# Patient Record
Sex: FEMALE | Race: ASIAN | NOT HISPANIC OR LATINO
[De-identification: names, ages, dates, MRNs, and addresses within clinical notes are randomized per-mention and may not be internally consistent; named-entity substitution may affect disease eponyms.]

---

## 2017-02-17 ENCOUNTER — APPOINTMENT (OUTPATIENT)
Dept: ORTHOPEDIC SURGERY | Facility: CLINIC | Age: 72
End: 2017-02-17

## 2017-02-17 VITALS — BODY MASS INDEX: 28.02 KG/M2 | WEIGHT: 139 LBS | HEIGHT: 59 IN

## 2017-02-17 VITALS — DIASTOLIC BLOOD PRESSURE: 76 MMHG | HEART RATE: 78 BPM | SYSTOLIC BLOOD PRESSURE: 136 MMHG

## 2017-02-17 DIAGNOSIS — Z78.9 OTHER SPECIFIED HEALTH STATUS: ICD-10-CM

## 2017-02-17 DIAGNOSIS — Z86.79 PERSONAL HISTORY OF OTHER DISEASES OF THE CIRCULATORY SYSTEM: ICD-10-CM

## 2017-02-17 DIAGNOSIS — Z96.652 PRESENCE OF LEFT ARTIFICIAL KNEE JOINT: ICD-10-CM

## 2017-02-17 DIAGNOSIS — Z82.61 FAMILY HISTORY OF ARTHRITIS: ICD-10-CM

## 2017-02-17 DIAGNOSIS — E78.00 PURE HYPERCHOLESTEROLEMIA, UNSPECIFIED: ICD-10-CM

## 2017-02-17 DIAGNOSIS — Z96.651 PRESENCE OF RIGHT ARTIFICIAL KNEE JOINT: ICD-10-CM

## 2017-02-17 DIAGNOSIS — Z86.39 PERSONAL HISTORY OF OTHER ENDOCRINE, NUTRITIONAL AND METABOLIC DISEASE: ICD-10-CM

## 2017-02-17 DIAGNOSIS — Z56.0 UNEMPLOYMENT, UNSPECIFIED: ICD-10-CM

## 2017-02-17 RX ORDER — CALCIUM ACETATE 667 MG/1
667 CAPSULE ORAL
Qty: 270 | Refills: 0 | Status: ACTIVE | COMMUNITY
Start: 2017-01-13

## 2017-02-17 RX ORDER — CLOBETASOL PROPIONATE 0.5 MG/G
0.05 CREAM TOPICAL
Qty: 60 | Refills: 0 | Status: ACTIVE | COMMUNITY
Start: 2016-10-04

## 2017-02-17 RX ORDER — ATORVASTATIN CALCIUM 80 MG/1
TABLET, FILM COATED ORAL
Refills: 0 | Status: ACTIVE | COMMUNITY

## 2017-02-17 RX ORDER — CLOPIDOGREL BISULFATE 300 MG/1
TABLET, FILM COATED ORAL
Refills: 0 | Status: ACTIVE | COMMUNITY

## 2017-02-17 RX ORDER — CALCITRIOL 0.5 UG/1
CAPSULE, LIQUID FILLED ORAL
Refills: 0 | Status: ACTIVE | COMMUNITY

## 2017-02-17 RX ORDER — EPOETIN ALFA 20000 [IU]/ML
SOLUTION INTRAVENOUS; SUBCUTANEOUS
Refills: 0 | Status: ACTIVE | COMMUNITY

## 2017-02-17 SDOH — ECONOMIC STABILITY - INCOME SECURITY: UNEMPLOYMENT, UNSPECIFIED: Z56.0

## 2018-08-01 ENCOUNTER — INPATIENT (INPATIENT)
Facility: HOSPITAL | Age: 73
LOS: 0 days | Discharge: HOME | End: 2018-08-02
Attending: INTERNAL MEDICINE | Admitting: INTERNAL MEDICINE
Payer: MEDICARE

## 2018-08-01 VITALS
DIASTOLIC BLOOD PRESSURE: 70 MMHG | RESPIRATION RATE: 20 BRPM | SYSTOLIC BLOOD PRESSURE: 155 MMHG | HEART RATE: 89 BPM | OXYGEN SATURATION: 99 % | TEMPERATURE: 99 F

## 2018-08-01 DIAGNOSIS — N18.9 CHRONIC KIDNEY DISEASE, UNSPECIFIED: ICD-10-CM

## 2018-08-01 DIAGNOSIS — N18.6 END STAGE RENAL DISEASE: ICD-10-CM

## 2018-08-01 DIAGNOSIS — Z95.1 PRESENCE OF AORTOCORONARY BYPASS GRAFT: Chronic | ICD-10-CM

## 2018-08-01 DIAGNOSIS — M25.50 PAIN IN UNSPECIFIED JOINT: ICD-10-CM

## 2018-08-01 DIAGNOSIS — D64.9 ANEMIA, UNSPECIFIED: ICD-10-CM

## 2018-08-01 DIAGNOSIS — I10 ESSENTIAL (PRIMARY) HYPERTENSION: ICD-10-CM

## 2018-08-01 LAB
ALBUMIN SERPL ELPH-MCNC: 2.7 G/DL — LOW (ref 3.3–5)
ALP SERPL-CCNC: 48 U/L — SIGNIFICANT CHANGE UP (ref 40–120)
ALT FLD-CCNC: 18 U/L — SIGNIFICANT CHANGE UP (ref 4–33)
ANISOCYTOSIS BLD QL: SLIGHT — SIGNIFICANT CHANGE UP
ANISOCYTOSIS BLD QL: SLIGHT — SIGNIFICANT CHANGE UP
APPEARANCE UR: SIGNIFICANT CHANGE UP
APTT BLD: 26.8 SEC — LOW (ref 27.5–37.4)
AST SERPL-CCNC: 17 U/L — SIGNIFICANT CHANGE UP (ref 4–32)
BASE EXCESS BLDV CALC-SCNC: 1.1 MMOL/L — SIGNIFICANT CHANGE UP
BASOPHILS # BLD AUTO: 0.02 K/UL — SIGNIFICANT CHANGE UP (ref 0–0.2)
BASOPHILS NFR BLD AUTO: 0.1 % — SIGNIFICANT CHANGE UP (ref 0–2)
BASOPHILS NFR SPEC: 0 % — SIGNIFICANT CHANGE UP (ref 0–2)
BASOPHILS NFR SPEC: 0 % — SIGNIFICANT CHANGE UP (ref 0–2)
BILIRUB SERPL-MCNC: < 0.2 MG/DL — LOW (ref 0.2–1.2)
BILIRUB UR-MCNC: NEGATIVE — SIGNIFICANT CHANGE UP
BLASTS # FLD: 0 % — SIGNIFICANT CHANGE UP (ref 0–0)
BLASTS # FLD: 0 % — SIGNIFICANT CHANGE UP (ref 0–0)
BLOOD GAS VENOUS - CREATININE: 7.11 MG/DL — HIGH (ref 0.5–1.3)
BLOOD UR QL VISUAL: HIGH
BUN SERPL-MCNC: 67 MG/DL — HIGH (ref 7–23)
CALCIUM SERPL-MCNC: 8.8 MG/DL — SIGNIFICANT CHANGE UP (ref 8.4–10.5)
CHLORIDE BLDV-SCNC: 100 MMOL/L — SIGNIFICANT CHANGE UP (ref 96–108)
CHLORIDE SERPL-SCNC: 95 MMOL/L — LOW (ref 98–107)
CO2 SERPL-SCNC: 24 MMOL/L — SIGNIFICANT CHANGE UP (ref 22–31)
COLOR SPEC: SIGNIFICANT CHANGE UP
CREAT SERPL-MCNC: 6.86 MG/DL — HIGH (ref 0.5–1.3)
CRP SERPL-MCNC: 77.2 MG/L — HIGH
EOSINOPHIL # BLD AUTO: 0.02 K/UL — SIGNIFICANT CHANGE UP (ref 0–0.5)
EOSINOPHIL NFR BLD AUTO: 0.1 % — SIGNIFICANT CHANGE UP (ref 0–6)
EOSINOPHIL NFR FLD: 0.9 % — SIGNIFICANT CHANGE UP (ref 0–6)
EOSINOPHIL NFR FLD: 0.9 % — SIGNIFICANT CHANGE UP (ref 0–6)
GAS PNL BLDV: 132 MMOL/L — LOW (ref 136–146)
GIANT PLATELETS BLD QL SMEAR: PRESENT — SIGNIFICANT CHANGE UP
GIANT PLATELETS BLD QL SMEAR: PRESENT — SIGNIFICANT CHANGE UP
GLUCOSE BLDC GLUCOMTR-MCNC: 230 MG/DL — HIGH (ref 70–99)
GLUCOSE BLDV-MCNC: 85 — SIGNIFICANT CHANGE UP (ref 70–99)
GLUCOSE SERPL-MCNC: 84 MG/DL — SIGNIFICANT CHANGE UP (ref 70–99)
GLUCOSE UR-MCNC: 50 — SIGNIFICANT CHANGE UP
HCO3 BLDV-SCNC: 25 MMOL/L — SIGNIFICANT CHANGE UP (ref 20–27)
HCT VFR BLD CALC: 33.4 % — LOW (ref 34.5–45)
HCT VFR BLD CALC: 33.4 % — LOW (ref 34.5–45)
HCT VFR BLDV CALC: 31.3 % — LOW (ref 34.5–45)
HGB BLD-MCNC: 10.3 G/DL — LOW (ref 11.5–15.5)
HGB BLD-MCNC: 10.3 G/DL — LOW (ref 11.5–15.5)
HGB BLDV-MCNC: 10.1 G/DL — LOW (ref 11.5–15.5)
IMM GRANULOCYTES # BLD AUTO: 0.12 # — SIGNIFICANT CHANGE UP
IMM GRANULOCYTES NFR BLD AUTO: 0.8 % — SIGNIFICANT CHANGE UP (ref 0–1.5)
INR BLD: 1.11 — SIGNIFICANT CHANGE UP (ref 0.88–1.17)
KETONES UR-MCNC: NEGATIVE — SIGNIFICANT CHANGE UP
LACTATE BLDV-MCNC: 1.4 MMOL/L — SIGNIFICANT CHANGE UP (ref 0.5–2)
LEUKOCYTE ESTERASE UR-ACNC: HIGH
LYMPHOCYTES # BLD AUTO: 1.4 K/UL — SIGNIFICANT CHANGE UP (ref 1–3.3)
LYMPHOCYTES # BLD AUTO: 9.6 % — LOW (ref 13–44)
LYMPHOCYTES NFR SPEC AUTO: 3.5 % — LOW (ref 13–44)
LYMPHOCYTES NFR SPEC AUTO: 3.5 % — LOW (ref 13–44)
MACROCYTES BLD QL: SIGNIFICANT CHANGE UP
MACROCYTES BLD QL: SIGNIFICANT CHANGE UP
MCHC RBC-ENTMCNC: 29.1 PG — SIGNIFICANT CHANGE UP (ref 27–34)
MCHC RBC-ENTMCNC: 29.1 PG — SIGNIFICANT CHANGE UP (ref 27–34)
MCHC RBC-ENTMCNC: 30.8 % — LOW (ref 32–36)
MCHC RBC-ENTMCNC: 30.8 % — LOW (ref 32–36)
MCV RBC AUTO: 94.4 FL — SIGNIFICANT CHANGE UP (ref 80–100)
MCV RBC AUTO: 94.4 FL — SIGNIFICANT CHANGE UP (ref 80–100)
METAMYELOCYTES # FLD: 0 % — SIGNIFICANT CHANGE UP (ref 0–1)
METAMYELOCYTES # FLD: 0 % — SIGNIFICANT CHANGE UP (ref 0–1)
MONOCYTES # BLD AUTO: 1.81 K/UL — HIGH (ref 0–0.9)
MONOCYTES NFR BLD AUTO: 12.5 % — SIGNIFICANT CHANGE UP (ref 2–14)
MONOCYTES NFR BLD: 7.8 % — SIGNIFICANT CHANGE UP (ref 2–9)
MONOCYTES NFR BLD: 7.8 % — SIGNIFICANT CHANGE UP (ref 2–9)
MUCOUS THREADS # UR AUTO: SIGNIFICANT CHANGE UP
MYELOCYTES NFR BLD: 0 % — SIGNIFICANT CHANGE UP (ref 0–0)
MYELOCYTES NFR BLD: 0 % — SIGNIFICANT CHANGE UP (ref 0–0)
NEUTROPHIL AB SER-ACNC: 86.9 % — HIGH (ref 43–77)
NEUTROPHIL AB SER-ACNC: 86.9 % — HIGH (ref 43–77)
NEUTROPHILS # BLD AUTO: 11.14 K/UL — HIGH (ref 1.8–7.4)
NEUTROPHILS NFR BLD AUTO: 76.9 % — SIGNIFICANT CHANGE UP (ref 43–77)
NEUTS BAND # BLD: 0.9 % — SIGNIFICANT CHANGE UP (ref 0–6)
NEUTS BAND # BLD: 0.9 % — SIGNIFICANT CHANGE UP (ref 0–6)
NITRITE UR-MCNC: NEGATIVE — SIGNIFICANT CHANGE UP
NON-SQ EPI CELLS # UR AUTO: 7 — SIGNIFICANT CHANGE UP
NRBC # FLD: 0.04 — SIGNIFICANT CHANGE UP
NRBC # FLD: 0.04 — SIGNIFICANT CHANGE UP
OTHER - HEMATOLOGY %: 0 — SIGNIFICANT CHANGE UP
OTHER - HEMATOLOGY %: 0 — SIGNIFICANT CHANGE UP
OVALOCYTES BLD QL SMEAR: SLIGHT — SIGNIFICANT CHANGE UP
OVALOCYTES BLD QL SMEAR: SLIGHT — SIGNIFICANT CHANGE UP
PCO2 BLDV: 40 MMHG — LOW (ref 41–51)
PH BLDV: 7.41 PH — SIGNIFICANT CHANGE UP (ref 7.32–7.43)
PH UR: 7 — SIGNIFICANT CHANGE UP (ref 4.6–8)
PLATELET # BLD AUTO: 282 K/UL — SIGNIFICANT CHANGE UP (ref 150–400)
PLATELET # BLD AUTO: 282 K/UL — SIGNIFICANT CHANGE UP (ref 150–400)
PLATELET COUNT - ESTIMATE: NORMAL — SIGNIFICANT CHANGE UP
PLATELET COUNT - ESTIMATE: NORMAL — SIGNIFICANT CHANGE UP
PMV BLD: 9.7 FL — SIGNIFICANT CHANGE UP (ref 7–13)
PMV BLD: 9.7 FL — SIGNIFICANT CHANGE UP (ref 7–13)
PO2 BLDV: 54 MMHG — HIGH (ref 35–40)
POIKILOCYTOSIS BLD QL AUTO: SLIGHT — SIGNIFICANT CHANGE UP
POIKILOCYTOSIS BLD QL AUTO: SLIGHT — SIGNIFICANT CHANGE UP
POLYCHROMASIA BLD QL SMEAR: SIGNIFICANT CHANGE UP
POLYCHROMASIA BLD QL SMEAR: SIGNIFICANT CHANGE UP
POTASSIUM BLDV-SCNC: 3.3 MMOL/L — LOW (ref 3.4–4.5)
POTASSIUM SERPL-MCNC: 3.8 MMOL/L — SIGNIFICANT CHANGE UP (ref 3.5–5.3)
POTASSIUM SERPL-SCNC: 3.8 MMOL/L — SIGNIFICANT CHANGE UP (ref 3.5–5.3)
PROMYELOCYTES # FLD: 0 % — SIGNIFICANT CHANGE UP (ref 0–0)
PROMYELOCYTES # FLD: 0 % — SIGNIFICANT CHANGE UP (ref 0–0)
PROT SERPL-MCNC: 5.5 G/DL — LOW (ref 6–8.3)
PROT UR-MCNC: 500 MG/DL — HIGH
PROTHROM AB SERPL-ACNC: 12.4 SEC — SIGNIFICANT CHANGE UP (ref 9.8–13.1)
RBC # BLD: 3.54 M/UL — LOW (ref 3.8–5.2)
RBC # BLD: 3.54 M/UL — LOW (ref 3.8–5.2)
RBC # FLD: 20.2 % — HIGH (ref 10.3–14.5)
RBC # FLD: 20.2 % — HIGH (ref 10.3–14.5)
RBC CASTS # UR COMP ASSIST: SIGNIFICANT CHANGE UP (ref 0–?)
SAO2 % BLDV: 83.7 % — SIGNIFICANT CHANGE UP (ref 60–85)
SODIUM SERPL-SCNC: 137 MMOL/L — SIGNIFICANT CHANGE UP (ref 135–145)
SP GR SPEC: 1.01 — SIGNIFICANT CHANGE UP (ref 1–1.04)
SQUAMOUS # UR AUTO: SIGNIFICANT CHANGE UP
UROBILINOGEN FLD QL: NORMAL MG/DL — SIGNIFICANT CHANGE UP
VARIANT LYMPHS # BLD: 0 % — SIGNIFICANT CHANGE UP
VARIANT LYMPHS # BLD: 0 % — SIGNIFICANT CHANGE UP
WBC # BLD: 14.45 K/UL — HIGH (ref 3.8–10.5)
WBC # BLD: 14.45 K/UL — HIGH (ref 3.8–10.5)
WBC # FLD AUTO: 14.45 K/UL — HIGH (ref 3.8–10.5)
WBC # FLD AUTO: 14.45 K/UL — HIGH (ref 3.8–10.5)
WBC UR QL: >50 — HIGH (ref 0–?)

## 2018-08-01 PROCEDURE — 73110 X-RAY EXAM OF WRIST: CPT | Mod: 26,LT

## 2018-08-01 PROCEDURE — 71045 X-RAY EXAM CHEST 1 VIEW: CPT | Mod: 26

## 2018-08-01 PROCEDURE — 99223 1ST HOSP IP/OBS HIGH 75: CPT | Mod: GC

## 2018-08-01 PROCEDURE — 73610 X-RAY EXAM OF ANKLE: CPT | Mod: 26,LT

## 2018-08-01 PROCEDURE — 99222 1ST HOSP IP/OBS MODERATE 55: CPT | Mod: GC

## 2018-08-01 RX ORDER — GENTAMICIN SULFATE 0.1 %
1 OINTMENT (GRAM) TOPICAL
Qty: 0 | Refills: 0 | Status: DISCONTINUED | OUTPATIENT
Start: 2018-08-01 | End: 2018-08-02

## 2018-08-01 RX ORDER — DEXTROSE 50 % IN WATER 50 %
12.5 SYRINGE (ML) INTRAVENOUS ONCE
Qty: 0 | Refills: 0 | Status: DISCONTINUED | OUTPATIENT
Start: 2018-08-01 | End: 2018-08-02

## 2018-08-01 RX ORDER — INSULIN LISPRO 100/ML
VIAL (ML) SUBCUTANEOUS
Qty: 0 | Refills: 0 | Status: DISCONTINUED | OUTPATIENT
Start: 2018-08-01 | End: 2018-08-02

## 2018-08-01 RX ORDER — GLUCAGON INJECTION, SOLUTION 0.5 MG/.1ML
1 INJECTION, SOLUTION SUBCUTANEOUS ONCE
Qty: 0 | Refills: 0 | Status: DISCONTINUED | OUTPATIENT
Start: 2018-08-01 | End: 2018-08-02

## 2018-08-01 RX ORDER — DEXTROSE 50 % IN WATER 50 %
25 SYRINGE (ML) INTRAVENOUS ONCE
Qty: 0 | Refills: 0 | Status: DISCONTINUED | OUTPATIENT
Start: 2018-08-01 | End: 2018-08-02

## 2018-08-01 RX ORDER — DEXTROSE 50 % IN WATER 50 %
15 SYRINGE (ML) INTRAVENOUS ONCE
Qty: 0 | Refills: 0 | Status: DISCONTINUED | OUTPATIENT
Start: 2018-08-01 | End: 2018-08-02

## 2018-08-01 RX ORDER — INSULIN LISPRO 100/ML
VIAL (ML) SUBCUTANEOUS AT BEDTIME
Qty: 0 | Refills: 0 | Status: DISCONTINUED | OUTPATIENT
Start: 2018-08-01 | End: 2018-08-02

## 2018-08-01 RX ORDER — OXYCODONE AND ACETAMINOPHEN 5; 325 MG/1; MG/1
1 TABLET ORAL ONCE
Qty: 0 | Refills: 0 | Status: DISCONTINUED | OUTPATIENT
Start: 2018-08-01 | End: 2018-08-01

## 2018-08-01 RX ORDER — SODIUM CHLORIDE 9 MG/ML
1000 INJECTION, SOLUTION INTRAVENOUS
Qty: 0 | Refills: 0 | Status: DISCONTINUED | OUTPATIENT
Start: 2018-08-01 | End: 2018-08-02

## 2018-08-01 RX ADMIN — OXYCODONE AND ACETAMINOPHEN 1 TABLET(S): 5; 325 TABLET ORAL at 10:36

## 2018-08-01 RX ADMIN — Medication 15 MILLIGRAM(S): at 22:51

## 2018-08-01 RX ADMIN — Medication 1 APPLICATION(S): at 18:29

## 2018-08-01 RX ADMIN — OXYCODONE AND ACETAMINOPHEN 1 TABLET(S): 5; 325 TABLET ORAL at 09:22

## 2018-08-01 NOTE — CONSULT NOTE ADULT - SUBJECTIVE AND OBJECTIVE BOX
[facility] [place] [facility]OLOGY [facility]ACT[facility]   MD ROSITA APODACA MD ANGELA WONG, PA    TEL:  OFFICE: [phone]  DR SCOTT CELL: [phone]  DR. JOHNSON CELL: [phone]  KESHA HOUSTON CELL: [phone]      HPI:  72 y/o female with PMH ESRD on peritoneal dialysis, HTN, HLD, DM, hypothyroid, CAD presenting with body pain. Pt states that she has been having body pain for the last two months, started in shoulder, went to back, now in her left hand and left ankle, with pain migrating to right ankle this AM, which is why she came in today. Cannot ambulate due to pain, can not do PD exchange sec to pain. Patient went to PCP for her joint pains and he started her on Prednisone for presumed rheumatoid arthritis, per patient. Endorses episode of N/V/D 2 weeks ago for which she was prescribed amoxicillin, which has since resolved. Endorses subjective fever and chills. Denies chest pain, SOB, current N/V/D, abdominal pain. No recent travel or sick contacts    Allergies:  Cipro (Unknown)  Diovan (Unknown)      PAST MEDICAL & SURGICAL HISTORY:  ESRD on peritoneal dialysis  Acid reflux  Hypertension  Dyslipidemia  Diabetes mellitus  CAD (coronary artery disease)  S/P CABG (coronary artery bypass graft): in   H/O: hysterectomy  History of total knee replacement b/l  After cataract, bilateral      Home Medications Reviewed    Hospital Medications:   MEDICATIONS  (STANDING):      SOCIAL HISTORY:  Denies ETOh, Smoking,     FAMILY HISTORY:      REVIEW OF SYSTEMS:  CONSTITUTIONAL: No weakness, fevers or chills  EYES/ENT: No visual changes;  No vertigo or throat pain   NECK: No pain or stiffness  RESPIRATORY: No cough, wheezing, hemoptysis; No shortness of breath  CARDIOVASCULAR: No chest pain or palpitations.  GASTROINTESTINAL: No abdominal or epigastric pain. No nausea, vomiting, or hematemesis; No diarrhea or constipation. No melena or hematochezia.  GENITOURINARY: No dysuria, frequency, foamy urine, urinary urgency, incontinence or hematuria  NEUROLOGICAL: No numbness or weakness  MUSCULOSKELETAL: SEE hpi  SKIN: No itching, burning, rashes, or lesions   VASCULAR: No bilateral lower extremity edema.   All other review of systems is negative unless indicated above.    VITALS:  T(F): 100 (18 @ 08:39), Max: 100 (18 @ 08:39)  HR: 78 (18 @ 08:39)  BP: 166/58 (18 @ 08:39)  RR: 16 (18 @ 08:39)  SpO2: 99% (18 @ 08:39)  Wt(kg): --        PHYSICAL EXAM:  Constitutional: NAD  HEENT: anicteric sclera, oropharynx clear, MMM  Neck: No JVD  Respiratory: CTAB, no wheezes, rales or rhonchi  Cardiovascular: S1, S2, RRR  Gastrointestinal: BS+, soft, NT/ND  Extremities: left ankle and wrist tenderness, left wrist in wrist guard. No peripheral edema  Neurological: A/O x 3, no focal deficits  Psychiatric: Normal mood, normal affect  : No CVA tenderness. No dyson.   Skin: No rashes  Vascular Access: +PD catheter in place    LABS:      137  |  95<L>  |  67<H>  ----------------------------<  84  3.8   |  24  |  6.86<H>    Ca    8.8      01 Aug 2018 09:07    TPro  5.5<L>  /  Alb  2.7<L>  /  TBili  < 0.2<L>  /  DBili      /  AST  17  /  ALT  18  /  AlkPhos  48      Creatinine Trend: 6.86 <--                        10.3   14.45 )-----------( 282      ( 01 Aug 2018 09:07 )             33.4     Urine Studies:  Urinalysis Basic - ( 01 Aug 2018 10:45 )    Color: PLYEL / Appearance: HAZY / S.011 / pH: 7.0  Gluc: 50 / Ketone: NEGATIVE  / Bili: NEGATIVE / Urobili: NORMAL mg/dL   Blood: TRACE / Protein: 500 mg/dL / Nitrite: NEGATIVE   Leuk Esterase: LARGE / RBC: 2-5 / WBC >50   Sq Epi: OCC / Non Sq Epi:  / Bacteria:           RADIOLOGY & ADDITIONAL STUDIES: [facility] [place] [facility]ACT[facility]   MD ROSITA APODACA MD ANGELA WONG, PA    TEL:  OFFICE: [phone]  DR SCOTT CELL: [phone]  DR. JOHNSON CELL: [phone]  KESHA HOUSTON CELL: [phone]      HPI:  74 y/o female with PMH ESRD on peritoneal dialysis, HTN, HLD, DM, hypothyroid, CAD presenting with body pain. Patient went to PCP for her joint pains and he started her on Prednisone for presumed rheumatoid arthritis, per patient. Endorses episode of N/V/D 2 weeks ago for which she was prescribed amoxicillin, which has since resolved. Endorses subjective fever and chills.    Nephrology consulted for ESRD management.  PT goes to [facility], has been on PD for 2 yrs. Pt home PD prescription is ( PD cycler for 9.5 hours, with 4 exchanges of alternating 1.5% and 2.5%). No Midday exchange. Denies discharge from PD catheter, or cloudy fluid.   Pt states she is also on epogen , q 2 weeks. last dose was last week.   Denies chest pain, SOB, current N/V/D, abdominal pain. No recent travel or sick contacts    Allergies:  Cipro (Unknown)  Diovan (Unknown)      PAST MEDICAL & SURGICAL HISTORY:  ESRD on peritoneal dialysis  Acid reflux  Hypertension  Dyslipidemia  Diabetes mellitus  CAD (coronary artery disease)  S/P CABG (coronary artery bypass graft): in   H/O: hysterectomy  History of total knee replacement b/l  After cataract, bilateral      Home Medications Reviewed    Hospital Medications:   MEDICATIONS  (STANDING):      SOCIAL HISTORY:  Denies ETOh, Smoking,     FAMILY HISTORY:      REVIEW OF SYSTEMS:  CONSTITUTIONAL: No weakness, fevers or chills  EYES/ENT: No visual changes;  No vertigo or throat pain   NECK: No pain or stiffness  RESPIRATORY: No cough, wheezing, hemoptysis; No shortness of breath  CARDIOVASCULAR: No chest pain or palpitations.  GASTROINTESTINAL: No abdominal or epigastric pain. No nausea, vomiting, or hematemesis; No diarrhea or constipation. No melena or hematochezia.  GENITOURINARY: No dysuria, frequency, foamy urine, urinary urgency, incontinence or hematuria  NEUROLOGICAL: No numbness or weakness  MUSCULOSKELETAL: SEE hpi  SKIN: No itching, burning, rashes, or lesions   VASCULAR: No bilateral lower extremity edema.   All other review of systems is negative unless indicated above.    VITALS:  T(F): 100 (18 @ 08:39), Max: 100 (18 @ 08:39)  HR: 78 (18 @ 08:39)  BP: 166/58 (18 @ 08:39)  RR: 16 (18 @ 08:39)  SpO2: 99% (18 @ 08:39)  Wt(kg): --        PHYSICAL EXAM:  Constitutional: NAD  HEENT: anicteric sclera, oropharynx clear, MMM  Neck: No JVD  Respiratory: CTAB, no wheezes, rales or rhonchi  Cardiovascular: S1, S2, RRR  Gastrointestinal: BS+, soft, NT/ND  Extremities: left ankle and wrist tenderness, left wrist in wrist guard. No peripheral edema  Neurological: A/O x 3, no focal deficits  Psychiatric: Normal mood, normal affect  : No CVA tenderness. No dyson.   Skin: No rashes  Vascular Access: +PD catheter in place, clean, no discharge     LABS:      137  |  95<L>  |  67<H>  ----------------------------<  84  3.8   |  24  |  6.86<H>    Ca    8.8      01 Aug 2018 09:07    TPro  5.5<L>  /  Alb  2.7<L>  /  TBili  < 0.2<L>  /  DBili      /  AST  17  /  ALT  18  /  AlkPhos  48      Creatinine Trend: 6.86 <--                        10.3   14.45 )-----------( 282      ( 01 Aug 2018 09:07 )             33.4     Urine Studies:  Urinalysis Basic - ( 01 Aug 2018 10:45 )    Color: PLYEL / Appearance: HAZY / S.011 / pH: 7.0  Gluc: 50 / Ketone: NEGATIVE  / Bili: NEGATIVE / Urobili: NORMAL mg/dL   Blood: TRACE / Protein: 500 mg/dL / Nitrite: NEGATIVE   Leuk Esterase: LARGE / RBC: 2-5 / WBC >50   Sq Epi: OCC / Non Sq Epi:  / Bacteria:           RADIOLOGY & ADDITIONAL STUDIES: [facility] [place] [facility]OLOGY PRACT[facility]   MD ROSITA APODACA MD ANGELA WONG, PA    TEL:  OFFICE: [phone]  DR SCOTT CELL: [phone]  DR. JOHNSON CELL: [phone]  KESHA HOUSTON CELL: [phone]      HPI:  74 y/o female with PMH ESRD on peritoneal dialysis, HTN, HLD, DM, hypothyroid, CAD presenting with body pain. Patient went to PCP for her joint pains and he started her on Prednisone for presumed rheumatoid arthritis, per patient. Endorses episode of N/V/D 2 weeks ago for which she was prescribed amoxicillin, which has since resolved. Endorses subjective fever and chills.    Nephrology consulted for ESRD management.  PT goes to [facility], has been on PD for 2 yrs. Pt home PD prescription is ( PD cycler for 9.5 hours, with 4 exchanges of alternating 1.5% and 2.5%). No Midday exchange. Denies discharge from PD catheter, or cloudy fluid.   Pt states she is also on epogen , q 2 weeks. last dose was last week.   Denies chest pain, SOB, current N/V/D, abdominal pain. No recent travel or sick contacts    Allergies:  Cipro (Unknown)  Diovan (Unknown)      PAST MEDICAL & SURGICAL HISTORY:  ESRD on peritoneal dialysis  Acid reflux  Hypertension  Dyslipidemia  Diabetes mellitus  CAD (coronary artery disease)  S/P CABG (coronary artery bypass graft): in   H/O: hysterectomy  History of total knee replacement b/l  After cataract, bilateral      Home Medications Reviewed    Hospital Medications:   MEDICATIONS  (STANDING):      SOCIAL HISTORY:  Denies ETOh, Smoking,     FAMILY HISTORY:      REVIEW OF SYSTEMS:  CONSTITUTIONAL: No weakness, fevers or chills  EYES/ENT: No visual changes;  No vertigo or throat pain   NECK: No pain or stiffness  RESPIRATORY: No cough, wheezing, hemoptysis; No shortness of breath  CARDIOVASCULAR: No chest pain or palpitations.  GASTROINTESTINAL: No abdominal or epigastric pain. No nausea, vomiting, or hematemesis; No diarrhea or constipation. No melena or hematochezia.  GENITOURINARY: No dysuria, frequency, foamy urine, urinary urgency, incontinence or hematuria  NEUROLOGICAL: No numbness or weakness  MUSCULOSKELETAL: SEE hpi  SKIN: No itching, burning, rashes, or lesions   VASCULAR: No bilateral lower extremity edema.   All other review of systems is negative unless indicated above.    VITALS:  T(F): 100 (18 @ 08:39), Max: 100 (18 @ 08:39)  HR: 78 (18 @ 08:39)  BP: 166/58 (18 @ 08:39)  RR: 16 (18 @ 08:39)  SpO2: 99% (18 @ 08:39)  Wt(kg): --        PHYSICAL EXAM:  Constitutional: NAD  HEENT: anicteric sclera, oropharynx clear, MMM  Neck: No JVD  Respiratory: CTAB, no wheezes, rales or rhonchi  Cardiovascular: S1, S2, RRR  Gastrointestinal: BS+, soft, NT/ND  Extremities: left ankle and wrist tenderness, left wrist in wrist guard. No peripheral edema  Neurological: A/O x 3, no focal deficits  Psychiatric: Normal mood, normal affect  : No CVA tenderness. No dyson.   Skin: No rashes  Vascular Access: +PD catheter in place, clean, no discharge     LABS:      137  |  95<L>  |  67<H>  ----------------------------<  84  3.8   |  24  |  6.86<H>    Ca    8.8      01 Aug 2018 09:07    TPro  5.5<L>  /  Alb  2.7<L>  /  TBili  < 0.2<L>  /  DBili      /  AST  17  /  ALT  18  /  AlkPhos  48      Creatinine Trend: 6.86 <--                        10.3   14.45 )-----------( 282      ( 01 Aug 2018 09:07 )             33.4     Urine Studies:  Urinalysis Basic - ( 01 Aug 2018 10:45 )    Color: PLYEL / Appearance: HAZY / S.011 / pH: 7.0  Gluc: 50 / Ketone: NEGATIVE  / Bili: NEGATIVE / Urobili: NORMAL mg/dL   Blood: TRACE / Protein: 500 mg/dL / Nitrite: NEGATIVE   Leuk Esterase: LARGE / RBC: 2-5 / WBC >50   Sq Epi: OCC / Non Sq Epi:  / Bacteria:           RADIOLOGY & ADDITIONAL STUDIES:

## 2018-08-01 NOTE — ED ADULT NURSE NOTE - CARDIO ASSESSMENT
Outgoing fax to patients employer (Disability Claims Office [phone]). [facility] paperwork sent; confirmation received.    ---

## 2018-08-01 NOTE — H&P ADULT - PSH
After cataract, bilateral    H/O: hysterectomy    History of total knee replacement b/l    S/P CABG (coronary artery bypass graft)  in 2012

## 2018-08-01 NOTE — H&P ADULT - HISTORY OF PRESENT ILLNESS
72 y/o female with PMH ESRD on peritoneal dialysis, HTN, HLD, DM, hypothyroid, CAD presenting with body pain. Patient went to PCP for her joint pains and he started her on Prednisone for presumed rheumatoid arthritis, per patient. Pt states that she has been having body pain for the last two months, started in shoulder, went to back, now in her left hand and left ankle, with pain migrating to right ankle this AM, which is why she came in today. Cannot ambulate due to painEndorses episode of N/V/D 2 weeks ago for which she was prescribed amoxicillin, which has since resolved. Endorses subjective fever and chills. pt denies recent tick bites

## 2018-08-01 NOTE — ED PROVIDER NOTE - OBJECTIVE STATEMENT
Pt is a 74 y/o female with PMH ESRD on peritoneal dialysis, HTN, HLD, DM, hypothyroid, CAD presenting with body pain. Pt states that she has been having body pain for the last two months, started in shoulder, went to back, now in her left hand and left leg, with pain migrating to right leg this AM, which is why she came in today. Patient went to PCP for pain and he started her on Prednisone for presumed rheumatoid arthritis, per patient. Of note, patient was in too much pain yesterday to perform her peritoneal dialysis overnight. Pt is a 72 y/o female with PMH ESRD on peritoneal dialysis, HTN, HLD, DM, hypothyroid, CAD presenting with body pain. Pt states that she has been having body pain for the last two months, started in shoulder, went to back, now in her left hand and left ankle, with pain migrating to right ankle this AM, which is why she came in today. Cannot ambulate due to pain. Patient went to PCP for her joint pains and he started her on Prednisone for presumed rheumatoid arthritis, per patient. Of note, patient was in too much pain yesterday to perform her peritoneal dialysis overnight. Endorses episode of N/V/D 2 weeks ago for which she was prescribed amoxicillin, which has since resolved. Endorses subjective fever and chills. Denies chest pain, SOB, current N/V/D, abdominal pain. Pt is a 72 y/o female with PMH ESRD on peritoneal dialysis, HTN, HLD, DM, hypothyroid, CAD presenting with body pain. Pt states that she has been having body pain for the last two months, started in shoulder, went to back, now in her left hand and left ankle, with pain migrating to right ankle this AM, which is why she came in today. Cannot ambulate due to pain. Patient went to PCP for her joint pains and he started her on Prednisone for presumed rheumatoid arthritis, per patient. Of note, patient was in too much pain yesterday to perform her peritoneal dialysis overnight. Endorses episode of N/V/D 2 weeks ago for which she was prescribed amoxicillin, which has since resolved. Endorses subjective fever and chills. Denies chest pain, SOB, current N/V/D, abdominal pain. No recent travel or sick contacts.

## 2018-08-01 NOTE — CONSULT NOTE ADULT - SUBJECTIVE AND OBJECTIVE BOX
Patient is a 73y old  Female who presents with a chief complaint of fever and joint pain     HPI: 74 y/o F with PMH as below presented for pain in both shoulder joints for 2 months, swelling in the left wrist and pain in hands since 2 weeks and swelling of left ankle joint since 1 week and fever and chills for 2 days.   Denied weight loss, appetite loss, cough, SOB, chest pain, dysuria.   Denied h/o autoimmune disorders in family.   Born in [facility] [place], migrated to [place] years ago, last visited [place] 2 years ago, no recent travel elsewhere.   [place] in [facility], [place]/ no tick bites.   Endorses recent abdominal infection 2 weeks ago for which she was prescribed antibiotics.   Has a pet bird.       PAST MEDICAL & SURGICAL HISTORY:  ESRD on peritoneal dialysis  Acid reflux  Hypertension  Dyslipidemia  Diabetes mellitus  CAD (coronary artery disease)  S/P CABG (coronary artery bypass graft): in   H/O: hysterectomy  History of total knee replacement b/l  After cataract, bilateral      Allergies  Cipro (Unknown)  Diovan (Unknown)        ANTIMICROBIALS:      OTHER MEDS: MEDICATIONS  (STANDING):      SOCIAL HISTORY:       FAMILY HISTORY:      REVIEW OF SYSTEMS  [  ] ROS unobtainable because:    [x  ] All other systems negative except as noted below:	    Constitutional:  [ ] fever [ ] chills  [ ] weight loss  [ ] weakness  Skin:  [ ] rash [ ] phlebitis	  Eyes: [ ] icterus [ ] pain  [ ] discharge	  ENMT: [ ] sore throat  [ ] thrush [ ] ulcers [ ] exudates  Respiratory: [ ] dyspnea [ ] hemoptysis [ ] cough [ ] sputum	  Cardiovascular:  [ ] chest pain [ ] palpitations [ ] edema	  Gastrointestinal:  [ ] nausea [ ] vomiting [ ] diarrhea [ ] constipation [ ] pain	  Genitourinary:  [ ] dysuria [ ] frequency [ ] hematuria [ ] discharge [ ] flank pain  [ ] incontinence  Musculoskeletal:  [ ] myalgias [x ] arthralgias [ ] arthritis  [ ] back pain  Neurological:  [ ] headache [ ] seizures  [ ] confusion/altered mental status  Psychiatric:  [ ] anxiety [ ] depression	  Hematology/Lymphatics:  [ ] lymphadenopathy  Endocrine:  [ ] adrenal [ ] thyroid  Allergic/Immunologic:	 [ ] transplant [ ] seasonal    Vital Signs Last 24 Hrs  T(F): 98.4 (18 @ 17:30), Max: 100 (18 @ 08:39)    Vital Signs Last 24 Hrs  HR: 75 (18 @ 17:30) (75 - 89)  BP: 164/75 (18 @ 17:30) (155/70 - 166/89)  RR: 16 (18 @ 17:30)  SpO2: 99% (18 @ 08:39) (99% - 99%)  Wt(kg): --    PHYSICAL EXAM:  General: non-toxic, awake and alert   HEAD/EYES: anicteric, PERRL  ENT:  supple  Cardiovascular:   S1, S2  Respiratory:  clear bilaterally  GI:  soft, non-tender, normal bowel sounds  :  no CVA tenderness   Musculoskeletal:  Left wrist and left ankle swelling  warmth, tenderness present   Neurologic:  grossly non-focal  Skin:  no rash  Lymph: no lymphadenopathy  Psychiatric:  appropriate affect  Vascular:  no phlebitis                                10.3   14.45 )-----------( 282      ( 01 Aug 2018 09:07 )             33.4       08-    137  |  95<L>  |  67<H>  ----------------------------<  84  3.8   |  24  |  6.86<H>    Ca    8.8      01 Aug 2018 09:07    TPro  5.5<L>  /  Alb  2.7<L>  /  TBili  < 0.2<L>  /  DBili  x   /  AST  17  /  ALT  18  /  AlkPhos  48  08-      Urinalysis Basic - ( 01 Aug 2018 10:45 )    Color: PLYEL / Appearance: HAZY / S.011 / pH: 7.0  Gluc: 50 / Ketone: NEGATIVE  / Bili: NEGATIVE / Urobili: NORMAL mg/dL   Blood: TRACE / Protein: 500 mg/dL / Nitrite: NEGATIVE   Leuk Esterase: LARGE / RBC: 2-5 / WBC >50   Sq Epi: OCC / Non Sq Epi: x / Bacteria: x        MICROBIOLOGY:          v            RADIOLOGY:  imaging below personally reviewed

## 2018-08-01 NOTE — CONSULT NOTE ADULT - SUBJECTIVE AND OBJECTIVE BOX
[id number]    HISTORY OF PRESENT ILLNESS:    Patient is a 73 year old with hx of DM, HTN, CAD, ESRD on PD who presents for 2 weeks of L wrist pain and 1 week of L ankle pain.    Patient stated that 2 weeks ago she developed L wrist pain and swelling. She went to her primary doctor who prescribed her 4mg prednisone which she took for 1 week. Her symptoms improved on the medications. After the medication was completed, her wrist pain worsened and she started to develop L ankle pain. Her primary gave her prednisone 10mg, which she took for 1 day and stopped because it gave her an upset stomach. She also tried icing her joints along with biofreeze, neither alleviated her symptoms. She denied any recent illness, sick contacts, fevers, chills, rashes, headaches, vision loss, kidney stones, denied a hx of gout.    Patient had b/l shoulder pain 2 months ago, was referred to Dr. Eduardo who performed b/l shoulder injections which did not help. She was also given gabapentin for her pain but only took 1 dose because of drowsiness. She stated that she did PT which did help her shoulder pains. Has a hx of b/l knee OA and had b/l knee replacements about 10 yrs ago.    PAST MEDICAL & SURGICAL HISTORY:  ESRD on peritoneal dialysis  Acid reflux  Hypertension  Dyslipidemia  Diabetes mellitus  CAD (coronary artery disease)  S/P CABG (coronary artery bypass graft): in   H/O: hysterectomy  History of total knee replacement b/l  After cataract, bilateral      Review of Systems:  Gen:  No fevers/chills, weight loss  HEENT: No blurry vision, no difficulty swallowing, no oral or nasal ulcers  CVS: No chest pain/palpitations  Resp: No SOB/wheezing  GI: No N/V/C/D/abdominal pain  MSK: b/l shoulder pain, L wrist pain, L ankle pain  Skin: No new rashes  Neuro: No headaches    MEDICATIONS  (STANDING):  dextrose 5%. 1000 milliLiter(s) (50 mL/Hr) IV Continuous <Continuous>  dextrose 50% Injectable 12.5 Gram(s) IV Push once  dextrose 50% Injectable 25 Gram(s) IV Push once  dextrose 50% Injectable 25 Gram(s) IV Push once  gentamicin 0.1% Cream 1 Application(s) Topical <User Schedule>  insulin lispro (HumaLOG) corrective regimen sliding scale   SubCutaneous three times a day before meals  insulin lispro (HumaLOG) corrective regimen sliding scale   SubCutaneous at bedtime  methylPREDNISolone sodium succinate Injectable 15 milliGRAM(s) IV Push once    MEDICATIONS  (PRN):  dextrose 40% Gel 15 Gram(s) Oral once PRN Blood Glucose LESS THAN 70 milliGRAM(s)/deciliter  glucagon  Injectable 1 milliGRAM(s) IntraMuscular once PRN Glucose LESS THAN 70 milligrams/deciliter      Allergies    Cipro (Unknown)  Diovan (Unknown)    Intolerances        PERTINENT MEDICATION HISTORY:    SOCIAL HISTORY:  OCCUPATION:  TRAVEL HISTORY:    FAMILY HISTORY:      Vital Signs Last 24 Hrs  T(C): 36.9 (01 Aug 2018 17:30), Max: 37.8 (01 Aug 2018 08:39)  T(F): 98.4 (01 Aug 2018 17:30), Max: 100 (01 Aug 2018 08:39)  HR: 75 (01 Aug 2018 17:30) (75 - 89)  BP: 164/75 (01 Aug 2018 17:30) (155/70 - 166/89)  BP(mean): --  RR: 16 (01 Aug 2018 17:30) (16 - 20)  SpO2: 99% (01 Aug 2018 08:39) (99% - 99%)    Physical Exam:  General: No apparent distress  HEENT: EOMI, MMM  CVS: +S1/S2, RRR, no murmurs/rubs/gallops  Resp: CTA b/l. No crackles/wheezing  GI: Soft, NT/ND +BS  MSK: L wrist swelling, L ankle medial malleolus tenderness, b/l surgical scars on knees  Neuro: AAOx3  Skin: no visible rashes    LABS:                        10.3   14.45 )-----------( 282      ( 01 Aug 2018 09:07 )             33.4     08-01    137  |  95<L>  |  67<H>  ----------------------------<  84  3.8   |  24  |  6.86<H>    Ca    8.8      01 Aug 2018 09:07    TPro  5.5<L>  /  Alb  2.7<L>  /  TBili  < 0.2<L>  /  DBili  x   /  AST  17  /  ALT  18  /  AlkPhos  48      PT/INR - ( 01 Aug 2018 09:07 )   PT: 12.4 SEC;   INR: 1.11          PTT - ( 01 Aug 2018 09:07 )  PTT:26.8 SEC  Urinalysis Basic - ( 01 Aug 2018 10:45 )    Color: PLYEL / Appearance: HAZY / S.011 / pH: 7.0  Gluc: 50 / Ketone: NEGATIVE  / Bili: NEGATIVE / Urobili: NORMAL mg/dL   Blood: TRACE / Protein: 500 mg/dL / Nitrite: NEGATIVE   Leuk Esterase: LARGE / RBC: 2-5 / WBC >50   Sq Epi: OCC / Non Sq Epi: x / Bacteria: x        RADIOLOGY & ADDITIONAL STUDIES:

## 2018-08-01 NOTE — CHART NOTE - NSCHARTNOTEFT_GEN_A_CORE
ADS NIGHT COVERAGE:    s/w pt's son [phone] in regards to home medications. I was told pt's daughter has list of medications. Attempted to reach daughter 2x at [phone]. Voicemail left, awaiting call back. Vitals stable. Will continue to monitor BP.     TRAVIS Lake ADS PA-C  [id number]

## 2018-08-01 NOTE — ED ADULT NURSE NOTE - NSIMPLEMENTINTERV_GEN_ALL_ED
Implemented All Fall with Harm Risk Interventions:  [place] to call system. Call bell, personal items and telephone within reach. Instruct patient to call for assistance. Room bathroom lighting operational. Non-slip footwear when patient is off stretcher. Physically safe environment: no spills, clutter or unnecessary equipment. Stretcher in lowest position, wheels locked, appropriate side rails in place. Provide visual cue, wrist band, yellow gown, etc. Monitor gait and stability. Monitor for mental status changes and reorient to person, place, and time. Review medications for side effects contributing to fall risk. Reinforce activity limits and safety measures with patient and family. Provide visual clues: red socks.

## 2018-08-01 NOTE — ED ADULT TRIAGE NOTE - CHIEF COMPLAINT QUOTE
pt c/o generalized body pain and joint pain for past two months worse for past few days. pt has seen her PMD for the same and is getting work up for the same. c/o severe pain to left arm and left foot.  pt says she called the ambulance as she can't take the pain any more. Pt does peritoneal dialysis at home, PMH of DM, HTN, GERD, hypothyroid,

## 2018-08-01 NOTE — H&P ADULT - ASSESSMENT
pt with above history p/w multiple joint pains pt with above history p/w multiple joint pains    - evaluated by rheum - trail of steroids    Dm2- iss    ESRD - PD AS SCHEDULED

## 2018-08-01 NOTE — ED PROVIDER NOTE - PROGRESS NOTE DETAILS
GUIDO note kelle: 72yo female with multiple joint pain and swelling, left shoulder, left wrist, left ankle, currently undergoing workup for possible RA on low dose prednisone. Over last week worsening symptoms and now unable to walk 2/2 pain. Pt well appearing, small effusion and warmth left ankle and wrist, range of motion limited 2/2 pain. pt also missed PD last night. plan for xrays, pain control, possible admission if unable to walk. Alicia Gauthier MD PGY-1: PCP Arin contacted. Reached answering machine, left callback number. Alicia Gauthier MD PGY-1: Will admit to Dr. Hilton given patient's inability to walk and care for herself/perform peritoneal dialysis due to pain. Alicia Gauthier MD PGY-1L: Patient states pain improved, appears comfortable

## 2018-08-01 NOTE — CONSULT NOTE ADULT - ATTENDING COMMENTS
73 year old female with ESRD presents with a two month history of joint pain (polyarticular) now with fever and leukocytosis.    She was noted to have pyuria but is ESRD and lacks urinary symptoms. I do not suspect a UTI.  Any growth on culture would likely be colonization.    She denies abdominal pain to suggest peritonitis related to PD.     Her primary complaint is joint pain.    Polyarticular joint pain due to an infectious process is less likely.     Check blood cultures.   No risk for GC.   No travel to lyme endemic area.  No international travel to risk areas for zika, chikungunya, dengue.    Consider rheumatology work up. Has a sister with "bad arthritis".  Check ARIES.     Observe off anabiotics. 73 year old female with ESRD presents with a two month history of joint pain (polyarticular) now with fever and leukocytosis.    She was noted to have pyuria but is ESRD and lacks urinary symptoms. I do not suspect a UTI.  Any growth on culture would likely be colonization.    She denies abdominal pain to suggest peritonitis related to PD.     Her primary complaint is joint pain.    Polyarticular joint pain due to an infectious process is less likely.   Recent GI illness about two weeks ago (treated with abx) but a reactive arthritis still seems less likely given that her joints pains preceeded the GI illness.    Check blood cultures.   No risk for GC.   No travel to lyme endemic area.  No international travel to risk areas for zika, chikungunya, dengue.    Consider rheumatology work up. Has a sister with "bad arthritis".  Check ARIES.     Observe off anabiotics.

## 2018-08-01 NOTE — ED PROVIDER NOTE - CARDIAC, MLM
Normal rate, regular rhythm.  Heart sounds S1, S2.  Holosystolic murmur noted at the right upper sternal border, no rubs or gallops.

## 2018-08-01 NOTE — CONSULT NOTE ADULT - PROBLEM SELECTOR RECOMMENDATION 9
on PD x 2 years  PD as ordered today  consent in chart  monitor electrolyte on PD x 2 years  Plan to start Manual PD , no cycler in [facility].   PD  as ordered today  consent in chart  monitor electrolyte

## 2018-08-01 NOTE — ED ADULT NURSE NOTE - OBJECTIVE STATEMENT
Received pt from shift change in room 22 AOX4. Pt c/o L arm and leg pain with no radiation to any other areas 8/10. Pt states gradually getting worse can no longer bear weight on leg, pt appears uncomfortably in bed. Pt does peritoneal dialysis at home nightly, area look clean dry and intact no signs of infect. Pt denies and CP, difficulty breathing, N/V or any other complaints at this time. PMH includes HTN, HLD, CAD and two bypass surgeries, pt did not take any medication today. Pt febrile rectally, NSR with run of Vtach on monitor, will continue to monitor

## 2018-08-01 NOTE — ED PROVIDER NOTE - ATTENDING CONTRIBUTION TO CARE
73F p/w gen body pain, L arm pain rad to back and now to L ankle x 2 mos  Went to PMD who rx’ed prednisone for poss RA.  LG temp at home, here as well.  Episode of N/V/D recently rx’ed amox a few weeks ago.  Takes PD at home, too much pain to hold the tube yesterday and thus did not get the PD.  On plavix.  H/o a-sten, mild abd distension.  PD site no erythema.  LUQ ttp, epig ttp.  No edema.  L wrist – tender, decr ROM, No warmth to wrist, no palpable effusion.  L ankle warm, red swollen.  Diff ?septic arthritis of ankle - less likely given afebrile and multiple joints involved - .  Check labs eval for hyperkalemia.  Hand xray prev was wnl.  Rx percocet for pain as tylenol not helping.  D/w Dr Sanchez covering for Dr Hinkle -   VS:  unremarkable except htn    GEN - mild distress L wrist L ankle pain, A+O x3   HEAD - NC/AT     ENT - PEERL, EOMI, mucous membranes dry , no discharge      NECK: Neck supple, non-tender without lymphadenopathy, no masses, no JVD  PULM - CTA b/l,  symmetric breath sounds  COR -  normal heart sounds    ABD - , mild mod distension, NT, soft, RLQ PD catheter site c/d/i.   BACK - no CVA tenderness, nontender spine     EXTREMS - no edema, no deformity, warm and well perfused.  L wrist and L ankle effusions, mild warmth, some pain with ROM, mild diffuse ttp, not hot, skin not red, both distal N/V/T intact.    SKIN - no rash or bruising      NEUROLOGIC - alert, CN 2-12 intact, sensation nl, motor no focal deficit.

## 2018-08-01 NOTE — ED PROVIDER NOTE - MUSCULOSKELETAL MINIMAL EXAM
Left wrist tender to palpation. Radial pulse present. ROM limited 2/2 pain. Left ankle erythematous, edematous and tender to palpation. ROM limited 2/2 pain. Dorsalis pedis pulse present, good capilllary refill Left wrist dorsal, medial and lateral tenderness to palpation, with small joint effusion, without erythema. Radial pulse present. ROM limited 2/2 pain. Left ankle erythematous, edematous and tender to palpation. ROM limited 2/2 pain. Dorsalis pedis pulse present, good capillary refill Left wrist dorsal, medial and lateral tenderness to palpation, with small joint effusion, without erythema. Atrophy of musculature evident on dorsal aspect of hand. Radial pulse present. ROM limited 2/2 pain. Left ankle erythematous, edematous and tender to palpation. ROM limited 2/2 pain. Dorsalis pedis pulse present, good capillary refill

## 2018-08-01 NOTE — ED PROVIDER NOTE - MEDICAL DECISION MAKING DETAILS
Pt is a 72 y/o female with PMH ESRD on peritoneal dialysis, HTN, HLD, DM, hypothyroid, CAD presenting with body pain. Will get labs, EKG, Chest XRay, left wrist and ankle XRay, will communicate with PCP/nephrologist. Pt is a 72 y/o female with PMH ESRD on peritoneal dialysis, HTN, HLD, DM, hypothyroid, CAD presenting with body pain. Will get labs, EKG, Chest XRay, left wrist and ankle XRay, pain control, will communicate with PCP/nephrologist.

## 2018-08-01 NOTE — ED PROVIDER NOTE - PSH
After cataract, bilateral    H/O: hysterectomy    History of total knee replacement b/l After cataract, bilateral    H/O: hysterectomy    History of total knee replacement b/l    S/P CABG (coronary artery bypass graft)  in 2012

## 2018-08-01 NOTE — CONSULT NOTE ADULT - ASSESSMENT
72 y/o female with PMH ESRD on peritoneal dialysis, HTN, HLD, DM, hypothyroid, CAD presenting with body pain x2 month first started on right shoulder now, left ankle and wrist pain.
Patient is a 73 year old with hx of DM, HTN, CAD, ESRD on PD who presents for 2 weeks of L wrist pain and 1 week of L ankle pain.    Patient presenting with acute onset polyarticular joint pains. She also had b/l shoulder pains recently as well. 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 X-rays of the wrist and ankle without any significant abnormalities.    - Would give solumedrol 10mg IV x1 now, another dose in the AM  - Will send for ESR, RF, CCP  - Will reassess in AM    Peter Kulkarni  Rheumatology Fellow PGY IV
4 y/o F with PMH as below presented for pain in both shoulder joints for 2 months, swelling in the left wrist and pain in hands since 2 weeks and swelling of left ankle joint since 1 week and fever and chills for 2 days. Born in [facility], migrated to [place] years ago, last visited [facility] 2 years ago, no recent travel elsewhere. Recent abdominal infection 2 weeks ago. Left wrist and ankle swelling on exam and with leukocytosis.      Assessment  Infectious etiology of joint pain and swelling less likely.     Plan-   Check blood cultures.   No risk for GC.   Unlikely reactive arthritis as joint pains ( 2 months) started before abdominal infection ( 2 weeks)   No travel to lyme endemic area.  No international travel to risk areas for zika, chikungunya, dengue.  Consider rheumatology work up.  Check ARIES.   Observe off antibiotics.

## 2018-08-01 NOTE — CONSULT NOTE ADULT - PROBLEM SELECTOR RECOMMENDATION 3
suboptimal  resume home medication  monitor suboptimal  resume home medication  monitor BP  Low salt diet

## 2018-08-01 NOTE — ED PROVIDER NOTE - PMH
Acid reflux    CAD (coronary artery disease)    Diabetes mellitus    Dyslipidemia    ESRD on peritoneal dialysis    Hypertension

## 2018-08-02 ENCOUNTER — TRANSCRIPTION ENCOUNTER (OUTPATIENT)
Age: 73
End: 2018-08-02

## 2018-08-02 VITALS
HEART RATE: 73 BPM | DIASTOLIC BLOOD PRESSURE: 76 MMHG | SYSTOLIC BLOOD PRESSURE: 158 MMHG | TEMPERATURE: 98 F | RESPIRATION RATE: 17 BRPM

## 2018-08-02 LAB
APTT BLD: 29.5 SEC — SIGNIFICANT CHANGE UP (ref 27.5–37.4)
CRP SERPL-MCNC: 87.4 MG/L — HIGH
ERYTHROCYTE [SEDIMENTATION RATE] IN BLOOD: 96 MM/HR — HIGH (ref 4–25)
GLUCOSE BLDC GLUCOMTR-MCNC: 294 MG/DL — HIGH (ref 70–99)
GLUCOSE BLDC GLUCOMTR-MCNC: 301 MG/DL — HIGH (ref 70–99)
GLUCOSE BLDC GLUCOMTR-MCNC: 324 MG/DL — HIGH (ref 70–99)
INR BLD: 1.07 — SIGNIFICANT CHANGE UP (ref 0.88–1.17)
PROTHROM AB SERPL-ACNC: 11.9 SEC — SIGNIFICANT CHANGE UP (ref 9.8–13.1)
THROMBIN TIME: 22.7 SEC — SIGNIFICANT CHANGE UP (ref 17–26)

## 2018-08-02 PROCEDURE — 99232 SBSQ HOSP IP/OBS MODERATE 35: CPT | Mod: GC

## 2018-08-02 RX ORDER — CLOPIDOGREL BISULFATE 75 MG/1
75 TABLET, FILM COATED ORAL DAILY
Qty: 0 | Refills: 0 | Status: DISCONTINUED | OUTPATIENT
Start: 2018-08-02 | End: 2018-08-02

## 2018-08-02 RX ORDER — LABETALOL HCL 100 MG
1 TABLET ORAL
Qty: 0 | Refills: 0 | COMMUNITY

## 2018-08-02 RX ORDER — AMLODIPINE BESYLATE 2.5 MG/1
10 TABLET ORAL DAILY
Qty: 0 | Refills: 0 | Status: DISCONTINUED | OUTPATIENT
Start: 2018-08-02 | End: 2018-08-02

## 2018-08-02 RX ORDER — ASPIRIN/CALCIUM CARB/MAGNESIUM 324 MG
81 TABLET ORAL DAILY
Qty: 0 | Refills: 0 | Status: DISCONTINUED | OUTPATIENT
Start: 2018-08-02 | End: 2018-08-02

## 2018-08-02 RX ORDER — CALCIUM ACETATE 667 MG
667 TABLET ORAL
Qty: 0 | Refills: 0 | Status: DISCONTINUED | OUTPATIENT
Start: 2018-08-02 | End: 2018-08-02

## 2018-08-02 RX ORDER — LEVOTHYROXINE SODIUM 125 MCG
50 TABLET ORAL DAILY
Qty: 0 | Refills: 0 | Status: DISCONTINUED | OUTPATIENT
Start: 2018-08-02 | End: 2018-08-02

## 2018-08-02 RX ORDER — SEVELAMER CARBONATE 2400 MG/1
2400 POWDER, FOR SUSPENSION ORAL THREE TIMES A DAY
Qty: 0 | Refills: 0 | Status: DISCONTINUED | OUTPATIENT
Start: 2018-08-02 | End: 2018-08-02

## 2018-08-02 RX ADMIN — CLOPIDOGREL BISULFATE 75 MILLIGRAM(S): 75 TABLET, FILM COATED ORAL at 18:25

## 2018-08-02 RX ADMIN — Medication 50 MICROGRAM(S): at 18:25

## 2018-08-02 RX ADMIN — Medication 15 MILLIGRAM(S): at 14:13

## 2018-08-02 RX ADMIN — Medication 4: at 18:25

## 2018-08-02 RX ADMIN — Medication 3: at 14:13

## 2018-08-02 RX ADMIN — Medication 81 MILLIGRAM(S): at 18:25

## 2018-08-02 RX ADMIN — Medication 667 MILLIGRAM(S): at 18:25

## 2018-08-02 RX ADMIN — AMLODIPINE BESYLATE 10 MILLIGRAM(S): 2.5 TABLET ORAL at 18:25

## 2018-08-02 NOTE — PROGRESS NOTE ADULT - ASSESSMENT
pt with above history p/w multiple joint pains    - evaluated by rheum - trail of steroids, poss dc after rheum clearance     Dm2- iss    ESRD - PD AS SCHEDULED

## 2018-08-02 NOTE — PROGRESS NOTE ADULT - ASSESSMENT
72 y/o female with PMH ESRD on peritoneal dialysis, HTN, HLD, DM, hypothyroid, CAD presenting with body pain x2 month first started on right shoulder now, left ankle and wrist pain.

## 2018-08-02 NOTE — PROGRESS NOTE ADULT - SUBJECTIVE AND OBJECTIVE BOX
chief complaint : joint pains       SUBJECTIVE / OVERNIGHT EVENTS: pt denies chest pain, shortness of breath, nausea, vomiting c/o joint pains       MEDICATIONS  (STANDING):  amLODIPine   Tablet 10 milliGRAM(s) Oral daily  aspirin  chewable 81 milliGRAM(s) Oral daily  calcium acetate 667 milliGRAM(s) Oral three times a day with meals  clopidogrel Tablet 75 milliGRAM(s) Oral daily  dextrose 5%. 1000 milliLiter(s) (50 mL/Hr) IV Continuous <Continuous>  dextrose 50% Injectable 12.5 Gram(s) IV Push once  dextrose 50% Injectable 25 Gram(s) IV Push once  dextrose 50% Injectable 25 Gram(s) IV Push once  gentamicin 0.1% Cream 1 Application(s) Topical <User Schedule>  insulin lispro (HumaLOG) corrective regimen sliding scale   SubCutaneous three times a day before meals  insulin lispro (HumaLOG) corrective regimen sliding scale   SubCutaneous at bedtime  levothyroxine 50 MICROGram(s) Oral daily  sevelamer hydrochloride 2400 milliGRAM(s) Oral three times a day    MEDICATIONS  (PRN):  dextrose 40% Gel 15 Gram(s) Oral once PRN Blood Glucose LESS THAN 70 milliGRAM(s)/deciliter  glucagon  Injectable 1 milliGRAM(s) IntraMuscular once PRN Glucose LESS THAN 70 milligrams/deciliter      Vital Signs Last 24 Hrs  T(C): 36.6 (02 Aug 2018 16:30), Max: 37.4 (01 Aug 2018 22:43)  T(F): 97.8 (02 Aug 2018 16:30), Max: 99.4 (01 Aug 2018 22:43)  HR: 73 (02 Aug 2018 16:30) (73 - 98)  BP: 158/76 (02 Aug 2018 16:30) (135/54 - 171/76)  BP(mean): --  RR: 17 (02 Aug 2018 16:30) (17 - 18)  SpO2: 99% (02 Aug 2018 15:16) (98% - 100%)    CAPILLARY BLOOD GLUCOSE      POCT Blood Glucose.: 324 mg/dL (02 Aug 2018 17:31)  POCT Blood Glucose.: 294 mg/dL (02 Aug 2018 14:10)  POCT Blood Glucose.: 301 mg/dL (02 Aug 2018 07:29)  POCT Blood Glucose.: 230 mg/dL (01 Aug 2018 22:30)    I&O's Summary    01 Aug 2018 07:  -  02 Aug 2018 07:00  --------------------------------------------------------  IN: 6000 mL / OUT: 4340 mL / NET: 1660 mL    02 Aug 2018 07:01  -  02 Aug 2018 19:16  --------------------------------------------------------  IN: 4000 mL / OUT: 4600 mL / NET: -600 mL        Constitutional: No fever, fatigue  Skin: No rash.  Eyes: No recent vision problems or eye pain.  ENT: No congestion, ear pain, or sore throat.  Cardiovascular: No chest pain or palpation.  Respiratory: No cough, shortness of breath, congestion, or wheezing.  Gastrointestinal: No abdominal pain, nausea, vomiting, or diarrhea.  Genitourinary: No dysuria.  Musculoskeletal: No joint swelling.  Neurologic: No headache.    PHYSICAL EXAM:  GENERAL: NAD  EYES: EOMI, PERRLA  NECK: Supple, No JVD  CHEST/LUNG: dec breath sounds at bases   HEART:  S1 , S2 +  ABDOMEN: soft , bs+  EXTREMITIES:  dec rom of left / rt ankle   NEUROLOGY: alert awake oriented       LABS:                        10.3   14.45 )-----------( 282      ( 01 Aug 2018 09:07 )             33.4     08-    137  |  95<L>  |  67<H>  ----------------------------<  84  3.8   |  24  |  6.86<H>    Ca    8.8      01 Aug 2018 09:07    TPro  5.5<L>  /  Alb  2.7<L>  /  TBili  < 0.2<L>  /  DBili  x   /  AST  17  /  ALT  18  /  AlkPhos  48  08-    PT/INR - ( 02 Aug 2018 15:37 )   PT: 11.9 SEC;   INR: 1.07          PTT - ( 02 Aug 2018 15:37 )  PTT:29.5 SEC      Urinalysis Basic - ( 01 Aug 2018 10:45 )    Color: PLYEL / Appearance: HAZY / S.011 / pH: 7.0  Gluc: 50 / Ketone: NEGATIVE  / Bili: NEGATIVE / Urobili: NORMAL mg/dL   Blood: TRACE / Protein: 500 mg/dL / Nitrite: NEGATIVE   Leuk Esterase: LARGE / RBC: 2-5 / WBC >50   Sq Epi: OCC / Non Sq Epi: x / Bacteria: x        RADIOLOGY & ADDITIONAL TESTS:    Imaging Personally Reviewed:    Consultant(s) Notes Reviewed:      Care Discussed with Consultants/Other Providers:

## 2018-08-02 NOTE — DISCHARGE NOTE ADULT - CARE PROVIDER_API CALL
Joel Hinkle), Internal Medicine; Nephrology  [address] [place] [address]  Phone: [phone]  Fax: [phone]

## 2018-08-02 NOTE — DISCHARGE NOTE ADULT - PATIENT PORTAL LINK FT
You can access the [facility] Patient Portal, offered by [facility], by registering with the following website: [email or web address]

## 2018-08-02 NOTE — DISCHARGE NOTE ADULT - CARE PLAN
Principal Discharge DX:	Joint pain  Goal:	Improved pain  Assessment and plan of treatment:	You were seen by Rheumatology in-hospital. You received IV Solumedrol with improvement. Continue steroid taper as recommended. Stable. Follow up outpatient PCP in 1-2 weeks for further management.  Secondary Diagnosis:	CAD (coronary artery disease)  Assessment and plan of treatment:	Continue ASA, Plavix. Stable. Monitor for chest pain, shortness of breath, palpitations. Follow up outpatient PCP in 1-2 weeks for further management.  Secondary Diagnosis:	ESRD on peritoneal dialysis  Assessment and plan of treatment:	Continue dialysis sessions as recommended by your nephrologist. Stable. Follow up outpatient nephrologist for further management.  Secondary Diagnosis:	Hypertension  Assessment and plan of treatment:	Continue blood pressure medication regimen as directed. Monitor for any visual changes, headaches or dizziness.  Monitor blood pressure regularly.  Follow up with your PCP for further management for high blood pressure.  Secondary Diagnosis:	Diabetes mellitus  Assessment and plan of treatment:	Continue consistent carbohydrate diet.  Monitor blood glucose levels throughout the day before meals and at bedtime.  Record blood sugars and bring to outpatient providers appointment in order to be reviewed by your doctor for management modifications.  Be aware of diabetes management symptoms including feeling cool and clammy may be related to low glucose levels.  Feeling hot and dry may indicate high glucose levels. If you feel these symptoms, check your blood sugar.  Make regular podiatry appointments in order to have feet checked for wounds and toe nails cut by a doctor to prevent infections, as well as, appointments with an ophthalmologist to monitor your vision. Principal Discharge DX:	Joint pain  Goal:	Improved pain  Assessment and plan of treatment:	You were seen by Rheumatology in-hospital. You received IV Solumedrol with improvement. Continue steroid taper as recommended. Stable. Follow up outpatient PCP in 1-2 weeks for further management.  You had some labs drawn in-hospital. Follow up outpatient Rheumatology clinic for results and further management at [phone].  Secondary Diagnosis:	CAD (coronary artery disease)  Assessment and plan of treatment:	Continue ASA, Plavix. Stable. Monitor for chest pain, shortness of breath, palpitations. Follow up outpatient PCP in 1-2 weeks for further management.  Secondary Diagnosis:	ESRD on peritoneal dialysis  Assessment and plan of treatment:	Continue dialysis sessions as recommended by your nephrologist. Stable. Follow up outpatient nephrologist for further management.  Secondary Diagnosis:	Hypertension  Assessment and plan of treatment:	Continue blood pressure medication regimen as directed. Monitor for any visual changes, headaches or dizziness.  Monitor blood pressure regularly.  Follow up with your PCP for further management for high blood pressure.  Secondary Diagnosis:	Diabetes mellitus  Assessment and plan of treatment:	Continue consistent carbohydrate diet.  Monitor blood glucose levels throughout the day before meals and at bedtime.  Record blood sugars and bring to outpatient providers appointment in order to be reviewed by your doctor for management modifications.  Be aware of diabetes management symptoms including feeling cool and clammy may be related to low glucose levels.  Feeling hot and dry may indicate high glucose levels. If you feel these symptoms, check your blood sugar.  Make regular podiatry appointments in order to have feet checked for wounds and toe nails cut by a doctor to prevent infections, as well as, appointments with an ophthalmologist to monitor your vision.

## 2018-08-02 NOTE — PROGRESS NOTE ADULT - SUBJECTIVE AND OBJECTIVE BOX
[facility]   MD ROSITA APODACA MD ANGELA WONG, PA    TEL:  OFFICE: [phone]  DR SCOTT CELL: [phone]  DR. JOHNSON CELL: [phone]  KESHA HOUSTON CELL: [phone]        Patient is a 73y old  Female who presents with a chief complaint of Joint pains (02 Aug 2018 14:10)      Patient seen and examined at bedside. No chest pain/sob    VITALS:  T(F): 98.3 (08-02-18 @ 15:16), Max: 99.4 (08-01-18 @ 22:43)  HR: 87 (08-02-18 @ 15:16)  BP: 138/74 (08-02-18 @ 15:16)  RR: 17 (08-02-18 @ 15:16)  SpO2: 99% (08-02-18 @ 15:16)  Wt(kg): --    08-01 @ 07:01  -  08-02 @ 07:00  --------------------------------------------------------  IN: 6000 mL / OUT: 4340 mL / NET: 1660 mL    08-02 @ 07:01  -  08-02 @ 15:38  --------------------------------------------------------  IN: 4000 mL / OUT: 4600 mL / NET: -600 mL      Height (cm): 149.86 (08-02 @ 11:07)  Weight (kg): 66.9 (08-02 @ 11:07)  BMI (kg/m2): 29.8 (08-02 @ 11:07)  BSA (m2): 1.62 (08-02 @ 11:07)    PHYSICAL EXAM:  Constitutional: NAD  Neck: No JVD  Respiratory: CTAB, no wheezes, rales or rhonchi  Cardiovascular: S1, S2, RRR  Gastrointestinal: BS+, soft, NT/ND  Extremities: No peripheral edema    Hospital Medications:   MEDICATIONS  (STANDING):  amLODIPine   Tablet 10 milliGRAM(s) Oral daily  aspirin  chewable 81 milliGRAM(s) Oral daily  calcium acetate 667 milliGRAM(s) Oral three times a day with meals  clopidogrel Tablet 75 milliGRAM(s) Oral daily  dextrose 5%. 1000 milliLiter(s) (50 mL/Hr) IV Continuous <Continuous>  dextrose 50% Injectable 12.5 Gram(s) IV Push once  dextrose 50% Injectable 25 Gram(s) IV Push once  dextrose 50% Injectable 25 Gram(s) IV Push once  gentamicin 0.1% Cream 1 Application(s) Topical <User Schedule>  insulin lispro (HumaLOG) corrective regimen sliding scale   SubCutaneous three times a day before meals  insulin lispro (HumaLOG) corrective regimen sliding scale   SubCutaneous at bedtime  levothyroxine 50 MICROGram(s) Oral daily  sevelamer hydrochloride 2400 milliGRAM(s) Oral three times a day      LABS:  08-01    137  |  95<L>  |  67<H>  ----------------------------<  84  3.8   |  24  |  6.86<H>    Ca    8.8      01 Aug 2018 09:07    TPro  5.5<L>  /  Alb  2.7<L>  /  TBili  < 0.2<L>  /  DBili      /  AST  17  /  ALT  18  /  AlkPhos  48  08-01    Creatinine Trend: 6.86 <--                                10.3   14.45 )-----------( 282      ( 01 Aug 2018 09:07 )             33.4     Urine Studies:  Urinalysis - [08-01-18 @ 10:45]      Color PLYEL / Appearance HAZY / SG 1.011 / pH 7.0      Gluc 50 / Ketone NEGATIVE  / Bili NEGATIVE / Urobili NORMAL       Blood TRACE / Protein 500 / Leuk Est LARGE / Nitrite NEGATIVE      RBC 2-5 / WBC >50 / Hyaline  / Gran  / Sq Epi OCC / Non Sq Epi  / Bacteria             RADIOLOGY & ADDITIONAL STUDIES: [facility] [facility]   MD ROSITA APODACA MD ANGELA WONG, PA    TEL:  OFFICE: [phone]  DR SCOTT CELL: [phone]  DR. JOHNSON CELL: [phone]  KESHA HOUSTON CELL: [phone]      HPI  Patient is a 73y old  Female who presents with a chief complaint of Joint pains   PT given solumedrol overnight with improvement in joint pain.   Pt tolerating PD, no cloudy fluid, no abdominal pain     Patient seen and examined at bedside. No chest pain/sob  Pt tolerating PD, no cloudy fluid, no abdominal pain     VITALS:  T(F): 98.3 (08-02-18 @ 15:16), Max: 99.4 (08-01-18 @ 22:43)  HR: 87 (08-02-18 @ 15:16)  BP: 138/74 (08-02-18 @ 15:16)  RR: 17 (08-02-18 @ 15:16)  SpO2: 99% (08-02-18 @ 15:16)  Wt(kg): --    08-01 @ 07:01  -  08-02 @ 07:00  --------------------------------------------------------  IN: 6000 mL / OUT: 4340 mL / NET: 1660 mL    08-02 @ 07:01  -  08-02 @ 15:38  --------------------------------------------------------  IN: 4000 mL / OUT: 4600 mL / NET: -600 mL      Height (cm): 149.86 (08-02 @ 11:07)  Weight (kg): 66.9 (08-02 @ 11:07)  BMI (kg/m2): 29.8 (08-02 @ 11:07)  BSA (m2): 1.62 (08-02 @ 11:07)    PHYSICAL EXAM:  Constitutional: NAD  Neck: No JVD  Respiratory: CTAB, no wheezes, rales or rhonchi  Cardiovascular: S1, S2, RRR  Gastrointestinal: BS+, soft, NT/ND,   Extremities: No peripheral edema  VAscular: PD catheter in place, no discharge no   Skin Warm and dry     Hospital Medications:   MEDICATIONS  (STANDING):  amLODIPine   Tablet 10 milliGRAM(s) Oral daily  aspirin  chewable 81 milliGRAM(s) Oral daily  calcium acetate 667 milliGRAM(s) Oral three times a day with meals  clopidogrel Tablet 75 milliGRAM(s) Oral daily  dextrose 5%. 1000 milliLiter(s) (50 mL/Hr) IV Continuous <Continuous>  dextrose 50% Injectable 12.5 Gram(s) IV Push once  dextrose 50% Injectable 25 Gram(s) IV Push once  dextrose 50% Injectable 25 Gram(s) IV Push once  gentamicin 0.1% Cream 1 Application(s) Topical <User Schedule>  insulin lispro (HumaLOG) corrective regimen sliding scale   SubCutaneous three times a day before meals  insulin lispro (HumaLOG) corrective regimen sliding scale   SubCutaneous at bedtime  levothyroxine 50 MICROGram(s) Oral daily  sevelamer hydrochloride 2400 milliGRAM(s) Oral three times a day      LABS:  08-01    137  |  95<L>  |  67<H>  ----------------------------<  84  3.8   |  24  |  6.86<H>    Ca    8.8      01 Aug 2018 09:07    TPro  5.5<L>  /  Alb  2.7<L>  /  TBili  < 0.2<L>  /  DBili      /  AST  17  /  ALT  18  /  AlkPhos  48  08-01    Creatinine Trend: 6.86 <--                                10.3   14.45 )-----------( 282      ( 01 Aug 2018 09:07 )             33.4     Urine Studies:  Urinalysis - [08-01-18 @ 10:45]      Color PLYEL / Appearance HAZY / SG 1.011 / pH 7.0      Gluc 50 / Ketone NEGATIVE  / Bili NEGATIVE / Urobili NORMAL       Blood TRACE / Protein 500 / Leuk Est LARGE / Nitrite NEGATIVE      RBC 2-5 / WBC >50 / Hyaline  / Gran  / Sq Epi OCC / Non Sq Epi  / Bacteria             RADIOLOGY & ADDITIONAL STUDIES:

## 2018-08-02 NOTE — CHART NOTE - NSCHARTNOTEFT_GEN_A_CORE
Spoke with ID, doesn't need BCx.     As per attending, pt medically stable for discharge.     ADS  [id number]

## 2018-08-02 NOTE — PROGRESS NOTE ADULT - SUBJECTIVE AND OBJECTIVE BOX
[facility]  [id number]    INTERVAL HPI/OVERNIGHT EVENTS:    Received solumedrol overnight, assessment done prior to second dose of solumedrol given. Patient reports improvement in wrist pain and swelling, able to flex and extend wrist. Ankle tenderness improved as patient able to ambulate.    MEDICATIONS  (STANDING):  dextrose 5%. 1000 milliLiter(s) (50 mL/Hr) IV Continuous <Continuous>  dextrose 50% Injectable 12.5 Gram(s) IV Push once  dextrose 50% Injectable 25 Gram(s) IV Push once  dextrose 50% Injectable 25 Gram(s) IV Push once  gentamicin 0.1% Cream 1 Application(s) Topical <User Schedule>  insulin lispro (HumaLOG) corrective regimen sliding scale   SubCutaneous three times a day before meals  insulin lispro (HumaLOG) corrective regimen sliding scale   SubCutaneous at bedtime  methylPREDNISolone sodium succinate Injectable 15 milliGRAM(s) IV Push once    MEDICATIONS  (PRN):  dextrose 40% Gel 15 Gram(s) Oral once PRN Blood Glucose LESS THAN 70 milliGRAM(s)/deciliter  glucagon  Injectable 1 milliGRAM(s) IntraMuscular once PRN Glucose LESS THAN 70 milligrams/deciliter      Allergies    Cipro (Unknown)  Diovan (Unknown)    Intolerances        Review of Systems:  Gen:  No fevers/chills, weight loss  HEENT: No blurry vision, no difficulty swallowing, no oral or nasal ulcers  CVS: No chest pain/palpitations  Resp: No SOB/wheezing  GI: No N/V/C/D/abdominal pain  MSK: b/l shoulder pain, L wrist pain, L ankle pain  Skin: No new rashes  Neuro: No headaches      Vital Signs Last 24 Hrs  T(C): 36.7 (02 Aug 2018 12:25), Max: 37.4 (01 Aug 2018 22:43)  T(F): 98 (02 Aug 2018 12:25), Max: 99.4 (01 Aug 2018 22:43)  HR: 98 (02 Aug 2018 12:25) (75 - 98)  BP: 152/80 (02 Aug 2018 12:25) (135/54 - 171/76)  BP(mean): --  RR: 18 (02 Aug 2018 12:25) (16 - 18)  SpO2: 98% (02 Aug 2018 11:07) (98% - 100%)    Physical Exam:  General: No apparent distress  HEENT: EOMI, MMM  CVS: +S1/S2, RRR, no murmurs/rubs/gallops  Resp: CTA b/l. No crackles/wheezing  GI: Soft, NT/ND +BS  MSK: L wrist swelling improved with flexion and extension of 20 degrees, L ankle medial malleolus tenderness markedly improved, able to ambulate, b/l surgical scars on knees  Neuro: AAOx3  Skin: no visible rashes    LABS:                        10.3   14.45 )-----------( 282      ( 01 Aug 2018 09:07 )             33.4     08-    137  |  95<L>  |  67<H>  ----------------------------<  84  3.8   |  24  |  6.86<H>    Ca    8.8      01 Aug 2018 09:07    TPro  5.5<L>  /  Alb  2.7<L>  /  TBili  < 0.2<L>  /  DBili  x   /  AST  17  /  ALT  18  /  AlkPhos  48  08-    PT/INR - ( 01 Aug 2018 09:07 )   PT: 12.4 SEC;   INR: 1.11          PTT - ( 01 Aug 2018 09:07 )  PTT:26.8 SEC  Urinalysis Basic - ( 01 Aug 2018 10:45 )    Color: PLYEL / Appearance: HAZY / S.011 / pH: 7.0  Gluc: 50 / Ketone: NEGATIVE  / Bili: NEGATIVE / Urobili: NORMAL mg/dL   Blood: TRACE / Protein: 500 mg/dL / Nitrite: NEGATIVE   Leuk Esterase: LARGE / RBC: 2-5 / WBC >50   Sq Epi: OCC / Non Sq Epi: x / Bacteria: x          RADIOLOGY & ADDITIONAL TESTS:

## 2018-08-02 NOTE — PROGRESS NOTE ADULT - ASSESSMENT
Patient is a 73 year old with hx of DM, HTN, CAD, ESRD on PD who presents for 2 weeks of L wrist pain and 1 week of L ankle pain.    Patient presenting with acute onset polyarticular joint pains. She also had b/l shoulder pains recently as well. 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 X-rays of the wrist and ankle without any significant abnormalities.    - Given solumedrol with improvement, will give short prednisone taper with PPI  - Follow up ESR, RF, CCP    **Recs pending attending approval**    Peter Kulkarni  Rheumatology Fellow PGY IV Patient is a 73 year old with hx of DM, HTN, CAD, ESRD on PD who presents for 2 weeks of L wrist pain and 1 week of L ankle pain.    Patient presenting with acute onset polyarticular joint pains. She also had b/l shoulder pains recently as well. 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 X-rays of the wrist and ankle without any significant abnormalities.    - Given solumedrol with improvement, will give short prednisone taper with PPI  - Follow up ESR, RF, CCP and uric acid.     Patient to follow up in 2 weeks after discharge with Dr. Eduardo ([facility])    Peter Kulkarni  Rheumatology Fellow PGY IV Patient is a 73 year old with hx of DM, HTN, CAD, ESRD on PD who presents for 2 weeks of L wrist pain and 1 week of L ankle pain.    Patient presenting with acute onset polyarticular joint pains. She also had b/l shoulder pains recently as well. 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 X-rays of the wrist and ankle without any significant abnormalities.    - Given solumedrol with improvement, will give short prednisone taper with PPI  - Follow up ESR, RF, CCP and uric acid.   given history of dypepsia with steroids in the past will give ppi with steorids.   steroid taper:  prednisone 15mg daily for 2 days and then 10mg a day for 3 day and then d/c    Patient to follow up in 2 weeks after discharge with Dr. Eduardo ([facility])    Peter Kulkarni  Rheumatology Fellow PGY IV

## 2018-08-02 NOTE — DISCHARGE NOTE ADULT - PLAN OF CARE
Improved pain You were seen by Rheumatology in-hospital. You received IV Solumedrol with improvement. Continue steroid taper as recommended. Stable. Follow up outpatient PCP in 1-2 weeks for further management. Continue ASA, Plavix. Stable. Monitor for chest pain, shortness of breath, palpitations. Follow up outpatient PCP in 1-2 weeks for further management. Continue dialysis sessions as recommended by your nephrologist. Stable. Follow up outpatient nephrologist for further management. Continue blood pressure medication regimen as directed. Monitor for any visual changes, headaches or dizziness.  Monitor blood pressure regularly.  Follow up with your PCP for further management for high blood pressure. Continue consistent carbohydrate diet.  Monitor blood glucose levels throughout the day before meals and at bedtime.  Record blood sugars and bring to outpatient providers appointment in order to be reviewed by your doctor for management modifications.  Be aware of diabetes management symptoms including feeling cool and clammy may be related to low glucose levels.  Feeling hot and dry may indicate high glucose levels. If you feel these symptoms, check your blood sugar.  Make regular podiatry appointments in order to have feet checked for wounds and toe nails cut by a doctor to prevent infections, as well as, appointments with an ophthalmologist to monitor your vision. You were seen by Rheumatology in-hospital. You received IV Solumedrol with improvement. Continue steroid taper as recommended. Stable. Follow up outpatient PCP in 1-2 weeks for further management.  You had some labs drawn in-hospital. Follow up outpatient Rheumatology clinic for results and further management at [phone].

## 2018-08-02 NOTE — DISCHARGE NOTE ADULT - HOSPITAL COURSE
73 F PMH ESRD on peritoneal dialysis, HTN, HLD, DM, hypothyroid, CAD presenting with body pain. Pt went to PCP for her joint pains and he started her on Prednisone for presumed RA, per patient. Pt states that she has been having body pain for the last two months, started in shoulder, went to back, now in her L hand and L ankle, with pain migrating to R ankle this AM, which is why she came in today. Cannot ambulate due to pain. Endorses episode of N/V/D 2 weeks ago for which she was prescribed Amoxicillin, which has since resolved. Endorses subjective fever and chills. pt denies recent tick bites. Seen by Rheumatology. S/P Solumedrol. Improved. Stable, follow up outpatient PCP.    As per attending, pt medically stable for discharge. 73 F PMH ESRD on peritoneal dialysis, HTN, HLD, DM, hypothyroid, CAD presenting with body pain. Pt went to PCP for her joint pains and he started her on Prednisone for presumed RA, per patient. Pt states that she has been having body pain for the last two months, started in shoulder, went to back, now in her L hand and L ankle, with pain migrating to R ankle this AM, which is why she came in today. Cannot ambulate due to pain. Endorses episode of N/V/D 2 weeks ago for which she was prescribed Amoxicillin, which has since resolved. Endorses subjective fever and chills. pt denies recent tick bites. Seen by Rheumatology, ID and Nephrology. S/P Solumedrol. Improved. Stable, follow up outpatient PCP.    As per attending, pt medically stable for discharge.

## 2018-08-02 NOTE — DISCHARGE NOTE ADULT - MEDICATION SUMMARY - MEDICATIONS TO TAKE
I will START or STAY ON the medications listed below when I get home from the hospital:    predniSONE 5 mg oral tablet  -- Take 15 mg QD x 2 days  Take 10 mg QD x 3 days then STOP   -- It is very important that you take or use this exactly as directed.  Do not skip doses or discontinue unless directed by your doctor.  Obtain medical advice before taking any non-prescription drugs as some may affect the action of this medication.  Take with food or milk.    -- Indication: For Joint pain    aspirin 81 mg oral tablet  -- 1 tab(s) by mouth once a day  -- Indication: For CAD (coronary artery disease)    gabapentin 100 mg oral capsule  -- 2 cap(s) by mouth once a day (at bedtime)  -- Indication: For Neuropathy    glimepiride 1 mg oral tablet  -- 2 tab(s) by mouth once a day in the morning  -- Indication: For Diabetes mellitus    atorvastatin 40 mg oral tablet  -- 1 tab(s) by mouth once a day  -- Indication: For Hyperlipidemia    clopidogrel 75 mg oral tablet  -- 1 tab(s) by mouth once a day  -- Indication: For CAD (coronary artery disease)    amLODIPine 10 mg oral tablet  -- 1 tab(s) by mouth once a day  -- Indication: For Hypertension    Renagel 800 mg oral tablet  -- 3 tab(s) by mouth 3 times a day  -- Indication: For ESRD on peritoneal dialysis    calcium acetate 667 mg oral capsule  -- 3 cap(s) by mouth 3 times a day (with meals)  -- Indication: For ESRD on peritoneal dialysis    levothyroxine 50 mcg (0.05 mg) oral tablet  -- 1 tab(s) by mouth once a day  -- Indication: For Hypothyroidism    Daily Martha oral tablet  -- 1 tab(s) by mouth once a day  -- Indication: For Supplement

## 2018-08-03 LAB
ANA PAT FLD IF-IMP: SIGNIFICANT CHANGE UP
ANA TITR SER: SIGNIFICANT CHANGE UP
DRVVT SCREEN TO CONFIRM RATIO: 1.09 — SIGNIFICANT CHANGE UP (ref 0–1.2)
NORMALIZED SCT PPP-RTO: 0.99 — SIGNIFICANT CHANGE UP (ref 0.88–1.27)
RHEUMATOID FACT SERPL-ACNC: 14 IU/ML — HIGH (ref 0–13)

## 2018-08-06 LAB
B19V DNA FLD QL NAA+PROBE: SIGNIFICANT CHANGE UP
CCP AB SER-ACNC: <8 — SIGNIFICANT CHANGE UP

## 2018-08-10 ENCOUNTER — INPATIENT (INPATIENT)
Facility: HOSPITAL | Age: 73
LOS: 2 days | Discharge: HOME HEALTH | End: 2018-08-13
Attending: INTERNAL MEDICINE | Admitting: INTERNAL MEDICINE
Payer: MEDICARE

## 2018-08-10 VITALS
DIASTOLIC BLOOD PRESSURE: 46 MMHG | RESPIRATION RATE: 16 BRPM | SYSTOLIC BLOOD PRESSURE: 112 MMHG | HEART RATE: 84 BPM | TEMPERATURE: 98 F | OXYGEN SATURATION: 99 %

## 2018-08-10 DIAGNOSIS — N18.9 CHRONIC KIDNEY DISEASE, UNSPECIFIED: ICD-10-CM

## 2018-08-10 DIAGNOSIS — E11.9 TYPE 2 DIABETES MELLITUS WITHOUT COMPLICATIONS: ICD-10-CM

## 2018-08-10 DIAGNOSIS — M25.50 PAIN IN UNSPECIFIED JOINT: ICD-10-CM

## 2018-08-10 DIAGNOSIS — D64.9 ANEMIA, UNSPECIFIED: ICD-10-CM

## 2018-08-10 DIAGNOSIS — Z29.9 ENCOUNTER FOR PROPHYLACTIC MEASURES, UNSPECIFIED: ICD-10-CM

## 2018-08-10 DIAGNOSIS — E78.5 HYPERLIPIDEMIA, UNSPECIFIED: ICD-10-CM

## 2018-08-10 DIAGNOSIS — R50.9 FEVER, UNSPECIFIED: ICD-10-CM

## 2018-08-10 DIAGNOSIS — Z95.1 PRESENCE OF AORTOCORONARY BYPASS GRAFT: Chronic | ICD-10-CM

## 2018-08-10 DIAGNOSIS — I25.10 ATHEROSCLEROTIC HEART DISEASE OF NATIVE CORONARY ARTERY WITHOUT ANGINA PECTORIS: ICD-10-CM

## 2018-08-10 DIAGNOSIS — I10 ESSENTIAL (PRIMARY) HYPERTENSION: ICD-10-CM

## 2018-08-10 DIAGNOSIS — N18.6 END STAGE RENAL DISEASE: ICD-10-CM

## 2018-08-10 LAB
ALBUMIN SERPL ELPH-MCNC: 3 G/DL — LOW (ref 3.3–5)
ALP SERPL-CCNC: 55 U/L — SIGNIFICANT CHANGE UP (ref 40–120)
ALT FLD-CCNC: 16 U/L — SIGNIFICANT CHANGE UP (ref 4–33)
APPEARANCE UR: CLEAR — SIGNIFICANT CHANGE UP
APTT BLD: 26.3 SEC — LOW (ref 27.5–37.4)
AST SERPL-CCNC: 20 U/L — SIGNIFICANT CHANGE UP (ref 4–32)
BASE EXCESS BLDV CALC-SCNC: 1.8 MMOL/L — SIGNIFICANT CHANGE UP
BASOPHILS # BLD AUTO: 0.04 K/UL — SIGNIFICANT CHANGE UP (ref 0–0.2)
BASOPHILS NFR BLD AUTO: 0.2 % — SIGNIFICANT CHANGE UP (ref 0–2)
BILIRUB SERPL-MCNC: 0.3 MG/DL — SIGNIFICANT CHANGE UP (ref 0.2–1.2)
BILIRUB UR-MCNC: NEGATIVE — SIGNIFICANT CHANGE UP
BLOOD UR QL VISUAL: NEGATIVE — SIGNIFICANT CHANGE UP
BUN SERPL-MCNC: 63 MG/DL — HIGH (ref 7–23)
CALCIUM SERPL-MCNC: 9.3 MG/DL — SIGNIFICANT CHANGE UP (ref 8.4–10.5)
CHLORIDE SERPL-SCNC: 98 MMOL/L — SIGNIFICANT CHANGE UP (ref 98–107)
CO2 SERPL-SCNC: 24 MMOL/L — SIGNIFICANT CHANGE UP (ref 22–31)
COLOR SPEC: YELLOW — SIGNIFICANT CHANGE UP
CREAT SERPL-MCNC: 6.65 MG/DL — HIGH (ref 0.5–1.3)
EOSINOPHIL # BLD AUTO: 0.05 K/UL — SIGNIFICANT CHANGE UP (ref 0–0.5)
EOSINOPHIL NFR BLD AUTO: 0.3 % — SIGNIFICANT CHANGE UP (ref 0–6)
GAS PNL BLDV: 133 MMOL/L — LOW (ref 136–146)
GLUCOSE BLDV-MCNC: 75 — SIGNIFICANT CHANGE UP (ref 70–99)
GLUCOSE SERPL-MCNC: 76 MG/DL — SIGNIFICANT CHANGE UP (ref 70–99)
GLUCOSE UR-MCNC: 300 — SIGNIFICANT CHANGE UP
HCO3 BLDV-SCNC: 26 MMOL/L — SIGNIFICANT CHANGE UP (ref 20–27)
HCT VFR BLD CALC: 35.7 % — SIGNIFICANT CHANGE UP (ref 34.5–45)
HCT VFR BLDV CALC: 34.6 % — SIGNIFICANT CHANGE UP (ref 34.5–45)
HGB BLD-MCNC: 11 G/DL — LOW (ref 11.5–15.5)
HGB BLDV-MCNC: 11.2 G/DL — LOW (ref 11.5–15.5)
IMM GRANULOCYTES # BLD AUTO: 0.26 # — SIGNIFICANT CHANGE UP
IMM GRANULOCYTES NFR BLD AUTO: 1.3 % — SIGNIFICANT CHANGE UP (ref 0–1.5)
INR BLD: 1.09 — SIGNIFICANT CHANGE UP (ref 0.88–1.17)
KETONES UR-MCNC: NEGATIVE — SIGNIFICANT CHANGE UP
LEUKOCYTE ESTERASE UR-ACNC: HIGH
LYMPHOCYTES # BLD AUTO: 1.4 K/UL — SIGNIFICANT CHANGE UP (ref 1–3.3)
LYMPHOCYTES # BLD AUTO: 7.2 % — LOW (ref 13–44)
MCHC RBC-ENTMCNC: 28.6 PG — SIGNIFICANT CHANGE UP (ref 27–34)
MCHC RBC-ENTMCNC: 30.8 % — LOW (ref 32–36)
MCV RBC AUTO: 92.7 FL — SIGNIFICANT CHANGE UP (ref 80–100)
MONOCYTES # BLD AUTO: 1.58 K/UL — HIGH (ref 0–0.9)
MONOCYTES NFR BLD AUTO: 8.1 % — SIGNIFICANT CHANGE UP (ref 2–14)
NEUTROPHILS # BLD AUTO: 16.19 K/UL — HIGH (ref 1.8–7.4)
NEUTROPHILS NFR BLD AUTO: 82.9 % — HIGH (ref 43–77)
NITRITE UR-MCNC: NEGATIVE — SIGNIFICANT CHANGE UP
NON-SQ EPI CELLS # UR AUTO: 2 — SIGNIFICANT CHANGE UP
NRBC # FLD: 0.08 — SIGNIFICANT CHANGE UP
PCO2 BLDV: 37 MMHG — LOW (ref 41–51)
PH BLDV: 7.45 PH — HIGH (ref 7.32–7.43)
PH UR: 7.5 — SIGNIFICANT CHANGE UP (ref 4.6–8)
PLATELET # BLD AUTO: 277 K/UL — SIGNIFICANT CHANGE UP (ref 150–400)
PMV BLD: 9.6 FL — SIGNIFICANT CHANGE UP (ref 7–13)
PO2 BLDV: 45 MMHG — HIGH (ref 35–40)
POTASSIUM BLDV-SCNC: 3.3 MMOL/L — LOW (ref 3.4–4.5)
POTASSIUM SERPL-MCNC: 3.5 MMOL/L — SIGNIFICANT CHANGE UP (ref 3.5–5.3)
POTASSIUM SERPL-SCNC: 3.5 MMOL/L — SIGNIFICANT CHANGE UP (ref 3.5–5.3)
PROT SERPL-MCNC: 6 G/DL — SIGNIFICANT CHANGE UP (ref 6–8.3)
PROT UR-MCNC: >600 MG/DL — SIGNIFICANT CHANGE UP
PROTHROM AB SERPL-ACNC: 12.1 SEC — SIGNIFICANT CHANGE UP (ref 9.8–13.1)
RBC # BLD: 3.85 M/UL — SIGNIFICANT CHANGE UP (ref 3.8–5.2)
RBC # FLD: 18.6 % — HIGH (ref 10.3–14.5)
RBC CASTS # UR COMP ASSIST: SIGNIFICANT CHANGE UP (ref 0–?)
SAO2 % BLDV: 77.2 % — SIGNIFICANT CHANGE UP (ref 60–85)
SODIUM SERPL-SCNC: 138 MMOL/L — SIGNIFICANT CHANGE UP (ref 135–145)
SP GR SPEC: 1.02 — SIGNIFICANT CHANGE UP (ref 1–1.04)
SQUAMOUS # UR AUTO: SIGNIFICANT CHANGE UP
UROBILINOGEN FLD QL: NORMAL MG/DL — SIGNIFICANT CHANGE UP
WBC # BLD: 19.52 K/UL — HIGH (ref 3.8–10.5)
WBC # FLD AUTO: 19.52 K/UL — HIGH (ref 3.8–10.5)
WBC UR QL: SIGNIFICANT CHANGE UP (ref 0–?)

## 2018-08-10 PROCEDURE — 71046 X-RAY EXAM CHEST 2 VIEWS: CPT | Mod: 26

## 2018-08-10 PROCEDURE — 99233 SBSQ HOSP IP/OBS HIGH 50: CPT

## 2018-08-10 PROCEDURE — 74176 CT ABD & PELVIS W/O CONTRAST: CPT | Mod: 26

## 2018-08-10 RX ORDER — DEXTROSE 50 % IN WATER 50 %
25 SYRINGE (ML) INTRAVENOUS ONCE
Qty: 0 | Refills: 0 | Status: DISCONTINUED | OUTPATIENT
Start: 2018-08-10 | End: 2018-08-13

## 2018-08-10 RX ORDER — ACETAMINOPHEN 500 MG
650 TABLET ORAL ONCE
Qty: 0 | Refills: 0 | Status: COMPLETED | OUTPATIENT
Start: 2018-08-10 | End: 2018-08-10

## 2018-08-10 RX ORDER — DEXTROSE 50 % IN WATER 50 %
15 SYRINGE (ML) INTRAVENOUS ONCE
Qty: 0 | Refills: 0 | Status: DISCONTINUED | OUTPATIENT
Start: 2018-08-10 | End: 2018-08-13

## 2018-08-10 RX ORDER — GLUCAGON INJECTION, SOLUTION 0.5 MG/.1ML
1 INJECTION, SOLUTION SUBCUTANEOUS ONCE
Qty: 0 | Refills: 0 | Status: DISCONTINUED | OUTPATIENT
Start: 2018-08-10 | End: 2018-08-13

## 2018-08-10 RX ORDER — ERGOCALCIFEROL 1.25 MG/1
50000 CAPSULE ORAL
Qty: 0 | Refills: 0 | Status: DISCONTINUED | OUTPATIENT
Start: 2018-08-10 | End: 2018-08-13

## 2018-08-10 RX ORDER — SEVELAMER CARBONATE 2400 MG/1
800 POWDER, FOR SUSPENSION ORAL THREE TIMES A DAY
Qty: 0 | Refills: 0 | Status: DISCONTINUED | OUTPATIENT
Start: 2018-08-10 | End: 2018-08-10

## 2018-08-10 RX ORDER — CLOPIDOGREL BISULFATE 75 MG/1
75 TABLET, FILM COATED ORAL DAILY
Qty: 0 | Refills: 0 | Status: DISCONTINUED | OUTPATIENT
Start: 2018-08-10 | End: 2018-08-13

## 2018-08-10 RX ORDER — AMLODIPINE BESYLATE 2.5 MG/1
10 TABLET ORAL DAILY
Qty: 0 | Refills: 0 | Status: DISCONTINUED | OUTPATIENT
Start: 2018-08-10 | End: 2018-08-13

## 2018-08-10 RX ORDER — GABAPENTIN 400 MG/1
200 CAPSULE ORAL AT BEDTIME
Qty: 0 | Refills: 0 | Status: DISCONTINUED | OUTPATIENT
Start: 2018-08-10 | End: 2018-08-13

## 2018-08-10 RX ORDER — ASPIRIN/CALCIUM CARB/MAGNESIUM 324 MG
81 TABLET ORAL DAILY
Qty: 0 | Refills: 0 | Status: DISCONTINUED | OUTPATIENT
Start: 2018-08-10 | End: 2018-08-13

## 2018-08-10 RX ORDER — PIPERACILLIN AND TAZOBACTAM 4; .5 G/20ML; G/20ML
3.38 INJECTION, POWDER, LYOPHILIZED, FOR SOLUTION INTRAVENOUS ONCE
Qty: 0 | Refills: 0 | Status: COMPLETED | OUTPATIENT
Start: 2018-08-10 | End: 2018-08-10

## 2018-08-10 RX ORDER — LABETALOL HCL 100 MG
200 TABLET ORAL
Qty: 0 | Refills: 0 | Status: DISCONTINUED | OUTPATIENT
Start: 2018-08-10 | End: 2018-08-13

## 2018-08-10 RX ORDER — SODIUM CHLORIDE 9 MG/ML
1000 INJECTION, SOLUTION INTRAVENOUS
Qty: 0 | Refills: 0 | Status: DISCONTINUED | OUTPATIENT
Start: 2018-08-10 | End: 2018-08-13

## 2018-08-10 RX ORDER — DEXTROSE 50 % IN WATER 50 %
12.5 SYRINGE (ML) INTRAVENOUS ONCE
Qty: 0 | Refills: 0 | Status: DISCONTINUED | OUTPATIENT
Start: 2018-08-10 | End: 2018-08-13

## 2018-08-10 RX ORDER — GENTAMICIN SULFATE 0.1 %
1 OINTMENT (GRAM) TOPICAL
Qty: 0 | Refills: 0 | Status: COMPLETED | OUTPATIENT
Start: 2018-08-10 | End: 2018-08-11

## 2018-08-10 RX ORDER — INSULIN LISPRO 100/ML
VIAL (ML) SUBCUTANEOUS
Qty: 0 | Refills: 0 | Status: DISCONTINUED | OUTPATIENT
Start: 2018-08-10 | End: 2018-08-13

## 2018-08-10 RX ORDER — ATORVASTATIN CALCIUM 80 MG/1
40 TABLET, FILM COATED ORAL AT BEDTIME
Qty: 0 | Refills: 0 | Status: DISCONTINUED | OUTPATIENT
Start: 2018-08-10 | End: 2018-08-13

## 2018-08-10 RX ORDER — SODIUM CHLORIDE 9 MG/ML
1000 INJECTION INTRAMUSCULAR; INTRAVENOUS; SUBCUTANEOUS ONCE
Qty: 0 | Refills: 0 | Status: COMPLETED | OUTPATIENT
Start: 2018-08-10 | End: 2018-08-10

## 2018-08-10 RX ORDER — SEVELAMER CARBONATE 2400 MG/1
2400 POWDER, FOR SUSPENSION ORAL THREE TIMES A DAY
Qty: 0 | Refills: 0 | Status: DISCONTINUED | OUTPATIENT
Start: 2018-08-10 | End: 2018-08-13

## 2018-08-10 RX ORDER — LEVOTHYROXINE SODIUM 125 MCG
50 TABLET ORAL DAILY
Qty: 0 | Refills: 0 | Status: DISCONTINUED | OUTPATIENT
Start: 2018-08-10 | End: 2018-08-13

## 2018-08-10 RX ORDER — CALCIUM ACETATE 667 MG
1334 TABLET ORAL
Qty: 0 | Refills: 0 | Status: DISCONTINUED | OUTPATIENT
Start: 2018-08-10 | End: 2018-08-13

## 2018-08-10 RX ADMIN — PIPERACILLIN AND TAZOBACTAM 200 GRAM(S): 4; .5 INJECTION, POWDER, LYOPHILIZED, FOR SOLUTION INTRAVENOUS at 15:38

## 2018-08-10 RX ADMIN — ATORVASTATIN CALCIUM 40 MILLIGRAM(S): 80 TABLET, FILM COATED ORAL at 22:55

## 2018-08-10 RX ADMIN — PIPERACILLIN AND TAZOBACTAM 3.38 GRAM(S): 4; .5 INJECTION, POWDER, LYOPHILIZED, FOR SOLUTION INTRAVENOUS at 19:59

## 2018-08-10 RX ADMIN — Medication 650 MILLIGRAM(S): at 17:31

## 2018-08-10 RX ADMIN — SEVELAMER CARBONATE 2400 MILLIGRAM(S): 2400 POWDER, FOR SUSPENSION ORAL at 23:05

## 2018-08-10 RX ADMIN — GABAPENTIN 200 MILLIGRAM(S): 400 CAPSULE ORAL at 22:55

## 2018-08-10 RX ADMIN — SODIUM CHLORIDE 1000 MILLILITER(S): 9 INJECTION INTRAMUSCULAR; INTRAVENOUS; SUBCUTANEOUS at 19:59

## 2018-08-10 RX ADMIN — SODIUM CHLORIDE 1000 MILLILITER(S): 9 INJECTION INTRAMUSCULAR; INTRAVENOUS; SUBCUTANEOUS at 15:39

## 2018-08-10 NOTE — H&P ADULT - FAMILY HISTORY
Mother  Still living? No  Family history of diabetes mellitus, Age at diagnosis: Age Unknown  Family history of early CAD, Age at diagnosis: Age Unknown

## 2018-08-10 NOTE — H&P ADULT - PROBLEM SELECTOR PLAN 8
IMPROVE VTE Individual Risk Assessment    RISK                                                          Points  [] Previous VTE                                           3  [] Thrombophilia                                        2  [] Lower limb paralysis                              2   [] Current Cancer                                       2   [x] Immobilization > 24 hrs                        1  [] ICU/CCU stay > 24 hours                       1  [x] Age > 60                                                   1    IMPROVE VTE Score: 2 will place on hsq for dvt ppx

## 2018-08-10 NOTE — H&P ADULT - PROBLEM SELECTOR PLAN 6
hold oral hypoglycemics, cont SS insulin for now hold oral hypoglycemics, cont SS insulin for now, check a1c

## 2018-08-10 NOTE — ED ADULT TRIAGE NOTE - CHIEF COMPLAINT QUOTE
Pt c/o weakness x2 weeks.  Family also endorses fever at home (Tmax 101).  Pt is on peritoneal dialysis.

## 2018-08-10 NOTE — CONSULT NOTE ADULT - SUBJECTIVE AND OBJECTIVE BOX
[facility] [place] [facility]   MD ROSITA APODACA MD RUORU WONG, PA    TEL:  OFFICE: [phone]  DR SCOTT CELL: [phone]  [place] CELL: [phone]  DR. JOHNSON CELL: [phone]    -- INITIAL RENAL CONSULT NOTE  --------------------------------------------------------------------------------  HPI:  74 y/o female with PMH ESRD on peritoneal dialysis, HTN, HLD, DM, hypothyroid, CAD presents with fever and pain in joints. Pt was recently discharged from [facility] , after treatment for RA flare s/p solumedrol.   Nephrology consulted for ESRD management.  Pt goes to [facility], has been on PD for 2 yrs. Pt home PD prescription is ( PD cycler for 9.5 hours, with 4 exchanges of alternating 1.5% and 2.5%). No Midday exchange. Denies discharge from PD catheter, or cloudy fluid.     Denies chest pain, SOB, current N/V/D, abdominal pain. No recent travel or sick contacts  States she had LLQ pain in AM which has resolved now.         PAST HISTORY  --------------------------------------------------------------------------------  PAST MEDICAL & SURGICAL HISTORY:  ESRD on peritoneal dialysis  Acid reflux  Hypertension  Dyslipidemia  Diabetes mellitus  CAD (coronary artery disease)  S/P CABG (coronary artery bypass graft): in 2012  H/O: hysterectomy  History of total knee replacement b/l  After cataract, bilateral    FAMILY HISTORY:    PAST SOCIAL HISTORY:    ALLERGIES & MEDICATIONS  --------------------------------------------------------------------------------  Allergies    Cipro (Unknown)  Diovan (Unknown)    Intolerances      Standing Inpatient Medications    PRN Inpatient Medications      REVIEW OF SYSTEMS  --------------------------------------------------------------------------------  Gen: No fevers/chills  Skin: No rashes  Head/Eyes/Ears: Normal hearing,  Normal vision   Respiratory: No dyspnea, cough  CV: No chest pain  GI: No abdominal pain, diarrhea, constipation, nausea, vomiting  MSK: No  edema, + pain in joints  Heme: No easy bruising or bleeding  Psych: No significant depression    All other systems were reviewed and are negative, except as noted.    VITALS/PHYSICAL EXAM  --------------------------------------------------------------------------------  T(C): 37.1 (08-10-18 @ 14:47), Max: 37.1 (08-10-18 @ 14:47)  HR: 74 (08-10-18 @ 16:05) (74 - 84)  BP: 128/50 (08-10-18 @ 16:05) (112/46 - 134/55)  RR: 16 (08-10-18 @ 16:05) (16 - 16)  SpO2: 99% (08-10-18 @ 16:05) (99% - 100%)  Wt(kg): --        Physical Exam:  	Gen: NAD  	HEENT: MMM  	Pulm: CTA B/L  	CV: S1S2  	Abd: Soft, +BS   	Ext: No LE edema B/L  	Neuro: Awake, alert non focal   	Skin: Warm and dry  	HD access: PD catheter site clean, no discharge or erythema     LABS/STUDIES  --------------------------------------------------------------------------------              11.0   19.52 >-----------<  277      [08-10-18 @ 14:15]              35.7     138  |  98  |  63  ----------------------------<  76      [08-10-18 @ 14:15]  3.5   |  24  |  6.65        Ca     9.3     [08-10-18 @ 14:15]    TPro  6.0  /  Alb  3.0  /  TBili  0.3  /  DBili  x   /  AST  20  /  ALT  16  /  AlkPhos  55  [08-10-18 @ 14:15]    PT/INR: PT 12.1 , INR 1.09       [08-10-18 @ 13:45]  PTT: 26.3       [08-10-18 @ 13:45]      Creatinine Trend:  SCr 6.65 [08-10 @ 14:15]  SCr 6.86 [08-01 @ 09:07]    Urinalysis - [08-10-18 @ 15:35]      Color YELLOW / Appearance CLEAR / SG 1.016 / pH 7.5      Gluc 300 / Ketone NEGATIVE  / Bili NEGATIVE / Urobili NORMAL       Blood NEGATIVE / Protein >600 / Leuk Est SMALL / Nitrite NEGATIVE      RBC 2-5 / WBC 25-50 / Hyaline  / Gran  / Sq Epi OCC / Non Sq Epi  / Bacteria           ARIES: titer 1:160, pattern Homogeneous      [08-02-18 @ 15:37]  Rheumatoid Factor 14.0      [08-02-18 @ 15:37]

## 2018-08-10 NOTE — CONSULT NOTE ADULT - PROBLEM SELECTOR RECOMMENDATION 9
ESRD on PD  Plan to intiate PD tonight, no cycler in hospital, will do manual exchanges  Monitor BMP and BP  Check peritoneal fluid cell count and culture ESRD on PD  Plan to intiate PD tonight, no cycler in hospital, will do manual exchanges  Monitor BMP and BP  Check peritoneal fluid cell count and culture  Follow up Ct A/P

## 2018-08-10 NOTE — H&P ADULT - HISTORY OF PRESENT ILLNESS
73yof with h/o htn, hld, dm, hypothyroid, ESRD on PD, cad, s/p cabg, recent adm ( Aug 1st to 2nd for joint pains treated with steroids and sent home) came in c/o having generalized weakness, fever and joint pain. Pt reports that she completed the tapering dose of prednisone yesterday, today developed a fever to 101, and the joint pains became worse, went to see her pmd who referred her to ER. Pt reports that her joint pain was better when she was taking prednisone and now has become worse since yesterday.  Reports that her joint pain involves her left wrist, left ankle, b/l elbows, and at the back of her knees. Denies any cough, dysuria ( pt still produces urine), sob, chest pain, redness at the PD site, nausea, vomiting, abdominal pain, chest pain or any other complaints.    In the ED she had cxr which was neg for infiltrate, admitted to medicine for further evaluation and management, Pt also was seen by Nephrology and rec to send peritoneal fluid cultures. 73yof with h/o htn, hld, dm, hypothyroid, ESRD on PD, cad, s/p cabg, recent adm ( Aug 1st to 2nd for joint pains treated with steroids and sent home) came in c/o having generalized weakness, fever and joint pain. Pt reports that she completed the tapering dose of prednisone yesterday, today developed a fever to 101, and the joint pains became worse, went to see her pmd who referred her to ER. Pt reports that her joint pain was better when she was taking prednisone and now has become worse since yesterday.  Reports that her joint pain involves her left wrist, left ankle, b/l elbows, and at the back of her knees. Denies any cough, dysuria ( pt still produces urine), sob, chest pain, redness at the PD site, nausea, vomiting, abdominal pain, chest pain or any other complaints.    In the ED she had cxr which was neg for infiltrate, received 1 dose of zosyn and admitted to medicine for further evaluation and management, Pt also was seen by Nephrology and rec to send peritoneal fluid cell count and cultures.

## 2018-08-10 NOTE — CONSULT NOTE ADULT - ASSESSMENT
72 y/o female with PMH ESRD on peritoneal dialysis, HTN, HLD, DM, hypothyroid, CAD presents with fever and pain in joints

## 2018-08-10 NOTE — H&P ADULT - EXTREMITIES COMMENTS
Left wrist with swelling, warmth and ttp, left ankle with swelling, warmth and tenderness, b/l elbow with minimal tenderness, b/l knees with no edema, or tenderness

## 2018-08-10 NOTE — ED PROVIDER NOTE - OBJECTIVE STATEMENT
72 y/o female with PMH ESRD on peritoneal dialysis, HTN, HLD, DM, hypothyroid, CAD s/p bypass presenting with fever and joint pain for 1 week, worsening last night. Pain is mostly in left wrist and HUSSEIN ankles right now. Fever of 101 this morning. Was seen by PMD/Nephrologist this morning who noted temperature and LLQ pain and sent to ED. LLQ tenderness has since resolved. Denies n/v/c/d, cp, sob, headache.

## 2018-08-10 NOTE — H&P ADULT - ATTENDING COMMENTS
Case discussed with pmd ( Dr. Hilton who will assume further care) Case discussed with pmd ( Dr. Hilton) who will assume further care.

## 2018-08-10 NOTE — H&P ADULT - PROBLEM SELECTOR PLAN 3
f/u cultures, will get ID consult f/u cultures, will get ID consult in am, hold off on abx for now f/u cultures, will get ID consult in am, hold off on abx for now, Ct abd/pelvis pending to f/u

## 2018-08-10 NOTE — ED PROVIDER NOTE - PHYSICAL EXAMINATION
Gen: No acute distress, alert, cooperative  Head: Normocephalic, Atraumatic  HEENT: PERRL, oral mucosa moist, normal conjunctiva  Lung: CTAB, no respiratory distress, no crackles or wheezes  CV: rrr, no murmur  Abd: soft, NTND, no rebound or guarding  MSK: No LE edema, walks, but needs assist, tires after a few steps. Brace over left wrist, tender diffusely to left wrist. ankles HUSSEIN non-tender. Shoulder, knee, extremities all non-tender.   Neuro: No focal neurologic deficits, normal strength and sensation throughout, aox3  Skin: Warm and dry, no evidence of rash   Psych: normal affect, follows commands

## 2018-08-10 NOTE — ED ADULT NURSE NOTE - NSIMPLEMENTINTERV_GEN_ALL_ED
Implemented All Fall with Harm Risk Interventions:  [place] to call system. Call bell, personal items and telephone within reach. Instruct patient to call for assistance. Room bathroom lighting operational. Non-slip footwear when patient is off stretcher. Physically safe environment: no spills, clutter or unnecessary equipment. Stretcher in lowest position, wheels locked, appropriate side rails in place. Provide visual cue, wrist band, yellow gown, etc. Monitor gait and stability. Monitor for mental status changes and reorient to person, place, and time. Review medications for side effects contributing to fall risk. Reinforce activity limits and safety measures with patient and family. Provide visual clues: red socks.

## 2018-08-10 NOTE — ED PROVIDER NOTE - ATTENDING CONTRIBUTION TO CARE
DR. BLOCH, ATTENDING MD-  I performed a face to face bedside interview with patient regarding history of present illness, review of symptoms and past medical history. I completed an independent physical exam.  I have discussed patient's plan of care with the resident.   Patient c/o weakness, well appearing NAD HEENT nml lungs clear, abd soft, nontender, extrem no edema. site nml. skin no redness.

## 2018-08-10 NOTE — ED PROVIDER NOTE - MEDICAL DECISION MAKING DETAILS
74 y/o female with PMH ESRD on peritoneal dialysis, HTN, HLD, DM, hypothyroid, CAD s/p bypass presenting with fever and joint pain for 1 week, worsening last night. No obvious source of infection, currently afebrile. PMD noted LLQ pain, but non-tender, recently received tylenol. Will get labs, urine, culture, cxr, vbg, fluids. Waiting on Callback from Dr. Hinkle for additional information.

## 2018-08-10 NOTE — H&P ADULT - ASSESSMENT
73yof with h/o htn, hld, dm, hypothyroid, ESRD on PD, cad, s/p cabg, recent adm ( Aug 1st to 2nd for joint pains treated with steroids and sent home) a/w fever and joint pain.

## 2018-08-10 NOTE — CONSULT NOTE ADULT - PROBLEM SELECTOR RECOMMENDATION 5
follow up pan cultures  monitor temp and wbc  consider ID consult follow up pan cultures  follow up Ct A/P  monitor temp and wbc  consider ID consult

## 2018-08-10 NOTE — H&P ADULT - NSHPLABSRESULTS_GEN_ALL_CORE
labs reviewed significant for leukocytosis, anemia, elevated bun/creatinine, proteinuria, pyuria  cxr imaging reviewed, labs reviewed significant for leukocytosis, anemia, elevated bun/creatinine, proteinuria, pyuria  cxr imaging reviewed.

## 2018-08-10 NOTE — ED ADULT NURSE NOTE - OBJECTIVE STATEMENT
Pt received a&ox3, c/o fever x 1 week, denies any source including headache/cp/sob/nvd/urianry symptoms, pt receives peritoneal dialysis, no signs of infection noted to dialysis port site in abdomen, denies abd pain, sent in today for fever, given 975mg of Tylenol 1 hour ago by center, NAD, MD eval performed, 20 gauge IV placed in right ac, labs drawn and sent.

## 2018-08-10 NOTE — H&P ADULT - PROBLEM SELECTOR PLAN 1
Will get Rheum consult in am, ID consult, hold off on abx for now as with unclear source, will send peritoneal fluid cell count and culture Will get Rheum consult in am, ID consult, hold off on abx for now as with unclear source, will send peritoneal fluid cell count and culture, f/u cultures. Discussed with ID to be seen in am.

## 2018-08-10 NOTE — ED PROVIDER NOTE - PROGRESS NOTE DETAILS
AK: Talked with PMD, Dr. Hinkle. He saw patient this morning, notes patient is stoic, but looked poorly this morning. History of + quat gold and diverticulitis with tenderness LLQ this morning. If she gets admitted can re-admit to Dr. Hilton or the hospitalist. AK: Discussed with Dr. Hilton for admission.

## 2018-08-11 LAB
B PERT DNA SPEC QL NAA+PROBE: SIGNIFICANT CHANGE UP
BODY FLUID TYPE: SIGNIFICANT CHANGE UP
C PNEUM DNA SPEC QL NAA+PROBE: NOT DETECTED — SIGNIFICANT CHANGE UP
CLARITY SPEC: CLEAR — SIGNIFICANT CHANGE UP
COLOR FLD: SIGNIFICANT CHANGE UP
FLUAV H1 2009 PAND RNA SPEC QL NAA+PROBE: NOT DETECTED — SIGNIFICANT CHANGE UP
FLUAV H1 RNA SPEC QL NAA+PROBE: NOT DETECTED — SIGNIFICANT CHANGE UP
FLUAV H3 RNA SPEC QL NAA+PROBE: NOT DETECTED — SIGNIFICANT CHANGE UP
FLUAV SUBTYP SPEC NAA+PROBE: SIGNIFICANT CHANGE UP
FLUBV RNA SPEC QL NAA+PROBE: NOT DETECTED — SIGNIFICANT CHANGE UP
GLUCOSE BLDC GLUCOMTR-MCNC: 191 MG/DL — HIGH (ref 70–99)
GLUCOSE BLDC GLUCOMTR-MCNC: 256 MG/DL — HIGH (ref 70–99)
GLUCOSE BLDC GLUCOMTR-MCNC: 276 MG/DL — HIGH (ref 70–99)
GLUCOSE BLDC GLUCOMTR-MCNC: 351 MG/DL — HIGH (ref 70–99)
GLUCOSE BLDC GLUCOMTR-MCNC: 400 MG/DL — HIGH (ref 70–99)
GRAM STN FLD: SIGNIFICANT CHANGE UP
HADV DNA SPEC QL NAA+PROBE: NOT DETECTED — SIGNIFICANT CHANGE UP
HBA1C BLD-MCNC: 6.7 % — HIGH (ref 4–5.6)
HCOV 229E RNA SPEC QL NAA+PROBE: NOT DETECTED — SIGNIFICANT CHANGE UP
HCOV HKU1 RNA SPEC QL NAA+PROBE: NOT DETECTED — SIGNIFICANT CHANGE UP
HCOV NL63 RNA SPEC QL NAA+PROBE: NOT DETECTED — SIGNIFICANT CHANGE UP
HCOV OC43 RNA SPEC QL NAA+PROBE: NOT DETECTED — SIGNIFICANT CHANGE UP
HMPV RNA SPEC QL NAA+PROBE: NOT DETECTED — SIGNIFICANT CHANGE UP
HPIV1 RNA SPEC QL NAA+PROBE: NOT DETECTED — SIGNIFICANT CHANGE UP
HPIV2 RNA SPEC QL NAA+PROBE: NOT DETECTED — SIGNIFICANT CHANGE UP
HPIV3 RNA SPEC QL NAA+PROBE: NOT DETECTED — SIGNIFICANT CHANGE UP
HPIV4 RNA SPEC QL NAA+PROBE: NOT DETECTED — SIGNIFICANT CHANGE UP
LYMPHOCYTES NFR FLD: 84 % — SIGNIFICANT CHANGE UP
M PNEUMO DNA SPEC QL NAA+PROBE: NOT DETECTED — SIGNIFICANT CHANGE UP
MONOCYTES # FLD: 8 % — SIGNIFICANT CHANGE UP
NEUTS SEG NFR FLD MANUAL: 8 % — SIGNIFICANT CHANGE UP
RCV VOL RI: 9 CELL/UL — HIGH (ref 0–5)
RSV RNA SPEC QL NAA+PROBE: NOT DETECTED — SIGNIFICANT CHANGE UP
RV+EV RNA SPEC QL NAA+PROBE: NOT DETECTED — SIGNIFICANT CHANGE UP
SPECIMEN SOURCE: SIGNIFICANT CHANGE UP
TOTAL CELLS COUNTED, BODY FLUID: 25 CELLS — SIGNIFICANT CHANGE UP
TOTAL NUCLEATED CELL COUNT, BODY FLUID: 11 CELL/UL — HIGH (ref 0–5)

## 2018-08-11 PROCEDURE — 99222 1ST HOSP IP/OBS MODERATE 55: CPT | Mod: GC

## 2018-08-11 RX ORDER — INSULIN LISPRO 100/ML
2 VIAL (ML) SUBCUTANEOUS ONCE
Qty: 0 | Refills: 0 | Status: COMPLETED | OUTPATIENT
Start: 2018-08-11 | End: 2018-08-11

## 2018-08-11 RX ORDER — INSULIN LISPRO 100/ML
4 VIAL (ML) SUBCUTANEOUS ONCE
Qty: 0 | Refills: 0 | Status: DISCONTINUED | OUTPATIENT
Start: 2018-08-11 | End: 2018-08-11

## 2018-08-11 RX ADMIN — Medication 81 MILLIGRAM(S): at 11:23

## 2018-08-11 RX ADMIN — Medication 50 MICROGRAM(S): at 05:48

## 2018-08-11 RX ADMIN — Medication 1334 MILLIGRAM(S): at 13:13

## 2018-08-11 RX ADMIN — Medication 1 APPLICATION(S): at 00:25

## 2018-08-11 RX ADMIN — Medication 1334 MILLIGRAM(S): at 18:32

## 2018-08-11 RX ADMIN — GABAPENTIN 200 MILLIGRAM(S): 400 CAPSULE ORAL at 21:55

## 2018-08-11 RX ADMIN — Medication 20 MILLIGRAM(S): at 16:05

## 2018-08-11 RX ADMIN — CLOPIDOGREL BISULFATE 75 MILLIGRAM(S): 75 TABLET, FILM COATED ORAL at 11:23

## 2018-08-11 RX ADMIN — ATORVASTATIN CALCIUM 40 MILLIGRAM(S): 80 TABLET, FILM COATED ORAL at 21:55

## 2018-08-11 RX ADMIN — Medication 1: at 09:30

## 2018-08-11 RX ADMIN — ERGOCALCIFEROL 50000 UNIT(S): 1.25 CAPSULE ORAL at 11:23

## 2018-08-11 RX ADMIN — Medication 1334 MILLIGRAM(S): at 09:34

## 2018-08-11 RX ADMIN — Medication 3: at 13:09

## 2018-08-11 RX ADMIN — Medication 200 MILLIGRAM(S): at 18:32

## 2018-08-11 RX ADMIN — Medication 1 TABLET(S): at 11:23

## 2018-08-11 RX ADMIN — SEVELAMER CARBONATE 2400 MILLIGRAM(S): 2400 POWDER, FOR SUSPENSION ORAL at 21:55

## 2018-08-11 RX ADMIN — Medication 2 UNIT(S): at 21:56

## 2018-08-11 RX ADMIN — Medication 200 MILLIGRAM(S): at 05:48

## 2018-08-11 RX ADMIN — AMLODIPINE BESYLATE 10 MILLIGRAM(S): 2.5 TABLET ORAL at 05:48

## 2018-08-11 RX ADMIN — Medication 3: at 18:33

## 2018-08-11 RX ADMIN — SEVELAMER CARBONATE 2400 MILLIGRAM(S): 2400 POWDER, FOR SUSPENSION ORAL at 13:13

## 2018-08-11 RX ADMIN — SEVELAMER CARBONATE 2400 MILLIGRAM(S): 2400 POWDER, FOR SUSPENSION ORAL at 09:50

## 2018-08-11 NOTE — CONSULT NOTE ADULT - ASSESSMENT
74 yo F with polyarticular arthritis most likely secondary to gout flare resistant to quick taper  thus we will recommend a slower taper  Although infectious etiology may be less likely, however, given generalized arthralgia myalgia and non productive cough  would r/o any underlying infectious etiology     Plan:  initiate prednisone 20mg daily x 2 days, 15mgx 2 days, 10mgx 2 days and then 5mg x 2 days  will follow along  F/u with ID recs  Should the pt be d/c - pt should follow up with Dr. Eduardo within one week    Dr. Huynh  Rheumatology Fellow  [phone]

## 2018-08-11 NOTE — PROGRESS NOTE ADULT - SUBJECTIVE AND OBJECTIVE BOX
KAPIL CAMPOVERDE  73y  Female      Patient is a 73y old  Female who presents with a chief complaint of Fever, joint pain and generalized weakness (10 Aug 2018 19:19)  Patient seen and examined.Covering .Patient c/o multiple joint pains,mainly lt.wrist,ankles.no sob,no cp,no fever,no cough    REVIEW OF SYSTEMS:          INTERVAL HPI/OVERNIGHT EVENTS:  T(C): 36.6 (18 @ 18:30), Max: 37.1 (18 @ 17:23)  HR: 80 (18 @ 18:30) (62 - 80)  BP: 181/86 (18 @ 18:30) (144/57 - 181/)  RR: 18 (18 @ 18:30) (8 - )  SpO2: 96% (18 @ 18:30) (96% - 99%)  Wt(kg): --  I&O's Summary    10 Aug 2018 07:  -  11 Aug 2018 07:00  --------------------------------------------------------  IN: 4000 mL / OUT: 2400 mL / NET: 1600 mL    11 Aug 2018 07:  -  11 Aug 2018 19:07  --------------------------------------------------------  IN: 6000 mL / OUT: 7000 mL / NET: -1000 mL      T(C): 36.6 (18 @ 18:30), Max: 37.1 (18 @ 17:23)  HR: 80 (18 @ 18:30) (62 - 80)  BP: 181/86 (18 @ 18:30) (144/57 - 181/86)  RR: 18 (18 @ 18:30) (8 - )  SpO2: 96% (18 @ 18:30) (96% - 99%)  Wt(kg): --Vital Signs Last 24 Hrs  T(C): 36.6 (11 Aug 2018 18:30), Max: 37.1 (11 Aug 2018 17:23)  T(F): 97.9 (11 Aug 2018 18:30), Max: 98.7 (11 Aug 2018 17:23)  HR: 80 (11 Aug 2018 18:30) (62 - 80)  BP: 181/86 (11 Aug 2018 18:30) (144/57 - 181/86)  BP(mean): --  RR: 18 (11 Aug 2018 18:30) (8 - 18)  SpO2: 96% (11 Aug 2018 18:30) (96% - 99%)    LABS:                        11.0   19.52 )-----------( 277      ( 10 Aug 2018 14:15 )             35.7     0810    138  |  98  |  63<H>  ----------------------------<  76  3.5   |  24  |  6.65<H>    Ca    9.3      10 Aug 2018 14:15    TPro  6.0  /  Alb  3.0<L>  /  TBili  0.3  /  DBili  x   /  AST  20  /  ALT  16  /  AlkPhos  55  08-10    PT/INR - ( 10 Aug 2018 13:45 )   PT: 12.1 SEC;   INR: 1.09          PTT - ( 10 Aug 2018 13:45 )  PTT:26.3 SEC  Urinalysis Basic - ( 10 Aug 2018 15:35 )    Color: YELLOW / Appearance: CLEAR / S.016 / pH: 7.5  Gluc: 300 / Ketone: NEGATIVE  / Bili: NEGATIVE / Urobili: NORMAL mg/dL   Blood: NEGATIVE / Protein: >600 mg/dL / Nitrite: NEGATIVE   Leuk Esterase: SMALL / RBC: 2-5 / WBC 25-50   Sq Epi: OCC / Non Sq Epi: x / Bacteria: x      CAPILLARY BLOOD GLUCOSE      POCT Blood Glucose.: 256 mg/dL (11 Aug 2018 18:30)  POCT Blood Glucose.: 276 mg/dL (11 Aug 2018 12:10)  POCT Blood Glucose.: 191 mg/dL (11 Aug 2018 08:57)        Urinalysis Basic - ( 10 Aug 2018 15:35 )    Color: YELLOW / Appearance: CLEAR / S.016 / pH: 7.5  Gluc: 300 / Ketone: NEGATIVE  / Bili: NEGATIVE / Urobili: NORMAL mg/dL   Blood: NEGATIVE / Protein: >600 mg/dL / Nitrite: NEGATIVE   Leuk Esterase: SMALL / RBC: 2-5 / WBC 25-50   Sq Epi: OCC / Non Sq Epi: x / Bacteria: x        PAST MEDICAL & SURGICAL HISTORY:  ESRD on peritoneal dialysis  Acid reflux  Hypertension  Dyslipidemia  Diabetes mellitus  CAD (coronary artery disease)  S/P CABG (coronary artery bypass graft): in   H/O: hysterectomy  History of total knee replacement b/l  After cataract, bilateral      MEDICATIONS  (STANDING):  amLODIPine   Tablet 10 milliGRAM(s) Oral daily  aspirin  chewable 81 milliGRAM(s) Oral daily  atorvastatin 40 milliGRAM(s) Oral at bedtime  calcium acetate 1334 milliGRAM(s) Oral three times a day with meals  clopidogrel Tablet 75 milliGRAM(s) Oral daily  dextrose 5%. 1000 milliLiter(s) (50 mL/Hr) IV Continuous <Continuous>  dextrose 50% Injectable 12.5 Gram(s) IV Push once  dextrose 50% Injectable 25 Gram(s) IV Push once  dextrose 50% Injectable 25 Gram(s) IV Push once  ergocalciferol 41668 Unit(s) Oral every week  gabapentin 200 milliGRAM(s) Oral at bedtime  insulin lispro (HumaLOG) corrective regimen sliding scale   SubCutaneous three times a day before meals  labetalol 200 milliGRAM(s) Oral two times a day  levothyroxine 50 MICROGram(s) Oral daily  multivitamin 1 Tablet(s) Oral daily  predniSONE   Tablet   Oral   predniSONE   Tablet 20 milliGRAM(s) Oral daily  sevelamer hydrochloride 2400 milliGRAM(s) Oral three times a day    MEDICATIONS  (PRN):  dextrose 40% Gel 15 Gram(s) Oral once PRN Blood Glucose LESS THAN 70 milliGRAM(s)/deciliter  glucagon  Injectable 1 milliGRAM(s) IntraMuscular once PRN Glucose LESS THAN 70 milligrams/deciliter        RADIOLOGY & ADDITIONAL TESTS:    Imaging Personally Reviewed:  [ ] YES  [ ] NO    Consultant(s) Notes Reviewed:  [ ] YES  [ ] NO    PHYSICAL EXAM:  GENERAL: NAD, well-groomed, well-developed  HEAD:  Atraumatic, Normocephalic  EYES: EOMI, PERRLA, conjunctiva and sclera clear  ENMT: No tonsillar erythema, exudates, or enlargement; Moist mucous membranes, Good dentition, No lesions  NECK: Supple, No JVD, Normal thyroid  NERVOUS SYSTEM:  Alert & Oriented X3, Good concentration; Motor Strength 5/5 B/L upper and lower extremities; DTRs 2+ intact and symmetric  CHEST/LUNG: Clear to percussion bilaterally; No rales, rhonchi, wheezing, or rubs  HEART: Regular rate and rhythm; No murmurs, rubs, or gallops  ABDOMEN: Soft, Nontender, Nondistended; Bowel sounds present  EXTREMITIES:  2+ Peripheral Pulses, No clubbing, cyanosis,  edema of lt.wrist area  LYMPH: No lymphadenopathy noted  SKIN: No rashes or lesions    Care Discussed with Consultants/Other Providers [x ] YES  [ ] NO      Code Status: [] Full Code [] DNR [] DNI [] Goals of Care:   Disposition: [] ICU [] Stroke Unit [] RCU []PCU []Floor [] Discharge Home         HENRY SheaFACP

## 2018-08-11 NOTE — CONSULT NOTE ADULT - ASSESSMENT
73yof with h/o htn, hld, dm, hypothyroid, ESRD on PD, cad, s/p cabg, recent adm ( Aug 1st to 2nd for joint pains treated with steroids and sent home) came in c/o having generalized weakness, fever and joint pain. Denies recent diarrheal illness, travel  She is afebrile since admission.  She does have a leukocytosis but this may be due to steroids. CXR Ua RVP CT a/p no evidence of infection. 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  It would be unusual to have multiple joints infected, though staph sepsis can present with polyarthralgias.  Leukocytosis is concerning but can be a result of the steroids she was given.  At this time, there is no definite infection.    Suggest:  - hold on the antibiotics  - f/u BC  - rheum to see    Please call the ID service [phone] with questions or concerns over the weekend

## 2018-08-11 NOTE — CONSULT NOTE ADULT - SUBJECTIVE AND OBJECTIVE BOX
HPI:  73yof with h/o htn, hld, dm, hypothyroid, ESRD on PD, cad, s/p cabg, recent adm ( Aug 1st to  for joint pains treated with steroids and sent home) came in c/o having generalized weakness, fever and joint pain. Pt reports that she completed the tapering dose of prednisone yesterday, today developed a fever to 101, and the joint pains became worse, went to see her pmd who referred her to ER. Pt reports that her joint pain was better when she was taking prednisone and now has become worse since yesterday.  Reports that her joint pain involves her left wrist, left ankle, b/l elbows, and at the back of her knees. Denies any cough, dysuria ( pt still produces urine), sob, chest pain, redness at the PD site, nausea, vomiting, abdominal pain, chest pain or any other complaints.    In the ED she had cxr which was neg for infiltrate, received 1 dose of zosyn and admitted to medicine for further evaluation and management, Pt also was seen by Nephrology and rec to send peritoneal fluid cell count and cultures. (10 Aug 2018 19:19)      PAST MEDICAL & SURGICAL HISTORY:  ESRD on peritoneal dialysis  Acid reflux  Hypertension  Dyslipidemia  Diabetes mellitus  CAD (coronary artery disease)  S/P CABG (coronary artery bypass graft): in   H/O: hysterectomy  History of total knee replacement b/l  After cataract, bilateral      Allergies  Cipro (Unknown)  Diovan (Unknown)        ANTIMICROBIALS:      OTHER MEDS: MEDICATIONS  (STANDING):  amLODIPine   Tablet 10 daily  aspirin  chewable 81 daily  atorvastatin 40 at bedtime  clopidogrel Tablet 75 daily  dextrose 40% Gel 15 once PRN  dextrose 50% Injectable 12.5 once  dextrose 50% Injectable 25 once  dextrose 50% Injectable 25 once  gabapentin 200 at bedtime  glucagon  Injectable 1 once PRN  insulin lispro (HumaLOG) corrective regimen sliding scale  three times a day before meals  labetalol 200 two times a day  levothyroxine 50 daily      SOCIAL HISTORY:  [ ] etoh [ ] tobacco [ ] former smoker [ ] IVDU    FAMILY HISTORY:  Family history of early CAD (Mother): mother had cad  Family history of diabetes mellitus (Mother): mother had dm      REVIEW OF SYSTEMS  [  ] ROS unobtainable because:    [  ] All other systems negative except as noted below:	    Constitutional:  [ ] fever [ ] chills  [ ] weight loss  [ ] weakness  Skin:  [ ] rash [ ] phlebitis	  Eyes: [ ] icterus [ ] pain  [ ] discharge	  ENMT: [ ] sore throat  [ ] thrush [ ] ulcers [ ] exudates  Respiratory: [ ] dyspnea [ ] hemoptysis [ ] cough [ ] sputum	  Cardiovascular:  [ ] chest pain [ ] palpitations [ ] edema	  Gastrointestinal:  [ ] nausea [ ] vomiting [ ] diarrhea [ ] constipation [ ] pain	  Genitourinary:  [ ] dysuria [ ] frequency [ ] hematuria [ ] discharge [ ] flank pain  [ ] incontinence  Musculoskeletal:  [ ] myalgias [ ] arthralgias [ ] arthritis  [ ] back pain  Neurological:  [ ] headache [ ] seizures  [ ] confusion/altered mental status  Psychiatric:  [ ] anxiety [ ] depression	  Hematology/Lymphatics:  [ ] lymphadenopathy  Endocrine:  [ ] adrenal [ ] thyroid  Allergic/Immunologic:	 [ ] transplant [ ] seasonal    Vital Signs Last 24 Hrs  T(F): 98.1 (18 @ 07:45), Max: 98.7 (08-10-18 @ 14:47)    Vital Signs Last 24 Hrs  HR: 68 (18 @ 07:45) (62 - 84)  BP: 154/75 (18 @ 07:45) (112/46 - 159/68)  RR: 18 (18 @ 07:45)  SpO2: 99% (18 @ 05:37) (96% - 100%)  Wt(kg): --    PHYSICAL EXAM:  General: non-toxic  HEAD/EYES: anicteric, PERRL  ENT:  supple  Cardiovascular:   S1, S2  Respiratory:  clear bilaterally  GI:  soft, non-tender, normal bowel sounds  :  no CVA tenderness   Musculoskeletal:  no synovitis  Neurologic:  grossly non-focal  Skin:  no rash  Lymph: no lymphadenopathy  Psychiatric:  appropriate affect  Vascular:  no phlebitis                                11.0   19.52 )-----------( 277      ( 10 Aug 2018 14:15 )             35.7       08-10    138  |  98  |  63<H>  ----------------------------<  76  3.5   |  24  |  6.65<H>    Ca    9.3      10 Aug 2018 14:15    TPro  6.0  /  Alb  3.0<L>  /  TBili  0.3  /  DBili  x   /  AST  20  /  ALT  16  /  AlkPhos  55  08-10      Urinalysis Basic - ( 10 Aug 2018 15:35 )    Color: YELLOW / Appearance: CLEAR / S.016 / pH: 7.5  Gluc: 300 / Ketone: NEGATIVE  / Bili: NEGATIVE / Urobili: NORMAL mg/dL   Blood: NEGATIVE / Protein: >600 mg/dL / Nitrite: NEGATIVE   Leuk Esterase: SMALL / RBC: 2-5 / WBC 25-50   Sq Epi: OCC / Non Sq Epi: x / Bacteria: x        MICROBIOLOGY:          v            RADIOLOGY: HPI:  73yof with h/o htn, hld, dm, hypothyroid, ESRD on PD, cad, s/p cabg, recent adm ( Aug 1st to  for joint pains treated with steroids and sent home) came in c/o having generalized weakness, fever and joint pain. Pt reports that she completed the tapering dose of prednisone yesterday, today developed a fever to 101, and the joint pains became worse, went to see her pmd who referred her to ER. Pt reports that her joint pain was better when she was taking prednisone and now has become worse since yesterday.  Reports that her joint pain involves her left wrist, left ankle, b/l elbows, and at the back of her knees. Denies any cough, dysuria ( pt still produces urine), sob, chest pain, redness at the PD site, nausea, vomiting, abdominal pain, chest pain or any other complaints.    In the ED she had cxr which was neg for infiltrate, received 1 dose of zosyn and admitted to medicine for further evaluation and management, Pt also was seen by Nephrology and rec to send peritoneal fluid cell count and cultures. (10 Aug 2018 19:19)    Id note-above reviewed. 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 She went come completed the course 1 day prior to arrival. Her pain improved and she had no fever. the day after completing prednisone she felt chills, went to HD center for bloodwork and was found there to have fever 101 F and sent to Ed. Still has some left wrist pain but other joints improved. Since in Ed noticed dry cough. Denies diarrhea urinary symptoms, sore throat, travel, sick contacts    PAST MEDICAL & SURGICAL HISTORY:  ESRD on peritoneal dialysis  Acid reflux  Hypertension  Dyslipidemia  Diabetes mellitus  CAD (coronary artery disease)  S/P CABG (coronary artery bypass graft): in   H/O: hysterectomy  History of total knee replacement b/l  After cataract, bilateral      Allergies  Cipro (Unknown)  Diovan (Unknown)        ANTIMICROBIALS:      OTHER MEDS: MEDICATIONS  (STANDING):  amLODIPine   Tablet 10 daily  aspirin  chewable 81 daily  atorvastatin 40 at bedtime  clopidogrel Tablet 75 daily  dextrose 40% Gel 15 once PRN  dextrose 50% Injectable 12.5 once  dextrose 50% Injectable 25 once  dextrose 50% Injectable 25 once  gabapentin 200 at bedtime  glucagon  Injectable 1 once PRN  insulin lispro (HumaLOG) corrective regimen sliding scale  three times a day before meals  labetalol 200 two times a day  levothyroxine 50 daily      SOCIAL HISTORY:  originally from [facility] [place], lives with  and adult son, has a pet bird, no sick contacts no travel    FAMILY HISTORY:  Family history of early CAD (Mother): mother had cad  Family history of diabetes mellitus (Mother): mother had dm      REVIEW OF SYSTEMS  [  ] ROS unobtainable because:    [ x ] All other systems negative except as noted below:	    Constitutional:  [x ] fever [ ] chills  [ ] weight loss  [ ] weakness  Skin:  [ ] rash [ ] phlebitis	  Eyes: [ ] icterus [ ] pain  [ ] discharge	  ENMT: [ ] sore throat  [ ] thrush [ ] ulcers [ ] exudates  Respiratory: [ ] dyspnea [ ] hemoptysis [ ] cough [ ] sputum	  Cardiovascular:  [ ] chest pain [ ] palpitations [ ] edema	  Gastrointestinal:  [ ] nausea [ ] vomiting [ ] diarrhea [ ] constipation [ ] pain	  Genitourinary:  [ ] dysuria [ ] frequency [ ] hematuria [ ] discharge [ ] flank pain  [ ] incontinence  Musculoskeletal:  [ ] myalgias [ ] arthralgias [ ] arthritis  [ ] back pain  Neurological:  [ ] headache [ ] seizures  [ ] confusion/altered mental status  Psychiatric:  [ ] anxiety [ ] depression	  Hematology/Lymphatics:  [ ] lymphadenopathy  Endocrine:  [ ] adrenal [ ] thyroid  Allergic/Immunologic:	 [ ] transplant [ ] seasonal    Vital Signs Last 24 Hrs  T(F): 98.1 (18 @ 07:45), Max: 98.7 (08-10-18 @ 14:47)    Vital Signs Last 24 Hrs  HR: 68 (18 @ 07:45) (62 - 84)  BP: 154/75 (18 @ 07:45) (112/46 - 159/68)  RR: 18 (18 @ 07:45)  SpO2: 99% (18 @ 05:37) (96% - 100%)  Wt(kg): --    PHYSICAL EXAM:  General: non-toxic  HEAD/EYES: anicteric, PERRL  ENT:  supple  Cardiovascular:   CABG scar well healedS1, S2  Respiratory:  clear bilaterally  GI:  soft, non-tender, RLQ PD catheter site clean normal bowel sounds  :  no CVA tenderness   Musculoskeletal:  left wrist warmth swelling limited ROM, b/l knee TKR scars  Neurologic:  grossly non-focal  Skin:  no rash  Lymph: no lymphadenopathy  Psychiatric:  appropriate affect  Vascular:  no phlebitis                                11.0   19.52 )-----------( 277      ( 10 Aug 2018 14:15 )             35.7       08-10    138  |  98  |  63<H>  ----------------------------<  76  3.5   |  24  |  6.65<H>    Ca    9.3      10 Aug 2018 14:15    TPro  6.0  /  Alb  3.0<L>  /  TBili  0.3  /  DBili  x   /  AST  20  /  ALT  16  /  AlkPhos  55  08-10      Urinalysis Basic - ( 10 Aug 2018 15:35 )    Color: YELLOW / Appearance: CLEAR / S.016 / pH: 7.5  Gluc: 300 / Ketone: NEGATIVE  / Bili: NEGATIVE / Urobili: NORMAL mg/dL   Blood: NEGATIVE / Protein: >600 mg/dL / Nitrite: NEGATIVE   Leuk Esterase: SMALL / RBC: 2-5 / WBC 25-50   Sq Epi: OCC / Non Sq Epi: x / Bacteria: x        MICROBIOLOGY:          v            RADIOLOGY: HPI:  73F with h/o htn, hld, dm, hypothyroid, ESRD on PD for 2 years, CAD s/p CABG.  In [facility] until early August when she was admitted with arthralgias.  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  Empiric steroids.  NO BC checked at that time.  Steroids were being tapered and she now has generalized weakness, fever and joint pain. Fever to 101 with worsening joint pains.  here she has leukocytosis.  No fever.  The joint pain involves her left wrist more so than the left ankle, b/l elbows, and at the back of her knees. Denies any cough, dysuria (pt still produces urine), sob, chest pain, redness at the PD site, nausea, vomiting, abdominal pain, diarrhea, SOB/CP/cough or any other complaints.  No sick contacts or travel.  No sorethroat.  complains of dry mouth - no dry eyes    In the ED she had cxr which was neg for infiltrate, received 1 dose of zosyn and admitted to medicine for further evaluation and management, Pt also was seen by Nephrology and rec to send peritoneal fluid cell count and cultures. (10 Aug 2018 19:19)    PAST MEDICAL & SURGICAL HISTORY:  ESRD on peritoneal dialysis  Acid reflux  Hypertension  Dyslipidemia  Diabetes mellitus  CAD (coronary artery disease)  S/P CABG (coronary artery bypass graft): in   H/O: hysterectomy  History of total knee replacement b/l  After cataract, bilateral    Allergies  Cipro (Unknown)  Diovan (Unknown)    ANTIMICROBIALS:  zosyn    OTHER MEDS: MEDICATIONS  (STANDING):  amLODIPine   Tablet 10 daily  aspirin  chewable 81 daily  atorvastatin 40 at bedtime  clopidogrel Tablet 75 daily  gabapentin 200 at bedtime  glucagon  Injectable 1 once PRN  insulin lispro (HumaLOG) corrective regimen sliding scale  three times a day before meals  labetalol 200 two times a day  levothyroxine 50 daily    SOCIAL HISTORY:  originally from [place] [facility], lives with  and adult son, has a pet bird, no sick contacts no travel    FAMILY HISTORY:  Family history of early CAD (Mother): mother had cad  Family history of diabetes mellitus (Mother): mother had dm  sister has similar joint symptoms    REVIEW OF SYSTEMS  [  ] ROS unobtainable because:    [ x ] All other systems negative except as noted below:	    Constitutional:  [x ] fever [ ] chills  [ ] weight loss  [ ] weakness  Skin:  [ ] rash [ ] phlebitis	  Eyes: [ ] icterus [ ] pain  [ ] discharge	  ENMT: [ ] sore throat  [ ] thrush [ ] ulcers [ ] exudates  Respiratory: [ ] dyspnea [ ] hemoptysis [ ] cough [ ] sputum	  Cardiovascular:  [ ] chest pain [ ] palpitations [ ] edema	  Gastrointestinal:  [ ] nausea [ ] vomiting [ ] diarrhea [ ] constipation [ ] pain	  Genitourinary:  [ ] dysuria [ ] frequency [ ] hematuria [ ] discharge [ ] flank pain  [ ] incontinence  Musculoskeletal:  [ ] myalgias [ ] arthralgias [ ] arthritis  [ ] back pain  Neurological:  [ ] headache [ ] seizures  [ ] confusion/altered mental status  Psychiatric:  [ ] anxiety [ ] depression	  Hematology/Lymphatics:  [ ] lymphadenopathy  Endocrine:  [ ] adrenal [ ] thyroid  Allergic/Immunologic:	 [ ] transplant [ ] seasonal    Vital Signs Last 24 Hrs  T(F): 98.1 (18 @ 07:45), Max: 98.7 (08-10-18 @ 14:47)    Vital Signs Last 24 Hrs  HR: 68 (18 @ 07:45) (62 - 84)  BP: 154/75 (18 @ 07:45) (112/46 - 159/68)  RR: 18 (18 @ 07:45)  SpO2: 99% (18 @ 05:37) (96% - 100%)  Wt(kg): --    PHYSICAL EXAM:  General: non-toxic  HEAD/EYES: anicteric  ENT:  supple  Cardiovascular:   CABG scar well healedS1, S2  Respiratory:  clear bilaterally  GI:  soft, non-tender, RLQ PD catheter site clean normal bowel sounds  :  no CVA tenderness   Musculoskeletal:  left wrist warmth swelling limited ROM, b/l knee TKR scars  Neurologic:  grossly non-focal  Skin:  no rash  Psychiatric:  appropriate affect  Vascular:  no phlebitis    Cell Count, Body Fluid (08.10.18 @ 23:33)    Body Fluid Type: PERITONEAL    Color - Body Fluid: STRAW    Clarity Body Fluid: CLEAR    Total Nucleated Cell Count, Body Fluid: 11 cell/uL    Total RBC Count: 9: Red Cell count correlates with the number and proportion of cells on cytospin preparation. cell/uL     Sedimentation Rate, Erythrocyte: 96 (18)  C-Reactive Protein, Serum: 87.4 (18)  C-Reactive Protein, Serum: 77.2 (18)    Anti-Nuclear Antibody in AM (18 @ 15:37)    ARIES Pattern: Homogeneous  Anti Nuclear Factor Titer: 1:160 (18 @ 15:37)                      11.0   19.52 )-----------( 277      ( 10 Aug 2018 14:15 )             35.7     138  |  98  |  63<H>  ----------------------------<  76  3.5   |  24  |  6.65<H>    Ca    9.3      10 Aug 2018 14:15    TPro  6.0  /  Alb  3.0<L>  /  TBili  0.3  /  DBili  x   /  AST  20  /  ALT  16  /  AlkPhos  55  08-10    Urinalysis Basic - ( 10 Aug 2018 15:35 )  Color: YELLOW / Appearance: CLEAR / S.016 / pH: 7.5  Gluc: 300 / Ketone: NEGATIVE  / Bili: NEGATIVE / Urobili: NORMAL mg/dL   Blood: NEGATIVE / Protein: >600 mg/dL / Nitrite: NEGATIVE   Leuk Esterase: SMALL / RBC: 2-5 / WBC 25-50   Sq Epi: OCC / Non Sq Epi: x / Bacteria: x    MICROBIOLOGY:  8/10 - BC,     RADIOLOGY:  CT Abdomen and Pelvis No Cont (08.10.18 @ 21:17) >  IMPRESSION:  No acute bowel pathology.  Peritoneal dialysis catheter with tip in the inferior peritoneal cavity.  Moderate ascites.    Xray Chest 2 Views PA/Lat (08.10.18 @ 15:17) >  IMPRESSION:  Clear lungs. No pleural effusions or pneumothorax.  Stable sternal wires, mediastinal surgical clips, cardiomegaly, and  aortic calcifications.  Trachea midline.  Generalized osteopenia and spinal degenerative changes again noted.

## 2018-08-11 NOTE — CONSULT NOTE ADULT - SUBJECTIVE AND OBJECTIVE BOX
[facility]  [id number]    HISTORY OF PRESENT ILLNESS:    Patient is a 73 year old with hx of DM, HTN, CAD, ESRD on PD who presents with diffuse joint pain with the worst at right shoulder, L wrist, and L ankle.  Pt was recently hospitalized for similar pain and was treated for acute gout flare with tapering of steroids which finished last Tuesday.  Pt started having symptoms again on  Wednesday and continued to worsen thus she came back to the hospital.  Pt states she has both proximal muscle pain as well as joint pain at her shoulders  R>>L and endorses on prev hospitalization, with the steroids  she felt much better in all joints including b/l shoulders.    Patient had b/l shoulder pain starting about 4 months ago - saw Dr. Eduardo about two months ago- who performed b/l shoulder injections   and pt had some relief. Denies any hx of hip pain.  Pt denies any constitutional symptoms, HA, vision changes, rash, trauma, chest pain, SOB, abd pain, GI or  complaints.    PAST MEDICAL & SURGICAL HISTORY:  ESRD on peritoneal dialysis  Acid reflux  Hypertension  Dyslipidemia  Diabetes mellitus  CAD (coronary artery disease)  S/P CABG (coronary artery bypass graft): in   H/O: hysterectomy  History of total knee replacement b/l  After cataract, bilateral      Review of Systems:  Gen:  No fevers/chills, weight loss  HEENT: No blurry vision, no difficulty swallowing  CVS: No chest pain/palpitations  Resp: No SOB/wheezing  GI: No N/V/C/D/abdominal pain  MSK: as above; R shoulder, L wrist and L ankle pain with dec ROM  Skin: No new rashes  Neuro: No headaches    MEDICATIONS  (STANDING):  amLODIPine   Tablet 10 milliGRAM(s) Oral daily  aspirin  chewable 81 milliGRAM(s) Oral daily  atorvastatin 40 milliGRAM(s) Oral at bedtime  calcium acetate 1334 milliGRAM(s) Oral three times a day with meals  clopidogrel Tablet 75 milliGRAM(s) Oral daily  dextrose 5%. 1000 milliLiter(s) (50 mL/Hr) IV Continuous <Continuous>  dextrose 50% Injectable 12.5 Gram(s) IV Push once  dextrose 50% Injectable 25 Gram(s) IV Push once  dextrose 50% Injectable 25 Gram(s) IV Push once  ergocalciferol 06794 Unit(s) Oral every week  gabapentin 200 milliGRAM(s) Oral at bedtime  insulin lispro (HumaLOG) corrective regimen sliding scale   SubCutaneous three times a day before meals  labetalol 200 milliGRAM(s) Oral two times a day  levothyroxine 50 MICROGram(s) Oral daily  multivitamin 1 Tablet(s) Oral daily  sevelamer hydrochloride 2400 milliGRAM(s) Oral three times a day    MEDICATIONS  (PRN):  dextrose 40% Gel 15 Gram(s) Oral once PRN Blood Glucose LESS THAN 70 milliGRAM(s)/deciliter  glucagon  Injectable 1 milliGRAM(s) IntraMuscular once PRN Glucose LESS THAN 70 milligrams/deciliter      Allergies    Cipro (Unknown)  Diovan (Unknown)    Intolerances      FAMILY HISTORY:  Family history of early CAD (Mother): mother had cad  Family history of diabetes mellitus (Mother): mother had dm      Vital Signs Last 24 Hrs  T(C): 36.7 (11 Aug 2018 13:45), Max: 36.8 (10 Aug 2018 19:27)  T(F): 98 (11 Aug 2018 13:45), Max: 98.3 (10 Aug 2018 19:27)  HR: 77 (11 Aug 2018 13:45) (62 - 78)  BP: 167/62 (11 Aug 2018 13:45) (128/50 - 167/62)  BP(mean): --  RR: 18 (11 Aug 2018 13:45) (16 - 68)  SpO2: 96% (11 Aug 2018 10:19) (96% - 99%)    Daily Height in cm: 149.86 (11 Aug 2018 01:07)    Daily Weight in k.3 (11 Aug 2018 07:45)    Physical Exam:  General: No apparent distress  HEENT: EOMI, MMM  GI: +PD dialysis occuring  MSK: + synovitis of L wrist, L ankle with dec ROM  right shoulder TTP with dec ROM 2/2 to pain  Neuro: AAOx3  Skin: no visible rashes    LABS:                        11.0   19.52 )-----------( 277      ( 10 Aug 2018 14:15 )             35.7     08-10    138  |  98  |  63<H>  ----------------------------<  76  3.5   |  24  |  6.65<H>    Ca    9.3      10 Aug 2018 14:15    TPro  6.0  /  Alb  3.0<L>  /  TBili  0.3  /  DBili  x   /  AST  20  /  ALT  16  /  AlkPhos  55  08-10    PT/INR - ( 10 Aug 2018 13:45 )   PT: 12.1 SEC;   INR: 1.09          PTT - ( 10 Aug 2018 13:45 )  PTT:26.3 SEC  Urinalysis Basic - ( 10 Aug 2018 15:35 )    Color: YELLOW / Appearance: CLEAR / S.016 / pH: 7.5  Gluc: 300 / Ketone: NEGATIVE  / Bili: NEGATIVE / Urobili: NORMAL mg/dL   Blood: NEGATIVE / Protein: >600 mg/dL / Nitrite: NEGATIVE   Leuk Esterase: SMALL / RBC: 2-5 / WBC 25-50   Sq Epi: OCC / Non Sq Epi: x / Bacteria: x        RADIOLOGY & ADDITIONAL STUDIES:

## 2018-08-11 NOTE — PROGRESS NOTE ADULT - SUBJECTIVE AND OBJECTIVE BOX
[facility]   MD ROSITA APODACA MD RUORU WONG, PA    TEL:  OFFICE: [phone]  DR SCOTT CELL: [phone]  TINY [place] CELL: [phone]  DR. JOHNSON CELL: [phone]    RENAL FOLLOW UP NOTE  --------------------------------------------------------------------------------  HPI:  72 y/o female with PMH ESRD on peritoneal dialysis, HTN, HLD, DM, hypothyroid, CAD presents with fever and pain in joints. Pt was recently discharged from [facility] , after treatment for RA flare s/p solumedrol.   Pt tolerating PD , denies sob, chest pain     PAST HISTORY  --------------------------------------------------------------------------------  No significant changes to PMH, PSH, FHx, SHx, unless otherwise noted    ALLERGIES & MEDICATIONS  --------------------------------------------------------------------------------  Allergies    Cipro (Unknown)  Diovan (Unknown)    Intolerances      Standing Inpatient Medications  amLODIPine   Tablet 10 milliGRAM(s) Oral daily  aspirin  chewable 81 milliGRAM(s) Oral daily  atorvastatin 40 milliGRAM(s) Oral at bedtime  calcium acetate 1334 milliGRAM(s) Oral three times a day with meals  clopidogrel Tablet 75 milliGRAM(s) Oral daily  dextrose 5%. 1000 milliLiter(s) IV Continuous <Continuous>  dextrose 50% Injectable 12.5 Gram(s) IV Push once  dextrose 50% Injectable 25 Gram(s) IV Push once  dextrose 50% Injectable 25 Gram(s) IV Push once  ergocalciferol 06556 Unit(s) Oral every week  gabapentin 200 milliGRAM(s) Oral at bedtime  insulin lispro (HumaLOG) corrective regimen sliding scale   SubCutaneous three times a day before meals  labetalol 200 milliGRAM(s) Oral two times a day  levothyroxine 50 MICROGram(s) Oral daily  multivitamin 1 Tablet(s) Oral daily  sevelamer hydrochloride 2400 milliGRAM(s) Oral three times a day    PRN Inpatient Medications  dextrose 40% Gel 15 Gram(s) Oral once PRN  glucagon  Injectable 1 milliGRAM(s) IntraMuscular once PRN      REVIEW OF SYSTEMS  --------------------------------------------------------------------------------  General: no fever  CVS: no chest pain  RESP: no sob, no cough  ABD: no abdominal pain  MSK: no edema     VITALS/PHYSICAL EXAM  --------------------------------------------------------------------------------  T(C): 36.6 (08-11-18 @ 10:19), Max: 37.1 (08-10-18 @ 14:47)  HR: 78 (08-11-18 @ 10:19) (62 - 84)  BP: 164/78 (08-11-18 @ 10:19) (112/46 - 164/78)  RR: 18 (08-11-18 @ 10:19) (16 - 68)  SpO2: 96% (08-11-18 @ 10:19) (96% - 100%)  Wt(kg): --  Height (cm): 149.86 (08-11-18 @ 01:07)  Weight (kg): 67.3 (08-11-18 @ 01:07)  BMI (kg/m2): 30 (08-11-18 @ 01:07)  BSA (m2): 1.62 (08-11-18 @ 01:07)      08-10-18 @ 07:01  -  08-11-18 @ 07:00  --------------------------------------------------------  IN: 4000 mL / OUT: 2400 mL / NET: 1600 mL    08-11-18 @ 07:01  -  08-11-18 @ 10:24  --------------------------------------------------------  IN: 2000 mL / OUT: 2400 mL / NET: -400 mL      Physical Exam:  	Gen: NAD  	HEENT: MMM  	Pulm: CTA B/L  	CV: S1S2  	Abd: Soft, +BS  	Ext: No LE edema B/L                      Neuro: Awake   	Skin: Warm and Dry   	Vascular access: PD catheter site clean, no discharge or erythema     LABS/STUDIES  --------------------------------------------------------------------------------              11.0   19.52 >-----------<  277      [08-10-18 @ 14:15]              35.7     138  |  98  |  63  ----------------------------<  76      [08-10-18 @ 14:15]  3.5   |  24  |  6.65        Ca     9.3     [08-10-18 @ 14:15]    TPro  6.0  /  Alb  3.0  /  TBili  0.3  /  DBili  x   /  AST  20  /  ALT  16  /  AlkPhos  55  [08-10-18 @ 14:15]    PT/INR: PT 12.1 , INR 1.09       [08-10-18 @ 13:45]  PTT: 26.3       [08-10-18 @ 13:45]      Creatinine Trend:  SCr 6.65 [08-10 @ 14:15]  SCr 6.86 [08-01 @ 09:07]    Urinalysis - [08-10-18 @ 15:35]      Color YELLOW / Appearance CLEAR / SG 1.016 / pH 7.5      Gluc 300 / Ketone NEGATIVE  / Bili NEGATIVE / Urobili NORMAL       Blood NEGATIVE / Protein >600 / Leuk Est SMALL / Nitrite NEGATIVE      RBC 2-5 / WBC 25-50 / Hyaline  / Gran  / Sq Epi OCC / Non Sq Epi  / Bacteria       HbA1c 6.7      [08-11-18 @ 05:30]      ARIES: titer 1:160, pattern Homogeneous      [08-02-18 @ 15:37]  Rheumatoid Factor 14.0      [08-02-18 @ 15:37]

## 2018-08-11 NOTE — CHART NOTE - NSCHARTNOTEFT_GEN_A_CORE
73yof with h/o htn, hld, dm, hypothyroid, ESRD on PD, cad, s/p CABG, recent adm ( Aug 1st to 2nd for joint pains treated with steroids and sent home) a/w fever and joint pain, ID following, Off Abx. Rheum consult called for further evaluation and management of joints pain     ADSNP [id number]

## 2018-08-12 LAB
BACTERIA UR CULT: SIGNIFICANT CHANGE UP
BASOPHILS # BLD AUTO: 0.02 K/UL — SIGNIFICANT CHANGE UP (ref 0–0.2)
BASOPHILS NFR BLD AUTO: 0.2 % — SIGNIFICANT CHANGE UP (ref 0–2)
BUN SERPL-MCNC: 58 MG/DL — HIGH (ref 7–23)
CALCIUM SERPL-MCNC: 9.3 MG/DL — SIGNIFICANT CHANGE UP (ref 8.4–10.5)
CHLORIDE SERPL-SCNC: 94 MMOL/L — LOW (ref 98–107)
CO2 SERPL-SCNC: 20 MMOL/L — LOW (ref 22–31)
CREAT SERPL-MCNC: 6.21 MG/DL — HIGH (ref 0.5–1.3)
EOSINOPHIL # BLD AUTO: 0 K/UL — SIGNIFICANT CHANGE UP (ref 0–0.5)
EOSINOPHIL NFR BLD AUTO: 0 % — SIGNIFICANT CHANGE UP (ref 0–6)
GLUCOSE BLDC GLUCOMTR-MCNC: 256 MG/DL — HIGH (ref 70–99)
GLUCOSE BLDC GLUCOMTR-MCNC: 285 MG/DL — HIGH (ref 70–99)
GLUCOSE BLDC GLUCOMTR-MCNC: 351 MG/DL — HIGH (ref 70–99)
GLUCOSE SERPL-MCNC: 284 MG/DL — HIGH (ref 70–99)
HCT VFR BLD CALC: 33.4 % — LOW (ref 34.5–45)
HGB BLD-MCNC: 10.4 G/DL — LOW (ref 11.5–15.5)
IMM GRANULOCYTES # BLD AUTO: 0.21 # — SIGNIFICANT CHANGE UP
IMM GRANULOCYTES NFR BLD AUTO: 1.8 % — HIGH (ref 0–1.5)
LYMPHOCYTES # BLD AUTO: 0.77 K/UL — LOW (ref 1–3.3)
LYMPHOCYTES # BLD AUTO: 6.7 % — LOW (ref 13–44)
MAGNESIUM SERPL-MCNC: 1.5 MG/DL — LOW (ref 1.6–2.6)
MCHC RBC-ENTMCNC: 28.4 PG — SIGNIFICANT CHANGE UP (ref 27–34)
MCHC RBC-ENTMCNC: 31.1 % — LOW (ref 32–36)
MCV RBC AUTO: 91.3 FL — SIGNIFICANT CHANGE UP (ref 80–100)
MONOCYTES # BLD AUTO: 0.18 K/UL — SIGNIFICANT CHANGE UP (ref 0–0.9)
MONOCYTES NFR BLD AUTO: 1.6 % — LOW (ref 2–14)
NEUTROPHILS # BLD AUTO: 10.35 K/UL — HIGH (ref 1.8–7.4)
NEUTROPHILS NFR BLD AUTO: 89.7 % — HIGH (ref 43–77)
NRBC # FLD: 0.07 — SIGNIFICANT CHANGE UP
PHOSPHATE SERPL-MCNC: 3.1 MG/DL — SIGNIFICANT CHANGE UP (ref 2.5–4.5)
PLATELET # BLD AUTO: 272 K/UL — SIGNIFICANT CHANGE UP (ref 150–400)
PMV BLD: 10.2 FL — SIGNIFICANT CHANGE UP (ref 7–13)
POTASSIUM SERPL-MCNC: 3.6 MMOL/L — SIGNIFICANT CHANGE UP (ref 3.5–5.3)
POTASSIUM SERPL-SCNC: 3.6 MMOL/L — SIGNIFICANT CHANGE UP (ref 3.5–5.3)
PTH-INTACT SERPL-MCNC: 206.8 PG/ML — HIGH (ref 15–65)
RBC # BLD: 3.66 M/UL — LOW (ref 3.8–5.2)
RBC # FLD: 18.3 % — HIGH (ref 10.3–14.5)
SODIUM SERPL-SCNC: 132 MMOL/L — LOW (ref 135–145)
SPECIMEN SOURCE: SIGNIFICANT CHANGE UP
WBC # BLD: 11.53 K/UL — HIGH (ref 3.8–10.5)
WBC # FLD AUTO: 11.53 K/UL — HIGH (ref 3.8–10.5)

## 2018-08-12 PROCEDURE — 99232 SBSQ HOSP IP/OBS MODERATE 35: CPT

## 2018-08-12 RX ADMIN — Medication 20 MILLIGRAM(S): at 15:11

## 2018-08-12 RX ADMIN — AMLODIPINE BESYLATE 10 MILLIGRAM(S): 2.5 TABLET ORAL at 05:29

## 2018-08-12 RX ADMIN — Medication 1334 MILLIGRAM(S): at 09:33

## 2018-08-12 RX ADMIN — Medication 3: at 09:32

## 2018-08-12 RX ADMIN — SEVELAMER CARBONATE 2400 MILLIGRAM(S): 2400 POWDER, FOR SUSPENSION ORAL at 13:21

## 2018-08-12 RX ADMIN — GABAPENTIN 200 MILLIGRAM(S): 400 CAPSULE ORAL at 21:26

## 2018-08-12 RX ADMIN — Medication 3: at 18:28

## 2018-08-12 RX ADMIN — Medication 1334 MILLIGRAM(S): at 12:01

## 2018-08-12 RX ADMIN — SEVELAMER CARBONATE 2400 MILLIGRAM(S): 2400 POWDER, FOR SUSPENSION ORAL at 09:33

## 2018-08-12 RX ADMIN — Medication 200 MILLIGRAM(S): at 18:28

## 2018-08-12 RX ADMIN — Medication 50 MICROGRAM(S): at 05:29

## 2018-08-12 RX ADMIN — Medication 1 TABLET(S): at 12:02

## 2018-08-12 RX ADMIN — CLOPIDOGREL BISULFATE 75 MILLIGRAM(S): 75 TABLET, FILM COATED ORAL at 12:02

## 2018-08-12 RX ADMIN — SEVELAMER CARBONATE 2400 MILLIGRAM(S): 2400 POWDER, FOR SUSPENSION ORAL at 21:26

## 2018-08-12 RX ADMIN — ATORVASTATIN CALCIUM 40 MILLIGRAM(S): 80 TABLET, FILM COATED ORAL at 21:26

## 2018-08-12 RX ADMIN — Medication 200 MILLIGRAM(S): at 05:29

## 2018-08-12 RX ADMIN — Medication 1334 MILLIGRAM(S): at 18:29

## 2018-08-12 RX ADMIN — Medication 5: at 13:21

## 2018-08-12 RX ADMIN — Medication 81 MILLIGRAM(S): at 12:01

## 2018-08-12 NOTE — PHYSICAL THERAPY INITIAL EVALUATION ADULT - CRITERIA FOR SKILLED THERAPEUTIC INTERVENTIONS
therapy frequency/anticipated equipment needs at discharge/impairments found/rehab potential/predicted duration of therapy intervention

## 2018-08-12 NOTE — PROGRESS NOTE ADULT - ASSESSMENT
74 y/o female with PMH ESRD on peritoneal dialysis, HTN, HLD, DM, hypothyroid, CAD presenting with body pain x2 month first started on right shoulder now, left ankle and wrist pain.

## 2018-08-12 NOTE — PROGRESS NOTE ADULT - SUBJECTIVE AND OBJECTIVE BOX
[facility]   MD ROSITA APODACA MD RUORU WONG, PA    TEL:  OFFICE: [phone]  DR SCOTT CELL: [phone]  T[place]Y [place] CELL: [phone]  DR. JOHNSON CELL: [phone]    RENAL FOLLOW UP NOTE  --------------------------------------------------------------------------------  HPI:  72 y/o female with PMH ESRD on peritoneal dialysis, HTN, HLD, DM, hypothyroid, CAD presents with fever and pain in joints. Pt was recently discharged from [facility] , after treatment for RA flare s/p solumedrol.   Pt tolerating PD , denies sob, chest pain         PAST HISTORY  --------------------------------------------------------------------------------  No significant changes to PMH, PSH, FHx, SHx, unless otherwise noted    ALLERGIES & MEDICATIONS  --------------------------------------------------------------------------------  Allergies    Cipro (Unknown)  Diovan (Unknown)    Intolerances      Standing Inpatient Medications  amLODIPine   Tablet 10 milliGRAM(s) Oral daily  aspirin  chewable 81 milliGRAM(s) Oral daily  atorvastatin 40 milliGRAM(s) Oral at bedtime  calcium acetate 1334 milliGRAM(s) Oral three times a day with meals  clopidogrel Tablet 75 milliGRAM(s) Oral daily  dextrose 5%. 1000 milliLiter(s) IV Continuous <Continuous>  dextrose 50% Injectable 12.5 Gram(s) IV Push once  dextrose 50% Injectable 25 Gram(s) IV Push once  dextrose 50% Injectable 25 Gram(s) IV Push once  ergocalciferol 36696 Unit(s) Oral every week  gabapentin 200 milliGRAM(s) Oral at bedtime  insulin lispro (HumaLOG) corrective regimen sliding scale   SubCutaneous three times a day before meals  labetalol 200 milliGRAM(s) Oral two times a day  levothyroxine 50 MICROGram(s) Oral daily  multivitamin 1 Tablet(s) Oral daily  predniSONE   Tablet   Oral   predniSONE   Tablet 20 milliGRAM(s) Oral daily  sevelamer hydrochloride 2400 milliGRAM(s) Oral three times a day    PRN Inpatient Medications  dextrose 40% Gel 15 Gram(s) Oral once PRN  glucagon  Injectable 1 milliGRAM(s) IntraMuscular once PRN      REVIEW OF SYSTEMS  --------------------------------------------------------------------------------  General: no fever  CVS: no chest pain  RESP: no sob, no cough  ABD: no abdominal pain  MSK: no edema , + wrist pain    VITALS/PHYSICAL EXAM  --------------------------------------------------------------------------------  T(C): 36.6 (08-12-18 @ 10:14), Max: 37.2 (08-11-18 @ 21:01)  HR: 70 (08-12-18 @ 10:14) (70 - 86)  BP: 167/69 (08-12-18 @ 10:14) (144/91 - 181/86)  RR: 18 (08-12-18 @ 10:14) (8 - 18)  SpO2: 100% (08-12-18 @ 10:14) (96% - 100%)  Wt(kg): --  Height (cm): 149.86 (08-11-18 @ 01:07)  Weight (kg): 67.3 (08-11-18 @ 01:07)  BMI (kg/m2): 30 (08-11-18 @ 01:07)  BSA (m2): 1.62 (08-11-18 @ 01:07)      08-11-18 @ 07:01  -  08-12-18 @ 07:00  --------------------------------------------------------  IN: 8000 mL / OUT: 28447 mL / NET: -3400 mL      Physical Exam:  	Gen: NAD  	HEENT: MMM  	Pulm: CTA B/L  	CV: S1S2  	Abd: Soft, +BS  	Ext: No LE edema B/L                      Neuro: Awake   	Skin: Warm and Dry   	Vascular access: PD catheter site clean, no discharge or erythema   LABS/STUDIES  --------------------------------------------------------------------------------              10.4   11.53 >-----------<  272      [08-12-18 @ 06:00]              33.4     132  |  94  |  58  ----------------------------<  284      [08-12-18 @ 06:00]  3.6   |  20  |  6.21        Ca     9.3     [08-12-18 @ 06:00]      Mg     1.5     [08-12-18 @ 06:00]      Phos  3.1     [08-12-18 @ 06:00]    TPro  6.0  /  Alb  3.0  /  TBili  0.3  /  DBili  x   /  AST  20  /  ALT  16  /  AlkPhos  55  [08-10-18 @ 14:15]    PT/INR: PT 12.1 , INR 1.09       [08-10-18 @ 13:45]  PTT: 26.3       [08-10-18 @ 13:45]      Creatinine Trend:  SCr 6.21 [08-12 @ 06:00]  SCr 6.65 [08-10 @ 14:15]  SCr 6.86 [08-01 @ 09:07]    Urinalysis - [08-10-18 @ 15:35]      Color YELLOW / Appearance CLEAR / SG 1.016 / pH 7.5      Gluc 300 / Ketone NEGATIVE  / Bili NEGATIVE / Urobili NORMAL       Blood NEGATIVE / Protein >600 / Leuk Est SMALL / Nitrite NEGATIVE      RBC 2-5 / WBC 25-50 / Hyaline  / Gran  / Sq Epi OCC / Non Sq Epi  / Bacteria       .8 (Ca --)      [08-12-18 @ 06:00]   --  HbA1c 6.7      [08-11-18 @ 05:30]      ARIES: titer 1:160, pattern Homogeneous      [08-02-18 @ 15:37]  Rheumatoid Factor 14.0      [08-02-18 @ 15:37]

## 2018-08-12 NOTE — PHYSICAL THERAPY INITIAL EVALUATION ADULT - LEVEL OF INDEPENDENCE: STAIR NEGOTIATION, REHAB EVAL
Will progress; not assessed at this time secondary to pt. c/o dizziness with ambulation which resolves upon sitting in bed

## 2018-08-12 NOTE — PROGRESS NOTE ADULT - SUBJECTIVE AND OBJECTIVE BOX
Follow Up:  fever, polyarthralgia, leukocytosis    Interval History/ROS: afebrile overnight. Restarted on prednisone as per Rheum. Still with some left wrist pain but feels it is better. Says cough is not as bad as before    Allergies  Cipro (Unknown)  Diovan (Unknown)        ANTIMICROBIALS:      OTHER MEDS:  MEDICATIONS  (STANDING):  amLODIPine   Tablet 10 daily  aspirin  chewable 81 daily  atorvastatin 40 at bedtime  clopidogrel Tablet 75 daily  dextrose 40% Gel 15 once PRN  dextrose 50% Injectable 12.5 once  dextrose 50% Injectable 25 once  dextrose 50% Injectable 25 once  gabapentin 200 at bedtime  glucagon  Injectable 1 once PRN  insulin lispro (HumaLOG) corrective regimen sliding scale  three times a day before meals  labetalol 200 two times a day  levothyroxine 50 daily  predniSONE   Tablet    predniSONE   Tablet 20 daily      Vital Signs Last 24 Hrs  T(C): 36.6 (12 Aug 2018 05:25), Max: 37.2 (11 Aug 2018 21:01)  T(F): 97.9 (12 Aug 2018 05:25), Max: 99 (11 Aug 2018 21:01)  HR: 78 (12 Aug 2018 05:25) (72 - 86)  BP: 152/88 (12 Aug 2018 05:25) (144/91 - 181/86)  BP(mean): --  RR: 18 (12 Aug 2018 05:25) (8 - 18)  SpO2: 97% (12 Aug 2018 05:25) (96% - 99%)    PHYSICAL EXAM:  General: non-toxic  HEAD/EYES: anicteric, PERRL  ENT:  supple  Cardiovascular:   S1, S2 midline sternal scar  Respiratory:  clear bilaterally  GI:  soft, non-tender, RLQ PD catheter site clean normal bowel sounds  :  no CVA tenderness   Musculoskeletal:  left wrist warmth and tender  Neurologic:  grossly non-focal  Skin:  no rash  Lymph: no lymphadenopathy  Psychiatric:  appropriate affect  Vascular:  no phlebitis                                10.4   11.53 )-----------( 272      ( 12 Aug 2018 06:00 )             33.4       08-12    132<L>  |  94<L>  |  58<H>  ----------------------------<  284<H>  3.6   |  20<L>  |  6.21<H>    Ca    9.3      12 Aug 2018 06:00  Phos  3.1     08-12  Mg     1.5     08-12    TPro  6.0  /  Alb  3.0<L>  /  TBili  0.3  /  DBili  x   /  AST  20  /  ALT  16  /  AlkPhos  55  08-10      Urinalysis Basic - ( 10 Aug 2018 15:35 )    Color: YELLOW / Appearance: CLEAR / S.016 / pH: 7.5  Gluc: 300 / Ketone: NEGATIVE  / Bili: NEGATIVE / Urobili: NORMAL mg/dL   Blood: NEGATIVE / Protein: >600 mg/dL / Nitrite: NEGATIVE   Leuk Esterase: SMALL / RBC: 2-5 / WBC 25-50   Sq Epi: OCC / Non Sq Epi: x / Bacteria: x        MICROBIOLOGY:  v  PERITONEAL FLUID  18 --  --    NOS^No Organisms Seen  WBC^White Blood Cells  QNTY CELLS IN GRAM STAIN: FEW (2+)      BLOOD PERIPHERAL  08-10-18 --  --  --                RADIOLOGY: Follow Up:  fever, polyarthralgia, leukocytosis    Interval History/ROS: afebrile overnight. Restarted on prednisone as per Rheum. Still with some left wrist pain but feels it is better. Says cough is not as bad as before.  Rest of ROS otherwise negative.    Allergies  Cipro (Unknown)  Diovan (Unknown)    ANTIMICROBIALS:    zosyn x1 8/10    OTHER MEDS:  MEDICATIONS  (STANDING):  amLODIPine   Tablet 10 daily  aspirin  chewable 81 daily  atorvastatin 40 at bedtime  clopidogrel Tablet 75 daily  gabapentin 200 at bedtime  insulin lispro (HumaLOG) corrective regimen sliding scale  three times a day before meals  labetalol 200 two times a day  levothyroxine 50 daily    predniSONE   Tablet 20 daily    Vital Signs Last 24 Hrs  T(C): 36.6 (12 Aug 2018 05:25), Max: 37.2 (11 Aug 2018 21:01)  T(F): 97.9 (12 Aug 2018 05:25), Max: 99 (11 Aug 2018 21:01)  HR: 78 (12 Aug 2018 05:25) (72 - 86)  BP: 152/88 (12 Aug 2018 05:25) (144/91 - 181/86)  BP(mean): --  RR: 18 (12 Aug 2018 05:25) (8 - 18)  SpO2: 97% (12 Aug 2018 05:25) (96% - 99%)    PHYSICAL EXAM:  General: non-toxic  HEAD/EYES: anicteric  ENT:  supple  Cardiovascular:   S1, S2 midline sternal scar  Respiratory:  clear bilaterally  GI:  soft, non-tender, RLQ PD catheter site clean normal bowel sounds  :  no CVA tenderness   Musculoskeletal:  left wrist warmth and tender - better ROM and less tender  Neurologic:  grossly non-focal  Skin:  no rash  Psychiatric:  appropriate affect  Vascular:  no phlebitis    WBC Count: 11.53 (18 @ 06:00)  WBC Count: 19.52 (08-10-18 @ 14:15)                       10.4   11.53 )-----------( 272      ( 12 Aug 2018 06:00 )             33.4     132<L>  |  94<L>  |  58<H>  ----------------------------<  284<H>  3.6   |  20<L>  |  6.21<H>    Ca    9.3      12 Aug 2018 06:00  Phos  3.1     08-12  Mg     1.5     08-12    TPro  6.0  /  Alb  3.0<L>  /  TBili  0.3  /  DBili  x   /  AST  20  /  ALT  16  /  AlkPhos  55  08-10    Urinalysis Basic - ( 10 Aug 2018 15:35 )  Color: YELLOW / Appearance: CLEAR / S.016 / pH: 7.5  Gluc: 300 / Ketone: NEGATIVE  / Bili: NEGATIVE / Urobili: NORMAL mg/dL   Blood: NEGATIVE / Protein: >600 mg/dL / Nitrite: NEGATIVE   Leuk Esterase: SMALL / RBC: 2-5 / WBC 25-50   Sq Epi: OCC / Non Sq Epi: x / Bacteria: x    MICROBIOLOGY:  PERITONEAL FLUID  18 --  --    NOS^No Organisms Seen  WBC^White Blood Cells  QNTY CELLS IN GRAM STAIN: FEW (2+)    Culture - Urine (08.10.18 @ 16:04)    Culture - Urine:   NO GROWTH AT 24 HOURS    Specimen Source: URINE MIDSTREAM    Culture - Blood (08.10.18 @ 15:25)    Culture - Blood:   NO ORGANISMS ISOLATED  NO ORGANISMS ISOLATED AT 24 HOURS    Specimen Source: BLOOD VENOUS    Culture - Blood (08.10.18 @ 15:25)    Culture - Blood:   NO ORGANISMS ISOLATED  NO ORGANISMS ISOLATED AT 24 HOURS    Specimen Source: BLOOD PERIPHERAL    RADIOLOGY:  CT Abdomen and Pelvis No Cont (08.10.18 @ 21:17) >  IMPRESSION:  No acute bowel pathology.  Peritoneal dialysis catheter with tip in the inferior peritoneal cavity.  Moderate ascites.    Xray Chest 2 Views PA/Lat (08.10.18 @ 15:17) >  IMPRESSION:  Clear lungs. No pleural effusions or pneumothorax.  Stable sternal wires, mediastinal surgical clips, cardiomegaly, and  aortic calcifications.  Trachea midline.  Generalized osteopenia and spinal degenerative changes again noted.

## 2018-08-12 NOTE — PHYSICAL THERAPY INITIAL EVALUATION ADULT - ADDITIONAL COMMENTS
Pt has straight cane if needed.     Patient left  sitting at edge of bed, NAD, call bell in reach, all lines/devices intact, RN aware.

## 2018-08-12 NOTE — PROGRESS NOTE ADULT - SUBJECTIVE AND OBJECTIVE BOX
KAPIL CAMPOVERDE  73y  Female      Patient is a 73y old  Female who presents with a chief complaint of Fever, joint pain and generalized weakness (10 Aug 2018 19:19)  Patient seen and examined.Covering .Patient c/o multiple joint pains,mainly lt.wrist,ankles.no sob,no cp,no fever,no cough    REVIEW OF SYSTEMS: as above    MEDICATIONS  (STANDING):  amLODIPine   Tablet 10 milliGRAM(s) Oral daily  aspirin  chewable 81 milliGRAM(s) Oral daily  atorvastatin 40 milliGRAM(s) Oral at bedtime  calcium acetate 1334 milliGRAM(s) Oral three times a day with meals  clopidogrel Tablet 75 milliGRAM(s) Oral daily  dextrose 5%. 1000 milliLiter(s) (50 mL/Hr) IV Continuous <Continuous>  dextrose 50% Injectable 12.5 Gram(s) IV Push once  dextrose 50% Injectable 25 Gram(s) IV Push once  dextrose 50% Injectable 25 Gram(s) IV Push once  ergocalciferol 56252 Unit(s) Oral every week  gabapentin 200 milliGRAM(s) Oral at bedtime  insulin lispro (HumaLOG) corrective regimen sliding scale   SubCutaneous three times a day before meals  labetalol 200 milliGRAM(s) Oral two times a day  levothyroxine 50 MICROGram(s) Oral daily  multivitamin 1 Tablet(s) Oral daily  predniSONE   Tablet   Oral   predniSONE   Tablet 20 milliGRAM(s) Oral daily  sevelamer hydrochloride 2400 milliGRAM(s) Oral three times a day    MEDICATIONS  (PRN):  dextrose 40% Gel 15 Gram(s) Oral once PRN Blood Glucose LESS THAN 70 milliGRAM(s)/deciliter  glucagon  Injectable 1 milliGRAM(s) IntraMuscular once PRN Glucose LESS THAN 70 milligrams/deciliter    Vital Signs Last 24 Hrs  T(C): 36.7 (12 Aug 2018 21:21), Max: 36.7 (12 Aug 2018 21:21)  T(F): 98.1 (12 Aug 2018 21:21), Max: 98.1 (12 Aug 2018 21:21)  HR: 70 (12 Aug 2018 21:21) (68 - 86)  BP: 141/58 (12 Aug 2018 21:21) (141/58 - 167/69)  BP(mean): --  RR: 16 (12 Aug 2018 21:21) (16 - 18)  SpO2: 97% (12 Aug 2018 21:21) (97% - 100%)    PAST MEDICAL & SURGICAL HISTORY:  ESRD on peritoneal dialysis  Acid reflux  Hypertension  Dyslipidemia  Diabetes mellitus  CAD (coronary artery disease)  S/P CABG (coronary artery bypass graft): in 2012  H/O: hysterectomy  History of total knee replacement b/l  After cataract, bilateral    MEDICATIONS  (STANDING):  amLODIPine   Tablet 10 milliGRAM(s) Oral daily  aspirin  chewable 81 milliGRAM(s) Oral daily  atorvastatin 40 milliGRAM(s) Oral at bedtime  calcium acetate 1334 milliGRAM(s) Oral three times a day with meals  clopidogrel Tablet 75 milliGRAM(s) Oral daily  dextrose 5%. 1000 milliLiter(s) (50 mL/Hr) IV Continuous <Continuous>  dextrose 50% Injectable 12.5 Gram(s) IV Push once  dextrose 50% Injectable 25 Gram(s) IV Push once  dextrose 50% Injectable 25 Gram(s) IV Push once  ergocalciferol 80537 Unit(s) Oral every week  gabapentin 200 milliGRAM(s) Oral at bedtime  insulin lispro (HumaLOG) corrective regimen sliding scale   SubCutaneous three times a day before meals  labetalol 200 milliGRAM(s) Oral two times a day  levothyroxine 50 MICROGram(s) Oral daily  multivitamin 1 Tablet(s) Oral daily  predniSONE   Tablet   Oral   predniSONE   Tablet 20 milliGRAM(s) Oral daily  sevelamer hydrochloride 2400 milliGRAM(s) Oral three times a day    MEDICATIONS  (PRN):  dextrose 40% Gel 15 Gram(s) Oral once PRN Blood Glucose LESS THAN 70 milliGRAM(s)/deciliter  glucagon  Injectable 1 milliGRAM(s) IntraMuscular once PRN Glucose LESS THAN 70 milligrams/deciliter    PHYSICAL EXAM:  GENERAL: NAD, well-groomed, well-developed  HEAD:  Atraumatic, Normocephalic  EYES: EOMI, PERRLA, conjunctiva and sclera clear  ENMT: No tonsillar erythema, exudates, or enlargement; Moist mucous membranes, Good dentition, No lesions  NECK: Supple, No JVD, Normal thyroid  NERVOUS SYSTEM:  Alert & Oriented X3, Good concentration; Motor Strength 5/5 B/L upper and lower extremities; DTRs 2+ intact and symmetric  CHEST/LUNG: Clear to percussion bilaterally; No rales, rhonchi, wheezing, or rubs  HEART: Regular rate and rhythm; No murmurs, rubs, or gallops  ABDOMEN: Soft, Nontender, Nondistended; Bowel sounds present  EXTREMITIES:  2+ Peripheral Pulses, No clubbing, cyanosis,  edema of lt.wrist area  LYMPH: No lymphadenopathy noted  SKIN: No rashes or lesions

## 2018-08-12 NOTE — PHYSICAL THERAPY INITIAL EVALUATION ADULT - GENERAL OBSERVATIONS, REHAB EVAL
Consult received, chart reviewed. Patient received standing bedside, NAD. Patient agreed to EVALUATION from Physical Therapist.

## 2018-08-12 NOTE — PROGRESS NOTE ADULT - ASSESSMENT
73F with h/o htn, hld, dm, hypothyroid, ESRD on PD, CAD s/p CABG - recent admission for polyarthralgias started on prednisone now with worsening arthralgias and fever, leukocytosis.  It would be unusual to have multiple joints infected, though staph sepsis can present with polyarthralgias.   She has been afebrile since admission  Her lekocytosis has improved off Abx, but she is now back on steroids    Suggest:  - hold on the antibiotics  - f/u BC 73F with h/o htn, hld, dm, hypothyroid, ESRD on PD, CAD s/p CABG - recent admission for polyarthralgias started on prednisone now with worsening arthralgias and fever, leukocytosis.  Appreciate rheumatology evaluation who feels the polyarthralgias are likely related to gout.  BC and UC as well as PD cultures have all been negative, therefore, doubt infectious etiology.  In addition, it would be unusual to have multiple joints infected, though staph sepsis can present with polyarthralgias, however, again - blood cultures are negative.  She has been afebrile since admission.  Leukocytosis is resolved.    Suggest:  - continue to observe off antibiotics    Please call Infectious Diseases if there is a change in status.  Thank you.  [phone].

## 2018-08-12 NOTE — PROGRESS NOTE ADULT - PROBLEM SELECTOR PLAN 3
BP elevated (sec to steroid?)  continue UF with PD   If BP remains elevated, consider titrating labetalol to 300mg TID  monitor BP  Low salt diet.

## 2018-08-13 ENCOUNTER — TRANSCRIPTION ENCOUNTER (OUTPATIENT)
Age: 73
End: 2018-08-13

## 2018-08-13 VITALS
SYSTOLIC BLOOD PRESSURE: 167 MMHG | OXYGEN SATURATION: 97 % | TEMPERATURE: 97 F | DIASTOLIC BLOOD PRESSURE: 66 MMHG | RESPIRATION RATE: 18 BRPM | HEART RATE: 73 BPM

## 2018-08-13 DIAGNOSIS — I10 ESSENTIAL (PRIMARY) HYPERTENSION: ICD-10-CM

## 2018-08-13 DIAGNOSIS — E87.1 HYPO-OSMOLALITY AND HYPONATREMIA: ICD-10-CM

## 2018-08-13 LAB
CARDIOLIPIN IGM SER-MCNC: 10.05 GPL — SIGNIFICANT CHANGE UP (ref 0–23)
CARDIOLIPIN IGM SER-MCNC: 2.06 MPL — SIGNIFICANT CHANGE UP (ref 0–11)
GLUCOSE BLDC GLUCOMTR-MCNC: 307 MG/DL — HIGH (ref 70–99)
GLUCOSE BLDC GLUCOMTR-MCNC: 397 MG/DL — HIGH (ref 70–99)
GLUCOSE BLDC GLUCOMTR-MCNC: 446 MG/DL — HIGH (ref 70–99)
SPECIMEN SOURCE: SIGNIFICANT CHANGE UP
SPECIMEN SOURCE: SIGNIFICANT CHANGE UP

## 2018-08-13 RX ORDER — LABETALOL HCL 100 MG
300 TABLET ORAL
Qty: 0 | Refills: 0 | Status: DISCONTINUED | OUTPATIENT
Start: 2018-08-13 | End: 2018-08-13

## 2018-08-13 RX ORDER — GLIMEPIRIDE 1 MG
2 TABLET ORAL
Qty: 0 | Refills: 0 | COMMUNITY

## 2018-08-13 RX ORDER — LABETALOL HCL 100 MG
1 TABLET ORAL
Qty: 60 | Refills: 0 | OUTPATIENT
Start: 2018-08-13 | End: 2018-09-11

## 2018-08-13 RX ORDER — LABETALOL HCL 100 MG
1 TABLET ORAL
Qty: 0 | Refills: 0 | COMMUNITY

## 2018-08-13 RX ORDER — PANTOPRAZOLE SODIUM 20 MG/1
1 TABLET, DELAYED RELEASE ORAL
Qty: 10 | Refills: 0 | OUTPATIENT
Start: 2018-08-13 | End: 2018-08-22

## 2018-08-13 RX ORDER — INSULIN LISPRO 100/ML
4 VIAL (ML) SUBCUTANEOUS
Qty: 0 | Refills: 0 | Status: DISCONTINUED | OUTPATIENT
Start: 2018-08-13 | End: 2018-08-13

## 2018-08-13 RX ADMIN — Medication 4 UNIT(S): at 13:18

## 2018-08-13 RX ADMIN — Medication 4: at 09:03

## 2018-08-13 RX ADMIN — Medication 5: at 18:36

## 2018-08-13 RX ADMIN — SEVELAMER CARBONATE 2400 MILLIGRAM(S): 2400 POWDER, FOR SUSPENSION ORAL at 09:03

## 2018-08-13 RX ADMIN — Medication 6: at 13:18

## 2018-08-13 RX ADMIN — Medication 200 MILLIGRAM(S): at 05:58

## 2018-08-13 RX ADMIN — Medication 4 UNIT(S): at 18:36

## 2018-08-13 RX ADMIN — SEVELAMER CARBONATE 2400 MILLIGRAM(S): 2400 POWDER, FOR SUSPENSION ORAL at 18:35

## 2018-08-13 RX ADMIN — Medication 1334 MILLIGRAM(S): at 09:03

## 2018-08-13 RX ADMIN — Medication 81 MILLIGRAM(S): at 13:20

## 2018-08-13 RX ADMIN — Medication 50 MICROGRAM(S): at 05:57

## 2018-08-13 RX ADMIN — AMLODIPINE BESYLATE 10 MILLIGRAM(S): 2.5 TABLET ORAL at 05:58

## 2018-08-13 RX ADMIN — SEVELAMER CARBONATE 2400 MILLIGRAM(S): 2400 POWDER, FOR SUSPENSION ORAL at 13:19

## 2018-08-13 RX ADMIN — Medication 300 MILLIGRAM(S): at 18:35

## 2018-08-13 RX ADMIN — Medication 1 TABLET(S): at 13:19

## 2018-08-13 RX ADMIN — CLOPIDOGREL BISULFATE 75 MILLIGRAM(S): 75 TABLET, FILM COATED ORAL at 13:19

## 2018-08-13 RX ADMIN — Medication 20 MILLIGRAM(S): at 05:58

## 2018-08-13 NOTE — PROGRESS NOTE ADULT - PROBLEM SELECTOR PLAN 4
likely secondary to hyperglycemia  optimize glucose control   monitor BMP
 on 8/12- at goal for ESRD   Monitor serum calcium and PO4.  Continue binders
Check PTH and PO4   Monitor serum calcium and PO4.  Continue binders
cont home meds
cont home meds

## 2018-08-13 NOTE — DISCHARGE NOTE ADULT - CARE PLAN
Principal Discharge DX:	Joint pain  Goal:	symptoms control  Assessment and plan of treatment:	Complete Prednisone as ordered, Taper off. 8/13-Prednisone 15mgx 2 days, 10mgx 2 days and then 5mg x 2 days-> follow up with Dr. Eduardo within one week  Secondary Diagnosis:	ESRD on peritoneal dialysis  Goal:	symptoms control  Secondary Diagnosis:	CAD (coronary artery disease)  Goal:	symptoms control  Secondary Diagnosis:	Diabetes mellitus  Goal:	Glucose control  Assessment and plan of treatment:	Hg A1c 6.7%  Secondary Diagnosis:	Dyslipidemia  Goal:	lipids control  Assessment and plan of treatment:	Continue Simvastatin  Secondary Diagnosis:	HTN (hypertension)  Goal:	BP control  Assessment and plan of treatment:	labetalol increased to 300mg 2 x day Principal Discharge DX:	Joint pain  Goal:	symptoms control  Assessment and plan of treatment:	Complete Prednisone as ordered, Taper off. 8/14 take Prednisone 15mgx 2 days, 10mgx 2 days and then 5mg x 2 days-> follow up with Dr. Eduardo within one week  Secondary Diagnosis:	ESRD on peritoneal dialysis  Goal:	symptoms control  Assessment and plan of treatment:	Continue PD as per routine schedule. Continue PHolso   Phosphorus level was 3.1 during hospitalization->follow up with your nephrologist for further management  Secondary Diagnosis:	CAD (coronary artery disease)  Goal:	symptoms control  Assessment and plan of treatment:	Continue Aspirin and Plavix, monitor for sign of bleeding  Secondary Diagnosis:	Diabetes mellitus  Goal:	Glucose control  Assessment and plan of treatment:	Hg A1c 6.7%. Your sugars may be elevated due to Prednisone, please follow consistent carbohydrate diet and Use Amaryl in AM before breakfast. Check FS in AM and before dinner, keep FS log and follow up with your PCP/ endocrinologist  Secondary Diagnosis:	Dyslipidemia  Goal:	lipids control  Assessment and plan of treatment:	Continue Simvastatin  Secondary Diagnosis:	HTN (hypertension)  Goal:	BP control  Assessment and plan of treatment:	*******labetalol increased to 300mg 2 x day****. Continue Norvasc daily

## 2018-08-13 NOTE — PROGRESS NOTE ADULT - PROBLEM SELECTOR PLAN 6
suboptimal, on norvasc 10 and labetalol 200 bid  increase labetalol 300mg bid  monitor suboptimal, on norvasc 10 and labetalol 200 bid  increase labetalol 300mg bid  monitor BP  low salt diet

## 2018-08-13 NOTE — DISCHARGE NOTE ADULT - SECONDARY DIAGNOSIS.
ESRD on peritoneal dialysis CAD (coronary artery disease) Diabetes mellitus Dyslipidemia HTN (hypertension)

## 2018-08-13 NOTE — PROGRESS NOTE ADULT - PROBLEM SELECTOR PROBLEM 1
ESRD on peritoneal dialysis
Joint pain
Joint pain

## 2018-08-13 NOTE — PROGRESS NOTE ADULT - PROBLEM SELECTOR PLAN 3
on 8/12- at goal for ESRD   Monitor serum calcium and PO4.  Continue binders  on 8/12- at goal for ESRD   Monitor serum calcium and PO4.  Continue renagel

## 2018-08-13 NOTE — DISCHARGE NOTE ADULT - MEDICATION SUMMARY - MEDICATIONS TO TAKE
I will START or STAY ON the medications listed below when I get home from the hospital:    predniSONE 5 mg oral tablet  -- 3 tabs  x 2 days, then 2 tabs x 2 days then 1 tab x 2 days then stop   -- It is very important that you take or use this exactly as directed.  Do not skip doses or discontinue unless directed by your doctor.  Obtain medical advice before taking any non-prescription drugs as some may affect the action of this medication.  Take with food or milk.    -- Indication: For Joint pain    aspirin 81 mg oral tablet  -- 1 tab(s) by mouth once a day  -- Indication: For CAD (coronary artery disease)    gabapentin 100 mg oral capsule  -- 2 cap(s) by mouth once a day (at bedtime)  -- Indication: For Neuropathy     glimepiride 1 mg oral tablet  -- 2 tab(s) by mouth once a day in the morning  -- Indication: For Diabetes mellitus    atorvastatin 40 mg oral tablet  -- 1 tab(s) by mouth once a day  -- Indication: For Dyslipidemia    clopidogrel 75 mg oral tablet  -- 1 tab(s) by mouth once a day  -- Indication: For CAD (coronary artery disease)    labetalol 300 mg oral tablet  -- 1 tab(s) by mouth 2 times a day  -- Indication: For HTN (hypertension)    amLODIPine 10 mg oral tablet  -- 1 tab(s) by mouth once a day  -- Indication: For HTN (hypertension)    Renagel 800 mg oral tablet  -- 3 tab(s) by mouth 3 times a day  -- Indication: For ESRD on peritoneal dialysis    calcium acetate 667 mg oral capsule  -- 3 cap(s) by mouth 3 times a day (with meals)  -- Indication: For ESRD on peritoneal dialysis    Protonix 40 mg oral delayed release tablet  -- 1 tab(s) by mouth once a day   -- It is very important that you take or use this exactly as directed.  Do not skip doses or discontinue unless directed by your doctor.  Obtain medical advice before taking any non-prescription drugs as some may affect the action of this medication.  Swallow whole.  Do not crush.    -- Indication: For Need for prophylactic measure    levothyroxine 50 mcg (0.05 mg) oral tablet  -- 1 tab(s) by mouth once a day  -- Indication: For Hypothyroidism     Daily Martha oral tablet  -- 1 tab(s) by mouth once a day  -- Indication: For Need for prophylactic measure    ergocalciferol 50,000 intl units (1.25 mg) oral capsule  -- 1 cap(s) by mouth once a week  -- Indication: For ESRD on peritoneal dialysis

## 2018-08-13 NOTE — DISCHARGE NOTE ADULT - CARE PROVIDER_API CALL
Osvaldo Sanchez), Internal Medicine; Nephrology  [address]  [address] [place] [address]  Phone: [phone]  Fax: [phone]    Dragan Eduardo), Internal Medicine; Rheumatology  [address] [facility] [address]  [place] [address]  Phone: [phone]  Fax: [phone]

## 2018-08-13 NOTE — PROGRESS NOTE ADULT - SUBJECTIVE AND OBJECTIVE BOX
[facility]   MD ROSITA APODACA MD ANGELA WONG, PA    TEL:  OFFICE: [phone]  DR SCOTT CELL: [phone]  DR. JOHNSON CELL: [phone]  KESHA HOUSTON CELL: [phone]        Patient is a 73y old  Female who presents with a chief complaint of Fever, joint pain and generalized weakness (10 Aug 2018 19:19)      Patient seen and examined at bedside. No chest pain/sob    VITALS:  T(F): 97.5 (08-13-18 @ 10:07), Max: 98.1 (08-12-18 @ 21:21)  HR: 69 (08-13-18 @ 10:07)  BP: 158/83 (08-13-18 @ 10:07)  RR: 18 (08-13-18 @ 10:07)  SpO2: 96% (08-13-18 @ 10:07)  Wt(kg): --    08-12 @ 07:01  -  08-13 @ 07:00  --------------------------------------------------------  IN: 2000 mL / OUT: 0 mL / NET: 2000 mL          PHYSICAL EXAM:  Constitutional: NAD  Neck: No JVD  Respiratory: CTAB, no wheezes, rales or rhonchi  Cardiovascular: S1, S2, RRR  Gastrointestinal: BS+, soft, NT/ND  Extremities: No peripheral edema    Hospital Medications:   MEDICATIONS  (STANDING):  amLODIPine   Tablet 10 milliGRAM(s) Oral daily  aspirin  chewable 81 milliGRAM(s) Oral daily  atorvastatin 40 milliGRAM(s) Oral at bedtime  calcium acetate 1334 milliGRAM(s) Oral three times a day with meals  clopidogrel Tablet 75 milliGRAM(s) Oral daily  dextrose 5%. 1000 milliLiter(s) (50 mL/Hr) IV Continuous <Continuous>  dextrose 50% Injectable 12.5 Gram(s) IV Push once  dextrose 50% Injectable 25 Gram(s) IV Push once  dextrose 50% Injectable 25 Gram(s) IV Push once  ergocalciferol 64064 Unit(s) Oral every week  gabapentin 200 milliGRAM(s) Oral at bedtime  insulin lispro (HumaLOG) corrective regimen sliding scale   SubCutaneous three times a day before meals  insulin lispro Injectable (HumaLOG) 4 Unit(s) SubCutaneous three times a day before meals  labetalol 200 milliGRAM(s) Oral two times a day  levothyroxine 50 MICROGram(s) Oral daily  multivitamin 1 Tablet(s) Oral daily  predniSONE   Tablet   Oral   sevelamer hydrochloride 2400 milliGRAM(s) Oral three times a day      LABS:  08-12    132<L>  |  94<L>  |  58<H>  ----------------------------<  284<H>  3.6   |  20<L>  |  6.21<H>    Ca    9.3      12 Aug 2018 06:00  Phos  3.1     08-12  Mg     1.5     08-12      Creatinine Trend: 6.21 <--, 6.65 <--                                10.4   11.53 )-----------( 272      ( 12 Aug 2018 06:00 )             33.4     Urine Studies:  Urinalysis - [08-10-18 @ 15:35]      Color YELLOW / Appearance CLEAR / SG 1.016 / pH 7.5      Gluc 300 / Ketone NEGATIVE  / Bili NEGATIVE / Urobili NORMAL       Blood NEGATIVE / Protein >600 / Leuk Est SMALL / Nitrite NEGATIVE      RBC 2-5 / WBC 25-50 / Hyaline  / Gran  / Sq Epi OCC / Non Sq Epi  / Bacteria       .8 (Ca --)      [08-12-18 @ 06:00]   --  HbA1c 6.7      [08-11-18 @ 05:30]      ARIES: titer 1:160, pattern Homogeneous      [08-02-18 @ 15:37]  Rheumatoid Factor 14.0      [08-02-18 @ 15:37]    RADIOLOGY & ADDITIONAL STUDIES: [facility]   MD ROSITA APODACA MD ANGELA WONG, PA    TEL:  OFFICE: [phone]  DR SCOTT CELL: [phone]  DR. JOHNSON CELL: [phone]  KESHA HOUSTON CELL: [phone]        Patient is a 73y old  Female who presents with a chief complaint of Fever, joint pain and generalized weakness       Patient seen and examined at bedside. No chest pain/sob    VITALS:  T(F): 97.5 (08-13-18 @ 10:07), Max: 98.1 (08-12-18 @ 21:21)  HR: 69 (08-13-18 @ 10:07)  BP: 158/83 (08-13-18 @ 10:07)  RR: 18 (08-13-18 @ 10:07)  SpO2: 96% (08-13-18 @ 10:07)  Wt(kg): --    08-12 @ 07:01  -  08-13 @ 07:00  --------------------------------------------------------  IN: 2000 mL / OUT: 0 mL / NET: 2000 mL          PHYSICAL EXAM:  Constitutional: NAD  Neck: No JVD  Respiratory: CTAB, no wheezes, rales or rhonchi  Cardiovascular: S1, S2, RRR  Gastrointestinal: BS+, soft, NT/ND, + pD catheter with no discharge no drainage   Extremities: No peripheral edema    Hospital Medications:   MEDICATIONS  (STANDING):  amLODIPine   Tablet 10 milliGRAM(s) Oral daily  aspirin  chewable 81 milliGRAM(s) Oral daily  atorvastatin 40 milliGRAM(s) Oral at bedtime  calcium acetate 1334 milliGRAM(s) Oral three times a day with meals  clopidogrel Tablet 75 milliGRAM(s) Oral daily  dextrose 5%. 1000 milliLiter(s) (50 mL/Hr) IV Continuous <Continuous>  dextrose 50% Injectable 12.5 Gram(s) IV Push once  dextrose 50% Injectable 25 Gram(s) IV Push once  dextrose 50% Injectable 25 Gram(s) IV Push once  ergocalciferol 73086 Unit(s) Oral every week  gabapentin 200 milliGRAM(s) Oral at bedtime  insulin lispro (HumaLOG) corrective regimen sliding scale   SubCutaneous three times a day before meals  insulin lispro Injectable (HumaLOG) 4 Unit(s) SubCutaneous three times a day before meals  labetalol 200 milliGRAM(s) Oral two times a day  levothyroxine 50 MICROGram(s) Oral daily  multivitamin 1 Tablet(s) Oral daily  predniSONE   Tablet   Oral   sevelamer hydrochloride 2400 milliGRAM(s) Oral three times a day      LABS:  08-12    132<L>  |  94<L>  |  58<H>  ----------------------------<  284<H>  3.6   |  20<L>  |  6.21<H>    Ca    9.3      12 Aug 2018 06:00  Phos  3.1     08-12  Mg     1.5     08-12      Creatinine Trend: 6.21 <--, 6.65 <--                                10.4   11.53 )-----------( 272      ( 12 Aug 2018 06:00 )             33.4     Urine Studies:  Urinalysis - [08-10-18 @ 15:35]      Color YELLOW / Appearance CLEAR / SG 1.016 / pH 7.5      Gluc 300 / Ketone NEGATIVE  / Bili NEGATIVE / Urobili NORMAL       Blood NEGATIVE / Protein >600 / Leuk Est SMALL / Nitrite NEGATIVE      RBC 2-5 / WBC 25-50 / Hyaline  / Gran  / Sq Epi OCC / Non Sq Epi  / Bacteria       .8 (Ca --)      [08-12-18 @ 06:00]   --  HbA1c 6.7      [08-11-18 @ 05:30]      ARIES: titer 1:160, pattern Homogeneous      [08-02-18 @ 15:37]  Rheumatoid Factor 14.0      [08-02-18 @ 15:37]    RADIOLOGY & ADDITIONAL STUDIES:

## 2018-08-13 NOTE — DISCHARGE NOTE ADULT - HOSPITAL COURSE
72 y/o female with PMH ESRD on peritoneal dialysis, HTN, HLD, DM, hypothyroid, CAD s/p bypass presenting with fever and joint pain for 1 week, worsening last night. Pain is mostly in left wrist and HUSSEIN ankles right now. fever 101. Pt has been seen by ID, cultures negative, monitored off Abx . Pt has been afebrile   Rheum  consulted, stated on Steroid taper with significant improvement in symptoms. 8/11-Start prednisone 20mg daily x 2 days, 15mgx 2 days, 10mgx 2 days and then 5mg x 2 days-> follow up with Dr. Eduardo within one week. PT eval no home PT needs  ESRD on peritoneal dialysis-CT A/P No acute bowel pathology.  PD catheter with tip in the inferior peritoneal cavity. Moderate ascites.  HTN Labetalol increased d/t elevated BP /Norvasc  Dyslipidemia-statin.   Diabetes mellitus: Hg A1c 6.7%- pt to resume home DM regimen   CAD asymptomatic -ASA/Plavix    PT eval no home PT needs, pt is optimized for discharge

## 2018-08-13 NOTE — DISCHARGE NOTE ADULT - PATIENT PORTAL LINK FT
You can access the [facility] Portal, offered by [facility], by registering with the following website: [email or web address]

## 2018-08-13 NOTE — PROGRESS NOTE ADULT - PROBLEM SELECTOR PLAN 1
ESRD on PD  Pt tolerating PD  Continue PD as ordered   Monitor BMP and BP  Peritoneal fluid cell count unremarkable, follow up culture
ESRD on PD  Pt tolerating PD  Resume PD from tomorrow  Monitor BMP and BP  Peritoneal fluid cell count unremarkable, follow up culture
ESRD on PD  Pt tolerating PD , continue current prescription   Monitor BMP and BP  Peritoneal fluid cell count unremarkable, follow up culture
Polyarticular arthritis,likely gout,started on prednisone taper  -Rheumatology,ID consult appreciated.
Polyarticular arthritis,likely gout,started on prednisone taper  -Rheumatology,ID consult appreciated.

## 2018-08-13 NOTE — PROGRESS NOTE ADULT - PROBLEM SELECTOR PROBLEM 4
Hyponatremia
Chronic kidney disease-mineral and bone disorder
Chronic kidney disease-mineral and bone disorder
HTN (hypertension)
HTN (hypertension)

## 2018-08-13 NOTE — DISCHARGE NOTE ADULT - PLAN OF CARE
symptoms control Complete Prednisone as ordered, Taper off. 8/13-Prednisone 15mgx 2 days, 10mgx 2 days and then 5mg x 2 days-> follow up with Dr. Eduardo within one week Glucose control Hg A1c 6.7% lipids control Continue Simvastatin BP control labetalol increased to 300mg 2 x day Complete Prednisone as ordered, Taper off. 8/14 take Prednisone 15mgx 2 days, 10mgx 2 days and then 5mg x 2 days-> follow up with Dr. Eduardo within one week Continue PD as per routine schedule. Continue PHolso   Phosphorus level was 3.1 during hospitalization->follow up with your nephrologist for further management Continue Aspirin and Plavix, monitor for sign of bleeding Hg A1c 6.7%. Your sugars may be elevated due to Prednisone, please follow consistent carbohydrate diet and Use Amaryl in AM before breakfast. Check FS in AM and before dinner, keep FS log and follow up with your PCP/ endocrinologist *******labetalol increased to 300mg 2 x day****. Continue Norvasc daily

## 2018-08-13 NOTE — PROGRESS NOTE ADULT - PROBLEM SELECTOR PLAN 2
in setting of RF  Monitor H/H  at goal no KANWAL for now
cont PD per schedule, cont home meds
cont PD per schedule, cont home meds
in setting of RF  Monitor H/H  at goal no KANWAL for now
in setting of RF  Monitor H/H  at goal no KANWAL for now

## 2018-08-15 LAB
BACTERIA BLD CULT: SIGNIFICANT CHANGE UP
BACTERIA BLD CULT: SIGNIFICANT CHANGE UP

## 2018-08-17 LAB
BACTERIA BLD CULT: SIGNIFICANT CHANGE UP
BACTERIA BLD CULT: SIGNIFICANT CHANGE UP
BACTERIA FLD CULT: SIGNIFICANT CHANGE UP

## 2018-12-13 ENCOUNTER — INPATIENT (INPATIENT)
Facility: HOSPITAL | Age: 73
LOS: 2 days | Discharge: HOME HEALTH | End: 2018-12-16
Attending: INTERNAL MEDICINE | Admitting: INTERNAL MEDICINE
Payer: MEDICARE

## 2018-12-13 VITALS
DIASTOLIC BLOOD PRESSURE: 72 MMHG | RESPIRATION RATE: 16 BRPM | HEART RATE: 50 BPM | SYSTOLIC BLOOD PRESSURE: 194 MMHG | OXYGEN SATURATION: 97 % | TEMPERATURE: 98 F

## 2018-12-13 DIAGNOSIS — I44.2 ATRIOVENTRICULAR BLOCK, COMPLETE: ICD-10-CM

## 2018-12-13 DIAGNOSIS — Z95.1 PRESENCE OF AORTOCORONARY BYPASS GRAFT: Chronic | ICD-10-CM

## 2018-12-13 LAB
ALBUMIN SERPL ELPH-MCNC: 3.5 G/DL — SIGNIFICANT CHANGE UP (ref 3.3–5)
ALP SERPL-CCNC: 105 U/L — SIGNIFICANT CHANGE UP (ref 40–120)
ALT FLD-CCNC: 21 U/L — SIGNIFICANT CHANGE UP (ref 4–33)
ANISOCYTOSIS BLD QL: SLIGHT — SIGNIFICANT CHANGE UP
APTT BLD: 16.6 SEC — LOW (ref 27.5–36.3)
AST SERPL-CCNC: 31 U/L — SIGNIFICANT CHANGE UP (ref 4–32)
BASE EXCESS BLDV CALC-SCNC: -0.3 MMOL/L — SIGNIFICANT CHANGE UP
BASOPHILS # BLD AUTO: 0.03 K/UL — SIGNIFICANT CHANGE UP (ref 0–0.2)
BASOPHILS NFR BLD AUTO: 0.3 % — SIGNIFICANT CHANGE UP (ref 0–2)
BASOPHILS NFR SPEC: 0 % — SIGNIFICANT CHANGE UP (ref 0–2)
BILIRUB SERPL-MCNC: 0.2 MG/DL — SIGNIFICANT CHANGE UP (ref 0.2–1.2)
BLASTS # FLD: 0 % — SIGNIFICANT CHANGE UP (ref 0–0)
BLOOD GAS VENOUS - CREATININE: 10.4 MG/DL — HIGH (ref 0.5–1.3)
BUN SERPL-MCNC: 75 MG/DL — HIGH (ref 7–23)
CALCIUM SERPL-MCNC: 10.2 MG/DL — SIGNIFICANT CHANGE UP (ref 8.4–10.5)
CHLORIDE BLDV-SCNC: 97 MMOL/L — SIGNIFICANT CHANGE UP (ref 96–108)
CHLORIDE SERPL-SCNC: 91 MMOL/L — LOW (ref 98–107)
CK MB BLD-MCNC: 4.6 — HIGH (ref 0–2.5)
CK MB BLD-MCNC: 6.97 NG/ML — HIGH (ref 1–4.7)
CK SERPL-CCNC: 150 U/L — SIGNIFICANT CHANGE UP (ref 25–170)
CO2 SERPL-SCNC: 21 MMOL/L — LOW (ref 22–31)
CREAT SERPL-MCNC: 9.76 MG/DL — HIGH (ref 0.5–1.3)
ELLIPTOCYTES BLD QL SMEAR: SLIGHT — SIGNIFICANT CHANGE UP
EOSINOPHIL # BLD AUTO: 0.14 K/UL — SIGNIFICANT CHANGE UP (ref 0–0.5)
EOSINOPHIL NFR BLD AUTO: 1.4 % — SIGNIFICANT CHANGE UP (ref 0–6)
EOSINOPHIL NFR FLD: 0.9 % — SIGNIFICANT CHANGE UP (ref 0–6)
GAS PNL BLDV: 135 MMOL/L — LOW (ref 136–146)
GLUCOSE BLDV-MCNC: 293 — HIGH (ref 70–99)
GLUCOSE SERPL-MCNC: 305 MG/DL — HIGH (ref 70–99)
HCO3 BLDV-SCNC: 23 MMOL/L — SIGNIFICANT CHANGE UP (ref 20–27)
HCT VFR BLD CALC: 29.3 % — LOW (ref 34.5–45)
HCT VFR BLDV CALC: 29.6 % — LOW (ref 34.5–45)
HGB BLD-MCNC: 9.1 G/DL — LOW (ref 11.5–15.5)
HGB BLDV-MCNC: 9.6 G/DL — LOW (ref 11.5–15.5)
HYPOCHROMIA BLD QL: SLIGHT — SIGNIFICANT CHANGE UP
IMM GRANULOCYTES # BLD AUTO: 0.05 # — SIGNIFICANT CHANGE UP
IMM GRANULOCYTES NFR BLD AUTO: 0.5 % — SIGNIFICANT CHANGE UP (ref 0–1.5)
INR BLD: 1.02 — SIGNIFICANT CHANGE UP (ref 0.88–1.17)
LACTATE BLDV-MCNC: 2.8 MMOL/L — HIGH (ref 0.5–2)
LIDOCAIN IGE QN: 157.5 U/L — HIGH (ref 7–60)
LYMPHOCYTES # BLD AUTO: 1.24 K/UL — SIGNIFICANT CHANGE UP (ref 1–3.3)
LYMPHOCYTES # BLD AUTO: 12.3 % — LOW (ref 13–44)
LYMPHOCYTES NFR SPEC AUTO: 7 % — LOW (ref 13–44)
MCHC RBC-ENTMCNC: 27.2 PG — SIGNIFICANT CHANGE UP (ref 27–34)
MCHC RBC-ENTMCNC: 31.1 % — LOW (ref 32–36)
MCV RBC AUTO: 87.7 FL — SIGNIFICANT CHANGE UP (ref 80–100)
METAMYELOCYTES # FLD: 0 % — SIGNIFICANT CHANGE UP (ref 0–1)
MICROCYTES BLD QL: SLIGHT — SIGNIFICANT CHANGE UP
MONOCYTES # BLD AUTO: 0.77 K/UL — SIGNIFICANT CHANGE UP (ref 0–0.9)
MONOCYTES NFR BLD AUTO: 7.6 % — SIGNIFICANT CHANGE UP (ref 2–14)
MONOCYTES NFR BLD: 8.7 % — SIGNIFICANT CHANGE UP (ref 2–9)
MYELOCYTES NFR BLD: 0 % — SIGNIFICANT CHANGE UP (ref 0–0)
NEUTROPHIL AB SER-ACNC: 81.7 % — HIGH (ref 43–77)
NEUTROPHILS # BLD AUTO: 7.89 K/UL — HIGH (ref 1.8–7.4)
NEUTROPHILS NFR BLD AUTO: 77.9 % — HIGH (ref 43–77)
NEUTS BAND # BLD: 0 % — SIGNIFICANT CHANGE UP (ref 0–6)
NRBC # FLD: 0.03 — SIGNIFICANT CHANGE UP
NT-PROBNP SERPL-SCNC: SIGNIFICANT CHANGE UP PG/ML
OTHER - HEMATOLOGY %: 0 — SIGNIFICANT CHANGE UP
OVALOCYTES BLD QL SMEAR: SLIGHT — SIGNIFICANT CHANGE UP
PCO2 BLDV: 43 MMHG — SIGNIFICANT CHANGE UP (ref 41–51)
PH BLDV: 7.37 PH — SIGNIFICANT CHANGE UP (ref 7.32–7.43)
PLATELET # BLD AUTO: 214 K/UL — SIGNIFICANT CHANGE UP (ref 150–400)
PLATELET COUNT - ESTIMATE: NORMAL — SIGNIFICANT CHANGE UP
PMV BLD: 9.4 FL — SIGNIFICANT CHANGE UP (ref 7–13)
PO2 BLDV: 32 MMHG — LOW (ref 35–40)
POIKILOCYTOSIS BLD QL AUTO: SLIGHT — SIGNIFICANT CHANGE UP
POLYCHROMASIA BLD QL SMEAR: SLIGHT — SIGNIFICANT CHANGE UP
POTASSIUM BLDV-SCNC: 4 MMOL/L — SIGNIFICANT CHANGE UP (ref 3.4–4.5)
POTASSIUM SERPL-MCNC: 4.2 MMOL/L — SIGNIFICANT CHANGE UP (ref 3.5–5.3)
POTASSIUM SERPL-SCNC: 4.2 MMOL/L — SIGNIFICANT CHANGE UP (ref 3.5–5.3)
PROMYELOCYTES # FLD: 0 % — SIGNIFICANT CHANGE UP (ref 0–0)
PROT SERPL-MCNC: 7.4 G/DL — SIGNIFICANT CHANGE UP (ref 6–8.3)
PROTHROM AB SERPL-ACNC: 11.6 SEC — SIGNIFICANT CHANGE UP (ref 9.8–13.1)
RBC # BLD: 3.34 M/UL — LOW (ref 3.8–5.2)
RBC # FLD: 18.9 % — HIGH (ref 10.3–14.5)
SAO2 % BLDV: 44.8 % — LOW (ref 60–85)
SCHISTOCYTES BLD QL AUTO: SLIGHT — SIGNIFICANT CHANGE UP
SODIUM SERPL-SCNC: 134 MMOL/L — LOW (ref 135–145)
TROPONIN T, HIGH SENSITIVITY: 2520 NG/L — CRITICAL HIGH (ref ?–14)
VARIANT LYMPHS # BLD: 1.7 % — SIGNIFICANT CHANGE UP
WBC # BLD: 10.12 K/UL — SIGNIFICANT CHANGE UP (ref 3.8–10.5)
WBC # FLD AUTO: 10.12 K/UL — SIGNIFICANT CHANGE UP (ref 3.8–10.5)

## 2018-12-13 PROCEDURE — 71045 X-RAY EXAM CHEST 1 VIEW: CPT | Mod: 26

## 2018-12-13 RX ORDER — ASPIRIN/CALCIUM CARB/MAGNESIUM 324 MG
325 TABLET ORAL ONCE
Qty: 0 | Refills: 0 | Status: COMPLETED | OUTPATIENT
Start: 2018-12-13 | End: 2018-12-13

## 2018-12-13 RX ORDER — HYDRALAZINE HCL 50 MG
10 TABLET ORAL ONCE
Qty: 0 | Refills: 0 | Status: COMPLETED | OUTPATIENT
Start: 2018-12-13 | End: 2018-12-13

## 2018-12-13 RX ORDER — NITROGLYCERIN 6.5 MG
50 CAPSULE, EXTENDED RELEASE ORAL
Qty: 50 | Refills: 0 | Status: DISCONTINUED | OUTPATIENT
Start: 2018-12-13 | End: 2018-12-14

## 2018-12-13 RX ORDER — ONDANSETRON 8 MG/1
4 TABLET, FILM COATED ORAL ONCE
Qty: 0 | Refills: 0 | Status: COMPLETED | OUTPATIENT
Start: 2018-12-13 | End: 2018-12-13

## 2018-12-13 RX ORDER — BUMETANIDE 0.25 MG/ML
2 INJECTION INTRAMUSCULAR; INTRAVENOUS ONCE
Qty: 0 | Refills: 0 | Status: COMPLETED | OUTPATIENT
Start: 2018-12-13 | End: 2018-12-13

## 2018-12-13 RX ADMIN — Medication 10 MILLIGRAM(S): at 23:22

## 2018-12-13 RX ADMIN — BUMETANIDE 2 MILLIGRAM(S): 0.25 INJECTION INTRAMUSCULAR; INTRAVENOUS at 23:20

## 2018-12-13 RX ADMIN — ONDANSETRON 4 MILLIGRAM(S): 8 TABLET, FILM COATED ORAL at 23:40

## 2018-12-13 RX ADMIN — BUMETANIDE 2 MILLIGRAM(S): 0.25 INJECTION INTRAMUSCULAR; INTRAVENOUS at 23:58

## 2018-12-13 RX ADMIN — Medication 325 MILLIGRAM(S): at 23:38

## 2018-12-13 NOTE — H&P ADULT - ASSESSMENT
74yo F with h/o HTN, HLD, DMII, hypothyroid, ESRD on PD, CAD s/p CABG, recent adm for ADHF 2 months ago @ [facility] now presents shortness of breath x3 days likely 2/2 ADHF. ProBNP >34347, CXR showing pulmonary edema.  Patient with elevated hST 2520, CKMB 6.97, CKMB relative index 4.5, normal .  but denies any active chest pain, STD in V6.  Patient had recent LHC <2 months ago.  Patient also found to be in high degree AV block. Patient's current creatinine 9.76. Lipase  Admit to CCU for monitoring. 72yo F with h/o HTN, HLD, DMII, hypothyroid, ESRD on PD, CAD s/p CABG, recent adm for ADHF 2 months ago @ [facility] now presents shortness of breath x3 days likely 2/2 ADHF. ProBNP >42559, CXR showing pulmonary edema.  Patient with elevated hST 2520, CKMB 6.97, CKMB relative index 4.5, normal .  but denies any active chest pain, STD in V6.  Patient had recent LHC <2 months ago.  Patient also found to be in high degree AV block. Patient's current creatinine 9.76.  Admit to CCU for monitoring.

## 2018-12-13 NOTE — H&P ADULT - NSHPLABSRESULTS_GEN_ALL_CORE
LABORATORY VALUES	 	                          9.1    10.12 )-----------( 214      ( 13 Dec 2018 21:05 )             29.3       12-13    134<L>  |  91<L>  |  75<H>  ----------------------------<  305<H>  4.2   |  21<L>  |  9.76<H>    Ca    10.2      13 Dec 2018 21:05    TPro  7.4  /  Alb  3.5  /  TBili  0.2  /  DBili  x   /  AST  31  /  ALT  21  /  AlkPhos  105  12-13    LIVER FUNCTIONS - ( 13 Dec 2018 21:05 )  Alb: 3.5 g/dL / Pro: 7.4 g/dL / ALK PHOS: 105 u/L / ALT: 21 u/L / AST: 31 u/L / GGT: x           Prothrombin Time, Plasma: 11.6 SEC (12-13 @ 21:05)      CARDIAC MARKERS:    Troponin T, High Sensitivity: 2520 ng/L (12-13-18 @ 21:05)    Serum Pro-Brain Natriuretic Peptide: > 42757 pg/mL (12-13 @ 21:05)    Blood Gas Venous - Lactate: 2.8 mmol/L (12-13 @ 21:50)    CXR: pulmonary edema

## 2018-12-13 NOTE — H&P ADULT - NEGATIVE GENERAL SYMPTOMS
no chills/no sweating/no anorexia/no weight loss/no polyphagia/no polyuria/no fever/no polydipsia/no weight gain

## 2018-12-13 NOTE — ED PROVIDER NOTE - ATTENDING CONTRIBUTION TO CARE
agree with resident note  "73 F ESRD on daily PD, CAD s/p cabg in 2012 and stenting 6 weeks ago by Dr Jennings, HTN, HLD, DM, presenting due to 2 days of shortness of breath and nonproductive cough and epigastric pain. "  States worse when lying down at night.      clinical course: EKG shows third degree heart block with RBBB and LPFB; pt is hypertensive, mentating and in no distress; placed on pad (Zoll); CCU consult called; trop and pro BNP extremely elevated; pt initially showing no signs of acute pulmonary edema; admitted to CCU; during report between floor and ER became dyspneic; started on nitro gtt and BIPAP; currently stabilized and awaiting transfer to CCU; discussed with NP with pt being hypertensive at this time no need for emergent pacer as patient on pads and hypertensive;

## 2018-12-13 NOTE — H&P ADULT - PROBLEM SELECTOR PLAN 5
Has h/o CAD  Check lipid panel  low fat, DASH diet  Risk factor modification Has h/o CAD  Check lipid panel  low fat, DASH diet  Risk factor modification  continue asa, plavix and statin Has h/o CAD  Check lipid panel  low fat, DASH diet  Risk factor modification  continue asa, plavix and atorvastatin

## 2018-12-13 NOTE — ED ADULT NURSE NOTE - NSIMPLEMENTINTERV_GEN_ALL_ED
Implemented All Fall Risk Interventions:  [place] to call system. Call bell, personal items and telephone within reach. Instruct patient to call for assistance. Room bathroom lighting operational. Non-slip footwear when patient is off stretcher. Physically safe environment: no spills, clutter or unnecessary equipment. Stretcher in lowest position, wheels locked, appropriate side rails in place. Provide visual cue, wrist band, yellow gown, etc. Monitor gait and stability. Monitor for mental status changes and reorient to person, place, and time. Review medications for side effects contributing to fall risk. Reinforce activity limits and safety measures with patient and family.

## 2018-12-13 NOTE — ED PROVIDER NOTE - OBJECTIVE STATEMENT
73 F ESRD on daily PD, CAD s/p cabg in 2012 and stenting 6 weeks ago by Dr Jennings, HTN, HLD, DM, presenting due to 2 days of shortness of breath and nonproductive cough and epigastric pain. SHe notes that she is unable to lie down which is unusual for her nad having light headedness over the last 2 days. Denies f/c, dysuria, hematuria, hematochezia, hx DVT, weight changes, sick contacts, recent travel.

## 2018-12-13 NOTE — H&P ADULT - PROBLEM SELECTOR PLAN 1
Patient p/w SOB, ROQUE, orthopnea.   CXR showed pulmonary edema  Admit to CCU  Continuous cardiac monitoring to r/o arrhythmias.   Bumex 2mg IVP BID  Trend BMP 2/2 diuresis and replete as needed  Daily weight monitoring, Strict I/O, Low sodium DASH diet  Check ECHO to evaluate LV/RV function and valvular abnormalities Patient p/w SOB, ROQUE, orthopnea.   CXR showed pulmonary edema  Admit to CCU  Continuous cardiac monitoring to r/o arrhythmias.   Bumex 2mg IVP BID  Trend BMP 2/2 diuresis and replete as needed  Daily weight monitoring, Strict I/O, Low sodium DASH diet  Check ECHO to evaluate LV/RV function and valvular abnormalities  currently on nitro gtt, Patient p/w SOB, ROQUE, orthopnea.   CXR showed pulmonary edema  Admit to CCU  Continuous cardiac monitoring to r/o arrhythmias.   Bumex 2mg IVP BID  Trend BMP 2/2 diuresis and replete as needed  Daily weight monitoring, Strict I/O, Low sodium DASH diet  Check ECHO to evaluate LV/RV function and valvular abnormalities  currently on nitro gtt and bipap

## 2018-12-13 NOTE — H&P ADULT - PROBLEM SELECTOR PLAN 6
Patient has h/o hyperlipidemia and is on   Lipid panel in am  Continue statin therapy   Low fat diet Patient has h/o hyperlipidemia and is on   Lipid panel in am  Continue statin therapy w/ atorvastatin 40mg at bedtime  Low fat diet

## 2018-12-13 NOTE — H&P ADULT - PROBLEM SELECTOR PLAN 4
Check HbA1c  Hold oral hypoglycemic agents  Monitor blood sugars ac and hs  Lispro insulin sliding scale  Low carbohydrate diet  Diabetes education

## 2018-12-13 NOTE — H&P ADULT - PROBLEM SELECTOR PLAN 7
patient currently on nitro gtt with goal -180s  hold labetalol since patient is CHB patient currently on nitro gtt with goal -180s  hold labetalol  and norvacs since patient is CHB

## 2018-12-13 NOTE — H&P ADULT - PROBLEM SELECTOR PLAN 3
nephro consulted for emergent HD  monitor strict I/O nephro consulted for? emergent HD; reccs appreciated  monitor strict I/O

## 2018-12-13 NOTE — ED ADULT TRIAGE NOTE - CHIEF COMPLAINT QUOTE
Arrives from home by ems, c/o epigastric abdominal pain x 3 days and sob started 3 days ago , getting worse.   dialysis patient , peritoneal done nightly. FS = [id number]

## 2018-12-13 NOTE — ED PROVIDER NOTE - CHIEF COMPLAINT
The patient is a 73y Female complaining of The patient is a 73y Female complaining of shortness of breath

## 2018-12-13 NOTE — ED ADULT NURSE NOTE - OBJECTIVE STATEMENT
Pt arrives to ED c/o midline upper abdominal x 3 days and feeling SOB, worse when laying flat. Pt was seen at [facility] recently, "my legs were very swollen, double the size", resolved with dialysis. Pt does peritoneal once nightly. Pt observed to be in third degree heart block, states this is a new finding. Place on Zoll. Attending aware, pending cardiology eval. Labs were drawn from EMS IV, sent to lab. Safety and comfort maintained. Pt arrives to ED c/o midline upper abdominal x 3 days and feeling SOB, worse when laying flat. Pt was seen at [facility] recently, "my legs were very swollen, double the size", resolved with dialysis. Pt does peritoneal dialysis once nightly, last treatment last night. Pt observed to be in third degree heart block, states this is a new finding. Place on Zoll. Attending aware, pending cardiology eval. Labs were drawn from EMS IV, sent to lab. Safety and comfort maintained.

## 2018-12-13 NOTE — ED PROVIDER NOTE - MEDICAL DECISION MAKING DETAILS
73 F with multiple cardiac risk factors and prior CAD p/w sob and orthopnea. On exam has b/l crackles suggestive of volume overload vs cardiac decompensation.  Likely due to ischemic cardiomyopathy given recent stenting and hx of CAD. Plan: XR, CCU consult, pacer pads, EKG, labs, ASA.

## 2018-12-13 NOTE — H&P ADULT - MUSCULOSKELETAL
details… detailed exam normal strength/normal/ROM intact/no joint swelling/no joint erythema/no joint warmth/no calf tenderness

## 2018-12-13 NOTE — H&P ADULT - NEGATIVE NEUROLOGICAL SYMPTOMS
no tremors/no headache/no paresthesias/no syncope/no difficulty walking/no loss of consciousness/no transient paralysis/no weakness/no vertigo/no loss of sensation/no generalized seizures/no focal seizures

## 2018-12-13 NOTE — H&P ADULT - NEGATIVE MUSCULOSKELETAL SYMPTOMS
no arthralgia/no stiffness/no myalgia/no joint swelling/no muscle weakness/no muscle cramps/no neck pain/no arthritis

## 2018-12-13 NOTE — H&P ADULT - HISTORY OF PRESENT ILLNESS
74yo F with h/o HTN, HLD, DMII, hypothyroid, ESRD on PD, CAD s/p CABG, recent adm for ADHF 2 months ago @ [facility] now presents with epigastric pain x4 days and shortness of breath x3 days.  In ED, found to be in high degree AV block, placed on zoll pads and atropine at bedside.  She denies fever, chills, recent or active chest pain, palpitation, orthopnea, PND, dizziness, lightheadedness, weakness, nausea, vomiting, diarrhea, constipation, abdominal pain, bladder and bowel problems, leg swelling, sick contact, recent travel. Patient endorsed compliance with medication, no overdose on betablocker.  Patient was admitted to [place] with same presentation as this admission; shortness of breath, pulmonary edema on CXR, elevated troponin, was treated for ADHF.  Patient had minimal response to diuretics, and required peritoneal dialysis to remove fluid.  Patient also had LHC about 2months ago in [place], with intervention to Cx and left main.    Patient with elevated proBNP >76331, pulmonary edema on CXR. Troponin 2520. Lipase 157.5. Lactate 2.8.  Vitals: 98.1, /70, HR 52, RR22, 02 sat 99%on NC2L. Patient given asa 325mg po and bumex 2mg IVP in ED.    Admit to CCU for monitoring. 74yo F with h/o HTN, HLD, DMII, hypothyroid, ESRD on PD, CAD s/p CABG, recent adm for ADHF 2 months ago @ [facility] now presents with epigastric pain x4 days and shortness of breath x3 days.  In ED, found to be in high degree AV block, placed on zoll pads and atropine at bedside.  She denies fever, chills, recent or active chest pain, palpitation, orthopnea, PND, dizziness, lightheadedness, weakness, nausea, vomiting, diarrhea, constipation, abdominal pain, bladder and bowel problems, leg swelling, sick contact, recent travel. Patient endorsed compliance with medication, no overdose on betablocker.  Patient was admitted to [place] with same presentation as this admission; shortness of breath, pulmonary edema on CXR, elevated troponin, was treated for ADHF.  Patient had minimal response to diuretics, and required peritoneal dialysis to remove fluid.  Patient also had LHC about 2months ago in [place], with intervention to Cx and left main.    Patient with elevated proBNP >17981, pulmonary edema on CXR. Troponin 2520. Lipase 157.5. Lactate 2.8.  Vitals: 98.1, /70, HR 52, RR22, 02 sat 99%on NC2L. Patient given asa 325mg po and bumex 2mg IVP x2. started on nitro gtt, and bipap in ED.    Admit to CCU for monitoring. 72yo F with h/o HTN, HLD, DMII, hypothyroid, ESRD on PD ( last session 12/12 pm), CAD s/p CABG, recent adm for ADHF 2 months ago @ [facility] now presents with epigastric pain x4 days and shortness of breath x3 days.  In ED, found to be in high degree AV block, placed on zoll pads and atropine at bedside.  She denies fever, chills, recent or active chest pain, palpitation, orthopnea, PND, dizziness, lightheadedness, weakness, nausea, vomiting, diarrhea, constipation, abdominal pain, bladder and bowel problems, leg swelling, sick contact, recent travel. Patient endorsed compliance with medication, no overdose on betablocker.  Patient was admitted to [place] with same presentation as this admission; shortness of breath, pulmonary edema on CXR, elevated troponin, was treated for ADHF.  Patient had minimal response to diuretics, and required peritoneal dialysis to remove fluid.  Patient also had LHC about 2months ago in [place], with intervention to Cx and left main.    Patient with elevated proBNP >85136, pulmonary edema on CXR. Troponin 2520. Lipase 157.5. Lactate 2.8.  Vitals: 98.1, /70, HR 52, RR22, 02 sat 99%on NC2L. Patient given asa 325mg po and bumex 2mg IVP x2. started on nitro gtt, and bipap in ED.     Admit to CCU for monitoring. 74yo F with h/o HTN, HLD, DMII, hypothyroid, ESRD on PD ( last session 12/12 pm), CAD s/p CABG, recent adm for ADHF 2 months ago @ [facility] now presents with epigastric pain x4 days and shortness of breath x3 days.  In ED, found to be in high degree AV block, placed on zoll pads and atropine at bedside.  She denies fever, chills, recent or active chest pain, palpitation, orthopnea, PND, dizziness, lightheadedness, weakness, nausea, vomiting, diarrhea, constipation, abdominal pain, bladder and bowel problems, leg swelling, sick contact, recent travel. Patient endorsed compliance with medication, no overdose on betablocker.  Patient was admitted to [place] with same presentation as this admission; shortness of breath, pulmonary edema on CXR, elevated troponin, was treated for ADHF.  Patient had minimal response to diuretics, and required peritoneal dialysis to remove fluid.  Patient also had LHC about 2months ago in [place], with intervention to Cx and left main.    Patient with elevated proBNP >49337, pulmonary edema on CXR. Troponin 2520. Lipase 157.5. Lactate 2.8.  Vitals: 98.1, /70, HR 52, RR22, 02 sat 99%on NC2L. Patient given asa 325mg po and bumex 2mg IVP x2. started on nitro gtt, and bipap in ED.     Admit to CCU for monitoring.    Patient recently at [facility] with CHF responded to increased dialysis via peritoneal improved workup for ischemia-lateral ishcemia on nuclear stress mild to moderate LV dysfunction, cath patent LIMA to LAD small RCA with mdoerate disease SVG to cx occluded severe distal left kelsea and proximal native CX disease- stented.  Patient discharged stable in 48 hours followed by Dr. Singletary nepharology    today awake alert on hemodyalysis CHB underlying RBBB

## 2018-12-13 NOTE — ED PROVIDER NOTE - PHYSICAL EXAMINATION
General: Alert and Oriented. No apparent distress.  Head: Normocephalic and atraumatic.  Eyes: PERRLA with EOMI.  Neck: Supple. Trachea midline.   Cardiac: Bradycardia. No murmurs appreciated. no LE edema.  Pulmonary: Crackles b/l  Abdominal: Soft, non-tender.  Neurologic: No focal sensory or motor deficits. Mentating normally  Musculoskeletal: Strength appropriate in all 4 extremities for age with no limited ROM.  Skin: Color appropriate for race. Intact, warm, and well-perfused.  Psychiatric: Appropriate mood and affect. No apparent risk to self or others.

## 2018-12-13 NOTE — H&P ADULT - PROBLEM SELECTOR PLAN 2
Patients HR currently 50s SBP 200s.  DDX: heart failure vs Iatrogenic vs conduction defect vs hypothyroidism vs hypothermia  Atropine and Zoll pads at bedside  Consider TVP if HD unstable  Hold AV mario blockers  EP consult and f/u recommendations.   Check TSH and if elevated, reflex free T4 and T3, check lyme titre  NPO after midnight. Possible PPM placement  continue to trend lactate Patients HR currently 50s SBP 200s.  DDX: heart failure vs Iatrogenic vs conduction defect vs hypothyroidism vs hypothermia  Atropine and Zoll pads at bedside  Consider TVP if HD unstable  Hold AV mario blockers  EP consult and f/u recommendations.   Check TSH and if elevated, reflex free T4 and T3, check lyme titre  NPO after midnight. Possible PPM placement  continue to trend lactate  trend cardiac enzymes

## 2018-12-13 NOTE — H&P ADULT - NEGATIVE ENMT SYMPTOMS
no nasal congestion/no nose bleeds/no recurrent cold sores/no tinnitus/no sinus symptoms/no nasal discharge/no hearing difficulty/no vertigo/no post-nasal discharge/no ear pain

## 2018-12-13 NOTE — H&P ADULT - ATTENDING COMMENTS
I reviewed and amended the housestaff/PA note  Presently patient on hemodialysis , less sob off IV NTG awake alert on bipap elevated troponin and BNP probably compatible with CHF (similar to [place] admission). CHB is new  Of concern is abdominal pain and elevated lipase n patient on hemodialysis    Recommend  dialysis as per renal   GI consult   EP consult re: pacemaker  2 D echo

## 2018-12-14 DIAGNOSIS — E78.5 HYPERLIPIDEMIA, UNSPECIFIED: ICD-10-CM

## 2018-12-14 DIAGNOSIS — N18.9 CHRONIC KIDNEY DISEASE, UNSPECIFIED: ICD-10-CM

## 2018-12-14 DIAGNOSIS — I10 ESSENTIAL (PRIMARY) HYPERTENSION: ICD-10-CM

## 2018-12-14 DIAGNOSIS — N18.6 END STAGE RENAL DISEASE: ICD-10-CM

## 2018-12-14 DIAGNOSIS — D64.9 ANEMIA, UNSPECIFIED: ICD-10-CM

## 2018-12-14 DIAGNOSIS — I25.10 ATHEROSCLEROTIC HEART DISEASE OF NATIVE CORONARY ARTERY WITHOUT ANGINA PECTORIS: ICD-10-CM

## 2018-12-14 DIAGNOSIS — I50.9 HEART FAILURE, UNSPECIFIED: ICD-10-CM

## 2018-12-14 DIAGNOSIS — R06.02 SHORTNESS OF BREATH: ICD-10-CM

## 2018-12-14 DIAGNOSIS — E03.9 HYPOTHYROIDISM, UNSPECIFIED: ICD-10-CM

## 2018-12-14 DIAGNOSIS — E11.9 TYPE 2 DIABETES MELLITUS WITHOUT COMPLICATIONS: ICD-10-CM

## 2018-12-14 DIAGNOSIS — K85.90 ACUTE PANCREATITIS WITHOUT NECROSIS OR INFECTION, UNSPECIFIED: ICD-10-CM

## 2018-12-14 DIAGNOSIS — I44.2 ATRIOVENTRICULAR BLOCK, COMPLETE: ICD-10-CM

## 2018-12-14 LAB
ALBUMIN SERPL ELPH-MCNC: 3 G/DL — LOW (ref 3.3–5)
ALBUMIN SERPL ELPH-MCNC: 3 G/DL — LOW (ref 3.3–5)
ALP SERPL-CCNC: 84 U/L — SIGNIFICANT CHANGE UP (ref 40–120)
ALP SERPL-CCNC: 93 U/L — SIGNIFICANT CHANGE UP (ref 40–120)
ALT FLD-CCNC: 18 U/L — SIGNIFICANT CHANGE UP (ref 4–33)
ALT FLD-CCNC: 21 U/L — SIGNIFICANT CHANGE UP (ref 4–33)
APTT BLD: 27.8 SEC — SIGNIFICANT CHANGE UP (ref 27.5–36.3)
AST SERPL-CCNC: 23 U/L — SIGNIFICANT CHANGE UP (ref 4–32)
AST SERPL-CCNC: 27 U/L — SIGNIFICANT CHANGE UP (ref 4–32)
BILIRUB SERPL-MCNC: 0.2 MG/DL — SIGNIFICANT CHANGE UP (ref 0.2–1.2)
BILIRUB SERPL-MCNC: 0.2 MG/DL — SIGNIFICANT CHANGE UP (ref 0.2–1.2)
BUN SERPL-MCNC: 59 MG/DL — HIGH (ref 7–23)
BUN SERPL-MCNC: 73 MG/DL — HIGH (ref 7–23)
CALCIUM SERPL-MCNC: 9.5 MG/DL — SIGNIFICANT CHANGE UP (ref 8.4–10.5)
CALCIUM SERPL-MCNC: 9.5 MG/DL — SIGNIFICANT CHANGE UP (ref 8.4–10.5)
CHLORIDE SERPL-SCNC: 92 MMOL/L — LOW (ref 98–107)
CHLORIDE SERPL-SCNC: 93 MMOL/L — LOW (ref 98–107)
CHOLEST SERPL-MCNC: 176 MG/DL — SIGNIFICANT CHANGE UP (ref 120–199)
CK MB BLD-MCNC: 3.67 NG/ML — SIGNIFICANT CHANGE UP (ref 1–4.7)
CK MB BLD-MCNC: 5.4 NG/ML — HIGH (ref 1–4.7)
CK SERPL-CCNC: 117 U/L — SIGNIFICANT CHANGE UP (ref 25–170)
CK SERPL-CCNC: 97 U/L — SIGNIFICANT CHANGE UP (ref 25–170)
CO2 SERPL-SCNC: 17 MMOL/L — LOW (ref 22–31)
CO2 SERPL-SCNC: 20 MMOL/L — LOW (ref 22–31)
CREAT SERPL-MCNC: 8.51 MG/DL — HIGH (ref 0.5–1.3)
CREAT SERPL-MCNC: 9.48 MG/DL — HIGH (ref 0.5–1.3)
GLUCOSE BLDC GLUCOMTR-MCNC: 115 MG/DL — HIGH (ref 70–99)
GLUCOSE BLDC GLUCOMTR-MCNC: 147 MG/DL — HIGH (ref 70–99)
GLUCOSE BLDC GLUCOMTR-MCNC: 154 MG/DL — HIGH (ref 70–99)
GLUCOSE BLDC GLUCOMTR-MCNC: 193 MG/DL — HIGH (ref 70–99)
GLUCOSE SERPL-MCNC: 193 MG/DL — HIGH (ref 70–99)
GLUCOSE SERPL-MCNC: 323 MG/DL — HIGH (ref 70–99)
HBA1C BLD-MCNC: 7.7 % — HIGH (ref 4–5.6)
HBV CORE AB SER-ACNC: NONREACTIVE — SIGNIFICANT CHANGE UP
HBV SURFACE AB SER-ACNC: 3.7 MLU/ML — LOW
HBV SURFACE AG SER-ACNC: NEGATIVE — SIGNIFICANT CHANGE UP
HCT VFR BLD CALC: 28 % — LOW (ref 34.5–45)
HCT VFR BLD CALC: 28.5 % — LOW (ref 34.5–45)
HCV AB S/CO SERPL IA: 0.03 S/CO — SIGNIFICANT CHANGE UP
HCV AB SERPL-IMP: SIGNIFICANT CHANGE UP
HDLC SERPL-MCNC: 32 MG/DL — LOW (ref 45–65)
HGB BLD-MCNC: 8.5 G/DL — LOW (ref 11.5–15.5)
HGB BLD-MCNC: 8.5 G/DL — LOW (ref 11.5–15.5)
INR BLD: 1.17 — SIGNIFICANT CHANGE UP (ref 0.88–1.17)
LACTATE SERPL-SCNC: 2.9 MMOL/L — HIGH (ref 0.5–2)
LIDOCAIN IGE QN: 136.7 U/L — HIGH (ref 7–60)
LIPID PNL WITH DIRECT LDL SERPL: 112 MG/DL — SIGNIFICANT CHANGE UP
MAGNESIUM SERPL-MCNC: 1.5 MG/DL — LOW (ref 1.6–2.6)
MAGNESIUM SERPL-MCNC: 1.7 MG/DL — SIGNIFICANT CHANGE UP (ref 1.6–2.6)
MCHC RBC-ENTMCNC: 26.9 PG — LOW (ref 27–34)
MCHC RBC-ENTMCNC: 27 PG — SIGNIFICANT CHANGE UP (ref 27–34)
MCHC RBC-ENTMCNC: 29.8 % — LOW (ref 32–36)
MCHC RBC-ENTMCNC: 30.4 % — LOW (ref 32–36)
MCV RBC AUTO: 88.9 FL — SIGNIFICANT CHANGE UP (ref 80–100)
MCV RBC AUTO: 90.2 FL — SIGNIFICANT CHANGE UP (ref 80–100)
NRBC # FLD: 0.04 — SIGNIFICANT CHANGE UP
NRBC # FLD: 0.04 — SIGNIFICANT CHANGE UP
NT-PROBNP SERPL-SCNC: SIGNIFICANT CHANGE UP PG/ML
PHOSPHATE SERPL-MCNC: 4.8 MG/DL — HIGH (ref 2.5–4.5)
PHOSPHATE SERPL-MCNC: 5.2 MG/DL — HIGH (ref 2.5–4.5)
PLATELET # BLD AUTO: 190 K/UL — SIGNIFICANT CHANGE UP (ref 150–400)
PLATELET # BLD AUTO: 214 K/UL — SIGNIFICANT CHANGE UP (ref 150–400)
PMV BLD: 9.1 FL — SIGNIFICANT CHANGE UP (ref 7–13)
PMV BLD: 9.4 FL — SIGNIFICANT CHANGE UP (ref 7–13)
POTASSIUM SERPL-MCNC: 4.6 MMOL/L — SIGNIFICANT CHANGE UP (ref 3.5–5.3)
POTASSIUM SERPL-MCNC: 4.7 MMOL/L — SIGNIFICANT CHANGE UP (ref 3.5–5.3)
POTASSIUM SERPL-SCNC: 4.6 MMOL/L — SIGNIFICANT CHANGE UP (ref 3.5–5.3)
POTASSIUM SERPL-SCNC: 4.7 MMOL/L — SIGNIFICANT CHANGE UP (ref 3.5–5.3)
PROT SERPL-MCNC: 6.4 G/DL — SIGNIFICANT CHANGE UP (ref 6–8.3)
PROT SERPL-MCNC: 6.5 G/DL — SIGNIFICANT CHANGE UP (ref 6–8.3)
PROTHROM AB SERPL-ACNC: 13 SEC — SIGNIFICANT CHANGE UP (ref 9.8–13.1)
RBC # BLD: 3.15 M/UL — LOW (ref 3.8–5.2)
RBC # BLD: 3.16 M/UL — LOW (ref 3.8–5.2)
RBC # FLD: 18.8 % — HIGH (ref 10.3–14.5)
RBC # FLD: 19.5 % — HIGH (ref 10.3–14.5)
SODIUM SERPL-SCNC: 134 MMOL/L — LOW (ref 135–145)
SODIUM SERPL-SCNC: 135 MMOL/L — SIGNIFICANT CHANGE UP (ref 135–145)
TRIGL SERPL-MCNC: 237 MG/DL — HIGH (ref 10–149)
TROPONIN T, HIGH SENSITIVITY: 2451 NG/L — CRITICAL HIGH (ref ?–14)
TROPONIN T, HIGH SENSITIVITY: 2676 NG/L — CRITICAL HIGH (ref ?–14)
TSH SERPL-MCNC: 6.09 UIU/ML — HIGH (ref 0.27–4.2)
WBC # BLD: 13.95 K/UL — HIGH (ref 3.8–10.5)
WBC # BLD: 9.66 K/UL — SIGNIFICANT CHANGE UP (ref 3.8–10.5)
WBC # FLD AUTO: 13.95 K/UL — HIGH (ref 3.8–10.5)
WBC # FLD AUTO: 9.66 K/UL — SIGNIFICANT CHANGE UP (ref 3.8–10.5)

## 2018-12-14 PROCEDURE — 93306 TTE W/DOPPLER COMPLETE: CPT | Mod: 26

## 2018-12-14 PROCEDURE — 99233 SBSQ HOSP IP/OBS HIGH 50: CPT | Mod: 57

## 2018-12-14 PROCEDURE — 76705 ECHO EXAM OF ABDOMEN: CPT | Mod: 26

## 2018-12-14 PROCEDURE — 33225 L VENTRIC PACING LEAD ADD-ON: CPT

## 2018-12-14 PROCEDURE — 71045 X-RAY EXAM CHEST 1 VIEW: CPT | Mod: 26

## 2018-12-14 PROCEDURE — 33208 INSRT HEART PM ATRIAL & VENT: CPT

## 2018-12-14 PROCEDURE — 36556 INSERT NON-TUNNEL CV CATH: CPT | Mod: RT

## 2018-12-14 RX ORDER — CHLORHEXIDINE GLUCONATE 213 G/1000ML
1 SOLUTION TOPICAL
Qty: 0 | Refills: 0 | Status: DISCONTINUED | OUTPATIENT
Start: 2018-12-14 | End: 2018-12-16

## 2018-12-14 RX ORDER — INSULIN LISPRO 100/ML
VIAL (ML) SUBCUTANEOUS EVERY 6 HOURS
Qty: 0 | Refills: 0 | Status: DISCONTINUED | OUTPATIENT
Start: 2018-12-14 | End: 2018-12-14

## 2018-12-14 RX ORDER — ASPIRIN/CALCIUM CARB/MAGNESIUM 324 MG
81 TABLET ORAL DAILY
Qty: 0 | Refills: 0 | Status: DISCONTINUED | OUTPATIENT
Start: 2018-12-14 | End: 2018-12-16

## 2018-12-14 RX ORDER — VANCOMYCIN HCL 1 G
1000 VIAL (EA) INTRAVENOUS ONCE
Qty: 0 | Refills: 0 | Status: DISCONTINUED | OUTPATIENT
Start: 2018-12-15 | End: 2018-12-15

## 2018-12-14 RX ORDER — DEXTROSE 50 % IN WATER 50 %
12.5 SYRINGE (ML) INTRAVENOUS ONCE
Qty: 0 | Refills: 0 | Status: DISCONTINUED | OUTPATIENT
Start: 2018-12-14 | End: 2018-12-15

## 2018-12-14 RX ORDER — GLUCAGON INJECTION, SOLUTION 0.5 MG/.1ML
1 INJECTION, SOLUTION SUBCUTANEOUS ONCE
Qty: 0 | Refills: 0 | Status: DISCONTINUED | OUTPATIENT
Start: 2018-12-14 | End: 2018-12-15

## 2018-12-14 RX ORDER — NITROGLYCERIN 6.5 MG
50 CAPSULE, EXTENDED RELEASE ORAL
Qty: 50 | Refills: 0 | Status: DISCONTINUED | OUTPATIENT
Start: 2018-12-14 | End: 2018-12-14

## 2018-12-14 RX ORDER — ATORVASTATIN CALCIUM 80 MG/1
40 TABLET, FILM COATED ORAL AT BEDTIME
Qty: 0 | Refills: 0 | Status: DISCONTINUED | OUTPATIENT
Start: 2018-12-14 | End: 2018-12-16

## 2018-12-14 RX ORDER — DEXTROSE 50 % IN WATER 50 %
25 SYRINGE (ML) INTRAVENOUS ONCE
Qty: 0 | Refills: 0 | Status: DISCONTINUED | OUTPATIENT
Start: 2018-12-14 | End: 2018-12-15

## 2018-12-14 RX ORDER — CLOPIDOGREL BISULFATE 75 MG/1
75 TABLET, FILM COATED ORAL DAILY
Qty: 0 | Refills: 0 | Status: DISCONTINUED | OUTPATIENT
Start: 2018-12-14 | End: 2018-12-16

## 2018-12-14 RX ORDER — LEVOTHYROXINE SODIUM 125 MCG
50 TABLET ORAL DAILY
Qty: 0 | Refills: 0 | Status: DISCONTINUED | OUTPATIENT
Start: 2018-12-14 | End: 2018-12-16

## 2018-12-14 RX ORDER — DEXTROSE 50 % IN WATER 50 %
15 SYRINGE (ML) INTRAVENOUS ONCE
Qty: 0 | Refills: 0 | Status: DISCONTINUED | OUTPATIENT
Start: 2018-12-14 | End: 2018-12-15

## 2018-12-14 RX ORDER — NOREPINEPHRINE BITARTRATE/D5W 8 MG/250ML
0.05 PLASTIC BAG, INJECTION (ML) INTRAVENOUS
Qty: 8 | Refills: 0 | Status: DISCONTINUED | OUTPATIENT
Start: 2018-12-14 | End: 2018-12-14

## 2018-12-14 RX ORDER — INSULIN LISPRO 100/ML
VIAL (ML) SUBCUTANEOUS AT BEDTIME
Qty: 0 | Refills: 0 | Status: DISCONTINUED | OUTPATIENT
Start: 2018-12-14 | End: 2018-12-16

## 2018-12-14 RX ORDER — INSULIN LISPRO 100/ML
VIAL (ML) SUBCUTANEOUS
Qty: 0 | Refills: 0 | Status: DISCONTINUED | OUTPATIENT
Start: 2018-12-14 | End: 2018-12-16

## 2018-12-14 RX ORDER — SODIUM CHLORIDE 9 MG/ML
1000 INJECTION, SOLUTION INTRAVENOUS
Qty: 0 | Refills: 0 | Status: DISCONTINUED | OUTPATIENT
Start: 2018-12-14 | End: 2018-12-15

## 2018-12-14 RX ORDER — GABAPENTIN 400 MG/1
200 CAPSULE ORAL AT BEDTIME
Qty: 0 | Refills: 0 | Status: DISCONTINUED | OUTPATIENT
Start: 2018-12-14 | End: 2018-12-16

## 2018-12-14 RX ADMIN — Medication 15 MICROGRAM(S)/MIN: at 00:05

## 2018-12-14 RX ADMIN — Medication 1: at 06:45

## 2018-12-14 RX ADMIN — Medication 50 MICROGRAM(S): at 06:41

## 2018-12-14 RX ADMIN — ATORVASTATIN CALCIUM 40 MILLIGRAM(S): 80 TABLET, FILM COATED ORAL at 22:06

## 2018-12-14 RX ADMIN — Medication 15 MICROGRAM(S)/MIN: at 00:48

## 2018-12-14 RX ADMIN — CLOPIDOGREL BISULFATE 75 MILLIGRAM(S): 75 TABLET, FILM COATED ORAL at 11:31

## 2018-12-14 RX ADMIN — Medication 81 MILLIGRAM(S): at 11:31

## 2018-12-14 RX ADMIN — CHLORHEXIDINE GLUCONATE 1 APPLICATION(S): 213 SOLUTION TOPICAL at 11:14

## 2018-12-14 RX ADMIN — GABAPENTIN 200 MILLIGRAM(S): 400 CAPSULE ORAL at 22:06

## 2018-12-14 NOTE — CONSULT NOTE ADULT - ASSESSMENT
72 y/o female with PMH ESRD on peritoneal dialysis, HTN, HLD, DM, hypothyroid, CAD presenting with SOB

## 2018-12-14 NOTE — PROCEDURE NOTE - PROCEDURE
<<-----Click on this checkbox to enter Procedure Insertion of temporary dialysis catheter  12/14/2018  right femoral vein  Active  MMARIELLA

## 2018-12-14 NOTE — PROCEDURE NOTE - NSPROCDETAILS_GEN_ALL_CORE
ultrasound guidance/sterile technique, catheter placed/lumen(s) aspirated and flushed/sterile dressing applied/guidewire recovered

## 2018-12-14 NOTE — CHART NOTE - NSCHARTNOTEFT_GEN_A_CORE
Patient is NPO for PPM implantation. Device teaching given to patient and she demonstrates understanding of the instructions. F/U appointment with device clinic in two weeks Patient is NPO for PPM implantation. Device teaching given to patient and her son ( over the phone). They demonstrate understanding of the instructions. F/U appointment with device clinic in two weeks

## 2018-12-14 NOTE — CONSULT NOTE ADULT - ASSESSMENT
72yo F with h/o HTN, HLD, DMII, hypothyroid, ESRD on PD ( last session 12/12 pm), CAD s/p CABG, recent adm for ADHF 2 months ago @ [facility] presented  with complete heart block and acute decompensated heart failure. S/P hemodialysis this morning. Feels better. Telemetry and EKG shows complete heart block. Hemodynamically stable at this time  - Echocardiogram to evaluate LV function and valve function  - Zoll pads on  - Continue to hold AVN blockers  - Possible PPM implantation today 74yo F with h/o HTN, HLD, DMII, hypothyroid, ESRD on PD ( last session 12/12 pm), CAD s/p CABG, recent adm for ADHF 2 months ago @ [facility] presented  with complete heart block and acute decompensated heart failure. S/P hemodialysis this morning. Feels better. Telemetry and EKG shows complete heart block. Hemodynamically stable at this time  - Echocardiogram to evaluate LV function and valve function  - Zoll pads on  - Continue to hold AVN blockers  - Possible PPM implantation today  - Please remove HD catheter

## 2018-12-14 NOTE — PROCEDURE NOTE - SUPERVISORY STATEMENT
R femoral temporary HD cathter was placed w/o complications and can be used as needed no cyanosis/no pedal edema/no clubbing

## 2018-12-14 NOTE — CONSULT NOTE ADULT - SUBJECTIVE AND OBJECTIVE BOX
[facility]   MD ROSITA APODACA MD RUORU WONG, PA    TEL:  OFFICE: [phone]  DR SCOTT CELL: [phone]  [place] CELL: [phone]  DR. JOHNSON CELL: [phone]    -- INITIAL RENAL CONSULT NOTE  --------------------------------------------------------------------------------  HPI:    72 y/o female with PMH ESRD on peritoneal dialysis, HTN, HLD, DM, hypothyroid, CAD presenting with SOB.     Nephrology consulted for ESRD management.  PT goes to [place]  [facility], has been on PD for 2 yrs. Pt home PD prescription is ( PD cycler for 9.5 hours, with 4 exchanges of alternating 1.5% and 2.5%). No Midday exchange. Denies discharge from PD catheter, or cloudy fluid.   PT reports SOB      PAST HISTORY  --------------------------------------------------------------------------------  PAST MEDICAL & SURGICAL HISTORY:  ESRD on peritoneal dialysis  Acid reflux  Hypertension  Dyslipidemia  Diabetes mellitus  CAD (coronary artery disease)  S/P CABG (coronary artery bypass graft): in 2012  H/O: hysterectomy  History of total knee replacement b/l  After cataract, bilateral    FAMILY HISTORY:  Family history of early CAD: mother had cad  Family history of diabetes mellitus: mother had dm    PAST SOCIAL HISTORY:    ALLERGIES & MEDICATIONS  --------------------------------------------------------------------------------  Allergies    Cipro (Unknown)  Diovan (Unknown)    Intolerances      Standing Inpatient Medications  nitroglycerin  Infusion 50 MICROgram(s)/Min IV Continuous <Continuous>    PRN Inpatient Medications      REVIEW OF SYSTEMS  --------------------------------------------------------------------------------  Gen: No fevers/chills  Skin: No rashes  Head/Eyes/Ears: Normal hearing,  Normal vision   Respiratory: No dyspnea, cough  CV: No chest pain  GI: No abdominal pain, diarrhea, constipation, nausea, vomiting  : No dysuria, hematuria  MSK: No  edema  Heme: No easy bruising or bleeding  Psych: No significant depression    All other systems were reviewed and are negative, except as noted.    VITALS/PHYSICAL EXAM  --------------------------------------------------------------------------------  T(C): 36.7 (12-13-18 @ 21:30), Max: 36.8 (12-13-18 @ 18:52)  HR: 54 (12-14-18 @ 00:06) (50 - 64)  BP: 229/77 (12-14-18 @ 00:06) (194/72 - 247/82)  RR: 24 (12-14-18 @ 00:06) (16 - 26)  SpO2: 96% (12-14-18 @ 00:06) (96% - 100%)  Wt(kg): --  Height (cm): 149.86 (12-13-18 @ 21:31)  Weight (kg): 62.1 (12-13-18 @ 21:31)  BMI (kg/m2): 27.7 (12-13-18 @ 21:31)  BSA (m2): 1.57 (12-13-18 @ 21:31)      Physical Exam:  	Gen: NAD  	HEENT: MMM  	Pulm: CTA B/L  	CV: S1S2  	Abd: Soft, +BS   	Ext: No LE edema B/L  	Neuro: Awake  	Skin: Warm and dry  	Vascular access:    LABS/STUDIES  --------------------------------------------------------------------------------              9.1    10.12 >-----------<  214      [12-13-18 @ 21:05]              29.3     134  |  91  |  75  ----------------------------<  305      [12-13-18 @ 21:05]  4.2   |  21  |  9.76        Ca     10.2     [12-13-18 @ 21:05]    TPro  7.4  /  Alb  3.5  /  TBili  0.2  /  DBili  x   /  AST  31  /  ALT  21  /  AlkPhos  105  [12-13-18 @ 21:05]    PT/INR: PT 11.6 , INR 1.02       [12-13-18 @ 21:05]  PTT: 16.6       [12-13-18 @ 21:05]          [12-13-18 @ 21:05]    Creatinine Trend:  SCr 9.76 [12-13 @ 21:05]    Urinalysis - [08-10-18 @ 15:35]      Color YELLOW / Appearance CLEAR / SG 1.016 / pH 7.5      Gluc 300 / Ketone NEGATIVE  / Bili NEGATIVE / Urobili NORMAL       Blood NEGATIVE / Protein >600 / Leuk Est SMALL / Nitrite NEGATIVE      RBC 2-5 / WBC 25-50 / Hyaline  / Gran  / Sq Epi OCC / Non Sq Epi  / Bacteria       .8 (Ca --)      [08-12-18 @ 06:00]   --  HbA1c 6.7      [08-11-18 @ 05:30]      ARIES: titer 1:160, pattern Homogeneous      [08-02-18 @ 15:37]  Rheumatoid Factor 14.0      [08-02-18 @ 15:37] [facility] [place] [place] [facility]   MD ROSITA APODACA MD RUORU WONG, PA    TEL:  OFFICE: [phone]  DR SCOTT CELL: [phone]  [place] CELL: [phone]  DR. JOHNSON CELL: [phone]    -- INITIAL RENAL CONSULT NOTE  --------------------------------------------------------------------------------  HPI:    74 y/o female with PMH ESRD on peritoneal dialysis, HTN, HLD, DM, hypothyroid, CAD presenting with SOB.     Nephrology consulted for ESRD management.  PT goes to [place]  [employer], has been on PD for 2 yrs. Pt home PD prescription is ( PD cycler for 9.5 hours, with 4 exchanges of alternating 1.5% and 2.5%). No Midday exchange. Denies discharge from PD catheter, or cloudy fluid.   PT reports SOB on bipap  denies missing any dialysis sessions       PAST HISTORY  --------------------------------------------------------------------------------  PAST MEDICAL & SURGICAL HISTORY:  ESRD on peritoneal dialysis  Acid reflux  Hypertension  Dyslipidemia  Diabetes mellitus  CAD (coronary artery disease)  S/P CABG (coronary artery bypass graft): in 2012  H/O: hysterectomy  History of total knee replacement b/l  After cataract, bilateral    FAMILY HISTORY:  Family history of early CAD: mother had cad  Family history of diabetes mellitus: mother had dm    PAST SOCIAL HISTORY:    ALLERGIES & MEDICATIONS  --------------------------------------------------------------------------------  Allergies    Cipro (Unknown)  Diovan (Unknown)    Intolerances      Standing Inpatient Medications  nitroglycerin  Infusion 50 MICROgram(s)/Min IV Continuous <Continuous>    PRN Inpatient Medications      REVIEW OF SYSTEMS  --------------------------------------------------------------------------------  Gen: No fevers/chills  Skin: No rashes  Head/Eyes/Ears: Normal hearing,  Normal vision   Respiratory: + dyspnea, no cough  CV: No chest pain  GI: No abdominal pain, diarrhea, constipation, nausea, vomiting  MSK: No  edema  Heme: No easy bruising or bleeding  Psych: No significant depression    All other systems were reviewed and are negative, except as noted.    VITALS/PHYSICAL EXAM  --------------------------------------------------------------------------------  T(C): 36.7 (12-13-18 @ 21:30), Max: 36.8 (12-13-18 @ 18:52)  HR: 54 (12-14-18 @ 00:06) (50 - 64)  BP: 229/77 (12-14-18 @ 00:06) (194/72 - 247/82)  RR: 24 (12-14-18 @ 00:06) (16 - 26)  SpO2: 96% (12-14-18 @ 00:06) (96% - 100%)  Wt(kg): --  Height (cm): 149.86 (12-13-18 @ 21:31)  Weight (kg): 62.1 (12-13-18 @ 21:31)  BMI (kg/m2): 27.7 (12-13-18 @ 21:31)  BSA (m2): 1.57 (12-13-18 @ 21:31)      Physical Exam:  	Gen: distress on bipap  	HEENT: MMM  	Pulm: Crackles B/L  	CV: S1S2  	Abd: Soft, +BS   	Ext: No LE edema B/L  	Neuro: Awake  	Skin: Warm and dry  	Vascular access: none    LABS/STUDIES  --------------------------------------------------------------------------------              9.1    10.12 >-----------<  214      [12-13-18 @ 21:05]              29.3     134  |  91  |  75  ----------------------------<  305      [12-13-18 @ 21:05]  4.2   |  21  |  9.76        Ca     10.2     [12-13-18 @ 21:05]    TPro  7.4  /  Alb  3.5  /  TBili  0.2  /  DBili  x   /  AST  31  /  ALT  21  /  AlkPhos  105  [12-13-18 @ 21:05]    PT/INR: PT 11.6 , INR 1.02       [12-13-18 @ 21:05]  PTT: 16.6       [12-13-18 @ 21:05]          [12-13-18 @ 21:05]    Creatinine Trend:  SCr 9.76 [12-13 @ 21:05]    Urinalysis - [08-10-18 @ 15:35]      Color YELLOW / Appearance CLEAR / SG 1.016 / pH 7.5      Gluc 300 / Ketone NEGATIVE  / Bili NEGATIVE / Urobili NORMAL       Blood NEGATIVE / Protein >600 / Leuk Est SMALL / Nitrite NEGATIVE      RBC 2-5 / WBC 25-50 / Hyaline  / Gran  / Sq Epi OCC / Non Sq Epi  / Bacteria       .8 (Ca --)      [08-12-18 @ 06:00]   --  HbA1c 6.7      [08-11-18 @ 05:30]      ARIES: titer 1:160, pattern Homogeneous      [08-02-18 @ 15:37]  Rheumatoid Factor 14.0      [08-02-18 @ 15:37]

## 2018-12-14 NOTE — CHART NOTE - NSCHARTNOTEFT_GEN_A_CORE
Type of Procedure: BiV PPM  Licensed independent practitioner: Gabino Lay MD  Assistant: None  Description of procedure:   Written informed consent was obtained from the patient after a full explanation of the risks and benefits of the procedure.  The risks of bleeding / infection / cardiac perforation / pneumothorax / vascular injury / stroke and death were among  those discussed. Alternatives were reviewed, including the option of no therapy. The patient was brought to the lab in the  fasting state. Continuous electrocardiographic and hemodynamic monitoring was initiated. The implantation site was  meticulously prepared with surgical scrub and allowed to dry with no pooling. Sterile draping was applied to cover the  operation site. The image intensifier was draped with a sterile bag and positioned over the patient's chest. Local anesthesia  was infiltrated subcutaneously at the access site. A conscious sedation protocol was used during the case.  After infiltration with Lidocaine, an incision was made slightly medial to the left deltopectoral groove. The incision was  carried down to the level of the pectoral fascia using blunt dissection and electrocautery to obtain hemostasis. The  cephalic branch of the left axillary vein was isolated, as it coursed in the deltopectoral groove, and circumferential ties  were placed around it to control back bleeding. Access was obtained with a permacath and a Terumo wire was advanced  to the level of the IVC under fluoroscopic guidance. A peel-away introducer sheath was inserted over the wire. A soft Jwire  was next advanced through the sheath and double wired. Next, the subclavian vein was entered with a micropuncture  needle utilizing fluoroscopic guidance. A Terumo glidewire was advanced through the needle into the subclavian vein to  the level of the IVC. The needle was removed and a peel-away introducer was inserted over the wire. Using the  subclavian vein access and fluoroscopy, the RV lead was inserted through the introducer and advanced to the RV apex.  The lead was tested for sensing and pacing parameters and then fixated. Next, a new peel-away sheath was placed in the  vein. The RA lead was advanced under fluoroscopy to the right atrial appendage. The lead was tested for sensing and  pacing parameters and then fixated. Next attention was paid to cephalic access. A long CS sheath was advanced over the  wire to the region of the tricuspid annulus. The glidewire was advanced to the CS using fluoroscopy and the long sheath  now advanced into the CS. A balloon tipped catheter was placed in the mid to distal CS body. A CS angiogram was taken  in multiple views to identify suitable target vessels for placement of the LV lead via the CS. The balloon tipped catheter  was removed and an LV lead was advanced over a whisper wire and positioned in the target branch of the CS using the  previously obtained angiogram and fluoroscopic guidance.The lead was tested for thresholds and sensing. Using a slitter,  the outer sheath was removed while leaving the left ventricular lead in place. The leads were anchored to the underlying  pectoralis muscle with Ethibond suture. A subcutaneous pocket was formed using blunt dissection medial and inferior to  the incision. The pocket was irrigated with sterile antibiotic solution. Hemostasis was confirmed. The leads were  connected to the generator and the whole device was inserted into the pocket. Appropriate function of the device was  assured by electrocardiographic observation and pacemaker interrogation. The pocket was closed in layers using  absorbable Vicryl sutures followed by a subcuticular running suture with 4-0 Vicryl. Steri-strips and a sterile dressing  were then applied.   Findings of procedure: As above  Estimated blood loss: <10cc  Specimen removed: N/A  Preoperative Dx: Heart block  Postoperative Dx: Heart block  Complications: None  Anesthesia type: Conscious

## 2018-12-14 NOTE — PROVIDER CONTACT NOTE (EICU) - RECOMMENDATIONS
Plan for urgent HD, awaiting HD cath placement     if need TVP will need RIJ available   NIV for comfort  external pacer as back up as per cardio  asa/plavix, statin,   no bb due to hr no ace/arb due to esrd

## 2018-12-14 NOTE — CONSULT NOTE ADULT - SUBJECTIVE AND OBJECTIVE BOX
CHIEF COMPLAINT: Called to evaluate patient with complete heart block    HISTORY OF PRESENT ILLNESS:  74yo F with h/o HTN, HLD, DMII, hypothyroid, ESRD on PD ( last session 12/12 pm), CAD s/p CABG, recent adm for ADHF 2 months ago @ [facility] now presents with epigastric pain x4 days and shortness of breath x3 days.  In ED, found to be in high degree AV block, placed on zoll pads and atropine at bedside.      PAST MEDICAL & SURGICAL HISTORY:  ESRD on peritoneal dialysis  Acid reflux  Hypertension  Dyslipidemia  Diabetes mellitus  CAD (coronary artery disease)  S/P CABG (coronary artery bypass graft): in 2012  H/O: hysterectomy  History of total knee replacement b/l  After cataract, bilateral          PREVIOUS DIAGNOSTIC TESTING:    Echocardiogram:   Catheterization:  Stress Test:  	    MEDICATIONS:  aspirin enteric coated 81 milliGRAM(s) Oral daily  clopidogrel Tablet 75 milliGRAM(s) Oral daily        gabapentin 200 milliGRAM(s) Oral at bedtime      atorvastatin 40 milliGRAM(s) Oral at bedtime  dextrose 40% Gel 15 Gram(s) Oral once PRN  dextrose 50% Injectable 12.5 Gram(s) IV Push once  dextrose 50% Injectable 25 Gram(s) IV Push once  dextrose 50% Injectable 25 Gram(s) IV Push once  glucagon  Injectable 1 milliGRAM(s) IntraMuscular once PRN  insulin lispro (HumaLOG) corrective regimen sliding scale   SubCutaneous every 6 hours  levothyroxine 50 MICROGram(s) Oral daily    chlorhexidine 4% Liquid 1 Application(s) Topical <User Schedule>  dextrose 5%. 1000 milliLiter(s) IV Continuous <Continuous>      FAMILY HISTORY:  Family history of early CAD: mother had cad  Family history of diabetes mellitus: mother had dm      SOCIAL HISTORY:      [ ] Smoker  [ ] Alcohol  [ ] Drugs    Allergies    Cipro (Unknown)  Diovan (Unknown)    Intolerances    	    REVIEW OF SYSTEMS:  CONSTITUTIONAL: No fever, weight loss, or fatigue  EYES: No eye pain, visual disturbances, or discharge  ENMT:  No difficulty hearing, tinnitus, vertigo; No sinus or throat pain  NECK: No pain or stiffness  RESPIRATORY: No cough, wheezing, chills or hemoptysis; No Shortness of Breath  CARDIOVASCULAR: No chest pain, palpitations, passing out, dizziness, or leg swelling  GASTROINTESTINAL: No abdominal or epigastric pain. No nausea, vomiting, or hematemesis; No diarrhea or constipation. No melena or hematochezia.  GENITOURINARY: No dysuria, frequency, hematuria, or incontinence  NEUROLOGICAL: No headaches, memory loss, loss of strength, numbness, or tremors  SKIN: No itching, burning, rashes, or lesions   LYMPH Nodes: No enlarged glands  ENDOCRINE: No heat or cold intolerance; No hair loss  MUSCULOSKELETAL: No joint pain or swelling; No muscle, back, or extremity pain  PSYCHIATRIC: No depression, anxiety, mood swings, or difficulty sleeping  HEME/LYMPH: No easy bruising, or bleeding gums  ALLERY AND IMMUNOLOGIC: No hives or eczema	    [ ] All others negative	  [ ] Unable to obtain    PHYSICAL EXAM:  T(C): 37.6 (12-14-18 @ 08:05), Max: 37.8 (12-14-18 @ 02:00)  HR: 67 (12-14-18 @ 11:09) (50 - 71)  BP: 143/46 (12-14-18 @ 11:00) (132/60 - 247/82)  RR: 23 (12-14-18 @ 11:00) (16 - 28)  SpO2: 100% (12-14-18 @ 11:09) (96% - 100%)  Wt(kg): --  I&O's Summary    13 Dec 2018 07:01  -  14 Dec 2018 07:00  --------------------------------------------------------  IN: 103 mL / OUT: 0 mL / NET: 103 mL    14 Dec 2018 07:01  -  14 Dec 2018 11:52  --------------------------------------------------------  IN: 800 mL / OUT: 3000 mL / NET: -2200 mL        Appearance: Normal	  HEENT:   Normal oral mucosa, PERRL, EOMI	  Lymphatic: No lymphadenopathy  Cardiovascular: Normal S1 S2, No JVD, No murmurs, No edema  Respiratory: Lungs clear to auscultation	  Psychiatry: A & O x 3, Mood & affect appropriate  Gastrointestinal:  Soft, Non-tender, + BS	  Skin: No rashes, No ecchymoses, No cyanosis	  Neurologic: Non-focal  Extremities: Normal range of motion, No clubbing, cyanosis or edema  Vascular: Peripheral pulses palpable 2+ bilaterally    TELEMETRY: 	    ECG:  	  RADIOLOGY:  OTHER: 	  	  LABS:	 	    CARDIAC MARKERS:      CKMB: 5.40 ng/mL (12-14 @ 01:08)  CKMB: 6.97 ng/mL (12-13 @ 21:05)    CKMB Relative Index: 4.6 (12-13 @ 21:05)                            8.5    13.95 )-----------( 214      ( 14 Dec 2018 01:08 )             28.0     12-14    134<L>  |  92<L>  |  73<H>  ----------------------------<  323<H>  4.6   |  17<L>  |  9.48<H>    Ca    9.5      14 Dec 2018 01:08  Phos  4.8     12-14  Mg     1.5     12-14    TPro  6.4  /  Alb  3.0<L>  /  TBili  0.2  /  DBili  x   /  AST  27  /  ALT  21  /  AlkPhos  93  12-14    proBNP: Serum Pro-Brain Natriuretic Peptide: > 49113 pg/mL (12-14 @ 01:08)  Serum Pro-Brain Natriuretic Peptide: > 06049 pg/mL (12-13 @ 21:05)    Lipid Profile:   HgA1c: Hemoglobin A1C, Whole Blood: 7.7 % (12-14 @ 05:10)    TSH: Thyroid Stimulating Hormone, Serum: 6.09 uIU/mL (12-14 @ 01:08) CHIEF COMPLAINT: Called to evaluate patient with complete heart block    HISTORY OF PRESENT ILLNESS:  72yo F with h/o HTN, HLD, DMII, hypothyroid, ESRD on PD ( last session 12/12 pm), CAD s/p CABG, recent adm for ADHF 2 months ago @ [facility] presented  with epigastric pain x4 days and shortness of breath x3 days.  Found to be in high degree AV block, placed on zoll pads and atropine at bedside.  EKG revealed complete heart block with wide QRS and HR 50 bpm. Telemetry shows complete heart block with VR 50s-60s. Patient was also with elevated proBNP >78614, pulmonary edema on CXR. Troponin 2520. Lipase 157.5. Lactate 2.8. She was given asa 325mg po and bumex 2mg IVP x2,  started on nitro gtt, and bipap in ED.   Patient is on peritoneal dialysis at home and had hemodialysis this morning. Currently patient feels better and denies any chest pain, palpitations or dizziness. On BIPAP and SOB is better.     PAST MEDICAL & SURGICAL HISTORY:  ESRD on peritoneal dialysis  Acid reflux  Hypertension  Dyslipidemia  Diabetes mellitus  CAD (coronary artery disease)  S/P CABG (coronary artery bypass graft): in 2012  H/O: hysterectomy  History of total knee replacement b/l  After cataract, bilateral    PREVIOUS DIAGNOSTIC TESTING:    Echocardiogram: Pending   Catheterization: Recently at [place] ~ 2 mos ago; patent LIMA to LAD small RCA with mdoerate disease SVG to cx occluded severe distal left main  and proximal native CX disease- stented.    Stress Test:  at [place]: ischemia-lateral ishcemia on nuclear stress mild to moderate LV dysfunction,	    MEDICATIONS:  aspirin enteric coated 81 milliGRAM(s) Oral daily  clopidogrel Tablet 75 milliGRAM(s) Oral daily  gabapentin 200 milliGRAM(s) Oral at bedtime  atorvastatin 40 milliGRAM(s) Oral at bedtime  dextrose 40% Gel 15 Gram(s) Oral once PRN  dextrose 50% Injectable 12.5 Gram(s) IV Push once  dextrose 50% Injectable 25 Gram(s) IV Push once  dextrose 50% Injectable 25 Gram(s) IV Push once  glucagon  Injectable 1 milliGRAM(s) IntraMuscular once PRN  insulin lispro (HumaLOG) corrective regimen sliding scale   SubCutaneous every 6 hours  levothyroxine 50 MICROGram(s) Oral daily  chlorhexidine 4% Liquid 1 Application(s) Topical <User Schedule>  dextrose 5%. 1000 milliLiter(s) IV Continuous <Continuous>      FAMILY HISTORY:  Family history of early CAD: mother had cad  Family history of diabetes mellitus: mother had dm      SOCIAL HISTORY:    Lives with     [-] Smoker  [-] Alcohol  [-] Drugs    Allergies    Cipro (Unknown)  Diovan (Unknown)    Intolerances    	    REVIEW OF SYSTEMS:  CONSTITUTIONAL: No fever, weight loss, or fatigue  EYES: No eye pain, visual disturbances, or discharge  ENMT:  No difficulty hearing, tinnitus, vertigo; No sinus or throat pain  NECK: No pain or stiffness  RESPIRATORY: No cough, wheezing, chills or hemoptysis; + Shortness of Breath  CARDIOVASCULAR: No chest pain, palpitations, passing out, dizziness, or leg swelling  GASTROINTESTINAL:  No nausea, vomiting, or hematemesis; No diarrhea or constipation. No melena or hematochezia. + epigastric pain,   GENITOURINARY:  + peritoneal dialysis  NEUROLOGICAL: No headaches, memory loss, loss of strength, numbness, or tremors  SKIN: No itching, burning, rashes, or lesions   LYMPH Nodes: No enlarged glands  ENDOCRINE: No heat or cold intolerance; No hair loss  MUSCULOSKELETAL: No joint pain or swelling; No muscle, back, or extremity pain  PSYCHIATRIC: No depression, anxiety, mood swings, or difficulty sleeping  HEME/LYMPH: No easy bruising, or bleeding gums  ALLERY AND IMMUNOLOGIC: No hives or eczema	    [X] All others negative	  [ ] Unable to obtain    PHYSICAL EXAM:  T(C): 37.6 (12-14-18 @ 08:05), Max: 37.8 (12-14-18 @ 02:00)  HR: 67 (12-14-18 @ 11:09) (50 - 71)  BP: 143/46 (12-14-18 @ 11:00) (132/60 - 247/82)  RR: 23 (12-14-18 @ 11:00) (16 - 28)  SpO2: 100% (12-14-18 @ 11:09) (96% - 100%)  Wt(kg): --  I&O's Summary    13 Dec 2018 07:01  -  14 Dec 2018 07:00  --------------------------------------------------------  IN: 103 mL / OUT: 0 mL / NET: 103 mL    14 Dec 2018 07:01  -  14 Dec 2018 11:52  --------------------------------------------------------  IN: 800 mL / OUT: 3000 mL / NET: -2200 mL        Appearance: Normal	  HEENT:   Normal oral mucosa, PERRL, EOMI	  Lymphatic: No lymphadenopathy  Cardiovascular: Irregular rate and rhythm  Respiratory: Lungs clear to auscultation	  Psychiatry: A & O x 3, Mood & affect appropriate  Gastrointestinal:  Soft, Non-tender, + BS	  Skin: No rashes, No ecchymoses, No cyanosis	  Neurologic: Non-focal  Extremities: Normal range of motion, No clubbing, cyanosis or edema  Vascular: Peripheral pulses palpable 2+ bilaterally    TELEMETRY: Complete heart block with HR 50s-60s	    ECG:  Complete heart block with RBBB, lAFB, HR 50 bpm  RADIOLOGY:  OTHER: 	  	  LABS:	 	    CARDIAC MARKERS:      CKMB: 5.40 ng/mL (12-14 @ 01:08)  CKMB: 6.97 ng/mL (12-13 @ 21:05)    CKMB Relative Index: 4.6 (12-13 @ 21:05)                            8.5    13.95 )-----------( 214      ( 14 Dec 2018 01:08 )             28.0     12-14    134<L>  |  92<L>  |  73<H>  ----------------------------<  323<H>  4.6   |  17<L>  |  9.48<H>    Ca    9.5      14 Dec 2018 01:08  Phos  4.8     12-14  Mg     1.5     12-14    TPro  6.4  /  Alb  3.0<L>  /  TBili  0.2  /  DBili  x   /  AST  27  /  ALT  21  /  AlkPhos  93  12-14    proBNP: Serum Pro-Brain Natriuretic Peptide: > 94825 pg/mL (12-14 @ 01:08)  Serum Pro-Brain Natriuretic Peptide: > 07789 pg/mL (12-13 @ 21:05)    Lipid Profile:   HgA1c: Hemoglobin A1C, Whole Blood: 7.7 % (12-14 @ 05:10)    TSH: Thyroid Stimulating Hormone, Serum: 6.09 uIU/mL (12-14 @ 01:08)

## 2018-12-14 NOTE — PROCEDURE NOTE - ADDITIONAL PROCEDURE DETAILS
-Guidewire confirmed in position in VEIN via bedside ultrasound prior to dilatation  -Catheter confirmed in position in VEIN via bedside ultrasound after insertion  (hard copy of images placed in paper chart)

## 2018-12-14 NOTE — CONSULT NOTE ADULT - PROBLEM SELECTOR RECOMMENDATION 2
BP eelvated  resume outpt meds  low salt diet  UF with Dialysis BP elevated  on nitro   resume outpt meds  low salt diet  UF with Dialysis

## 2018-12-14 NOTE — PROGRESS NOTE ADULT - PROBLEM SELECTOR PLAN 5
-HbA1c 7.7%  -Hold oral hypoglycemic agents  -Monitor blood sugars ac and hs  -Lispro insulin sliding scale  -Low carbohydrate diet  -Diabetes education

## 2018-12-14 NOTE — CONSULT NOTE ADULT - PROBLEM SELECTOR RECOMMENDATION 5
in setting of fluid overload  Hemodialysis tonight with UF as tolerated in setting of fluid overload  Hemodialysis tonight with UF as tolerated  Monitor daily weights

## 2018-12-14 NOTE — CONSULT NOTE ADULT - PROBLEM SELECTOR RECOMMENDATION 9
ESRD on PD  Pt in acute sob, fluid overload, plan for HD catheter placement and HD tonight  Monitor BMP and BP ESRD on PD  Pt in acute sob, fluid overload, plan for HD catheter placement and HD tonight  Dialylsis nurse on call paged   Monitor BMP and BP

## 2018-12-14 NOTE — PROVIDER CONTACT NOTE (EICU) - BACKGROUND
73 year old female CAD s/p CABG, HTN, High chol, DM2, Esrd on PD, who p/w decompensated heart failure & 3rd heart block  elevated trop ? acs

## 2018-12-15 LAB
BUN SERPL-MCNC: 75 MG/DL — HIGH (ref 7–23)
CALCIUM SERPL-MCNC: 8.6 MG/DL — SIGNIFICANT CHANGE UP (ref 8.4–10.5)
CHLORIDE SERPL-SCNC: 94 MMOL/L — LOW (ref 98–107)
CO2 SERPL-SCNC: 21 MMOL/L — LOW (ref 22–31)
CREAT SERPL-MCNC: 9.28 MG/DL — HIGH (ref 0.5–1.3)
GLUCOSE BLDC GLUCOMTR-MCNC: 140 MG/DL — HIGH (ref 70–99)
GLUCOSE BLDC GLUCOMTR-MCNC: 286 MG/DL — HIGH (ref 70–99)
GLUCOSE BLDC GLUCOMTR-MCNC: 301 MG/DL — HIGH (ref 70–99)
GLUCOSE BLDC GLUCOMTR-MCNC: 314 MG/DL — HIGH (ref 70–99)
GLUCOSE BLDC GLUCOMTR-MCNC: 317 MG/DL — HIGH (ref 70–99)
GLUCOSE BLDC GLUCOMTR-MCNC: 327 MG/DL — HIGH (ref 70–99)
GLUCOSE SERPL-MCNC: 115 MG/DL — HIGH (ref 70–99)
HCT VFR BLD CALC: 24.6 % — LOW (ref 34.5–45)
HGB BLD-MCNC: 7.5 G/DL — LOW (ref 11.5–15.5)
LACTATE SERPL-SCNC: 1.1 MMOL/L — SIGNIFICANT CHANGE UP (ref 0.5–2)
LIDOCAIN IGE QN: 137.4 U/L — HIGH (ref 7–60)
MAGNESIUM SERPL-MCNC: 1.5 MG/DL — LOW (ref 1.6–2.6)
MCHC RBC-ENTMCNC: 27.1 PG — SIGNIFICANT CHANGE UP (ref 27–34)
MCHC RBC-ENTMCNC: 30.5 % — LOW (ref 32–36)
MCV RBC AUTO: 88.8 FL — SIGNIFICANT CHANGE UP (ref 80–100)
NRBC # FLD: 0 — SIGNIFICANT CHANGE UP
NT-PROBNP SERPL-SCNC: SIGNIFICANT CHANGE UP PG/ML
PHOSPHATE SERPL-MCNC: 6.1 MG/DL — HIGH (ref 2.5–4.5)
PLATELET # BLD AUTO: 162 K/UL — SIGNIFICANT CHANGE UP (ref 150–400)
PMV BLD: 9.6 FL — SIGNIFICANT CHANGE UP (ref 7–13)
POTASSIUM SERPL-MCNC: 4.5 MMOL/L — SIGNIFICANT CHANGE UP (ref 3.5–5.3)
POTASSIUM SERPL-SCNC: 4.5 MMOL/L — SIGNIFICANT CHANGE UP (ref 3.5–5.3)
RBC # BLD: 2.77 M/UL — LOW (ref 3.8–5.2)
RBC # FLD: 19.4 % — HIGH (ref 10.3–14.5)
SODIUM SERPL-SCNC: 137 MMOL/L — SIGNIFICANT CHANGE UP (ref 135–145)
T3FREE SERPL-MCNC: 1.49 PG/ML — LOW (ref 1.8–4.6)
T4 FREE SERPL-MCNC: 1 NG/DL — SIGNIFICANT CHANGE UP (ref 0.9–1.8)
TROPONIN T, HIGH SENSITIVITY: 3214 NG/L — CRITICAL HIGH (ref ?–14)
WBC # BLD: 9.26 K/UL — SIGNIFICANT CHANGE UP (ref 3.8–10.5)
WBC # FLD AUTO: 9.26 K/UL — SIGNIFICANT CHANGE UP (ref 3.8–10.5)

## 2018-12-15 PROCEDURE — 71045 X-RAY EXAM CHEST 1 VIEW: CPT | Mod: 26

## 2018-12-15 PROCEDURE — 93306 TTE W/DOPPLER COMPLETE: CPT | Mod: 26

## 2018-12-15 RX ORDER — LABETALOL HCL 100 MG
300 TABLET ORAL
Qty: 0 | Refills: 0 | Status: DISCONTINUED | OUTPATIENT
Start: 2018-12-15 | End: 2018-12-16

## 2018-12-15 RX ORDER — GENTAMICIN SULFATE 0.1 %
1 OINTMENT (GRAM) TOPICAL EVERY 8 HOURS
Qty: 0 | Refills: 0 | Status: DISCONTINUED | OUTPATIENT
Start: 2018-12-15 | End: 2018-12-16

## 2018-12-15 RX ORDER — MAGNESIUM SULFATE 500 MG/ML
1 VIAL (ML) INJECTION ONCE
Qty: 0 | Refills: 0 | Status: COMPLETED | OUTPATIENT
Start: 2018-12-15 | End: 2018-12-15

## 2018-12-15 RX ORDER — VANCOMYCIN HCL 1 G
1000 VIAL (EA) INTRAVENOUS ONCE
Qty: 0 | Refills: 0 | Status: COMPLETED | OUTPATIENT
Start: 2018-12-15 | End: 2018-12-15

## 2018-12-15 RX ORDER — ACETAMINOPHEN 500 MG
650 TABLET ORAL ONCE
Qty: 0 | Refills: 0 | Status: COMPLETED | OUTPATIENT
Start: 2018-12-15 | End: 2018-12-15

## 2018-12-15 RX ORDER — AMLODIPINE BESYLATE 2.5 MG/1
10 TABLET ORAL DAILY
Qty: 0 | Refills: 0 | Status: DISCONTINUED | OUTPATIENT
Start: 2018-12-15 | End: 2018-12-16

## 2018-12-15 RX ORDER — CALCIUM ACETATE 667 MG
667 TABLET ORAL
Qty: 0 | Refills: 0 | Status: DISCONTINUED | OUTPATIENT
Start: 2018-12-15 | End: 2018-12-16

## 2018-12-15 RX ORDER — ERYTHROPOIETIN 10000 [IU]/ML
10000 INJECTION, SOLUTION INTRAVENOUS; SUBCUTANEOUS
Qty: 0 | Refills: 0 | Status: DISCONTINUED | OUTPATIENT
Start: 2018-12-15 | End: 2018-12-16

## 2018-12-15 RX ADMIN — Medication 100 GRAM(S): at 10:07

## 2018-12-15 RX ADMIN — Medication 667 MILLIGRAM(S): at 17:48

## 2018-12-15 RX ADMIN — Medication 1 APPLICATION(S): at 13:19

## 2018-12-15 RX ADMIN — Medication 650 MILLIGRAM(S): at 06:43

## 2018-12-15 RX ADMIN — Medication 50 MICROGRAM(S): at 06:43

## 2018-12-15 RX ADMIN — Medication 250 MILLIGRAM(S): at 12:17

## 2018-12-15 RX ADMIN — ATORVASTATIN CALCIUM 40 MILLIGRAM(S): 80 TABLET, FILM COATED ORAL at 22:25

## 2018-12-15 RX ADMIN — Medication 650 MILLIGRAM(S): at 22:55

## 2018-12-15 RX ADMIN — Medication 650 MILLIGRAM(S): at 07:13

## 2018-12-15 RX ADMIN — Medication 650 MILLIGRAM(S): at 14:42

## 2018-12-15 RX ADMIN — Medication 81 MILLIGRAM(S): at 12:17

## 2018-12-15 RX ADMIN — Medication 650 MILLIGRAM(S): at 15:15

## 2018-12-15 RX ADMIN — CLOPIDOGREL BISULFATE 75 MILLIGRAM(S): 75 TABLET, FILM COATED ORAL at 12:17

## 2018-12-15 RX ADMIN — Medication 667 MILLIGRAM(S): at 22:25

## 2018-12-15 RX ADMIN — Medication 4: at 17:48

## 2018-12-15 RX ADMIN — GABAPENTIN 200 MILLIGRAM(S): 400 CAPSULE ORAL at 22:25

## 2018-12-15 RX ADMIN — Medication 650 MILLIGRAM(S): at 22:25

## 2018-12-15 RX ADMIN — Medication 2: at 22:27

## 2018-12-15 RX ADMIN — CHLORHEXIDINE GLUCONATE 1 APPLICATION(S): 213 SOLUTION TOPICAL at 12:18

## 2018-12-15 RX ADMIN — Medication 3: at 12:17

## 2018-12-15 RX ADMIN — Medication 300 MILLIGRAM(S): at 23:19

## 2018-12-15 NOTE — PROGRESS NOTE ADULT - ATTENDING COMMENTS
Patient seen at 12 PM  S/P biventicular pacer  echo mild to moderate LV dsyfunction   denies abdominal pain or sob feels well   no fever or chills  stil desaturation off O2  dialysis catherter removed on peritoneal dialysis    Recommend  GI consult re; elevated LIpase and abdomianl pain on admission  continue present medications  if stable in AM discharge home followup with outside renal, Dr. Hinkle and me

## 2018-12-16 ENCOUNTER — TRANSCRIPTION ENCOUNTER (OUTPATIENT)
Age: 73
End: 2018-12-16

## 2018-12-16 VITALS
TEMPERATURE: 98 F | DIASTOLIC BLOOD PRESSURE: 50 MMHG | SYSTOLIC BLOOD PRESSURE: 108 MMHG | RESPIRATION RATE: 21 BRPM | OXYGEN SATURATION: 96 % | HEART RATE: 79 BPM

## 2018-12-16 LAB
ALBUMIN SERPL ELPH-MCNC: 2.6 G/DL — LOW (ref 3.3–5)
ALP SERPL-CCNC: 72 U/L — SIGNIFICANT CHANGE UP (ref 40–120)
ALT FLD-CCNC: 14 U/L — SIGNIFICANT CHANGE UP (ref 4–33)
AST SERPL-CCNC: 16 U/L — SIGNIFICANT CHANGE UP (ref 4–32)
BILIRUB SERPL-MCNC: < 0.2 MG/DL — LOW (ref 0.2–1.2)
BUN SERPL-MCNC: 78 MG/DL — HIGH (ref 7–23)
CALCIUM SERPL-MCNC: 9 MG/DL — SIGNIFICANT CHANGE UP (ref 8.4–10.5)
CHLORIDE SERPL-SCNC: 94 MMOL/L — LOW (ref 98–107)
CO2 SERPL-SCNC: 20 MMOL/L — LOW (ref 22–31)
CREAT SERPL-MCNC: 8.82 MG/DL — HIGH (ref 0.5–1.3)
GLUCOSE BLDC GLUCOMTR-MCNC: 159 MG/DL — HIGH (ref 70–99)
GLUCOSE BLDC GLUCOMTR-MCNC: 368 MG/DL — HIGH (ref 70–99)
GLUCOSE SERPL-MCNC: 164 MG/DL — HIGH (ref 70–99)
HCT VFR BLD CALC: 23.2 % — LOW (ref 34.5–45)
HCT VFR BLD CALC: 24.5 % — LOW (ref 34.5–45)
HGB BLD-MCNC: 7.3 G/DL — LOW (ref 11.5–15.5)
HGB BLD-MCNC: 7.5 G/DL — LOW (ref 11.5–15.5)
MAGNESIUM SERPL-MCNC: 2 MG/DL — SIGNIFICANT CHANGE UP (ref 1.6–2.6)
MCHC RBC-ENTMCNC: 27 PG — SIGNIFICANT CHANGE UP (ref 27–34)
MCHC RBC-ENTMCNC: 27.5 PG — SIGNIFICANT CHANGE UP (ref 27–34)
MCHC RBC-ENTMCNC: 30.6 % — LOW (ref 32–36)
MCHC RBC-ENTMCNC: 31.5 % — LOW (ref 32–36)
MCV RBC AUTO: 87.5 FL — SIGNIFICANT CHANGE UP (ref 80–100)
MCV RBC AUTO: 88.1 FL — SIGNIFICANT CHANGE UP (ref 80–100)
NRBC # FLD: 0 — SIGNIFICANT CHANGE UP
NRBC # FLD: 0 — SIGNIFICANT CHANGE UP
PHOSPHATE SERPL-MCNC: 6.7 MG/DL — HIGH (ref 2.5–4.5)
PLATELET # BLD AUTO: 140 K/UL — LOW (ref 150–400)
PLATELET # BLD AUTO: 142 K/UL — LOW (ref 150–400)
PMV BLD: 9.4 FL — SIGNIFICANT CHANGE UP (ref 7–13)
PMV BLD: 9.7 FL — SIGNIFICANT CHANGE UP (ref 7–13)
POTASSIUM SERPL-MCNC: 3.6 MMOL/L — SIGNIFICANT CHANGE UP (ref 3.5–5.3)
POTASSIUM SERPL-SCNC: 3.6 MMOL/L — SIGNIFICANT CHANGE UP (ref 3.5–5.3)
PROT SERPL-MCNC: 5.6 G/DL — LOW (ref 6–8.3)
RBC # BLD: 2.65 M/UL — LOW (ref 3.8–5.2)
RBC # BLD: 2.78 M/UL — LOW (ref 3.8–5.2)
RBC # FLD: 19.3 % — HIGH (ref 10.3–14.5)
RBC # FLD: 19.3 % — HIGH (ref 10.3–14.5)
SODIUM SERPL-SCNC: 134 MMOL/L — LOW (ref 135–145)
WBC # BLD: 10.48 K/UL — SIGNIFICANT CHANGE UP (ref 3.8–10.5)
WBC # BLD: 7.26 K/UL — SIGNIFICANT CHANGE UP (ref 3.8–10.5)
WBC # FLD AUTO: 10.48 K/UL — SIGNIFICANT CHANGE UP (ref 3.8–10.5)
WBC # FLD AUTO: 7.26 K/UL — SIGNIFICANT CHANGE UP (ref 3.8–10.5)

## 2018-12-16 PROCEDURE — 99222 1ST HOSP IP/OBS MODERATE 55: CPT | Mod: GC

## 2018-12-16 RX ORDER — ACETAMINOPHEN 500 MG
650 TABLET ORAL EVERY 6 HOURS
Qty: 0 | Refills: 0 | Status: DISCONTINUED | OUTPATIENT
Start: 2018-12-16 | End: 2018-12-16

## 2018-12-16 RX ADMIN — CHLORHEXIDINE GLUCONATE 1 APPLICATION(S): 213 SOLUTION TOPICAL at 12:10

## 2018-12-16 RX ADMIN — Medication 667 MILLIGRAM(S): at 09:35

## 2018-12-16 RX ADMIN — Medication 50 MICROGRAM(S): at 06:40

## 2018-12-16 RX ADMIN — AMLODIPINE BESYLATE 10 MILLIGRAM(S): 2.5 TABLET ORAL at 06:40

## 2018-12-16 RX ADMIN — Medication 81 MILLIGRAM(S): at 12:10

## 2018-12-16 RX ADMIN — Medication 667 MILLIGRAM(S): at 12:10

## 2018-12-16 RX ADMIN — Medication 1: at 09:34

## 2018-12-16 RX ADMIN — Medication 5: at 12:10

## 2018-12-16 RX ADMIN — Medication 300 MILLIGRAM(S): at 06:40

## 2018-12-16 RX ADMIN — Medication 1 APPLICATION(S): at 09:35

## 2018-12-16 RX ADMIN — CLOPIDOGREL BISULFATE 75 MILLIGRAM(S): 75 TABLET, FILM COATED ORAL at 12:10

## 2018-12-16 NOTE — PROGRESS NOTE ADULT - PROBLEM SELECTOR PROBLEM 1
Acute decompensated heart failure
ESRD on peritoneal dialysis
ESRD on peritoneal dialysis

## 2018-12-16 NOTE — DISCHARGE NOTE ADULT - NS AS DC HF EDUCATION INSTRUCTIONS
Activities as tolerated/Call Primary Care Provider for follow-up after discharge/Low salt diet/Monitor Weight Daily/Report weight gain of 2 or more pounds in one day or 3 or more pounds in one week, worsening shortness of breath, fatigue, weakness, increased swelling of hands and feet to primary care provider

## 2018-12-16 NOTE — DISCHARGE NOTE ADULT - CARE PLAN
Principal Discharge DX:	Third degree heart block  Goal:	Follow-up Care  Assessment and plan of treatment:	You had a problem with your heart's normal conduction, and had a permanent pacemaker placed to help make sure it beats properly. Please follow-up with the electrophysiologists as directed to continue your care.  Secondary Diagnosis:	S/P CABG (coronary artery bypass graft)  Goal:	Continue medications  Assessment and plan of treatment:	Coronary artery disease is a condition where the arteries the supply the heart muscle get clogges with fatty deposits & puts you at risk for a heart attack. You have a history of this problem, and had bypass grafting performed to help supply blood to the blood of your heart.  Call your doctor if you have any new pain, pressure, or discomfort in the center of your chest, pain, tingling or discomfort in arms, back, neck, jaw, or stomach, shortness of breath, nausea, vomiting, burping or heartburn, sweating, cold and clammy skin, racing or abnormal heartbeat for more than 10 minutes or if they keep coming & going.  Call 911 and do not tr to get to hospital by care  You can help yourself with lefestyle changes (quitting smoking if you smoke), eat lots of fruits & vegetables & low fat dairy products, not a lot of meat & fatty foods, walk or some form of physical activity most days of the week, lose weight if you are overweight  Take your cardiac medication as prescribed to lower cholesterol, to lower blood pressure, aspirin to prevent blood clots, and diabetes control  Make sure to keep appointments with doctor for cardiac follow up care  Secondary Diagnosis:	Hypertension, unspecified type  Goal:	Control blood pressure  Assessment and plan of treatment:	Please check your blood pressure regularly and take your blood pressure medications as prescribed. If it is consistently high (SBP >180 or DBP >90), or you experience intractable headaches, dizziness, changes in vision, chest pain, difficulty breathing, or movement difficulty, please seek emergency medical attention immediately.  Secondary Diagnosis:	ESRD on peritoneal dialysis  Goal:	Continue treatment  Assessment and plan of treatment:	You have a history of renal disease that you currently control with peritoneal dialysis. Please continue these treatments to prevent severe consequences of untreated renal disease.  Secondary Diagnosis:	Diabetes mellitus  Goal:	Control blood sugars  Assessment and plan of treatment:	Please check your blood sugar regularly, and take diabetes medications as prescribed. If your blood sugar is consistently high (>180), or you feel extreme dizziness, thirstiness, or frequent urination, please seek emergency medical attention immediately.

## 2018-12-16 NOTE — PROGRESS NOTE ADULT - PROBLEM SELECTOR PROBLEM 3
Acute pancreatitis
Hypertension, unspecified type
Hypertension, unspecified type

## 2018-12-16 NOTE — PROGRESS NOTE ADULT - ASSESSMENT
74yo F with h/o HTN, HLD, DMII, hypothyroid, ESRD on PD, CAD s/p CABG, recent adm for ADHF 2 months ago @ [facility] now presents shortness of breath x3 days , ADHF in the setting of high degree AV block
72 y/o female with PMH ESRD on peritoneal dialysis, HTN, HLD, DM, hypothyroid, CAD presenting with SOB
72yo F with h/o HTN, HLD, DMII, hypothyroid, ESRD on PD, CAD s/p CABG, recent adm for ADHF 2 months ago @ [facility] now presents shortness of breath x3 days , ADHF in the setting of high degree AV block
72yo F with h/o HTN, HLD, DMII, hypothyroid, ESRD on PD, CAD s/p CABG, recent adm for ADHF 2 months ago @ [facility] now presents shortness of breath x3 days , ADHF in the setting of high degree AV block
74 y/o female with PMH ESRD on peritoneal dialysis, HTN, HLD, DM, hypothyroid, CAD presenting with SOB
74yo F with h/o HTN, HLD, DMII, hypothyroid, ESRD on PD, CAD s/p CABG, recent adm for ADHF 2 months ago @ [facility] now presents shortness of breath x3 days likely 2/2 ADHF. ProBNP >59858, CXR showing pulmonary edema.  Patient with elevated hST 2520, CKMB 6.97, CKMB relative index 4.5, normal .  but denies any active chest pain, STD in V6.  Patient had recent LHC <2 months ago.  Patient also found to be in high degree AV block. Patient's current creatinine 9.76.  Admitted to CCU for monitoring.
74yo F with h/o HTN, HLD, DMII, hypothyroid, ESRD on PD, CAD s/p CABG, recent adm for ADHF 2 months ago @ [facility] now presents shortness of breath x3 days , ADHF in the setting of high degree AV block

## 2018-12-16 NOTE — DISCHARGE NOTE ADULT - HOSPITAL COURSE
72yo F with h/o HTN, HLD, DMII, hypothyroid, ESRD on PD ( last session 12/12 pm), CAD s/p CABG, recent adm for ADHF 2 months ago @ [facility], presented with epigastric pain x4 days and shortness of breath x3 days, found to be in CHB and HTN. Initially treated with nitro gtt and emergent HD before receiving a PPM on 12/14. Tolerating diet and resumed her PD regimen after PPM placement. Device capturing and pacing appropriately. The epigastric pain resolved on its own, although the patient did have an elevated lipase level, diagnostic studies including ultrasound only revealed asymptomatic gallstones, and GI along with the primary team determined pancreatitis to be unlikely. The patient was seen and evaluated and deemed stable for discharge with appropriate follow-up. 74yo F with h/o HTN, HLD, DMII, hypothyroid, ESRD on PD ( last session 12/12 pm), CAD s/p CABG, recent adm for ADHF 2 months ago @ [facility], presented with epigastric pain x4 days and shortness of breath x3 days, found to be in CHB and HTN. Initially treated with nitro gtt and emergent HD before receiving a PPM on 12/14. Tolerating diet and resumed her PD regimen after PPM placement. Device capturing and pacing appropriately. The epigastric pain resolved on its own, although the patient did have an elevated lipase level, diagnostic studies including ultrasound only revealed asymptomatic gallstones, and GI along with the primary team determined pancreatitis to be unlikely with elevated lab values likely due to ESRD. The patient was seen and evaluated and deemed stable for discharge with appropriate follow-up.

## 2018-12-16 NOTE — DISCHARGE NOTE ADULT - PATIENT PORTAL LINK FT
You can access the [facility] Patient Portal, offered by [facility], by registering with the following website: [email or web address]

## 2018-12-16 NOTE — PROGRESS NOTE ADULT - PROBLEM SELECTOR PROBLEM 4
ESRD on peritoneal dialysis
Chronic kidney disease-mineral and bone disorder
Chronic kidney disease-mineral and bone disorder
ESRD on peritoneal dialysis

## 2018-12-16 NOTE — CONSULT NOTE ADULT - SUBJECTIVE AND OBJECTIVE BOX
Chief Complaint:  Patient is a 73y old  Female who presents with a chief complaint of epigastric pain (16 Dec 2018 08:20)      HPI:  73 year old female with HTN, HLD, DMII, hypothyroidism, ESRD on PD ( last session  pm), CAD s/p CABG, presented with epigastric pain for 4 days and shortness of breath for 3 days.  Patient is currently in the CCU and is being evaluated for acute decompensated heart failure in the setting of high degree AV block.         Allergies:  Cipro (Unknown)  Diovan (Unknown)      Home Medications:   Patient Currently Takes Medications as of 13-Aug-2018 12:54 documented in Structured Notes  · 	Protonix 40 mg oral delayed release tablet: 1 tab(s) orally once a day   · 	labetalol 300 mg oral tablet: 1 tab(s) orally 2 times a day  · 	predniSONE 5 mg oral tablet: 3 tabs  x 2 days, then 2 tabs x 2 days then 1 tab x 2 days then stop   · 	amLODIPine 10 mg oral tablet: 1 tab(s) orally once a day  · 	atorvastatin 40 mg oral tablet: 1 tab(s) orally once a day  · 	calcium acetate 667 mg oral capsule: 3 cap(s) orally 3 times a day (with meals)  · 	levothyroxine 50 mcg (0.05 mg) oral tablet: 1 tab(s) orally once a day  · 	aspirin 81 mg oral tablet: 1 tab(s) orally once a day  · 	Daily Martha oral tablet: 1 tab(s) orally once a day  · 	gabapentin 100 mg oral capsule: 2 cap(s) orally once a day (at bedtime)  · 	clopidogrel 75 mg oral tablet: 1 tab(s) orally once a day  · 	Renagel 800 mg oral tablet: 3 tab(s) orally 3 times a day  · 	ergocalciferol 50,000 intl units (1.25 mg) oral capsule: 1 cap(s) orally once a week  · 	glimepiride 1 mg oral tablet: 2 tab(s) orally once a day in the morning  	and if FS >200 mg/dl before dinner take 1 mg of Amaryl while on Prednisone    Hospital Medications:  acetaminophen   Tablet .. 650 milliGRAM(s) Oral every 6 hours PRN  amLODIPine   Tablet 10 milliGRAM(s) Oral daily  aspirin enteric coated 81 milliGRAM(s) Oral daily  atorvastatin 40 milliGRAM(s) Oral at bedtime  calcium acetate 667 milliGRAM(s) Oral four times a day with meals  chlorhexidine 4% Liquid 1 Application(s) Topical <User Schedule>  clopidogrel Tablet 75 milliGRAM(s) Oral daily  epoetin rocky Injectable 74242 Unit(s) SubCutaneous <User Schedule>  gabapentin 200 milliGRAM(s) Oral at bedtime  gentamicin 0.1% Cream 1 Application(s) Topical every 8 hours  insulin lispro (HumaLOG) corrective regimen sliding scale   SubCutaneous three times a day before meals  insulin lispro (HumaLOG) corrective regimen sliding scale   SubCutaneous at bedtime  labetalol 300 milliGRAM(s) Oral two times a day  levothyroxine 50 MICROGram(s) Oral daily      PMHX/PSHX:  ESRD on peritoneal dialysis  Acid reflux  Hypertension  Dyslipidemia  Diabetes mellitus  CAD (coronary artery disease)  S/P CABG (coronary artery bypass graft)  H/O: hysterectomy  History of total knee replacement b/l  After cataract, bilateral      Family history:  Family history of early CAD  Family history of diabetes mellitus      Social History:   denies drug, tobacco and alcohol use  lives with family    ROS:     General:  No wt loss, fevers, chills, night sweats, fatigue,   Eyes:  Good vision, no reported pain  ENT:  No sore throat, pain, runny nose, dysphagia  CV:  No pain, palpitations, hypo/hypertension  Resp:  No dyspnea, cough, tachypnea, wheezing  GI:  See HPI  :  No pain, bleeding, incontinence, nocturia  Muscle:  No pain, weakness  Neuro:  No weakness, tingling, memory problems  Psych:  No fatigue, insomnia, mood problems, depression  Endocrine:  No polyuria, polydipsia, cold/heat intolerance  Heme:  No petechiae, ecchymosis, easy bruisability  Skin:  No rash, edema      PHYSICAL EXAM:     GENERAL:  Appears stated age, well-groomed, well-nourished, no distress  HEENT:  NC/AT,  conjunctivae clear and pink,  no JVD  CHEST:  Full & symmetric excursion, no increased effort, breath sounds clear  HEART:  Regular rhythm, S1, S2, no murmur/rub/S3/S4, no abdominal bruit, no edema  ABDOMEN:  Soft, non-tender, non-distended, normoactive bowel sounds,  no masses ,  EXTREMITIES:  no cyanosis,clubbing or edema  SKIN:  No rash/erythema/ecchymoses/petechiae/wounds/abscess/warm/dry  NEURO:  Alert, oriented    Vital Signs:  Vital Signs Last 24 Hrs  T(C): 36.4 (16 Dec 2018 08:00), Max: 36.9 (15 Dec 2018 16:00)  T(F): 97.6 (16 Dec 2018 08:00), Max: 98.4 (15 Dec 2018 16:00)  HR: 41 (16 Dec 2018 08:00) (41 - 89)  BP: 102/41 (16 Dec 2018 08:00) (102/41 - 172/59)  BP(mean): 57 (16 Dec 2018 08:) (57 - 118)  RR: 19 (16 Dec 2018 08:) (15 - 27)  SpO2: 93% (16 Dec 2018 08:) (91% - 100%)  Daily     Daily Weight in k.7 (16 Dec 2018 06:07)    LABS:                        7.3    7.26  )-----------( 140      ( 16 Dec 2018 06:00 )             23.2     12-16    134<L>  |  94<L>  |  78<H>  ----------------------------<  164<H>  3.6   |  20<L>  |  8.82<H>    Ca    9.0      16 Dec 2018 06:00  Phos  6.7     12-16  Mg     2.0     12-16    TPro  5.6<L>  /  Alb  2.6<L>  /  TBili  < 0.2<L>  /  DBili  x   /  AST  16  /  ALT  14  /  AlkPhos  72  12-16    LIVER FUNCTIONS - ( 16 Dec 2018 06:00 )  Alb: 2.6 g/dL / Pro: 5.6 g/dL / ALK PHOS: 72 u/L / ALT: 14 u/L / AST: 16 u/L / GGT: x             Imaging:  < from: US Abdomen Limited (18 @ 12:46) >  FINDINGS:  Liver: Within normal limits.  Bile ducts: Normal caliber. Common hepatic duct measures 5 mm.   Gallbladder: Small stones.      Pancreas: Visualized portions are within normal limits.  Right kidney: 8 cm. No hydronephrosis. Small cysts.  Ascites: None.  IVC: Visualized portions are within normal limits.  IMPRESSION:   Small gallstones.  < end of copied text >      < from: CT Abdomen and Pelvis No Cont (08.10.18 @ 21:17) >  FINDINGS:  LOWER CHEST: Cardiomegaly. Status post CABG. Coronary artery calcifications. Small left pleural effusion with adjacent compressive atelectasis. Small hiatal hernia.  LIVER: Within normal limits.  BILE DUCTS: Normal caliber.  GALLBLADDER: Cholelithiasis.  SPLEEN: Within normal limits.  PANCREAS: Within normal limits.  ADRENALS: A 1.1 cm left adrenal nodule.  KIDNEYS/URETERS: Bilateral atrophic kidneys.    BLADDER: Within normal limits.  REPRODUCTIVE ORGANS: Hysterectomy.  BOWEL: Small hiatal hernia. Colonic diverticulosis without diverticulitis. No bowel obstruction. Appendix is normal.  PERITONEUM: Peritoneal dialysis catheter with tip in the inferior   peritoneal cavity. Moderate ascites.  VESSELS:  Marked atherosclerotic changes.  RETROPERITONEUM: No lymphadenopathy.    ABDOMINAL WALL: Small fat-containing umbilical hernia.  BONES: Degenerative changes of the spine.    IMPRESSION: No acute bowel pathology. Peritoneal dialysis catheter with tip in the inferior peritoneal cavity. Moderate ascites.  < end of copied text > Chief Complaint:  Patient is a 73y old  Female who presents with a chief complaint of epigastric pain (16 Dec 2018 08:20)      HPI:  73 year old female with HTN, HLD, DMII, hypothyroidism, ESRD on PD ( last session  pm), CAD s/p CABG, presented with epigastric pain for 4 days and shortness of breath for 3 days.  Patient is currently in the CCU and is being evaluated for acute decompensated heart failure in the setting of high degree AV block.     Patient states she had epigastric abdominal pain on the day of admission. It was sharp, non radiating, felt like gas, with no aggravating or relieving factors. She denies nausea, vomiting, changes in BMs, melena or hematochezia. She moves her bowels every day, 1-2 times, brown in color, with no straining.     Last EGD and colonoscopy: 3 years ago, reports normal (does not remember name of the GI doctor)  She denies history of pancreatitis, gallstones, pain with food consumption, alcohol use, or family history of liver or pancreatic disease.      Allergies:  Cipro (Unknown)  Diovan (Unknown)      Home Medications:   Patient Currently Takes Medications as of 13-Aug-2018 12:54 documented in Structured Notes  · 	Protonix 40 mg oral delayed release tablet: 1 tab(s) orally once a day   · 	labetalol 300 mg oral tablet: 1 tab(s) orally 2 times a day  · 	predniSONE 5 mg oral tablet: 3 tabs  x 2 days, then 2 tabs x 2 days then 1 tab x 2 days then stop   · 	amLODIPine 10 mg oral tablet: 1 tab(s) orally once a day  · 	atorvastatin 40 mg oral tablet: 1 tab(s) orally once a day  · 	calcium acetate 667 mg oral capsule: 3 cap(s) orally 3 times a day (with meals)  · 	levothyroxine 50 mcg (0.05 mg) oral tablet: 1 tab(s) orally once a day  · 	aspirin 81 mg oral tablet: 1 tab(s) orally once a day  · 	Daily Martha oral tablet: 1 tab(s) orally once a day  · 	gabapentin 100 mg oral capsule: 2 cap(s) orally once a day (at bedtime)  · 	clopidogrel 75 mg oral tablet: 1 tab(s) orally once a day  · 	Renagel 800 mg oral tablet: 3 tab(s) orally 3 times a day  · 	ergocalciferol 50,000 intl units (1.25 mg) oral capsule: 1 cap(s) orally once a week  · 	glimepiride 1 mg oral tablet: 2 tab(s) orally once a day in the morning  	and if FS >200 mg/dl before dinner take 1 mg of Amaryl while on Prednisone    Hospital Medications:  acetaminophen   Tablet .. 650 milliGRAM(s) Oral every 6 hours PRN  amLODIPine   Tablet 10 milliGRAM(s) Oral daily  aspirin enteric coated 81 milliGRAM(s) Oral daily  atorvastatin 40 milliGRAM(s) Oral at bedtime  calcium acetate 667 milliGRAM(s) Oral four times a day with meals  chlorhexidine 4% Liquid 1 Application(s) Topical <User Schedule>  clopidogrel Tablet 75 milliGRAM(s) Oral daily  epoetin rocky Injectable 20513 Unit(s) SubCutaneous <User Schedule>  gabapentin 200 milliGRAM(s) Oral at bedtime  gentamicin 0.1% Cream 1 Application(s) Topical every 8 hours  insulin lispro (HumaLOG) corrective regimen sliding scale   SubCutaneous three times a day before meals  insulin lispro (HumaLOG) corrective regimen sliding scale   SubCutaneous at bedtime  labetalol 300 milliGRAM(s) Oral two times a day  levothyroxine 50 MICROGram(s) Oral daily      PMHX/PSHX:  ESRD on peritoneal dialysis  Acid reflux  Hypertension  Dyslipidemia  Diabetes mellitus  CAD (coronary artery disease)  S/P CABG (coronary artery bypass graft)  H/O: hysterectomy  History of total knee replacement b/l  After cataract, bilateral      Family history:  Family history of early CAD  Family history of diabetes mellitus      Social History:   denies drug, tobacco and alcohol use  lives with family    ROS:     General:  No wt loss, fevers, chills, night sweats, fatigue,   Eyes:  Good vision, no reported pain  ENT:  No sore throat, pain, runny nose, dysphagia  CV:  No pain, palpitations, hypo/hypertension  Resp:  No dyspnea, cough, tachypnea, wheezing  GI:  See HPI  :  No pain, bleeding, incontinence, nocturia  Muscle:  No pain, weakness  Neuro:  No weakness, tingling, memory problems  Psych:  No fatigue, insomnia, mood problems, depression  Endocrine:  No polyuria, polydipsia, cold/heat intolerance  Heme:  No petechiae, ecchymosis, easy bruisability  Skin:  No rash, edema      PHYSICAL EXAM:     GENERAL:  Appears stated age, well-groomed, well-nourished, no distress  HEENT:  NC/AT,  conjunctivae clear and pink,  no JVD  CHEST:  Full & symmetric excursion, no increased effort, breath sounds clear  HEART:  Regular rhythm, S1, S2, no murmur/rub/S3/S4, no abdominal bruit, no edema  ABDOMEN:  Soft, non-tender, non-distended, normoactive bowel sounds,  no masses ,  EXTREMITIES:  no cyanosis,clubbing or edema  SKIN:  No rash/erythema/ecchymoses/petechiae/wounds/abscess/warm/dry  NEURO:  Alert, oriented    Vital Signs:  Vital Signs Last 24 Hrs  T(C): 36.4 (16 Dec 2018 08:00), Max: 36.9 (15 Dec 2018 16:00)  T(F): 97.6 (16 Dec 2018 08:00), Max: 98.4 (15 Dec 2018 16:00)  HR: 41 (16 Dec 2018 08:00) (41 - 89)  BP: 102/41 (16 Dec 2018 08:00) (102/41 - 172/59)  BP(mean): 57 (16 Dec 2018 08:00) (57 - 118)  RR: 19 (16 Dec 2018 08:00) (15 - 27)  SpO2: 93% (16 Dec 2018 08:00) (91% - 100%)  Daily     Daily Weight in k.7 (16 Dec 2018 06:07)    LABS:                        7.3    7.26  )-----------( 140      ( 16 Dec 2018 06:00 )             23.2     12-16    134<L>  |  94<L>  |  78<H>  ----------------------------<  164<H>  3.6   |  20<L>  |  8.82<H>    Ca    9.0      16 Dec 2018 06:00  Phos  6.7     12-16  Mg     2.0     12-16    TPro  5.6<L>  /  Alb  2.6<L>  /  TBili  < 0.2<L>  /  DBili  x   /  AST  16  /  ALT  14  /  AlkPhos  72  12-16    LIVER FUNCTIONS - ( 16 Dec 2018 06:00 )  Alb: 2.6 g/dL / Pro: 5.6 g/dL / ALK PHOS: 72 u/L / ALT: 14 u/L / AST: 16 u/L / GGT: x             Imaging:  < from: US Abdomen Limited (18 @ 12:46) >  FINDINGS:  Liver: Within normal limits.  Bile ducts: Normal caliber. Common hepatic duct measures 5 mm.   Gallbladder: Small stones.      Pancreas: Visualized portions are within normal limits.  Right kidney: 8 cm. No hydronephrosis. Small cysts.  Ascites: None.  IVC: Visualized portions are within normal limits.  IMPRESSION:   Small gallstones.  < end of copied text >      < from: CT Abdomen and Pelvis No Cont (08.10.18 @ 21:17) >  FINDINGS:  LOWER CHEST: Cardiomegaly. Status post CABG. Coronary artery calcifications. Small left pleural effusion with adjacent compressive atelectasis. Small hiatal hernia.  LIVER: Within normal limits.  BILE DUCTS: Normal caliber.  GALLBLADDER: Cholelithiasis.  SPLEEN: Within normal limits.  PANCREAS: Within normal limits.  ADRENALS: A 1.1 cm left adrenal nodule.  KIDNEYS/URETERS: Bilateral atrophic kidneys.    BLADDER: Within normal limits.  REPRODUCTIVE ORGANS: Hysterectomy.  BOWEL: Small hiatal hernia. Colonic diverticulosis without diverticulitis. No bowel obstruction. Appendix is normal.  PERITONEUM: Peritoneal dialysis catheter with tip in the inferior   peritoneal cavity. Moderate ascites.  VESSELS:  Marked atherosclerotic changes.  RETROPERITONEUM: No lymphadenopathy.    ABDOMINAL WALL: Small fat-containing umbilical hernia.  BONES: Degenerative changes of the spine.    IMPRESSION: No acute bowel pathology. Peritoneal dialysis catheter with tip in the inferior peritoneal cavity. Moderate ascites.  < end of copied text >

## 2018-12-16 NOTE — DISCHARGE NOTE ADULT - CARE PROVIDER_API CALL
Edin Jennings), Cardiovascular Disease; Internal Medicine; Interventional Cardiology  [address] [address]  Phone: [phone]  Fax: [phone] Edin Jennings), Cardiovascular Disease; Internal Medicine; Interventional Cardiology  [address] [facility]  [address] [place] [address]  Phone: [phone]  Fax: [phone]    Gabino Lay), Cardiac Electrophysiology; Cardiology; Internal Medicine  [address] [place] [address]  Phone: [phone]  Fax: [phone]

## 2018-12-16 NOTE — PROGRESS NOTE ADULT - PROBLEM SELECTOR PLAN 2
s/p BIV PPM   -CXR ordered, VANCO 2 doses needed post peritoneal HD
Patient's HR currently 50s SBP 200s. DDX: heart failure vs Iatrogenic vs conduction defect vs hypothyroidism vs hypothermia.  -Atropine and Zoll pads at bedside  -Hold AV mario blockers  -EP consult and f/u recommendations   -TSH 6.09, will check free T3 and T4  -NPO after midnight. Possible PPM placement  -trend cardiac enzymes
in setting of RF  KANWAL TIW   Monitor Hb.
in setting of RF  KANWAL TIW   Monitor Hb.
s/p BIV PPM   -
s/p BIV PPM   -CXR ordered, VANCO 2 doses needed post peritoneal HD
s/p BIV PPM   -CXR ordered, VANCO 2 doses needed post peritoneal HD

## 2018-12-16 NOTE — PROGRESS NOTE ADULT - SUBJECTIVE AND OBJECTIVE BOX
Date of Admission:  12/16/18  24 hour events:  No overnight events  Vital Signs Last 24 Hrs  T(C): 36.6 (16 Dec 2018 00:10), Max: 36.9 (15 Dec 2018 16:00)  T(F): 97.9 (16 Dec 2018 00:10), Max: 98.4 (15 Dec 2018 16:00)  HR: 74 (16 Dec 2018 06:07) (69 - 89)  BP: 126/48 (16 Dec 2018 06:07) (102/89 - 172/59)  BP(mean): 67 (16 Dec 2018 06:07) (60 - 118)  RR: 17 (16 Dec 2018 06:07) (15 - 27)  SpO2: 95% (16 Dec 2018 06:07) (91% - 100%)  I&O's Summary    15 Dec 2018 07:01  -  16 Dec 2018 07:00  --------------------------------------------------------  IN: 8250 mL / OUT: 9550 mL / NET: -1300 mL        MEDICATIONS:  amLODIPine   Tablet 10 milliGRAM(s) Oral daily  aspirin enteric coated 81 milliGRAM(s) Oral daily  clopidogrel Tablet 75 milliGRAM(s) Oral daily  labetalol 300 milliGRAM(s) Oral two times a day  acetaminophen   Tablet .. 650 milliGRAM(s) Oral every 6 hours PRN  gabapentin 200 milliGRAM(s) Oral at bedtime  atorvastatin 40 milliGRAM(s) Oral at bedtime  insulin lispro (HumaLOG) corrective regimen sliding scale   SubCutaneous three times a day before meals  insulin lispro (HumaLOG) corrective regimen sliding scale   SubCutaneous at bedtime  levothyroxine 50 MICROGram(s) Oral daily    calcium acetate 667 milliGRAM(s) Oral four times a day with meals  chlorhexidine 4% Liquid 1 Application(s) Topical <User Schedule>  epoetin rocky Injectable 60162 Unit(s) SubCutaneous <User Schedule>  gentamicin 0.1% Cream 1 Application(s) Topical every 8 hours      REVIEW OF SYSTEMS:  Complete 10point ROS negative.    PHYSICAL EXAM:  General: NAD  Cardiovascular: Normal S1 S2, No JVD, No murmurs, No edema  Respiratory: Lungs clear to auscultation	  Gastrointestinal:  Soft, Non-tender, + BS	  Skin: warm and dry, No rashes, No ecchymoses, No cyanosis	  Extremities: Normal range of motion, No clubbing, cyanosis or edema  Vascular: Peripheral pulses palpable 2+ bilaterally    LABS:	 	    CBC Full  -  ( 16 Dec 2018 06:00 )  WBC Count : 7.26 K/uL  Hemoglobin : 7.3 g/dL  Hematocrit : 23.2 %  Platelet Count - Automated : 140 K/uL  Mean Cell Volume : 87.5 fL  Mean Cell Hemoglobin : 27.5 pg  Mean Cell Hemoglobin Concentration : 31.5 %  Auto Neutrophil # : x  Auto Lymphocyte # : x  Auto Monocyte # : x  Auto Eosinophil # : x  Auto Basophil # : x  Auto Neutrophil % : x  Auto Lymphocyte % : x  Auto Monocyte % : x  Auto Eosinophil % : x  Auto Basophil % : x    12-16    134<L>  |  94<L>  |  78<H>  ----------------------------<  164<H>  3.6   |  20<L>  |  8.82<H>  12-15    137  |  94<L>  |  75<H>  ----------------------------<  115<H>  4.5   |  21<L>  |  9.28<H>    Ca    9.0      16 Dec 2018 06:00  Ca    8.6      15 Dec 2018 06:30  Phos  6.7     12-16  Phos  6.1     12-15  Mg     2.0     12-16  Mg     1.5     12-15    TPro  5.6<L>  /  Alb  2.6<L>  /  TBili  < 0.2<L>  /  DBili  x   /  AST  16  /  ALT  14  /  AlkPhos  72  12-16  TPro  6.5  /  Alb  3.0<L>  /  TBili  0.2  /  DBili  x   /  AST  23  /  ALT  18  /  AlkPhos  84  12-14      proBNP: Serum Pro-Brain Natriuretic Peptide: > 26127 pg/mL (12-15 @ 06:30)  Serum Pro-Brain Natriuretic Peptide: > 44699 pg/mL (12-14 @ 01:08)  Serum Pro-Brain Natriuretic Peptide: > 73755 pg/mL (12-13 @ 21:05)
[facility] [place] [facility]   MD ROSITA APODACA MD RUORU WONG, PA    TEL:  OFFICE: [phone]  DR SCOTT CELL: [phone]  TINY HOUSTON CELL: [phone]  DR. JOHNSON CELL: [phone]    RENAL FOLLOW UP NOTE  --------------------------------------------------------------------------------  HPI:      Pt seen and examined at bedside.   Fermin SOB, chest pain     PAST HISTORY  --------------------------------------------------------------------------------  No significant changes to PMH, PSH, FHx, SHx, unless otherwise noted    ALLERGIES & MEDICATIONS  --------------------------------------------------------------------------------  Allergies    Cipro (Unknown)  Diovan (Unknown)    Intolerances      Standing Inpatient Medications  aspirin enteric coated 81 milliGRAM(s) Oral daily  atorvastatin 40 milliGRAM(s) Oral at bedtime  chlorhexidine 4% Liquid 1 Application(s) Topical <User Schedule>  clopidogrel Tablet 75 milliGRAM(s) Oral daily  gabapentin 200 milliGRAM(s) Oral at bedtime  gentamicin 0.1% Cream 1 Application(s) Topical every 8 hours  insulin lispro (HumaLOG) corrective regimen sliding scale   SubCutaneous three times a day before meals  insulin lispro (HumaLOG) corrective regimen sliding scale   SubCutaneous at bedtime  levothyroxine 50 MICROGram(s) Oral daily    PRN Inpatient Medications      REVIEW OF SYSTEMS  --------------------------------------------------------------------------------  General: no fever  CVS: no chest pain  RESP: no sob, no cough  ABD: no abdominal pain      VITALS/PHYSICAL EXAM  --------------------------------------------------------------------------------  T(C): 37.5 (12-15-18 @ 07:55), Max: 37.6 (12-15-18 @ 04:00)  HR: 80 (12-15-18 @ 12:00) (60 - 89)  BP: 156/56 (12-15-18 @ 11:00) (116/53 - 156/56)  RR: 27 (12-15-18 @ 12:00) (16 - 29)  SpO2: 99% (12-15-18 @ 12:00) (94% - 100%)  Wt(kg): --  Height (cm): 149.86 (12-14-18 @ 02:00)  Weight (kg): 62.1 (12-14-18 @ 02:00)  BMI (kg/m2): 27.7 (12-14-18 @ 02:00)  BSA (m2): 1.57 (12-14-18 @ 02:00)      12-14-18 @ 07:01  -  12-15-18 @ 07:00  --------------------------------------------------------  IN: 1000 mL / OUT: 3200 mL / NET: -2200 mL    12-15-18 @ 07:01  -  12-15-18 @ 12:31  --------------------------------------------------------  IN: 2000 mL / OUT: 0 mL / NET: 2000 mL      Physical Exam:  	Gen: NAD  	HEENT: MMM  	Pulm: CTA B/L  	CV: S1S2  	Abd: Soft, +BS  	Ext: No LE edema B/L                      Neuro: Awake   	Skin: Warm and Dry       LABS/STUDIES  --------------------------------------------------------------------------------              7.5    9.26  >-----------<  162      [12-15-18 @ 06:30]              24.6     137  |  94  |  75  ----------------------------<  115      [12-15-18 @ 06:30]  4.5   |  21  |  9.28        Ca     8.6     [12-15-18 @ 06:30]      Mg     1.5     [12-15-18 @ 06:30]      Phos  6.1     [12-15-18 @ 06:30]    TPro  6.5  /  Alb  3.0  /  TBili  0.2  /  DBili  x   /  AST  23  /  ALT  18  /  AlkPhos  84  [12-14-18 @ 12:30]    PT/INR: PT 13.0 , INR 1.17       [12-14-18 @ 01:08]  PTT: 27.8       [12-14-18 @ 01:08]    CK 97      [12-14-18 @ 12:30]    Creatinine Trend:  SCr 9.28 [12-15 @ 06:30]  SCr 8.51 [12-14 @ 12:30]  SCr 9.48 [12-14 @ 01:08]  SCr 9.76 [12-13 @ 21:05]        .8 (Ca --)      [08-12-18 @ 06:00]   --  HbA1c 7.7      [12-14-18 @ 05:10]  TSH 6.09      [12-14-18 @ 01:08]  Lipid: chol 176, , HDL 32,       [12-14-18 @ 01:08]    HBsAb 3.7      [12-14-18 @ 05:10]  HBsAg NEGATIVE      [12-14-18 @ 01:08]  HBcAb Nonreactive      [12-14-18 @ 05:10]  HCV 0.03, Nonreactive Hepatitis C AB  S/CO Ratio                        Interpretation  < 1.0                                     Non-Reactive  1.0 - 4.9                           Weakly-Reactive  > 5.0                                 Reactive  Non-Reactive: Ap[place] with a non-reactive HCV antibody  result is considered uninfected.  No further action is  needed unless recent infection is suspected.  In these  cases, consider repeat testing later to detect  seroconversion..  Weakly-Reactive: HCV antibody test is abnormal, HCV RNA  Qualitative test will follow.  Reactive: HCV antibody test is abnormal, HCV RNA  Qualitative test will follow.  Note: HCV antibody testing is performed on the Abbott   system.      [12-14-18 @ 05:10]
Date of Admission:  12/13/18  24 hour events:  s/p BIV PPM  Vital Signs Last 24 Hrs  T(C): 37.6 (15 Dec 2018 04:00), Max: 37.6 (14 Dec 2018 08:05)  T(F): 99.7 (15 Dec 2018 04:00), Max: 99.7 (15 Dec 2018 04:00)  HR: 86 (15 Dec 2018 07:00) (57 - 89)  BP: 151/60 (15 Dec 2018 07:00) (115/57 - 154/64)  BP(mean): 83 (15 Dec 2018 07:00) (66 - 99)  RR: 16 (15 Dec 2018 07:00) (16 - 28)  SpO2: 97% (15 Dec 2018 07:00) (94% - 100%)  I&O's Summary    14 Dec 2018 07:01  -  15 Dec 2018 07:00  --------------------------------------------------------  IN: 1000 mL / OUT: 3200 mL / NET: -2200 mL        MEDICATIONS:  aspirin enteric coated 81 milliGRAM(s) Oral daily  clopidogrel Tablet 75 milliGRAM(s) Oral daily  vancomycin  IVPB 1000 milliGRAM(s) IV Intermittent once  gabapentin 200 milliGRAM(s) Oral at bedtime  atorvastatin 40 milliGRAM(s) Oral at bedtime  dextrose 40% Gel 15 Gram(s) Oral once PRN  dextrose 50% Injectable 12.5 Gram(s) IV Push once  dextrose 50% Injectable 25 Gram(s) IV Push once  dextrose 50% Injectable 25 Gram(s) IV Push once  glucagon  Injectable 1 milliGRAM(s) IntraMuscular once PRN  insulin lispro (HumaLOG) corrective regimen sliding scale   SubCutaneous three times a day before meals  insulin lispro (HumaLOG) corrective regimen sliding scale   SubCutaneous at bedtime  levothyroxine 50 MICROGram(s) Oral daily    chlorhexidine 4% Liquid 1 Application(s) Topical <User Schedule>  dextrose 5%. 1000 milliLiter(s) IV Continuous <Continuous>      REVIEW OF SYSTEMS:  Complete 10point ROS negative.    PHYSICAL EXAM:  General: NAD  Cardiovascular: Normal S1 S2, No JVD, No murmurs, No edema  Respiratory: Lungs clear to auscultation	  Gastrointestinal:  Soft, Non-tender, + BS	  Skin: warm and dry, No rashes, No ecchymoses, No cyanosis	  Extremities: Normal range of motion, No clubbing, cyanosis or edema  Vascular: Peripheral pulses palpable 2+ bilaterally    LABS:	 	    CBC Full  -  ( 15 Dec 2018 06:30 )  WBC Count : 9.26 K/uL  Hemoglobin : 7.5 g/dL  Hematocrit : 24.6 %  Platelet Count - Automated : 162 K/uL  Mean Cell Volume : 88.8 fL  Mean Cell Hemoglobin : 27.1 pg  Mean Cell Hemoglobin Concentration : 30.5 %  Auto Neutrophil # : x  Auto Lymphocyte # : x  Auto Monocyte # : x  Auto Eosinophil # : x  Auto Basophil # : x  Auto Neutrophil % : x  Auto Lymphocyte % : x  Auto Monocyte % : x  Auto Eosinophil % : x  Auto Basophil % : x    TELE:
Leonel Casey MD  Internal Medicine PGY1  Pager:  [phone]      HPI / INTERVAL HISTORY: Pt received urgent HD this AM. Currently still on BIPAP.    Telemetry: no events    OBJECTIVE:  VITAL SIGNS:  ICU Vital Signs Last 24 Hrs  T(C): 37.6 (14 Dec 2018 08:05), Max: 37.8 (14 Dec 2018 02:00)  T(F): 99.6 (14 Dec 2018 08:05), Max: 100 (14 Dec 2018 02:00)  HR: 57 (14 Dec 2018 08:05) (50 - 71)  BP: 132/60 (14 Dec 2018 08:05) (132/60 - 247/82)  BP(mean): 78 (14 Dec 2018 08:00) (78 - 91)  ABP: --  ABP(mean): --  RR: 22 (14 Dec 2018 08:05) (16 - 28)  SpO2: 100% (14 Dec 2018 08:00) (96% - 100%)        12-13 @ 07:01  -  12-14 @ 07:00  --------------------------------------------------------  IN: 103 mL / OUT: 0 mL / NET: 103 mL    12-14 @ 07:01  -  12-14 @ 08:46  --------------------------------------------------------  IN: 800 mL / OUT: 3000 mL / NET: -2200 mL      CAPILLARY BLOOD GLUCOSE      POCT Blood Glucose.: 154 mg/dL (14 Dec 2018 06:43)      PHYSICAL EXAM:  Gen: NAD, on BIPAP  HEENT: NC/AT; anicteric sclera  Neck: supple  Resp: clear to ausculation anteriorly; no wheezes  Cardiovasc: S1S2 normal; RRR; no murmurs, rubs or gallops  GI: soft, nondistended, nontender; +BS; + peritoneal catheter  Extr: warm, well-perfused, PT/DP pulses 2+ B/L; no LE edema; + R groin shiley  Skin: normal color and turgor  Neuro: alert and oriented; no FND    LABS:                        8.5    13.95 )-----------( 214      ( 14 Dec 2018 01:08 )             28.0     12-14    134<L>  |  92<L>  |  73<H>  ----------------------------<  323<H>  4.6   |  17<L>  |  9.48<H>    Ca    9.5      14 Dec 2018 01:08  Phos  4.8     12-14  Mg     1.5     12-14    TPro  6.4  /  Alb  3.0<L>  /  TBili  0.2  /  DBili  x   /  AST  27  /  ALT  21  /  AlkPhos  93  12-14    LIVER FUNCTIONS - ( 14 Dec 2018 01:08 )  Alb: 3.0 g/dL / Pro: 6.4 g/dL / ALK PHOS: 93 u/L / ALT: 21 u/L / AST: 27 u/L / GGT: x           PT/INR - ( 14 Dec 2018 01:08 )   PT: 13.0 SEC;   INR: 1.17          PTT - ( 14 Dec 2018 01:08 )  PTT:27.8 SEC    CARDIAC MARKERS ( 14 Dec 2018 01:08 )  x     / x     / 117 u/L / 5.40 ng/mL / x      CARDIAC MARKERS ( 13 Dec 2018 21:05 )  x     / x     / 150 u/L / 6.97 ng/mL / x              RADIOLOGY & ADDITIONAL TESTS:       MEDICATIONS:  aspirin enteric coated 81 milliGRAM(s) Oral daily  atorvastatin 40 milliGRAM(s) Oral at bedtime  chlorhexidine 4% Liquid 1 Application(s) Topical <User Schedule>  clopidogrel Tablet 75 milliGRAM(s) Oral daily  dextrose 40% Gel 15 Gram(s) Oral once PRN  dextrose 5%. 1000 milliLiter(s) IV Continuous <Continuous>  dextrose 50% Injectable 12.5 Gram(s) IV Push once  dextrose 50% Injectable 25 Gram(s) IV Push once  dextrose 50% Injectable 25 Gram(s) IV Push once  gabapentin 200 milliGRAM(s) Oral at bedtime  glucagon  Injectable 1 milliGRAM(s) IntraMuscular once PRN  insulin lispro (HumaLOG) corrective regimen sliding scale   SubCutaneous every 6 hours  levothyroxine 50 MICROGram(s) Oral daily      ALLERGIES:  Cipro (Unknown)  Diovan (Unknown)
PATIENT:  KAPIL CAMPOVERDE  [id number]    CHIEF COMPLAINT:  Patient is a 73y old  Female who presents with a chief complaint of epigastric pain (15 Dec 2018 12:30)      INTERVAL HISTORY:    REVIEW OF SYSTEMS:    Constitutional:     [ ] negative [ ] fevers [ ] chills [ ] weight loss [ ] weight gain  HEENT:                  [ ] negative [ ] dry eyes [ ] eye irritation [ ] postnasal drip [ ] nasal congestion  CV:                         [ ] negative  [ ] chest pain [ ] orthopnea [ ] palpitations [ ] murmur  Resp:                     [ ] negative [ ] cough [ ] shortness of breath [ ] dyspnea [ ] wheezing [ ] sputum [ ] hemoptysis  GI:                          [ ] negative [ ] nausea [ ] vomiting [ ] diarrhea [ ] constipation [ ] abd pain [ ] dysphagia   :                        [ ] negative [ ] dysuria [ ] nocturia [ ] hematuria [ ] increased urinary frequency  Musculoskeletal: [ ] negative [ ] back pain [ ] myalgias [ ] arthralgias [ ] fracture  Skin:                       [ ] negative [ ] rash [ ] itch  Neurological:        [ ] negative [ ] headache [ ] dizziness [ ] syncope [ ] weakness [ ] numbness  Psychiatric:           [ ] negative [ ] anxiety [ ] depression  Endocrine:            [ ] negative [ ] diabetes [ ] thyroid problem  Heme/Lymph:      [ ] negative [ ] anemia [ ] bleeding problem  Allergic/Immune: [ ] negative [ ] itchy eyes [ ] nasal discharge [ ] hives [ ] angioedema    [ ] All other systems negative  [ ] Unable to assess ROS because ________.    MEDICATIONS  (STANDING):  amLODIPine   Tablet 10 milliGRAM(s) Oral daily  aspirin enteric coated 81 milliGRAM(s) Oral daily  atorvastatin 40 milliGRAM(s) Oral at bedtime  calcium acetate 667 milliGRAM(s) Oral four times a day with meals  chlorhexidine 4% Liquid 1 Application(s) Topical <User Schedule>  clopidogrel Tablet 75 milliGRAM(s) Oral daily  epoetin rocky Injectable 54889 Unit(s) SubCutaneous <User Schedule>  gabapentin 200 milliGRAM(s) Oral at bedtime  gentamicin 0.1% Cream 1 Application(s) Topical every 8 hours  insulin lispro (HumaLOG) corrective regimen sliding scale   SubCutaneous three times a day before meals  insulin lispro (HumaLOG) corrective regimen sliding scale   SubCutaneous at bedtime  labetalol 300 milliGRAM(s) Oral two times a day  levothyroxine 50 MICROGram(s) Oral daily    MEDICATIONS  (PRN):      OBJECTIVE:  ICU Vital Signs Last 24 Hrs  T(C): 36.6 (16 Dec 2018 00:10), Max: 37.5 (15 Dec 2018 07:55)  T(F): 97.9 (16 Dec 2018 00:10), Max: 99.5 (15 Dec 2018 07:55)  HR: 74 (16 Dec 2018 06:07) (69 - 89)  BP: 126/48 (16 Dec 2018 06:07) (102/89 - 172/59)  BP(mean): 67 (16 Dec 2018 06:07) (60 - 118)  ABP: --  ABP(mean): --  RR: 17 (16 Dec 2018 06:07) (15 - 29)  SpO2: 95% (16 Dec 2018 06:07) (91% - 100%)      Adult Advanced Hemodynamics Last 24 Hrs  CVP(mm Hg): --  CVP(cm H2O): --  CO: --  CI: --  PA: --  PA(mean): --  PCWP: --  SVR: --  SVRI: --  PVR: --  PVRI: --  CAPILLARY BLOOD GLUCOSE      POCT Blood Glucose.: 327 mg/dL (15 Dec 2018 22:23)  POCT Blood Glucose.: 317 mg/dL (15 Dec 2018 17:42)  POCT Blood Glucose.: 286 mg/dL (15 Dec 2018 11:57)  POCT Blood Glucose.: 301 mg/dL (15 Dec 2018 11:54)  POCT Blood Glucose.: 314 mg/dL (15 Dec 2018 11:52)  POCT Blood Glucose.: 140 mg/dL (15 Dec 2018 08:07)    CAPILLARY BLOOD GLUCOSE      POCT Blood Glucose.: 327 mg/dL (15 Dec 2018 22:23)      I&O's Summary    15 Dec 2018 07:01  -  16 Dec 2018 07:00  --------------------------------------------------------  IN: 8250 mL / OUT: 9550 mL / NET: -1300 mL      Daily     Daily Weight in k.7 (16 Dec 2018 06:07)    General: WN/WD NAD  HEENT: PERRLA, EOMI, moist mucous membranes  Neurology: A&Ox3, nonfocal, JANG x 4  Respiratory: CTA B/L, normal respiratory effort, no wheezes, crackles, rales  CV: RRR, S1S2, no murmurs, rubs or gallops  Abdominal: Soft, NT, ND +BS, Last BM  Extremities: No edema, + peripheral pulses  Incisions:   Tubes:    TELEMETRY:     EKG:     IMAGING:    LABS:                          7.3    7.26  )-----------( 140      ( 16 Dec 2018 06:00 )             23.2     12-16    134<L>  |  94<L>  |  78<H>  ----------------------------<  164<H>  3.6   |  20<L>  |  8.82<H>    Ca    9.0      16 Dec 2018 06:00  Phos  6.7     12-16  Mg     2.0     12-16    TPro  5.6<L>  /  Alb  2.6<L>  /  TBili  < 0.2<L>  /  DBili  x   /  AST  16  /  ALT  14  /  AlkPhos  72  12-16    LIVER FUNCTIONS - ( 16 Dec 2018 06:00 )  Alb: 2.6 g/dL / Pro: 5.6 g/dL / ALK PHOS: 72 u/L / ALT: 14 u/L / AST: 16 u/L / GGT: x
SUBJECTIVE / OVERNIGHT EVENTS: pt denies shortness of breath, nausea,v        MEDICATIONS  (STANDING):  aspirin enteric coated 81 milliGRAM(s) Oral daily  atorvastatin 40 milliGRAM(s) Oral at bedtime  calcium acetate 667 milliGRAM(s) Oral four times a day with meals  chlorhexidine 4% Liquid 1 Application(s) Topical <User Schedule>  clopidogrel Tablet 75 milliGRAM(s) Oral daily  epoetin rocky Injectable 35671 Unit(s) SubCutaneous <User Schedule>  gabapentin 200 milliGRAM(s) Oral at bedtime  gentamicin 0.1% Cream 1 Application(s) Topical every 8 hours  insulin lispro (HumaLOG) corrective regimen sliding scale   SubCutaneous three times a day before meals  insulin lispro (HumaLOG) corrective regimen sliding scale   SubCutaneous at bedtime  levothyroxine 50 MICROGram(s) Oral daily    MEDICATIONS  (PRN):    Vital Signs Last 24 Hrs  T(C): 36.8 (15 Dec 2018 17:00), Max: 37.6 (15 Dec 2018 04:00)  T(F): 98.2 (15 Dec 2018 17:00), Max: 99.7 (15 Dec 2018 04:00)  HR: 69 (15 Dec 2018 17:00) (69 - 89)  BP: 142/67 (15 Dec 2018 17:00) (116/53 - 172/59)  BP(mean): 83 (15 Dec 2018 16:00) (66 - 116)  RR: 23 (15 Dec 2018 17:00) (16 - 29)  SpO2: 100% (15 Dec 2018 17:00) (94% - 100%)    CAPILLARY BLOOD GLUCOSE      POCT Blood Glucose.: 317 mg/dL (15 Dec 2018 17:42)  POCT Blood Glucose.: 286 mg/dL (15 Dec 2018 11:57)  POCT Blood Glucose.: 301 mg/dL (15 Dec 2018 11:54)  POCT Blood Glucose.: 314 mg/dL (15 Dec 2018 11:52)  POCT Blood Glucose.: 140 mg/dL (15 Dec 2018 08:07)  POCT Blood Glucose.: 147 mg/dL (14 Dec 2018 21:58)    I&O's Summary    14 Dec 2018 07:01  -  15 Dec 2018 07:00  --------------------------------------------------------  IN: 1000 mL / OUT: 3200 mL / NET: -2200 mL    15 Dec 2018 07:01  -  15 Dec 2018 18:02  --------------------------------------------------------  IN: 6000 mL / OUT: 4700 mL / NET: 1300 mL        Constitutional: No fever, fatigue  Skin: No rash.  Eyes: No recent vision problems or eye pain.  ENT: No congestion, ear pain, or sore throat.  Cardiovascular: No chest pain or palpation.  Respiratory: No cough, shortness of breath, congestion, or wheezing.  Gastrointestinal: No abdominal pain, nausea, vomiting, or diarrhea.  Genitourinary: No dysuria.  Musculoskeletal: No joint swelling.  Neurologic: No headache.    PHYSICAL EXAM:  GENERAL: NAD  EYES: EOMI, PERRLA  NECK: Supple, No JVD  CHEST/LUNG: crepts at bases +  HEART:  S1 , S2 +  ABDOMEN: soft , bs+  EXTREMITIES:  trace edema+  NEUROLOGY:alert awakle  a    LABS:                        7.5    9.26  )-----------( 162      ( 15 Dec 2018 06:30 )             24.6     12-15    137  |  94<L>  |  75<H>  ----------------------------<  115<H>  4.5   |  21<L>  |  9.28<H>    Ca    8.6      15 Dec 2018 06:30  Phos  6.1     12-15  Mg     1.5     12-15    TPro  6.5  /  Alb  3.0<L>  /  TBili  0.2  /  DBili  x   /  AST  23  /  ALT  18  /  AlkPhos  84  12-14    PT/INR - ( 14 Dec 2018 01:08 )   PT: 13.0 SEC;   INR: 1.17          PTT - ( 14 Dec 2018 01:08 )  PTT:27.8 SEC  CARDIAC MARKERS ( 14 Dec 2018 12:30 )  x     / x     / 97 u/L / 3.67 ng/mL / x      CARDIAC MARKERS ( 14 Dec 2018 01:08 )  x     / x     / 117 u/L / 5.40 ng/mL / x      CARDIAC MARKERS ( 13 Dec 2018 21:05 )  x     / x     / 150 u/L / 6.97 ng/mL / x              RADIOLOGY & ADDITIONAL TESTS:    Imaging Personally Reviewed:    Consultant(s) Notes Reviewed:      Care Discussed with Consultants/Other Providers:
[facility] [place] [facility]   MD ROSITA APODACA MD RUORU WONG, PA    TEL:  OFFICE: [phone]  DR SCOTT CELL: [phone]  TINY HOUSTON CELL: [phone]  DR. JOHNSON CELL: [phone]    RENAL FOLLOW UP NOTE  --------------------------------------------------------------------------------  HPI:      Pt seen and examined at bedside.   Fermin SOB, chest pain     PAST HISTORY  --------------------------------------------------------------------------------  No significant changes to PMH, PSH, FHx, SHx, unless otherwise noted    ALLERGIES & MEDICATIONS  --------------------------------------------------------------------------------  Allergies    Cipro (Unknown)  Diovan (Unknown)    Intolerances      Standing Inpatient Medications  amLODIPine   Tablet 10 milliGRAM(s) Oral daily  aspirin enteric coated 81 milliGRAM(s) Oral daily  atorvastatin 40 milliGRAM(s) Oral at bedtime  calcium acetate 667 milliGRAM(s) Oral four times a day with meals  chlorhexidine 4% Liquid 1 Application(s) Topical <User Schedule>  clopidogrel Tablet 75 milliGRAM(s) Oral daily  epoetin rocky Injectable 36900 Unit(s) SubCutaneous <User Schedule>  gabapentin 200 milliGRAM(s) Oral at bedtime  gentamicin 0.1% Cream 1 Application(s) Topical every 8 hours  insulin lispro (HumaLOG) corrective regimen sliding scale   SubCutaneous three times a day before meals  insulin lispro (HumaLOG) corrective regimen sliding scale   SubCutaneous at bedtime  labetalol 300 milliGRAM(s) Oral two times a day  levothyroxine 50 MICROGram(s) Oral daily    PRN Inpatient Medications  acetaminophen   Tablet .. 650 milliGRAM(s) Oral every 6 hours PRN      REVIEW OF SYSTEMS  --------------------------------------------------------------------------------  General: no fever  CVS: no chest pain  RESP: no sob, no cough  ABD: no abdominal pain    VITALS/PHYSICAL EXAM  --------------------------------------------------------------------------------  T(C): 36.3 (12-16-18 @ 12:00), Max: 36.9 (12-15-18 @ 16:00)  HR: 74 (12-16-18 @ 12:00) (41 - 89)  BP: 119/83 (12-16-18 @ 12:00) (102/41 - 172/59)  RR: 22 (12-16-18 @ 12:00) (15 - 26)  SpO2: 94% (12-16-18 @ 12:00) (91% - 100%)  Wt(kg): --        12-15-18 @ 07:01  -  12-16-18 @ 07:00  --------------------------------------------------------  IN: 8250 mL / OUT: 9550 mL / NET: -1300 mL    12-16-18 @ 07:01  -  12-16-18 @ 12:44  --------------------------------------------------------  IN: 400 mL / OUT: 200 mL / NET: 200 mL      Physical Exam:  	Gen: NAD  	HEENT: MMM  	Pulm: CTA B/L  	CV: S1S2  	Abd: Soft, +BS  	Ext: No LE edema B/L                      Neuro: Awake   	Skin: Warm and Dry   	    LABS/STUDIES  --------------------------------------------------------------------------------              7.5    10.48 >-----------<  142      [12-16-18 @ 11:30]              24.5     134  |  94  |  78  ----------------------------<  164      [12-16-18 @ 06:00]  3.6   |  20  |  8.82        Ca     9.0     [12-16-18 @ 06:00]      Mg     2.0     [12-16-18 @ 06:00]      Phos  6.7     [12-16-18 @ 06:00]    TPro  5.6  /  Alb  2.6  /  TBili  < 0.2  /  DBili  x   /  AST  16  /  ALT  14  /  AlkPhos  72  [12-16-18 @ 06:00]          Creatinine Trend:  SCr 8.82 [12-16 @ 06:00]  SCr 9.28 [12-15 @ 06:30]  SCr 8.51 [12-14 @ 12:30]  SCr 9.48 [12-14 @ 01:08]  SCr 9.76 [12-13 @ 21:05]        .8 (Ca --)      [08-12-18 @ 06:00]   --  HbA1c 7.7      [12-14-18 @ 05:10]  TSH 6.09      [12-14-18 @ 01:08]  Lipid: chol 176, , HDL 32,       [12-14-18 @ 01:08]    HBsAb 3.7      [12-14-18 @ 05:10]  HBsAg NEGATIVE      [12-14-18 @ 01:08]  HBcAb Nonreactive      [12-14-18 @ 05:10]  HCV 0.03, Nonreactive Hepatitis C AB  S/CO Ratio                        Interpretation  < 1.0                                     Non-Reactive  1.0 - 4.9                           Weakly-Reactive  > 5.0                                 Reactive  Non-Reactive: Aperson with a non-reactive HCV antibody  result is considered uninfected.  No further action is  needed unless recent infection is suspected.  In these  cases, consider repeat testing later to detect  seroconversion..  Weakly-Reactive: HCV antibody test is abnormal, HCV RNA  Qualitative test will follow.  Reactive: HCV antibody test is abnormal, HCV RNA  Qualitative test will follow.  Note: HCV antibody testing is performed on the [employer]   system.      [12-14-18 @ 05:10]

## 2018-12-16 NOTE — DISCHARGE NOTE ADULT - SECONDARY DIAGNOSIS.
S/P CABG (coronary artery bypass graft) Hypertension, unspecified type ESRD on peritoneal dialysis Diabetes mellitus

## 2018-12-16 NOTE — CONSULT NOTE ADULT - ASSESSMENT
IMPRESSION  - 73 year old female with HTN, HLD, DMII, hypothyroidism, ESRD on PD ( last session 12/12 pm), CAD s/p CABG, presented with epigastric pain for 4 days and shortness of breath for 3 days.    IMPRESSION  - Elevated lipase, low suspicion for pancreatitis (does not meet criteria, pain was not typical, lipase is not 3 times the UL of normal and there in no imaging suggestive of it)  - Acute decompensated heart failure in the setting of third degree AV block now s/p BiV PPM placement   - Gallstones, asymptomatic     RECOMMENDATION    - can check amylase level, triglyceride level, IgG4  - low suspicion for pancreatitis in this patient, she does not meet the criteria, would recommend not trending lipase anymore  - supportive care as per primary team    Please call us back with questions.    Jose A Lora, PGY-5  GI fellow  B- [id number]/ [phone]  Please call GI fellow on call after 5pm and on weekends 73 year old female with HTN, HLD, DMII, hypothyroidism, ESRD on PD ( last session 12/12 pm), CAD s/p CABG, presented with epigastric pain for 4 days and shortness of breath for 3 days.    IMPRESSION  - Elevated lipase, likely in the setting of ESRD, there is low suspicion for pancreatitis (does not meet criteria, pain was not typical, lipase is not 3 times the UL of normal and there in no imaging suggestive of it)  - Acute decompensated heart failure in the setting of third degree AV block now s/p BiV PPM placement   - Gallstones, asymptomatic     RECOMMENDATION    - can check amylase level, triglyceride level, IgG4  - low suspicion for pancreatitis in this patient, she does not meet the criteria, would recommend not trending lipase anymore  - supportive care as per primary team    Please call us back with questions.    Jose A Lora, PGY-5  GI fellow  B- [id number]/ [phone]  Please call GI fellow on call after 5pm and on weekends

## 2018-12-16 NOTE — PROGRESS NOTE ADULT - REASON FOR ADMISSION
epigastric pain

## 2018-12-16 NOTE — DISCHARGE NOTE ADULT - ADDITIONAL INSTRUCTIONS
Please follow-up with the electrophysiologists as recommended.  Please follow-up with your cardiologist in 10-14 days. Please follow-up with the electrophysiologists, call Phone Number: [phone] to make an appointment within the next 3-6 weeks.  Please follow-up with your cardiologist in 10-14 days. Please follow-up with the electrophysiologists, call Phone Number: [phone] to make an appointment within the next 1-2 weeks.  Please follow-up with your cardiologist in 10-14 days.

## 2018-12-16 NOTE — PROGRESS NOTE ADULT - PROBLEM SELECTOR PROBLEM 2
Third degree heart block
Anemia
Anemia
Third degree heart block

## 2018-12-16 NOTE — DISCHARGE NOTE ADULT - NS AS ACTIVITY OBS
Showering allowed/Driving allowed/No Heavy lifting/straining/Walking-Indoors allowed/Sex allowed/Return to Work/School allowed/Walking-Outdoors allowed/Bathing allowed/Stairs allowed

## 2018-12-16 NOTE — PROGRESS NOTE ADULT - PROBLEM SELECTOR PLAN 4
-benjamin to removed yday and patient resumed peritoneal dialysis
-benjamin to be removed today and patient is to resume with peritoneal dialysis
-benjamin to removed yday and patient resumed peritoneal dialysis
-nephro consulted for urgent HD; s/p 1 session this AM  -monitor strict I/O
Check PTH   Hyperphosphatemia: start Phoslo 1 tab TID with meals   Monitor serum calcium and PO4 daily
Check PTH   Hyperphosphatemia: start Phoslo 1 tab TID with meals   Monitor serum calcium and PO4 daily
pt to resume pd

## 2018-12-16 NOTE — PROGRESS NOTE ADULT - PROBLEM SELECTOR PLAN 1
Patient p/w SOB, ROQUE, orthopnea. CXR showed pulmonary edema.  -Patient diuresed with bumex, now holding diuretics  -Continuous cardiac monitoring to r/o arrhythmias.   -Trend BMP 2/2 diuresis and replete as needed  -Daily weight monitoring, Strict I/O, Low sodium DASH diet
ESRD on PD  Pt recieved one session of HD on 12/14 for fluid overload  PT resume on  PD on 12/15  If discharged, pt will resume PD at home today  Monitor BMP and BP.
ESRD on PD  Pt recieved one session of HD on 12/14 for fluid overload  Plan to resume PD today  Monitor BMP and BP.
Patient p/w SOB, ROQUE, orthopnea. CXR showed pulmonary edema.  - s/p Bumex 2mg IVP BID  -Continuous cardiac monitoring to r/o arrhythmias.   -Trend BMP 2/2 diuresis and replete as needed  -Daily weight monitoring, Strict I/O, Low sodium DASH diet  -Check ECHO to evaluate LV/RV function and valvular abnormalities  -s/p currently on nitro gtt, SBPs stable  - Continue bipap
Patient p/w SOB, ROQUE, orthopnea. CXR showed pulmonary edema.  -Patient diuresed with bumex, now holding diuretics  -Continuous cardiac monitoring to r/o arrhythmias.   -Trend BMP 2/2 diuresis and replete as needed  -Daily weight monitoring, Strict I/O, Low sodium DASH diet
Patient p/w SOB, ROQUE, orthopnea. CXR showed pulmonary edema.  -Patient diuresed with bumex, now holding diuretics  -Continuous cardiac monitoring to r/o arrhythmias.   -Trend BMP 2/2 diuresis and replete as needed  -Daily weight monitoring, Strict I/O, Low sodium DASH diet
Patient p/w SOB, ROQUE, orthopnea. CXR showed pulmonary edema.  PD as scheduled

## 2018-12-16 NOTE — DISCHARGE NOTE ADULT - CARE PROVIDERS DIRECT ADDRESSES
,DirectAddress_Unknown ,DirectAddress_Unknown,[email or web address]

## 2018-12-16 NOTE — DISCHARGE NOTE ADULT - PLAN OF CARE
Follow-up Care You had a problem with your heart's normal conduction, and had a permanent pacemaker placed to help make sure it beats properly. Please follow-up with the electrophysiologists as directed to continue your care. Continue medications Coronary artery disease is a condition where the arteries the supply the heart muscle get clogges with fatty deposits & puts you at risk for a heart attack. You have a history of this problem, and had bypass grafting performed to help supply blood to the blood of your heart.  Call your doctor if you have any new pain, pressure, or discomfort in the center of your chest, pain, tingling or discomfort in arms, back, neck, jaw, or stomach, shortness of breath, nausea, vomiting, burping or heartburn, sweating, cold and clammy skin, racing or abnormal heartbeat for more than 10 minutes or if they keep coming & going.  Call 911 and do not tr to get to hospital by care  You can help yourself with lefestyle changes (quitting smoking if you smoke), eat lots of fruits & vegetables & low fat dairy products, not a lot of meat & fatty foods, walk or some form of physical activity most days of the week, lose weight if you are overweight  Take your cardiac medication as prescribed to lower cholesterol, to lower blood pressure, aspirin to prevent blood clots, and diabetes control  Make sure to keep appointments with doctor for cardiac follow up care Control blood pressure Please check your blood pressure regularly and take your blood pressure medications as prescribed. If it is consistently high (SBP >180 or DBP >90), or you experience intractable headaches, dizziness, changes in vision, chest pain, difficulty breathing, or movement difficulty, please seek emergency medical attention immediately. Continue treatment You have a history of renal disease that you currently control with peritoneal dialysis. Please continue these treatments to prevent severe consequences of untreated renal disease. Control blood sugars Please check your blood sugar regularly, and take diabetes medications as prescribed. If your blood sugar is consistently high (>180), or you feel extreme dizziness, thirstiness, or frequent urination, please seek emergency medical attention immediately.

## 2018-12-16 NOTE — DISCHARGE NOTE ADULT - MEDICATION SUMMARY - MEDICATIONS TO TAKE
I will START or STAY ON the medications listed below when I get home from the hospital:    aspirin 81 mg oral tablet  -- 1 tab(s) by mouth once a day  -- Indication: For CAD (coronary artery disease)    gabapentin 100 mg oral capsule  -- 2 cap(s) by mouth once a day (at bedtime)  -- Indication: For Diabetes mellitus    glimepiride 1 mg oral tablet  -- 2 tab(s) by mouth once a day in the morning  and if FS >200 mg/dl before dinner take 1 mg of Amaryl while on Prednisone  -- Indication: For Diabetes mellitus    atorvastatin 40 mg oral tablet  -- 1 tab(s) by mouth once a day  -- Indication: For CAD (coronary artery disease)    clopidogrel 75 mg oral tablet  -- 1 tab(s) by mouth once a day  -- Indication: For CAD (coronary artery disease)    labetalol 300 mg oral tablet  -- 1 tab(s) by mouth 2 times a day  -- Indication: For Hypertension    amLODIPine 10 mg oral tablet  -- 1 tab(s) by mouth once a day  -- Indication: For Hypertension    Renagel 800 mg oral tablet  -- 3 tab(s) by mouth 3 times a day  -- Indication: For ESRD on peritoneal dialysis    calcium acetate 667 mg oral capsule  -- 3 cap(s) by mouth 3 times a day (with meals)  -- Indication: For ESRD on peritoneal dialysis    Protonix 40 mg oral delayed release tablet  -- 1 tab(s) by mouth once a day   -- It is very important that you take or use this exactly as directed.  Do not skip doses or discontinue unless directed by your doctor.  Obtain medical advice before taking any non-prescription drugs as some may affect the action of this medication.  Swallow whole.  Do not crush.    -- Indication: For Anemia    levothyroxine 50 mcg (0.05 mg) oral tablet  -- 1 tab(s) by mouth once a day  -- Indication: For Hypothyroidism, unspecified type    Daily Martha oral tablet  -- 1 tab(s) by mouth once a day  -- Indication: For ESRD on peritoneal dialysis    ergocalciferol 50,000 intl units (1.25 mg) oral capsule  -- 1 cap(s) by mouth once a week  -- Indication: For ESRD on peritoneal dialysis

## 2018-12-16 NOTE — PROGRESS NOTE ADULT - PROBLEM SELECTOR PLAN 3
-US abdomen reveals small gallstones, abd pain resolved, GI consulted, awaiting recommendations
-US abdomen reveals small gallstones, abd pain resolved
BP improved  Continue current meds   low salt diet  UF with Dialysis.
BP improved  Continue current meds   low salt diet  UF with Dialysis.
Pt w/ symptoms concerning for acute pancreatitis - epigastric abdominal pain w/ elevated lipase.  -Abdominal U/S ordered; will hold off CT A/P for now given pt's elevated Cr  -Elevated TG on lipid panel

## 2018-12-31 ENCOUNTER — APPOINTMENT (OUTPATIENT)
Dept: ELECTROPHYSIOLOGY | Facility: CLINIC | Age: 73
End: 2018-12-31
Payer: MEDICARE

## 2018-12-31 VITALS — HEART RATE: 70 BPM | DIASTOLIC BLOOD PRESSURE: 60 MMHG | SYSTOLIC BLOOD PRESSURE: 133 MMHG | RESPIRATION RATE: 14 BRPM

## 2018-12-31 PROCEDURE — 99024 POSTOP FOLLOW-UP VISIT: CPT

## 2018-12-31 RX ORDER — MV-MIN/FOLIC/VIT K/LYCOP/COQ10 200-100MCG
CAPSULE ORAL
Refills: 0 | Status: ACTIVE | COMMUNITY

## 2018-12-31 RX ORDER — GENTAMICIN SULFATE 1 MG/G
0.1 OINTMENT TOPICAL
Qty: 15 | Refills: 0 | Status: DISCONTINUED | COMMUNITY
Start: 2016-05-24 | End: 2018-12-31

## 2018-12-31 RX ORDER — CALCIUM ACETATE 667 MG
667 TABLET ORAL
Qty: 270 | Refills: 0 | Status: DISCONTINUED | COMMUNITY
Start: 2016-10-20 | End: 2018-12-31

## 2018-12-31 RX ORDER — AMLODIPINE BESYLATE 5 MG/1
TABLET ORAL
Refills: 0 | Status: DISCONTINUED | COMMUNITY
End: 2018-12-31

## 2018-12-31 RX ORDER — GLIMEPIRIDE 1 MG/1
1 TABLET ORAL DAILY
Refills: 0 | Status: ACTIVE | COMMUNITY

## 2018-12-31 RX ORDER — LEVOTHYROXINE SODIUM 0.05 MG/1
50 TABLET ORAL
Refills: 0 | Status: ACTIVE | COMMUNITY

## 2018-12-31 RX ORDER — LABETALOL HYDROCHLORIDE 200 MG/1
200 TABLET, FILM COATED ORAL TWICE DAILY
Refills: 0 | Status: ACTIVE | COMMUNITY

## 2018-12-31 RX ORDER — GABAPENTIN 100 MG/1
100 CAPSULE ORAL
Refills: 0 | Status: ACTIVE | COMMUNITY

## 2019-01-12 ENCOUNTER — INPATIENT (INPATIENT)
Facility: HOSPITAL | Age: 74
LOS: 2 days | Discharge: HOME | End: 2019-01-15
Attending: GENERAL PRACTICE | Admitting: GENERAL PRACTICE
Payer: MEDICARE

## 2019-01-12 VITALS
RESPIRATION RATE: 18 BRPM | DIASTOLIC BLOOD PRESSURE: 58 MMHG | SYSTOLIC BLOOD PRESSURE: 138 MMHG | HEART RATE: 67 BPM | OXYGEN SATURATION: 100 % | TEMPERATURE: 98 F

## 2019-01-12 DIAGNOSIS — K21.9 GASTRO-ESOPHAGEAL REFLUX DISEASE WITHOUT ESOPHAGITIS: ICD-10-CM

## 2019-01-12 DIAGNOSIS — N18.6 END STAGE RENAL DISEASE: ICD-10-CM

## 2019-01-12 DIAGNOSIS — I50.9 HEART FAILURE, UNSPECIFIED: ICD-10-CM

## 2019-01-12 DIAGNOSIS — I10 ESSENTIAL (PRIMARY) HYPERTENSION: ICD-10-CM

## 2019-01-12 DIAGNOSIS — I25.10 ATHEROSCLEROTIC HEART DISEASE OF NATIVE CORONARY ARTERY WITHOUT ANGINA PECTORIS: ICD-10-CM

## 2019-01-12 DIAGNOSIS — D64.9 ANEMIA, UNSPECIFIED: ICD-10-CM

## 2019-01-12 DIAGNOSIS — E11.9 TYPE 2 DIABETES MELLITUS WITHOUT COMPLICATIONS: ICD-10-CM

## 2019-01-12 DIAGNOSIS — E03.9 HYPOTHYROIDISM, UNSPECIFIED: ICD-10-CM

## 2019-01-12 DIAGNOSIS — Z95.1 PRESENCE OF AORTOCORONARY BYPASS GRAFT: Chronic | ICD-10-CM

## 2019-01-12 DIAGNOSIS — Z29.9 ENCOUNTER FOR PROPHYLACTIC MEASURES, UNSPECIFIED: ICD-10-CM

## 2019-01-12 DIAGNOSIS — N18.9 CHRONIC KIDNEY DISEASE, UNSPECIFIED: ICD-10-CM

## 2019-01-12 DIAGNOSIS — E78.5 HYPERLIPIDEMIA, UNSPECIFIED: ICD-10-CM

## 2019-01-12 LAB
ALBUMIN SERPL ELPH-MCNC: 3.3 G/DL — SIGNIFICANT CHANGE UP (ref 3.3–5)
ALP SERPL-CCNC: 70 U/L — SIGNIFICANT CHANGE UP (ref 40–120)
ALT FLD-CCNC: 18 U/L — SIGNIFICANT CHANGE UP (ref 4–33)
ANION GAP SERPL CALC-SCNC: 19 MEQ/L — HIGH (ref 7–14)
AST SERPL-CCNC: 28 U/L — SIGNIFICANT CHANGE UP (ref 4–32)
B PERT DNA SPEC QL NAA+PROBE: NOT DETECTED — SIGNIFICANT CHANGE UP
BASOPHILS # BLD AUTO: 0.06 K/UL — SIGNIFICANT CHANGE UP (ref 0–0.2)
BASOPHILS NFR BLD AUTO: 0.9 % — SIGNIFICANT CHANGE UP (ref 0–2)
BILIRUB SERPL-MCNC: 0.2 MG/DL — SIGNIFICANT CHANGE UP (ref 0.2–1.2)
BUN SERPL-MCNC: 64 MG/DL — HIGH (ref 7–23)
C PNEUM DNA SPEC QL NAA+PROBE: NOT DETECTED — SIGNIFICANT CHANGE UP
CALCIUM SERPL-MCNC: 10 MG/DL — SIGNIFICANT CHANGE UP (ref 8.4–10.5)
CHLORIDE SERPL-SCNC: 99 MMOL/L — SIGNIFICANT CHANGE UP (ref 98–107)
CK MB BLD-MCNC: 7.02 NG/ML — HIGH (ref 1–4.7)
CK MB BLD-MCNC: SIGNIFICANT CHANGE UP (ref 0–2.5)
CK SERPL-CCNC: 116 U/L — SIGNIFICANT CHANGE UP (ref 25–170)
CO2 SERPL-SCNC: 23 MMOL/L — SIGNIFICANT CHANGE UP (ref 22–31)
CREAT SERPL-MCNC: 9.14 MG/DL — HIGH (ref 0.5–1.3)
EOSINOPHIL # BLD AUTO: 0.26 K/UL — SIGNIFICANT CHANGE UP (ref 0–0.5)
EOSINOPHIL NFR BLD AUTO: 4.1 % — SIGNIFICANT CHANGE UP (ref 0–6)
FLUAV H1 2009 PAND RNA SPEC QL NAA+PROBE: NOT DETECTED — SIGNIFICANT CHANGE UP
FLUAV H1 RNA SPEC QL NAA+PROBE: NOT DETECTED — SIGNIFICANT CHANGE UP
FLUAV H3 RNA SPEC QL NAA+PROBE: NOT DETECTED — SIGNIFICANT CHANGE UP
FLUAV SUBTYP SPEC NAA+PROBE: NOT DETECTED — SIGNIFICANT CHANGE UP
FLUBV RNA SPEC QL NAA+PROBE: NOT DETECTED — SIGNIFICANT CHANGE UP
GLUCOSE SERPL-MCNC: 218 MG/DL — HIGH (ref 70–99)
HADV DNA SPEC QL NAA+PROBE: NOT DETECTED — SIGNIFICANT CHANGE UP
HCOV PNL SPEC NAA+PROBE: SIGNIFICANT CHANGE UP
HCT VFR BLD CALC: 33.5 % — LOW (ref 34.5–45)
HGB BLD-MCNC: 9.9 G/DL — LOW (ref 11.5–15.5)
HMPV RNA SPEC QL NAA+PROBE: NOT DETECTED — SIGNIFICANT CHANGE UP
HPIV1 RNA SPEC QL NAA+PROBE: NOT DETECTED — SIGNIFICANT CHANGE UP
HPIV2 RNA SPEC QL NAA+PROBE: NOT DETECTED — SIGNIFICANT CHANGE UP
HPIV3 RNA SPEC QL NAA+PROBE: NOT DETECTED — SIGNIFICANT CHANGE UP
HPIV4 RNA SPEC QL NAA+PROBE: NOT DETECTED — SIGNIFICANT CHANGE UP
IMM GRANULOCYTES NFR BLD AUTO: 0.6 % — SIGNIFICANT CHANGE UP (ref 0–1.5)
LYMPHOCYTES # BLD AUTO: 1.15 K/UL — SIGNIFICANT CHANGE UP (ref 1–3.3)
LYMPHOCYTES # BLD AUTO: 18.2 % — SIGNIFICANT CHANGE UP (ref 13–44)
MCHC RBC-ENTMCNC: 28.1 PG — SIGNIFICANT CHANGE UP (ref 27–34)
MCHC RBC-ENTMCNC: 29.6 % — LOW (ref 32–36)
MCV RBC AUTO: 95.2 FL — SIGNIFICANT CHANGE UP (ref 80–100)
MONOCYTES # BLD AUTO: 0.85 K/UL — SIGNIFICANT CHANGE UP (ref 0–0.9)
MONOCYTES NFR BLD AUTO: 13.4 % — SIGNIFICANT CHANGE UP (ref 2–14)
NEUTROPHILS # BLD AUTO: 3.97 K/UL — SIGNIFICANT CHANGE UP (ref 1.8–7.4)
NEUTROPHILS NFR BLD AUTO: 62.8 % — SIGNIFICANT CHANGE UP (ref 43–77)
NRBC # FLD: 0 K/UL — LOW (ref 25–125)
NT-PROBNP SERPL-SCNC: SIGNIFICANT CHANGE UP PG/ML
PLATELET # BLD AUTO: 196 K/UL — SIGNIFICANT CHANGE UP (ref 150–400)
PMV BLD: 10.1 FL — SIGNIFICANT CHANGE UP (ref 7–13)
POTASSIUM SERPL-MCNC: 4.3 MMOL/L — SIGNIFICANT CHANGE UP (ref 3.5–5.3)
POTASSIUM SERPL-SCNC: 4.3 MMOL/L — SIGNIFICANT CHANGE UP (ref 3.5–5.3)
PROT SERPL-MCNC: 6.1 G/DL — SIGNIFICANT CHANGE UP (ref 6–8.3)
RBC # BLD: 3.52 M/UL — LOW (ref 3.8–5.2)
RBC # FLD: 17.2 % — HIGH (ref 10.3–14.5)
RSV RNA SPEC QL NAA+PROBE: NOT DETECTED — SIGNIFICANT CHANGE UP
RV+EV RNA SPEC QL NAA+PROBE: NOT DETECTED — SIGNIFICANT CHANGE UP
SODIUM SERPL-SCNC: 141 MMOL/L — SIGNIFICANT CHANGE UP (ref 135–145)
TROPONIN T, HIGH SENSITIVITY: 330 NG/L — CRITICAL HIGH (ref ?–14)
TROPONIN T, HIGH SENSITIVITY: 345 NG/L — CRITICAL HIGH (ref ?–14)
WBC # BLD: 6.33 K/UL — SIGNIFICANT CHANGE UP (ref 3.8–10.5)
WBC # FLD AUTO: 6.33 K/UL — SIGNIFICANT CHANGE UP (ref 3.8–10.5)

## 2019-01-12 PROCEDURE — 71046 X-RAY EXAM CHEST 2 VIEWS: CPT | Mod: 26

## 2019-01-12 PROCEDURE — 78582 LUNG VENTILAT&PERFUS IMAGING: CPT | Mod: 26,GC

## 2019-01-12 RX ORDER — DEXTROSE 50 % IN WATER 50 %
25 SYRINGE (ML) INTRAVENOUS ONCE
Qty: 0 | Refills: 0 | Status: DISCONTINUED | OUTPATIENT
Start: 2019-01-12 | End: 2019-01-15

## 2019-01-12 RX ORDER — SEVELAMER CARBONATE 2400 MG/1
800 POWDER, FOR SUSPENSION ORAL THREE TIMES A DAY
Qty: 0 | Refills: 0 | Status: DISCONTINUED | OUTPATIENT
Start: 2019-01-12 | End: 2019-01-15

## 2019-01-12 RX ORDER — CALCIUM ACETATE 667 MG
667 TABLET ORAL
Qty: 0 | Refills: 0 | Status: DISCONTINUED | OUTPATIENT
Start: 2019-01-12 | End: 2019-01-15

## 2019-01-12 RX ORDER — PANTOPRAZOLE SODIUM 20 MG/1
40 TABLET, DELAYED RELEASE ORAL
Qty: 0 | Refills: 0 | Status: DISCONTINUED | OUTPATIENT
Start: 2019-01-12 | End: 2019-01-15

## 2019-01-12 RX ORDER — DEXTROSE 50 % IN WATER 50 %
15 SYRINGE (ML) INTRAVENOUS ONCE
Qty: 0 | Refills: 0 | Status: DISCONTINUED | OUTPATIENT
Start: 2019-01-12 | End: 2019-01-15

## 2019-01-12 RX ORDER — LEVOTHYROXINE SODIUM 125 MCG
50 TABLET ORAL DAILY
Qty: 0 | Refills: 0 | Status: DISCONTINUED | OUTPATIENT
Start: 2019-01-12 | End: 2019-01-15

## 2019-01-12 RX ORDER — INSULIN LISPRO 100/ML
VIAL (ML) SUBCUTANEOUS
Qty: 0 | Refills: 0 | Status: DISCONTINUED | OUTPATIENT
Start: 2019-01-12 | End: 2019-01-13

## 2019-01-12 RX ORDER — ERGOCALCIFEROL 1.25 MG/1
50000 CAPSULE ORAL
Qty: 0 | Refills: 0 | Status: DISCONTINUED | OUTPATIENT
Start: 2019-01-12 | End: 2019-01-14

## 2019-01-12 RX ORDER — GLUCAGON INJECTION, SOLUTION 0.5 MG/.1ML
1 INJECTION, SOLUTION SUBCUTANEOUS ONCE
Qty: 0 | Refills: 0 | Status: DISCONTINUED | OUTPATIENT
Start: 2019-01-12 | End: 2019-01-15

## 2019-01-12 RX ORDER — AMLODIPINE BESYLATE 2.5 MG/1
10 TABLET ORAL DAILY
Qty: 0 | Refills: 0 | Status: DISCONTINUED | OUTPATIENT
Start: 2019-01-12 | End: 2019-01-15

## 2019-01-12 RX ORDER — SODIUM CHLORIDE 9 MG/ML
1000 INJECTION, SOLUTION INTRAVENOUS
Qty: 0 | Refills: 0 | Status: DISCONTINUED | OUTPATIENT
Start: 2019-01-12 | End: 2019-01-15

## 2019-01-12 RX ORDER — ATORVASTATIN CALCIUM 80 MG/1
40 TABLET, FILM COATED ORAL AT BEDTIME
Qty: 0 | Refills: 0 | Status: DISCONTINUED | OUTPATIENT
Start: 2019-01-12 | End: 2019-01-15

## 2019-01-12 RX ORDER — LABETALOL HCL 100 MG
300 TABLET ORAL
Qty: 0 | Refills: 0 | Status: DISCONTINUED | OUTPATIENT
Start: 2019-01-12 | End: 2019-01-15

## 2019-01-12 RX ORDER — GABAPENTIN 400 MG/1
100 CAPSULE ORAL AT BEDTIME
Qty: 0 | Refills: 0 | Status: DISCONTINUED | OUTPATIENT
Start: 2019-01-12 | End: 2019-01-15

## 2019-01-12 RX ORDER — HEPARIN SODIUM 5000 [USP'U]/ML
5000 INJECTION INTRAVENOUS; SUBCUTANEOUS EVERY 8 HOURS
Qty: 0 | Refills: 0 | Status: DISCONTINUED | OUTPATIENT
Start: 2019-01-12 | End: 2019-01-15

## 2019-01-12 RX ORDER — DEXTROSE 50 % IN WATER 50 %
12.5 SYRINGE (ML) INTRAVENOUS ONCE
Qty: 0 | Refills: 0 | Status: DISCONTINUED | OUTPATIENT
Start: 2019-01-12 | End: 2019-01-15

## 2019-01-12 RX ORDER — CLOPIDOGREL BISULFATE 75 MG/1
75 TABLET, FILM COATED ORAL DAILY
Qty: 0 | Refills: 0 | Status: DISCONTINUED | OUTPATIENT
Start: 2019-01-12 | End: 2019-01-15

## 2019-01-12 RX ORDER — ASPIRIN/CALCIUM CARB/MAGNESIUM 324 MG
81 TABLET ORAL DAILY
Qty: 0 | Refills: 0 | Status: DISCONTINUED | OUTPATIENT
Start: 2019-01-12 | End: 2019-01-15

## 2019-01-12 RX ORDER — LABETALOL HCL 100 MG
300 TABLET ORAL ONCE
Qty: 0 | Refills: 0 | Status: COMPLETED | OUTPATIENT
Start: 2019-01-12 | End: 2019-01-12

## 2019-01-12 RX ADMIN — ATORVASTATIN CALCIUM 40 MILLIGRAM(S): 80 TABLET, FILM COATED ORAL at 21:55

## 2019-01-12 RX ADMIN — Medication 6: at 23:48

## 2019-01-12 RX ADMIN — HEPARIN SODIUM 5000 UNIT(S): 5000 INJECTION INTRAVENOUS; SUBCUTANEOUS at 21:56

## 2019-01-12 RX ADMIN — GABAPENTIN 100 MILLIGRAM(S): 400 CAPSULE ORAL at 21:56

## 2019-01-12 RX ADMIN — Medication 300 MILLIGRAM(S): at 21:31

## 2019-01-12 RX ADMIN — SEVELAMER CARBONATE 800 MILLIGRAM(S): 2400 POWDER, FOR SUSPENSION ORAL at 21:56

## 2019-01-12 NOTE — ED PROVIDER NOTE - OBJECTIVE STATEMENT
Pt is a 72 y/o F nonsmoker PMHx HTN, HLD, DM, hypothyroidism, ESRD on peritoneal dialysis (last dialysis last night) p/w sob x 2 days.  Pt notes gradual onset shortness of breath, ROQUE, orthopnea (new, requiring two pillows) associated with exertional substernal chest pain which improves with rest.  Pt notes chest pain is nonpleuritic, nonradiating, nonpositional.  Pt notes bilateral lower extremity edema, which has been gradually improving since last admission (admitted for complete heart block, CHF, and received PPM).  Pt notes associated dry cough.  Pt denies any fevers, chills, sore throat, rhinorrhea, calf pain, h/o dvt/pe, hemoptysis, recent prolonged immobilization, jaw/arm/back/abdominal pain.

## 2019-01-12 NOTE — H&P ADULT - NEGATIVE ENMT SYMPTOMS
no tinnitus/no sinus symptoms/no nasal discharge/no vertigo/no nasal congestion/no nasal obstruction/no ear pain

## 2019-01-12 NOTE — H&P ADULT - NSHPLABSRESULTS_GEN_ALL_CORE
9.9    6.33  )-----------( 196      ( 12 Jan 2019 14:05 )             33.5   01-12    141  |  99  |  64<H>  ----------------------------<  218<H>  4.3   |  23  |  9.14<H>    Ca    10.0      12 Jan 2019 14:05    TPro  6.1  /  Alb  3.3  /  TBili  0.2  /  DBili  x   /  AST  28  /  ALT  18  /  AlkPhos  70  01-12

## 2019-01-12 NOTE — H&P ADULT - FAMILY HISTORY
Mother  Still living? No  Family history of diabetes mellitus, Age at diagnosis: Age Unknown  Family history of early CAD, Age at diagnosis: Age Unknown
Family history of early CAD, mother had cad     Mother  Still living? Unknown  Family history of diabetes mellitus, Age at diagnosis: Age Unknown

## 2019-01-12 NOTE — H&P ADULT - RS GEN PE MLT RESP DETAILS PC
airway patent/breath sounds equal/respirations non-labored/no chest wall tenderness/no intercostal retractions

## 2019-01-12 NOTE — H&P ADULT - ASSESSMENT
admitted with CHF acute on chronic systolic heart failure
74 y/o F nonsmoker history of HTN, Dyslipidemia, DM2, hypothyroidism, ESRD on peritoneal dialysis (last dialysis 1/11/19 admitted for sob x 2 days likely due to fluid overload.  Renal consult appreciated

## 2019-01-12 NOTE — H&P ADULT - PSH
After cataract, bilateral    H/O: hysterectomy    History of total knee replacement b/l    S/P CABG (coronary artery bypass graft)  in 2012
After cataract, bilateral    H/O: hysterectomy    History of total knee replacement b/l    S/P CABG (coronary artery bypass graft)  in 2012

## 2019-01-12 NOTE — H&P ADULT - NSHPLABSRESULTS_GEN_ALL_CORE
hsTrop: 348--> 330                    BNP: > 56,000                         Bun/Cr:64/9.14  Glucose: 218                Hgb: 9.9/335  1/12 CXR - Generalized increased bilateral lung markings may reflect congestion with edema.Hazy indistinct right CP angle suggesting small right pleural reaction. Sharp left CP angle. No pneumothorax. Stable sternal wires, mediastinal surgical clips, left chest wall   biventricular pacemaker and slightly prominent appearing cardiac and mediastinal silhouettes and aortic calcifications. Trachea midline. There was left. Spinal degenerative changes again noted.  1/12 VQ Scan - very low probability of PE hsTrop: 348--> 330                    BNP: > 56,000                         Bun/Cr:64/9.14  Glucose: 218                Hgb: 9.9/335  1/12 CXR - Generalized increased bilateral lung markings may reflect congestion with edema.Hazy indistinct right CP angle suggesting small right pleural reaction. Sharp left CP angle. No pneumothorax. Stable sternal wires, mediastinal surgical clips, left chest wall   biventricular pacemaker and slightly prominent appearing cardiac and mediastinal silhouettes and aortic calcifications. Trachea midline. There was left. Spinal degenerative changes again noted.  1/12 VQ Scan - very low probability of PE      < from: TTE with Doppler (w/Cont) (12.14.18 @ 11:31) >    CONCLUSIONS:  1. Mitral annular calcification, otherwise normal mitral  valve. Moderate mitral regurgitation.  2. Normal left ventricular internal dimensions and wall thicknesses.  3. Endocardium not well visualized; grossly mild to  moderate segmental left ventricular systolic dysfunction.  Hypokinesis of the basal to mid inferior wall and basal to  mid inferoseptum.  Endocardial visualization enhanced with  intravenous injection of echo contrast (Definity).  4. The right ventricle is not well visualized; grossly  normal right ventricular systolic function.  5. Estimated right ventricular systolic pressure equals 49  mm Hg, assuming right atrial pressure equals 10 mm Hg,  consistent with mild pulmonary hypertension.  < end of copied text >

## 2019-01-12 NOTE — H&P ADULT - PROBLEM SELECTOR PLAN 1
Plan as per renal c/s.  Plan to resume PD today.   Consent obtained   Monitor BMP and BP.  Continue Renagel. Plan as per renal c/s.  Plan to resume PD today and fluid removal.   Consent obtained   Monitor BMP and BP.  Continue Renagel.  recent echo above showing LV dysfunction: likely component of acute exacerbation of chronic systolic HF as well

## 2019-01-12 NOTE — H&P ADULT - PMH
Acid reflux    CAD (coronary artery disease)    Diabetes mellitus    Dyslipidemia    ESRD on peritoneal dialysis    Hypertension
Acid reflux    CAD (coronary artery disease)    Diabetes mellitus    Dyslipidemia    ESRD on peritoneal dialysis    Hypertension

## 2019-01-12 NOTE — ED ADULT NURSE NOTE - NSIMPLEMENTINTERV_GEN_ALL_ED
Implemented All Universal Safety Interventions:  [place] to call system. Call bell, personal items and telephone within reach. Instruct patient to call for assistance. Room bathroom lighting operational. Non-slip footwear when patient is off stretcher. Physically safe environment: no spills, clutter or unnecessary equipment. Stretcher in lowest position, wheels locked, appropriate side rails in place.

## 2019-01-12 NOTE — H&P ADULT - HISTORY OF PRESENT ILLNESS
73F, history of HTN, Dyslipidemia, DM2 , hypothyroidism, ESRD on peritoneal dialysis, last dialysis last night,  experiencing sob for the past few days.  Pt notes gradual onset shortness of breath, ROQUE, orthopnea, requiring two pillows,  associated with exertional substernal chest pain which improves with rest.  Pt notes chest pain is nonpleuritic, non radiating, non positional.  Pt notes bilateral lower extremity edema, which has been gradually improving since last admission, when admitted for complete heart block, CHF, and received PP).  Positive dry cough.  Pt denies any fevers, chills, sore throat, rhinorrhea, calf pain, hemoptysis, recent prolonged immobilization, jaw/arm/back/abdominal pain. 73F, history of HTN, Dyslipidemia, DM2 , hypothyroidism, ESRD on peritoneal dialysis, last dialysis last night,  experiencing sob for the past few days.    Pt notes gradual onset shortness of breath, ROQUE, orthopnea, requiring two pillows,  associated with exertional substernal chest pain which improves with rest.    Pt notes chest pain is nonpleuritic, non radiating, non positional.  Pt notes bilateral lower extremity edema, which has been gradually improving since last admission, when admitted for complete heart block, CHF, and received PP).  Positive dry cough.    Pt denies any fevers, chills, sore throat, rhinorrhea, calf pain, hemoptysis, recent prolonged immobilization, jaw/arm/back/abdominal pain.

## 2019-01-12 NOTE — ED PROVIDER NOTE - ATTENDING CONTRIBUTION TO CARE
[place]  pt here  with 2 days of ROQUE  has baseline orthopnea (no change)  notes improved LE edema  ?associated CP with SOB  no fever  recent PPM     exam  pt trace pitting  no signif lung finding  lying flat in bed  abd soft  nontender  PD catheter inplace   low abd  CXR  congested    no acute changes seen    pt with ROQUE  CXR congested  though pt notes decreased edema and is lying flat w/o SOB  check enzymes  V/Q

## 2019-01-12 NOTE — H&P ADULT - NEGATIVE NEUROLOGICAL SYMPTOMS
no tremors/no generalized seizures/no focal seizures/no syncope/no vertigo/no weakness/no paresthesias

## 2019-01-12 NOTE — H&P ADULT - NSHPSOCIALHISTORY_GEN_ALL_CORE
denies drug, tobacco and alcohol use  lives with family
Denies Nicotine.  Denies ETOH.  Denies Illicit drug use.   Lives with family.

## 2019-01-12 NOTE — ED ADULT NURSE NOTE - OBJECTIVE STATEMENT
patient alert ox3 came in c/o SOB .mild cough and on and off chest pain. denies any fever or pain now .CM shows NSR. s/p PPM to left side of the chest last month. NAD. skin warm and  dry, intact. labs done as ordered. awaiting results and re eval.

## 2019-01-12 NOTE — ED PROVIDER NOTE - MEDICAL DECISION MAKING DETAILS
Pt is a 74 y/o F nonsmoker PMHx HTN, HLD, DM, hypothyroidism, ESRD on peritoneal dialysis (last dialysis last night) p/w sob x 2 days.  -- possible chf exacerbation, possible upper respiratory infection, possible pneumonia -- labs, ekg, trop, bnp, cxr

## 2019-01-13 LAB
ANION GAP SERPL CALC-SCNC: 21 MEQ/L — HIGH (ref 7–14)
BUN SERPL-MCNC: 63 MG/DL — HIGH (ref 7–23)
CALCIUM SERPL-MCNC: 9.6 MG/DL — SIGNIFICANT CHANGE UP (ref 8.4–10.5)
CHLORIDE SERPL-SCNC: 102 MMOL/L — SIGNIFICANT CHANGE UP (ref 98–107)
CO2 SERPL-SCNC: 20 MMOL/L — LOW (ref 22–31)
CREAT SERPL-MCNC: 9.31 MG/DL — HIGH (ref 0.5–1.3)
GLUCOSE BLDC GLUCOMTR-MCNC: 211 MG/DL — HIGH (ref 70–99)
GLUCOSE BLDC GLUCOMTR-MCNC: 229 MG/DL — HIGH (ref 70–99)
GLUCOSE BLDC GLUCOMTR-MCNC: 288 MG/DL — HIGH (ref 70–99)
GLUCOSE BLDC GLUCOMTR-MCNC: 292 MG/DL — HIGH (ref 70–99)
GLUCOSE BLDC GLUCOMTR-MCNC: 94 MG/DL — SIGNIFICANT CHANGE UP (ref 70–99)
GLUCOSE SERPL-MCNC: 99 MG/DL — SIGNIFICANT CHANGE UP (ref 70–99)
HBA1C BLD-MCNC: 6.6 % — HIGH (ref 4–5.6)
HCT VFR BLD CALC: 33.9 % — LOW (ref 34.5–45)
HGB BLD-MCNC: 10.1 G/DL — LOW (ref 11.5–15.5)
MAGNESIUM SERPL-MCNC: 1.9 MG/DL — SIGNIFICANT CHANGE UP (ref 1.6–2.6)
MCHC RBC-ENTMCNC: 27.9 PG — SIGNIFICANT CHANGE UP (ref 27–34)
MCHC RBC-ENTMCNC: 29.8 % — LOW (ref 32–36)
MCV RBC AUTO: 93.6 FL — SIGNIFICANT CHANGE UP (ref 80–100)
NRBC # FLD: 0 K/UL — LOW (ref 25–125)
PHOSPHATE SERPL-MCNC: 5.2 MG/DL — HIGH (ref 2.5–4.5)
PLATELET # BLD AUTO: 180 K/UL — SIGNIFICANT CHANGE UP (ref 150–400)
PMV BLD: 9.5 FL — SIGNIFICANT CHANGE UP (ref 7–13)
POTASSIUM SERPL-MCNC: 3.6 MMOL/L — SIGNIFICANT CHANGE UP (ref 3.5–5.3)
POTASSIUM SERPL-SCNC: 3.6 MMOL/L — SIGNIFICANT CHANGE UP (ref 3.5–5.3)
PTH-INTACT SERPL-MCNC: 297.4 PG/ML — HIGH (ref 15–65)
RBC # BLD: 3.62 M/UL — LOW (ref 3.8–5.2)
RBC # FLD: 16.7 % — HIGH (ref 10.3–14.5)
SODIUM SERPL-SCNC: 143 MMOL/L — SIGNIFICANT CHANGE UP (ref 135–145)
WBC # BLD: 6.34 K/UL — SIGNIFICANT CHANGE UP (ref 3.8–10.5)
WBC # FLD AUTO: 6.34 K/UL — SIGNIFICANT CHANGE UP (ref 3.8–10.5)

## 2019-01-13 RX ORDER — GENTAMICIN SULFATE 0.1 %
1 OINTMENT (GRAM) TOPICAL THREE TIMES A DAY
Qty: 0 | Refills: 0 | Status: DISCONTINUED | OUTPATIENT
Start: 2019-01-13 | End: 2019-01-15

## 2019-01-13 RX ORDER — ERYTHROPOIETIN 10000 [IU]/ML
10000 INJECTION, SOLUTION INTRAVENOUS; SUBCUTANEOUS
Qty: 0 | Refills: 0 | Status: DISCONTINUED | OUTPATIENT
Start: 2019-01-13 | End: 2019-01-15

## 2019-01-13 RX ORDER — CALCITRIOL 0.5 UG/1
0.25 CAPSULE ORAL
Qty: 0 | Refills: 0 | Status: DISCONTINUED | OUTPATIENT
Start: 2019-01-13 | End: 2019-01-15

## 2019-01-13 RX ORDER — GENTAMICIN SULFATE 0.1 %
1 OINTMENT (GRAM) TOPICAL ONCE
Qty: 0 | Refills: 0 | Status: COMPLETED | OUTPATIENT
Start: 2019-01-13 | End: 2019-01-13

## 2019-01-13 RX ORDER — INSULIN LISPRO 100/ML
VIAL (ML) SUBCUTANEOUS
Qty: 0 | Refills: 0 | Status: DISCONTINUED | OUTPATIENT
Start: 2019-01-13 | End: 2019-01-15

## 2019-01-13 RX ADMIN — SEVELAMER CARBONATE 800 MILLIGRAM(S): 2400 POWDER, FOR SUSPENSION ORAL at 05:56

## 2019-01-13 RX ADMIN — HEPARIN SODIUM 5000 UNIT(S): 5000 INJECTION INTRAVENOUS; SUBCUTANEOUS at 21:57

## 2019-01-13 RX ADMIN — HEPARIN SODIUM 5000 UNIT(S): 5000 INJECTION INTRAVENOUS; SUBCUTANEOUS at 15:01

## 2019-01-13 RX ADMIN — Medication 300 MILLIGRAM(S): at 17:55

## 2019-01-13 RX ADMIN — AMLODIPINE BESYLATE 10 MILLIGRAM(S): 2.5 TABLET ORAL at 05:56

## 2019-01-13 RX ADMIN — Medication 50 MICROGRAM(S): at 05:56

## 2019-01-13 RX ADMIN — PANTOPRAZOLE SODIUM 40 MILLIGRAM(S): 20 TABLET, DELAYED RELEASE ORAL at 05:56

## 2019-01-13 RX ADMIN — Medication 300 MILLIGRAM(S): at 10:48

## 2019-01-13 RX ADMIN — SEVELAMER CARBONATE 800 MILLIGRAM(S): 2400 POWDER, FOR SUSPENSION ORAL at 15:01

## 2019-01-13 RX ADMIN — ATORVASTATIN CALCIUM 40 MILLIGRAM(S): 80 TABLET, FILM COATED ORAL at 21:57

## 2019-01-13 RX ADMIN — Medication 667 MILLIGRAM(S): at 09:48

## 2019-01-13 RX ADMIN — Medication 667 MILLIGRAM(S): at 17:55

## 2019-01-13 RX ADMIN — CLOPIDOGREL BISULFATE 75 MILLIGRAM(S): 75 TABLET, FILM COATED ORAL at 12:15

## 2019-01-13 RX ADMIN — Medication 4: at 17:47

## 2019-01-13 RX ADMIN — HEPARIN SODIUM 5000 UNIT(S): 5000 INJECTION INTRAVENOUS; SUBCUTANEOUS at 05:56

## 2019-01-13 RX ADMIN — Medication 1 TABLET(S): at 12:15

## 2019-01-13 RX ADMIN — GABAPENTIN 100 MILLIGRAM(S): 400 CAPSULE ORAL at 21:57

## 2019-01-13 RX ADMIN — Medication 81 MILLIGRAM(S): at 12:15

## 2019-01-13 RX ADMIN — Medication 4: at 14:18

## 2019-01-13 RX ADMIN — Medication 667 MILLIGRAM(S): at 12:16

## 2019-01-13 RX ADMIN — Medication 6: at 22:33

## 2019-01-13 RX ADMIN — SEVELAMER CARBONATE 800 MILLIGRAM(S): 2400 POWDER, FOR SUSPENSION ORAL at 21:57

## 2019-01-13 RX ADMIN — Medication 1 APPLICATION(S): at 13:44

## 2019-01-13 NOTE — PROGRESS NOTE ADULT - PROBLEM SELECTOR PLAN 2
BP fluctuating  if remains elevated, may need to titrate anti-hypertensives  low salt diet  UF with Dialysis.

## 2019-01-13 NOTE — PROGRESS NOTE ADULT - SUBJECTIVE AND OBJECTIVE BOX
[facility] [place] [facility]   MD ROSITA APODACA MD RUORU WONG, PA    TEL:  OFFICE: [phone]  DR SCOTT CELL: [phone]  TINY HOUSTON CELL: [phone]  DR. JOHNSON CELL: [phone]    RENAL FOLLOW UP NOTE  --------------------------------------------------------------------------------  HPI:      Pt seen and examined at bedside.   Fermin SOB, chest pain     PAST HISTORY  --------------------------------------------------------------------------------  No significant changes to PMH, PSH, FHx, SHx, unless otherwise noted    ALLERGIES & MEDICATIONS  --------------------------------------------------------------------------------  Allergies    Cipro (Unknown)  Diovan (Unknown)    Intolerances      Standing Inpatient Medications  amLODIPine   Tablet 10 milliGRAM(s) Oral daily  aspirin enteric coated 81 milliGRAM(s) Oral daily  atorvastatin 40 milliGRAM(s) Oral at bedtime  calcitriol   Capsule 0.25 MICROGram(s) Oral <User Schedule>  calcium acetate 667 milliGRAM(s) Oral three times a day with meals  clopidogrel Tablet 75 milliGRAM(s) Oral daily  dextrose 5%. 1000 milliLiter(s) IV Continuous <Continuous>  dextrose 50% Injectable 12.5 Gram(s) IV Push once  dextrose 50% Injectable 25 Gram(s) IV Push once  dextrose 50% Injectable 25 Gram(s) IV Push once  epoetin rocky Injectable 47930 Unit(s) SubCutaneous <User Schedule>  ergocalciferol 10557 Unit(s) Oral every week  gabapentin 100 milliGRAM(s) Oral at bedtime  gentamicin 0.1% Cream 1 Application(s) Topical once  heparin  Injectable 5000 Unit(s) SubCutaneous every 8 hours  insulin lispro (HumaLOG) corrective regimen sliding scale   SubCutaneous three times a day before meals  labetalol 300 milliGRAM(s) Oral two times a day  levothyroxine 50 MICROGram(s) Oral daily  multivitamin 1 Tablet(s) Oral daily  pantoprazole    Tablet 40 milliGRAM(s) Oral before breakfast  sevelamer hydrochloride 800 milliGRAM(s) Oral three times a day    PRN Inpatient Medications  dextrose 40% Gel 15 Gram(s) Oral once PRN  glucagon  Injectable 1 milliGRAM(s) IntraMuscular once PRN      REVIEW OF SYSTEMS  --------------------------------------------------------------------------------  General: no fever  CVS: no chest pain  RESP: no sob, no cough      VITALS/PHYSICAL EXAM  --------------------------------------------------------------------------------  T(C): 37.1 (01-13-19 @ 09:43), Max: 37.1 (01-13-19 @ 09:43)  HR: 69 (01-13-19 @ 09:43) (61 - 81)  BP: 140/64 (01-13-19 @ 09:43) (135/94 - 187/74)  RR: 18 (01-13-19 @ 09:43) (17 - 18)  SpO2: 100% (01-13-19 @ 09:43) (94% - 100%)  Wt(kg): --  Height (cm): 149.86 (01-12-19 @ 22:00)  Weight (kg): 63.9 (01-12-19 @ 22:00)  BMI (kg/m2): 28.5 (01-12-19 @ 22:00)  BSA (m2): 1.59 (01-12-19 @ 22:00)      01-12-19 @ 07:01  -  01-13-19 @ 07:00  --------------------------------------------------------  IN: 4000 mL / OUT: 4850 mL / NET: -850 mL      Physical Exam:  	Gen: NAD  	HEENT: MMLAWANDA  	Pulm: CTA B/L  	CV: S1S2  	Abd: Soft, +BS  	Ext: No LE edema B/L                      Neuro: Awake   	Skin: Warm and Dry   	    LABS/STUDIES  --------------------------------------------------------------------------------              10.1   6.34  >-----------<  180      [01-13-19 @ 05:30]              33.9     143  |  102  |  63  ----------------------------<  99      [01-13-19 @ 05:20]  3.6   |  20  |  9.31        Ca     9.6     [01-13-19 @ 05:20]      Mg     1.9     [01-13-19 @ 05:20]      Phos  5.2     [01-13-19 @ 05:20]    TPro  6.1  /  Alb  3.3  /  TBili  0.2  /  DBili  x   /  AST  28  /  ALT  18  /  AlkPhos  70  [01-12-19 @ 14:05]              [01-12-19 @ 17:55]    Creatinine Trend:  SCr 9.31 [01-13 @ 05:20]  SCr 9.14 [01-12 @ 14:05]  SCr 8.82 [12-16 @ 06:00]  SCr 9.28 [12-15 @ 06:30]  SCr 8.51 [12-14 @ 12:30]        .4 (Ca --)      [01-13-19 @ 05:32]   --  .8 (Ca --)      [08-12-18 @ 06:00]   --  HbA1c 6.6      [01-13-19 @ 05:36]  TSH 6.09      [12-14-18 @ 01:08]  Lipid: chol 176, , HDL 32,       [12-14-18 @ 01:08]

## 2019-01-13 NOTE — CONSULT NOTE ADULT - ASSESSMENT
1. Acute/Chronic HF  2. MIld LV dysfunction  3. CAD s/p CABG  4. ESRD on PD    -CV stable  -pt appears clinically improved  -cont PD per renal  -pt on Bumex in the past, off diuretics at this point, unclear if responsive
74 y/o F nonsmoker PMHx HTN, HLD, DM, hypothyroidism, ESRD on peritoneal dialysis (last dialysis last night) p/w sob x 2 days.

## 2019-01-13 NOTE — PROGRESS NOTE ADULT - PROBLEM SELECTOR PLAN 1
ESRD on PD  Pt s/p PD yesterday with improvement in clinical statu  Plan to resume one exchange of PD today  Monitor BMP and BP.

## 2019-01-13 NOTE — CONSULT NOTE ADULT - SUBJECTIVE AND OBJECTIVE BOX
CHIEF COMPLAINT:    HPI:  73F, history of HTN, Dyslipidemia, DM2 , hypothyroidism, ESRD on peritoneal dialysis, last dialysis last night, CHF, AVB s/p BiVPPM  experiencing sob for the past few days.    Pt notes gradual onset shortness of breath, ROQUE, orthopnea, requiring two pillows,  associated with exertional substernal chest pain which improves with rest.    Pt notes chest pain is nonpleuritic, non radiating, non positional.  Pt notes bilateral lower extremity edema, which has been gradually improving since last admission, when admitted for complete heart block, CHF, and received PP).  Positive dry cough.    Pt denies any fevers, chills, sore throat, rhinorrhea, calf pain, hemoptysis, recent prolonged immobilization, jaw/arm/back/abdominal pain. (12 Jan 2019 20:38)      PAST MEDICAL & SURGICAL HISTORY:  ESRD on peritoneal dialysis  Acid reflux  Hypertension  Dyslipidemia  Diabetes mellitus  CAD (coronary artery disease)  S/P CABG (coronary artery bypass graft): in 2012  H/O: hysterectomy  History of total knee replacement b/l  After cataract, bilateral          PREVIOUS DIAGNOSTIC TESTING:    [ ] Echocardiogram:  [ ]  Catheterization:  [ ] Stress Test:  	    MEDICATIONS:  MEDICATIONS  (STANDING):  amLODIPine   Tablet 10 milliGRAM(s) Oral daily  aspirin enteric coated 81 milliGRAM(s) Oral daily  atorvastatin 40 milliGRAM(s) Oral at bedtime  calcium acetate 667 milliGRAM(s) Oral three times a day with meals  clopidogrel Tablet 75 milliGRAM(s) Oral daily  dextrose 5%. 1000 milliLiter(s) (50 mL/Hr) IV Continuous <Continuous>  dextrose 50% Injectable 12.5 Gram(s) IV Push once  dextrose 50% Injectable 25 Gram(s) IV Push once  dextrose 50% Injectable 25 Gram(s) IV Push once  ergocalciferol 91236 Unit(s) Oral every week  gabapentin 100 milliGRAM(s) Oral at bedtime  heparin  Injectable 5000 Unit(s) SubCutaneous every 8 hours  insulin lispro (HumaLOG) corrective regimen sliding scale   SubCutaneous three times a day before meals  labetalol 300 milliGRAM(s) Oral two times a day  levothyroxine 50 MICROGram(s) Oral daily  multivitamin 1 Tablet(s) Oral daily  pantoprazole    Tablet 40 milliGRAM(s) Oral before breakfast  sevelamer hydrochloride 800 milliGRAM(s) Oral three times a day      FAMILY HISTORY:  Family history of early CAD: mother had cad  Family history of diabetes mellitus (Mother): mother had dm      SOCIAL HISTORY:    [ ] Non-smoker  [ ] Smoker  [ ] Alcohol    Allergies    Cipro (Unknown)  Diovan (Unknown)    Intolerances    	    REVIEW OF SYSTEMS:  CONSTITUTIONAL: No fever, weight loss, or fatigue  EYES: No eye pain, visual disturbances, or discharge  ENMT:  No difficulty hearing, tinnitus, vertigo; No sinus or throat pain  NECK: No pain or stiffness  RESPIRATORY: No cough, wheezing, chills or hemoptysis; No Shortness of Breath  CARDIOVASCULAR: No chest pain, palpitations, passing out, dizziness, or leg swelling  GASTROINTESTINAL: No abdominal or epigastric pain. No nausea, vomiting, or hematemesis; No diarrhea or constipation. No melena or hematochezia.  GENITOURINARY: No dysuria, frequency, hematuria, or incontinence  NEUROLOGICAL: No headaches, memory loss, loss of strength, numbness, or tremors  SKIN: No itching, burning, rashes, or lesions   	    [ ] All others negative	  [ ] Unable to obtain    PHYSICAL EXAM:  T(C): 37.1 (01-13-19 @ 09:43), Max: 37.1 (01-13-19 @ 09:43)  HR: 69 (01-13-19 @ 09:43) (61 - 81)  BP: 140/64 (01-13-19 @ 09:43) (135/94 - 187/74)  RR: 18 (01-13-19 @ 09:43) (17 - 18)  SpO2: 100% (01-13-19 @ 09:43) (94% - 100%)  Wt(kg): --  I&O's Summary    12 Jan 2019 07:01  -  13 Jan 2019 07:00  --------------------------------------------------------  IN: 4000 mL / OUT: 4850 mL / NET: -850 mL        Appearance: Normal	  Psychiatry: A & O x 3, Mood & affect appropriate  HEENT:   Normal oral mucosa, PERRL, EOMI	  Lymphatic: No lymphadenopathy  Cardiovascular: Normal S1 S2,RRR, No JVD, No murmurs  Respiratory: Lungs clear to auscultation	  Gastrointestinal:  Soft, Non-tender, + BS	  Skin: No rashes, No ecchymoses, No cyanosis	  Neurologic: Non-focal  Extremities: Normal range of motion, No clubbing, cyanosis or edema  Vascular: Peripheral pulses palpable 2+ bilaterally    TELEMETRY: 	    ECG:  	  RADIOLOGY:  OTHER: 	  	  LABS:	 	    CARDIAC MARKERS:      CKMB: 7.02 ng/mL (01-12 @ 17:55)    CKMB Relative Index: Test not performed (01-12 @ 17:55)                            10.1   6.34  )-----------( 180      ( 13 Jan 2019 05:30 )             33.9     01-13    143  |  102  |  63<H>  ----------------------------<  99  3.6   |  20<L>  |  9.31<H>    Ca    9.6      13 Jan 2019 05:20  Phos  5.2     01-13  Mg     1.9     01-13    TPro  6.1  /  Alb  3.3  /  TBili  0.2  /  DBili  x   /  AST  28  /  ALT  18  /  AlkPhos  70  01-12      proBNP: Serum Pro-Brain Natriuretic Peptide: > 64495 pg/mL (01-12 @ 14:05)    Lipid Profile:   HgA1c: Hemoglobin A1C, Whole Blood: 6.6 % (01-13 @ 05:36)    TSH:     ASSESSMENT/PLAN:
[facility] [place] [facility]   MD ROSITA APODACA MD RUORU WONG, PA    TEL:  OFFICE: [phone]  DR SCOTT CELL: [phone]  T[place]Y [place] CELL: [phone]  DR. JOHNSON CELL: [phone]    -- INITIAL RENAL CONSULT NOTE  --------------------------------------------------------------------------------  HPI:   72 y/o F nonsmoker PMHx HTN, HLD, DM, hypothyroidism, ESRD on peritoneal dialysis (last dialysis last night) p/w sob x 2 days.  Nephrology consulted for ESRD management. PT goes to [place]  [facility], has been on PD for 2 yrs. Pt home PD prescription is ( PD cycler for 9.5 hours, with 4 exchanges of alternating 1.5% and 2.5%). No Midday exchange. Denies discharge from PD catheter, or cloudy fluid.   PT reports SOB      PAST HISTORY  --------------------------------------------------------------------------------  PAST MEDICAL & SURGICAL HISTORY:  ESRD on peritoneal dialysis  Acid reflux  Hypertension  Dyslipidemia  Diabetes mellitus  CAD (coronary artery disease)  S/P CABG (coronary artery bypass graft): in 2012  H/O: hysterectomy  History of total knee replacement b/l  After cataract, bilateral    FAMILY HISTORY:  Family history of early CAD: mother had cad  Family history of diabetes mellitus: mother had dm    PAST SOCIAL HISTORY:    ALLERGIES & MEDICATIONS  --------------------------------------------------------------------------------  Allergies    Cipro (Unknown)  Diovan (Unknown)    Intolerances      Standing Inpatient Medications  amLODIPine   Tablet 10 milliGRAM(s) Oral daily  aspirin enteric coated 81 milliGRAM(s) Oral daily  atorvastatin 40 milliGRAM(s) Oral at bedtime  calcium acetate 667 milliGRAM(s) Oral three times a day with meals  clopidogrel Tablet 75 milliGRAM(s) Oral daily  dextrose 5%. 1000 milliLiter(s) IV Continuous <Continuous>  dextrose 50% Injectable 12.5 Gram(s) IV Push once  dextrose 50% Injectable 25 Gram(s) IV Push once  dextrose 50% Injectable 25 Gram(s) IV Push once  ergocalciferol 99625 Unit(s) Oral every week  gabapentin 100 milliGRAM(s) Oral at bedtime  heparin  Injectable 5000 Unit(s) SubCutaneous every 8 hours  insulin lispro (HumaLOG) corrective regimen sliding scale   SubCutaneous three times a day before meals  labetalol 300 milliGRAM(s) Oral two times a day  levothyroxine 50 MICROGram(s) Oral daily  multivitamin 1 Tablet(s) Oral daily  pantoprazole    Tablet 40 milliGRAM(s) Oral before breakfast  sevelamer hydrochloride 800 milliGRAM(s) Oral three times a day    PRN Inpatient Medications  dextrose 40% Gel 15 Gram(s) Oral once PRN  glucagon  Injectable 1 milliGRAM(s) IntraMuscular once PRN      REVIEW OF SYSTEMS  --------------------------------------------------------------------------------  Gen: No fevers/chills  Skin: No rashes  Head/Eyes/Ears: Normal hearing,  Normal vision   Respiratory: + dyspnea, cough  CV: No chest pain  GI: No abdominal pain, diarrhea, constipation, nausea, vomiting  MSK: +  edema  Heme: No easy bruising or bleeding  Psych: No significant depression    All other systems were reviewed and are negative, except as noted.    VITALS/PHYSICAL EXAM  --------------------------------------------------------------------------------  T(C): 36.6 (01-12-19 @ 12:49), Max: 36.6 (01-12-19 @ 12:49)  HR: 70 (01-12-19 @ 14:23) (67 - 70)  BP: 174/66 (01-12-19 @ 14:23) (138/58 - 174/66)  RR: 18 (01-12-19 @ 14:23) (18 - 18)  SpO2: 96% (01-12-19 @ 14:23) (96% - 100%)  Wt(kg): --        Physical Exam:  	Gen: NAD  	HEENT: MMM  	Pulm: CTA B/L  	CV: S1S2  	Abd: Soft, +BS , + PD catheter  	Ext: trace  LE edema B/L  	Neuro: Awake, alert  	Skin: Warm and dry  	    LABS/STUDIES  --------------------------------------------------------------------------------              9.9    6.33  >-----------<  196      [01-12-19 @ 14:05]              33.5     141  |  99  |  64  ----------------------------<  218      [01-12-19 @ 14:05]  4.3   |  23  |  9.14        Ca     10.0     [01-12-19 @ 14:05]    TPro  6.1  /  Alb  3.3  /  TBili  0.2  /  DBili  x   /  AST  28  /  ALT  18  /  AlkPhos  70  [01-12-19 @ 14:05]          Creatinine Trend:  SCr 9.14 [01-12 @ 14:05]  SCr 8.82 [12-16 @ 06:00]  SCr 9.28 [12-15 @ 06:30]  SCr 8.51 [12-14 @ 12:30]  SCr 9.48 [12-14 @ 01:08]    Urinalysis - [08-10-18 @ 15:35]      Color YELLOW / Appearance CLEAR / SG 1.016 / pH 7.5      Gluc 300 / Ketone NEGATIVE  / Bili NEGATIVE / Urobili NORMAL       Blood NEGATIVE / Protein >600 / Leuk Est SMALL / Nitrite NEGATIVE      RBC 2-5 / WBC 25-50 / Hyaline  / Gran  / Sq Epi OCC / Non Sq Epi  / Bacteria       .8 (Ca --)      [08-12-18 @ 06:00]   --  HbA1c 7.7      [12-14-18 @ 05:10]  TSH 6.09      [12-14-18 @ 01:08]  Lipid: chol 176, , HDL 32,       [12-14-18 @ 01:08]    HBsAb 3.7      [12-14-18 @ 05:10]  HBsAg NEGATIVE      [12-14-18 @ 01:08]  HBcAb Nonreactive      [12-14-18 @ 05:10]  HCV 0.03, Nonreactive Hepatitis C AB  S/CO Ratio                        Interpretation  < 1.0                                     Non-Reactive  1.0 - 4.9                           Weakly-Reactive  > 5.0                                 Reactive  Non-Reactive: [place] with a non-reactive HCV antibody  result is considered uninfected.  No further action is  needed unless recent infection is suspected.  In these  cases, consider repeat testing later to detect  seroconversion..  Weakly-Reactive: HCV antibody test is abnormal, HCV RNA  Qualitative test will follow.  Reactive: HCV antibody test is abnormal, HCV RNA  Qualitative test will follow.  Note: HCV antibody testing is performed on the Abbott   system.      [12-14-18 @ 05:10]    ARIES: titer 1:160, pattern Homogeneous      [08-02-18 @ 15:37]  Rheumatoid Factor 14.0      [08-02-18 @ 15:37]

## 2019-01-14 DIAGNOSIS — E87.6 HYPOKALEMIA: ICD-10-CM

## 2019-01-14 LAB
ANION GAP SERPL CALC-SCNC: 19 MEQ/L — HIGH (ref 7–14)
BUN SERPL-MCNC: 63 MG/DL — HIGH (ref 7–23)
CALCIUM SERPL-MCNC: 9.5 MG/DL — SIGNIFICANT CHANGE UP (ref 8.4–10.5)
CHLORIDE SERPL-SCNC: 100 MMOL/L — SIGNIFICANT CHANGE UP (ref 98–107)
CO2 SERPL-SCNC: 21 MMOL/L — LOW (ref 22–31)
CREAT SERPL-MCNC: 9.3 MG/DL — HIGH (ref 0.5–1.3)
GLUCOSE BLDC GLUCOMTR-MCNC: 183 MG/DL — HIGH (ref 70–99)
GLUCOSE BLDC GLUCOMTR-MCNC: 235 MG/DL — HIGH (ref 70–99)
GLUCOSE BLDC GLUCOMTR-MCNC: 291 MG/DL — HIGH (ref 70–99)
GLUCOSE BLDC GLUCOMTR-MCNC: 372 MG/DL — HIGH (ref 70–99)
GLUCOSE SERPL-MCNC: 61 MG/DL — LOW (ref 70–99)
HCT VFR BLD CALC: 34.8 % — SIGNIFICANT CHANGE UP (ref 34.5–45)
HGB BLD-MCNC: 10.4 G/DL — LOW (ref 11.5–15.5)
MCHC RBC-ENTMCNC: 28 PG — SIGNIFICANT CHANGE UP (ref 27–34)
MCHC RBC-ENTMCNC: 29.9 % — LOW (ref 32–36)
MCV RBC AUTO: 93.5 FL — SIGNIFICANT CHANGE UP (ref 80–100)
NRBC # FLD: 0 K/UL — LOW (ref 25–125)
PHOSPHATE SERPL-MCNC: 4.9 MG/DL — HIGH (ref 2.5–4.5)
PLATELET # BLD AUTO: 194 K/UL — SIGNIFICANT CHANGE UP (ref 150–400)
PMV BLD: 9.9 FL — SIGNIFICANT CHANGE UP (ref 7–13)
POTASSIUM SERPL-MCNC: 3.5 MMOL/L — SIGNIFICANT CHANGE UP (ref 3.5–5.3)
POTASSIUM SERPL-SCNC: 3.5 MMOL/L — SIGNIFICANT CHANGE UP (ref 3.5–5.3)
RBC # BLD: 3.72 M/UL — LOW (ref 3.8–5.2)
RBC # FLD: 16.7 % — HIGH (ref 10.3–14.5)
SODIUM SERPL-SCNC: 140 MMOL/L — SIGNIFICANT CHANGE UP (ref 135–145)
WBC # BLD: 7.06 K/UL — SIGNIFICANT CHANGE UP (ref 3.8–10.5)
WBC # FLD AUTO: 7.06 K/UL — SIGNIFICANT CHANGE UP (ref 3.8–10.5)

## 2019-01-14 RX ORDER — ERGOCALCIFEROL 1.25 MG/1
50000 CAPSULE ORAL
Qty: 0 | Refills: 0 | Status: DISCONTINUED | OUTPATIENT
Start: 2019-01-14 | End: 2019-01-15

## 2019-01-14 RX ORDER — POTASSIUM CHLORIDE 20 MEQ
20 PACKET (EA) ORAL ONCE
Qty: 0 | Refills: 0 | Status: COMPLETED | OUTPATIENT
Start: 2019-01-14 | End: 2019-01-14

## 2019-01-14 RX ADMIN — Medication 10: at 13:26

## 2019-01-14 RX ADMIN — SEVELAMER CARBONATE 800 MILLIGRAM(S): 2400 POWDER, FOR SUSPENSION ORAL at 06:00

## 2019-01-14 RX ADMIN — ERYTHROPOIETIN 10000 UNIT(S): 10000 INJECTION, SOLUTION INTRAVENOUS; SUBCUTANEOUS at 17:57

## 2019-01-14 RX ADMIN — ATORVASTATIN CALCIUM 40 MILLIGRAM(S): 80 TABLET, FILM COATED ORAL at 21:58

## 2019-01-14 RX ADMIN — SEVELAMER CARBONATE 800 MILLIGRAM(S): 2400 POWDER, FOR SUSPENSION ORAL at 21:58

## 2019-01-14 RX ADMIN — Medication 667 MILLIGRAM(S): at 09:20

## 2019-01-14 RX ADMIN — Medication 667 MILLIGRAM(S): at 13:11

## 2019-01-14 RX ADMIN — CLOPIDOGREL BISULFATE 75 MILLIGRAM(S): 75 TABLET, FILM COATED ORAL at 13:11

## 2019-01-14 RX ADMIN — GABAPENTIN 100 MILLIGRAM(S): 400 CAPSULE ORAL at 21:58

## 2019-01-14 RX ADMIN — ERGOCALCIFEROL 50000 UNIT(S): 1.25 CAPSULE ORAL at 18:38

## 2019-01-14 RX ADMIN — Medication 667 MILLIGRAM(S): at 17:56

## 2019-01-14 RX ADMIN — PANTOPRAZOLE SODIUM 40 MILLIGRAM(S): 20 TABLET, DELAYED RELEASE ORAL at 06:00

## 2019-01-14 RX ADMIN — Medication 81 MILLIGRAM(S): at 13:11

## 2019-01-14 RX ADMIN — Medication 2: at 09:20

## 2019-01-14 RX ADMIN — Medication 300 MILLIGRAM(S): at 17:56

## 2019-01-14 RX ADMIN — AMLODIPINE BESYLATE 10 MILLIGRAM(S): 2.5 TABLET ORAL at 06:00

## 2019-01-14 RX ADMIN — Medication 50 MICROGRAM(S): at 06:00

## 2019-01-14 RX ADMIN — Medication 4: at 23:08

## 2019-01-14 RX ADMIN — Medication 1 TABLET(S): at 13:11

## 2019-01-14 RX ADMIN — Medication 6: at 18:16

## 2019-01-14 RX ADMIN — HEPARIN SODIUM 5000 UNIT(S): 5000 INJECTION INTRAVENOUS; SUBCUTANEOUS at 21:58

## 2019-01-14 RX ADMIN — HEPARIN SODIUM 5000 UNIT(S): 5000 INJECTION INTRAVENOUS; SUBCUTANEOUS at 13:12

## 2019-01-14 RX ADMIN — Medication 300 MILLIGRAM(S): at 06:00

## 2019-01-14 RX ADMIN — CALCITRIOL 0.25 MICROGRAM(S): 0.5 CAPSULE ORAL at 06:00

## 2019-01-14 RX ADMIN — HEPARIN SODIUM 5000 UNIT(S): 5000 INJECTION INTRAVENOUS; SUBCUTANEOUS at 06:00

## 2019-01-14 RX ADMIN — SEVELAMER CARBONATE 800 MILLIGRAM(S): 2400 POWDER, FOR SUSPENSION ORAL at 13:11

## 2019-01-14 RX ADMIN — Medication 20 MILLIEQUIVALENT(S): at 13:10

## 2019-01-14 NOTE — PROGRESS NOTE ADULT - SUBJECTIVE AND OBJECTIVE BOX
[facility] [place] [facility]   MD ROSITA APODACA MD ANGELA WONG, PA    TEL:  OFFICE: [phone]  DR SCOTT CELL: [phone]  DR. JOHNSON CELL: [phone]  KESHA HOUSTON CELL: [phone]        Patient is a 73y old  Female who presents with a chief complaint of SOB X 2 days (13 Jan 2019 14:39)      Patient seen and examined at bedside. No chest pain/sob    VITALS:  T(F): 97.6 (01-14-19 @ 10:44), Max: 97.8 (01-13-19 @ 13:15)  HR: 60 (01-14-19 @ 10:44)  BP: 111/74 (01-14-19 @ 10:44)  RR: 18 (01-14-19 @ 10:44)  SpO2: 100% (01-14-19 @ 05:38)  Wt(kg): --    01-13 @ 07:01 - 01-14 @ 07:00  --------------------------------------------------------  IN: 4000 mL / OUT: 5925 mL / NET: -1925 mL    01-14 @ 07:01 - 01-14 @ 11:53  --------------------------------------------------------  IN: 4000 mL / OUT: 2400 mL / NET: 1600 mL          PHYSICAL EXAM:  Constitutional: NAD  Neck: No JVD  Respiratory: CTAB, no wheezes, rales or rhonchi  Cardiovascular: S1, S2, RRR  Gastrointestinal: BS+, soft, NT/ND  Extremities: No peripheral edema    Hospital Medications:   MEDICATIONS  (STANDING):  amLODIPine   Tablet 10 milliGRAM(s) Oral daily  aspirin enteric coated 81 milliGRAM(s) Oral daily  atorvastatin 40 milliGRAM(s) Oral at bedtime  calcitriol   Capsule 0.25 MICROGram(s) Oral <User Schedule>  calcium acetate 667 milliGRAM(s) Oral three times a day with meals  clopidogrel Tablet 75 milliGRAM(s) Oral daily  dextrose 5%. 1000 milliLiter(s) (50 mL/Hr) IV Continuous <Continuous>  dextrose 50% Injectable 12.5 Gram(s) IV Push once  dextrose 50% Injectable 25 Gram(s) IV Push once  dextrose 50% Injectable 25 Gram(s) IV Push once  epoetin rocky Injectable 86300 Unit(s) SubCutaneous <User Schedule>  ergocalciferol 82989 Unit(s) Oral every week  gabapentin 100 milliGRAM(s) Oral at bedtime  gentamicin 0.1% Cream 1 Application(s) Topical three times a day  heparin  Injectable 5000 Unit(s) SubCutaneous every 8 hours  insulin lispro (HumaLOG) corrective regimen sliding scale   SubCutaneous Before meals and at bedtime  labetalol 300 milliGRAM(s) Oral two times a day  levothyroxine 50 MICROGram(s) Oral daily  multivitamin 1 Tablet(s) Oral daily  pantoprazole    Tablet 40 milliGRAM(s) Oral before breakfast  sevelamer hydrochloride 800 milliGRAM(s) Oral three times a day      LABS:  01-14    140  |  100  |  63<H>  ----------------------------<  61<L>  3.5   |  21<L>  |  9.30<H>    Ca    9.5      14 Jan 2019 05:25  Phos  4.9     01-14  Mg     1.9     01-13    TPro  6.1  /  Alb  3.3  /  TBili  0.2  /  DBili      /  AST  28  /  ALT  18  /  AlkPhos  70  01-12    Creatinine Trend: 9.30 <--, 9.31 <--, 9.14 <--    Phosphorus Level, Serum: 4.9 mg/dL (01-14 @ 05:25)                              10.4   7.06  )-----------( 194      ( 14 Jan 2019 05:25 )             34.8     Urine Studies:      .4 (Ca --)      [01-13-19 @ 05:32]   --  .8 (Ca --)      [08-12-18 @ 06:00]   --  HbA1c 6.6      [01-13-19 @ 05:36]  TSH 6.09      [12-14-18 @ 01:08]  Lipid: chol 176, , HDL 32,       [12-14-18 @ 01:08]        RADIOLOGY & ADDITIONAL STUDIES:

## 2019-01-14 NOTE — PROGRESS NOTE ADULT - SUBJECTIVE AND OBJECTIVE BOX
Subjective: Patient seen and examined. No new events except as noted.   Feels somewhat better less sob no chest pain  S/P CABG PTCI of left main to LCX proximal at [facility] s/p CHB pacemaker chronic chf    MEDICATIONS:  MEDICATIONS  (STANDING):  amLODIPine   Tablet 10 milliGRAM(s) Oral daily  aspirin enteric coated 81 milliGRAM(s) Oral daily  atorvastatin 40 milliGRAM(s) Oral at bedtime  calcitriol   Capsule 0.25 MICROGram(s) Oral <User Schedule>  calcium acetate 667 milliGRAM(s) Oral three times a day with meals  clopidogrel Tablet 75 milliGRAM(s) Oral daily  dextrose 5%. 1000 milliLiter(s) (50 mL/Hr) IV Continuous <Continuous>  dextrose 50% Injectable 12.5 Gram(s) IV Push once  dextrose 50% Injectable 25 Gram(s) IV Push once  dextrose 50% Injectable 25 Gram(s) IV Push once  epoetin rocky Injectable 37638 Unit(s) SubCutaneous <User Schedule>  ergocalciferol 38095 Unit(s) Oral every week  gabapentin 100 milliGRAM(s) Oral at bedtime  gentamicin 0.1% Cream 1 Application(s) Topical three times a day  heparin  Injectable 5000 Unit(s) SubCutaneous every 8 hours  insulin lispro (HumaLOG) corrective regimen sliding scale   SubCutaneous Before meals and at bedtime  labetalol 300 milliGRAM(s) Oral two times a day  levothyroxine 50 MICROGram(s) Oral daily  multivitamin 1 Tablet(s) Oral daily  pantoprazole    Tablet 40 milliGRAM(s) Oral before breakfast  potassium chloride    Tablet ER 20 milliEquivalent(s) Oral once  sevelamer hydrochloride 800 milliGRAM(s) Oral three times a day      PHYSICAL EXAM:  T(C): 36.4 (01-14-19 @ 10:44), Max: 36.6 (01-13-19 @ 13:15)  HR: 60 (01-14-19 @ 10:44) (59 - 71)  BP: 111/74 (01-14-19 @ 10:44) (111/74 - 146/74)  RR: 18 (01-14-19 @ 10:44) (18 - 18)  SpO2: 100% (01-14-19 @ 05:38) (95% - 100%)  Wt(kg): --  I&O's Summary    13 Jan 2019 07:01  -  14 Jan 2019 07:00  --------------------------------------------------------  IN: 4000 mL / OUT: 5925 mL / NET: -1925 mL    14 Jan 2019 07:01  -  14 Jan 2019 12:42  --------------------------------------------------------  IN: 4000 mL / OUT: 2400 mL / NET: 1600 mL          Appearance: Normal  chronically ill	  HEENT:   Normal oral mucosa, PERRL, EOMI	  Cardiovascular: Normal S1 S2, No JVD, No murmurs ,  Respiratory: Lungs basilar fine rales	  Gastrointestinal:  Soft, Non-tender, + BS	  Psychiatry:  Mood & affect appropriate  Ext: 1+ edema  Peripheral pulses palpable 2+ bilaterally      LABS:    CARDIAC MARKERS:  CARDIAC MARKERS ( 12 Jan 2019 17:55 )  x     / x     / 116 u/L / 7.02 ng/mL / x                                    10.4   7.06  )-----------( 194      ( 14 Jan 2019 05:25 )             34.8     01-14    140  |  100  |  63<H>  ----------------------------<  61<L>  3.5   |  21<L>  |  9.30<H>    Ca    9.5      14 Jan 2019 05:25  Phos  4.9     01-14  Mg     1.9     01-13    TPro  6.1  /  Alb  3.3  /  TBili  0.2  /  DBili  x   /  AST  28  /  ALT  18  /  AlkPhos  70  01-12    proBNP:   Lipid Profile:   HgA1c:   TSH:           TELEMETRY: 	    ECG:  < from: 12 Lead ECG (01.12.19 @ 12:36) >  iagnosis Line Atrial-sensed ventricular-paced rhythm  Abnormal ECG  	  RADIOLOGY:   < from: Xray Chest 2 Views PA/Lat (01.12.19 @ 14:38) >  IMPRESSION:  Generalized increased bilateral lung markings may reflect congestion with   edema.  Hazy indistinct right CP angle suggesting small right pleural reaction.   Sharp left CP angle.     No pneumothorax.    Stable sternal wires, mediastinal surgicalclips, left chest wall   biventricular pacemaker and slightly prominent appearing cardiac and   mediastinal silhouettes and aortic calcifications.    Trachea midline.    There was left. Spinal degenerative changes again noted.          < from: TTE with Doppler (w/Cont) (12.14.18 @ 11:31) >  CONCLUSIONS:  1. Mitral annular calcification, otherwise normal mitral  valve. Moderate mitral regurgitation.  2. Normal left ventricular internal dimensions and wall  thicknesses.  3. Endocardium not well visualized; grossly mild to  moderate segmental left ventricular systolic dysfunction.  Hypokinesis of the basal to mid inferior wall and basal to  mid inferoseptum.  Endocardial visualization enhanced with  intravenous injection of echo contrast (Definity).  4. The right ventricle is not well visualized; grossly  normal right ventricular systolic function.  5. Estimated right ventricular systolic pressure equals 49  mm Hg, assuming right atrial pressure equals 10 mm Hg,  consistent with mild pulmonary hypertension.  ------------------------------------------------------------------------  Confirmed on  12/14/2018 - 16:01:48 by Hunter Crow M.D.  ------------------------------------------------------------------------

## 2019-01-15 ENCOUNTER — TRANSCRIPTION ENCOUNTER (OUTPATIENT)
Age: 74
End: 2019-01-15

## 2019-01-15 VITALS
DIASTOLIC BLOOD PRESSURE: 64 MMHG | RESPIRATION RATE: 18 BRPM | SYSTOLIC BLOOD PRESSURE: 111 MMHG | HEART RATE: 60 BPM | TEMPERATURE: 98 F

## 2019-01-15 LAB
ANION GAP SERPL CALC-SCNC: 21 MEQ/L — HIGH (ref 7–14)
BUN SERPL-MCNC: 61 MG/DL — HIGH (ref 7–23)
CALCIUM SERPL-MCNC: 9.4 MG/DL — SIGNIFICANT CHANGE UP (ref 8.4–10.5)
CHLORIDE SERPL-SCNC: 96 MMOL/L — LOW (ref 98–107)
CO2 SERPL-SCNC: 18 MMOL/L — LOW (ref 22–31)
CREAT SERPL-MCNC: 8.76 MG/DL — HIGH (ref 0.5–1.3)
GLUCOSE BLDC GLUCOMTR-MCNC: 198 MG/DL — HIGH (ref 70–99)
GLUCOSE BLDC GLUCOMTR-MCNC: 300 MG/DL — HIGH (ref 70–99)
GLUCOSE SERPL-MCNC: 162 MG/DL — HIGH (ref 70–99)
HCT VFR BLD CALC: 35.6 % — SIGNIFICANT CHANGE UP (ref 34.5–45)
HGB BLD-MCNC: 10.5 G/DL — LOW (ref 11.5–15.5)
MCHC RBC-ENTMCNC: 27.5 PG — SIGNIFICANT CHANGE UP (ref 27–34)
MCHC RBC-ENTMCNC: 29.5 % — LOW (ref 32–36)
MCV RBC AUTO: 93.2 FL — SIGNIFICANT CHANGE UP (ref 80–100)
NRBC # FLD: 0.04 K/UL — LOW (ref 25–125)
PLATELET # BLD AUTO: 208 K/UL — SIGNIFICANT CHANGE UP (ref 150–400)
PMV BLD: 10 FL — SIGNIFICANT CHANGE UP (ref 7–13)
POTASSIUM SERPL-MCNC: 3.7 MMOL/L — SIGNIFICANT CHANGE UP (ref 3.5–5.3)
POTASSIUM SERPL-SCNC: 3.7 MMOL/L — SIGNIFICANT CHANGE UP (ref 3.5–5.3)
RBC # BLD: 3.82 M/UL — SIGNIFICANT CHANGE UP (ref 3.8–5.2)
RBC # FLD: 16.7 % — HIGH (ref 10.3–14.5)
SODIUM SERPL-SCNC: 135 MMOL/L — SIGNIFICANT CHANGE UP (ref 135–145)
WBC # BLD: 6.78 K/UL — SIGNIFICANT CHANGE UP (ref 3.8–10.5)
WBC # FLD AUTO: 6.78 K/UL — SIGNIFICANT CHANGE UP (ref 3.8–10.5)

## 2019-01-15 RX ORDER — ATORVASTATIN CALCIUM 80 MG/1
1 TABLET, FILM COATED ORAL
Qty: 0 | Refills: 0 | DISCHARGE
Start: 2019-01-15

## 2019-01-15 RX ORDER — ATORVASTATIN CALCIUM 80 MG/1
1 TABLET, FILM COATED ORAL
Qty: 0 | Refills: 0 | COMMUNITY

## 2019-01-15 RX ORDER — CLOPIDOGREL BISULFATE 75 MG/1
1 TABLET, FILM COATED ORAL
Qty: 0 | Refills: 0 | COMMUNITY

## 2019-01-15 RX ORDER — LABETALOL HCL 100 MG
1 TABLET ORAL
Qty: 0 | Refills: 0 | COMMUNITY
Start: 2019-01-15

## 2019-01-15 RX ORDER — LEVOTHYROXINE SODIUM 125 MCG
1 TABLET ORAL
Qty: 0 | Refills: 0 | COMMUNITY

## 2019-01-15 RX ORDER — AMLODIPINE BESYLATE 2.5 MG/1
1 TABLET ORAL
Qty: 0 | Refills: 0 | COMMUNITY

## 2019-01-15 RX ORDER — GABAPENTIN 400 MG/1
2 CAPSULE ORAL
Qty: 0 | Refills: 0 | COMMUNITY

## 2019-01-15 RX ORDER — ASPIRIN/CALCIUM CARB/MAGNESIUM 324 MG
1 TABLET ORAL
Qty: 0 | Refills: 0 | COMMUNITY

## 2019-01-15 RX ORDER — PANTOPRAZOLE SODIUM 20 MG/1
1 TABLET, DELAYED RELEASE ORAL
Qty: 0 | Refills: 0 | COMMUNITY
Start: 2019-01-15

## 2019-01-15 RX ORDER — CLOPIDOGREL BISULFATE 75 MG/1
1 TABLET, FILM COATED ORAL
Qty: 0 | Refills: 0 | DISCHARGE
Start: 2019-01-15

## 2019-01-15 RX ORDER — LABETALOL HCL 100 MG
0.5 TABLET ORAL
Qty: 0 | Refills: 0 | DISCHARGE
Start: 2019-01-15

## 2019-01-15 RX ORDER — LEVOTHYROXINE SODIUM 125 MCG
1 TABLET ORAL
Qty: 0 | Refills: 0 | DISCHARGE
Start: 2019-01-15

## 2019-01-15 RX ORDER — AMLODIPINE BESYLATE 2.5 MG/1
1 TABLET ORAL
Qty: 0 | Refills: 0 | COMMUNITY
Start: 2019-01-15

## 2019-01-15 RX ORDER — ASPIRIN/CALCIUM CARB/MAGNESIUM 324 MG
1 TABLET ORAL
Qty: 0 | Refills: 0 | DISCHARGE
Start: 2019-01-15

## 2019-01-15 RX ORDER — GABAPENTIN 400 MG/1
1 CAPSULE ORAL
Qty: 0 | Refills: 0 | COMMUNITY
Start: 2019-01-15

## 2019-01-15 RX ADMIN — HEPARIN SODIUM 5000 UNIT(S): 5000 INJECTION INTRAVENOUS; SUBCUTANEOUS at 06:00

## 2019-01-15 RX ADMIN — Medication 667 MILLIGRAM(S): at 13:01

## 2019-01-15 RX ADMIN — Medication 81 MILLIGRAM(S): at 13:01

## 2019-01-15 RX ADMIN — PANTOPRAZOLE SODIUM 40 MILLIGRAM(S): 20 TABLET, DELAYED RELEASE ORAL at 06:00

## 2019-01-15 RX ADMIN — SEVELAMER CARBONATE 800 MILLIGRAM(S): 2400 POWDER, FOR SUSPENSION ORAL at 06:00

## 2019-01-15 RX ADMIN — Medication 667 MILLIGRAM(S): at 09:20

## 2019-01-15 RX ADMIN — Medication 50 MICROGRAM(S): at 06:00

## 2019-01-15 RX ADMIN — CLOPIDOGREL BISULFATE 75 MILLIGRAM(S): 75 TABLET, FILM COATED ORAL at 13:01

## 2019-01-15 RX ADMIN — Medication 1 APPLICATION(S): at 12:11

## 2019-01-15 RX ADMIN — AMLODIPINE BESYLATE 10 MILLIGRAM(S): 2.5 TABLET ORAL at 06:00

## 2019-01-15 RX ADMIN — Medication 300 MILLIGRAM(S): at 06:00

## 2019-01-15 RX ADMIN — Medication 6: at 13:24

## 2019-01-15 RX ADMIN — Medication 1 TABLET(S): at 13:01

## 2019-01-15 RX ADMIN — SEVELAMER CARBONATE 800 MILLIGRAM(S): 2400 POWDER, FOR SUSPENSION ORAL at 13:01

## 2019-01-15 RX ADMIN — Medication 2: at 09:19

## 2019-01-15 NOTE — PROGRESS NOTE ADULT - ASSESSMENT
M E D I C A L   A T T E N D I N G    F O L L O W    U P   N O T E                                     ------------------------------------------------------------------------------------------------    patient evaluated by me, case discussed with team, chart, medications, and physical exam reviewed, labs / tests  and vitals reviewed by me, as cliff.   Patient is stable for discharge today.  Patient to follow up with  PMD/renal. cardio   See discharge document for full note.      ==================>> MEDICATIONS <<====================    amLODIPine   Tablet 10 milliGRAM(s) Oral daily  aspirin enteric coated 81 milliGRAM(s) Oral daily  atorvastatin 40 milliGRAM(s) Oral at bedtime  calcitriol   Capsule 0.25 MICROGram(s) Oral <User Schedule>  calcium acetate 667 milliGRAM(s) Oral three times a day with meals  clopidogrel Tablet 75 milliGRAM(s) Oral daily  dextrose 5%. 1000 milliLiter(s) IV Continuous <Continuous>  dextrose 50% Injectable 12.5 Gram(s) IV Push once  dextrose 50% Injectable 25 Gram(s) IV Push once  dextrose 50% Injectable 25 Gram(s) IV Push once  epoetin rocky Injectable 17144 Unit(s) SubCutaneous <User Schedule>  ergocalciferol 54262 Unit(s) Oral <User Schedule>  gabapentin 100 milliGRAM(s) Oral at bedtime  gentamicin 0.1% Cream 1 Application(s) Topical three times a day  heparin  Injectable 5000 Unit(s) SubCutaneous every 8 hours  insulin lispro (HumaLOG) corrective regimen sliding scale   SubCutaneous Before meals and at bedtime  labetalol 300 milliGRAM(s) Oral two times a day  levothyroxine 50 MICROGram(s) Oral daily  multivitamin 1 Tablet(s) Oral daily  pantoprazole    Tablet 40 milliGRAM(s) Oral before breakfast  sevelamer hydrochloride 800 milliGRAM(s) Oral three times a day    MEDICATIONS  (PRN):  dextrose 40% Gel 15 Gram(s) Oral once PRN Blood Glucose LESS THAN 70 milliGRAM(s)/deciliter  glucagon  Injectable 1 milliGRAM(s) IntraMuscular once PRN Glucose LESS THAN 70 milligrams/deciliter    ==================>> VITAL SIGNS <<==================    T(C): 36.9 (01-15-19 @ 12:00), Max: 36.9 (01-14-19 @ 20:37)  HR: 60 (01-15-19 @ 12:00) (60 - 73)  BP: 111/64 (01-15-19 @ 12:00) (111/64 - 141/58)  RR: 18 (01-15-19 @ 12:00) (17 - 18)  SpO2: 100% (01-15-19 @ 05:47) (96% - 100%)    POCT Blood Glucose.: 300 mg/dL (15 Serge 2019 13:03)  POCT Blood Glucose.: 198 mg/dL (15 Serge 2019 08:32)  POCT Blood Glucose.: 235 mg/dL (14 Jan 2019 22:10)  POCT Blood Glucose.: 291 mg/dL (14 Jan 2019 17:57)  I&O's Summary    14 Jan 2019 07:01  -  15 Serge 2019 07:00  --------------------------------------------------------  IN: 8000 mL / OUT: 7600 mL / NET: 400 mL    15 Serge 2019 07:01  -  15 Serge 2019 16:07  --------------------------------------------------------  IN: 2000 mL / OUT: 4600 mL / NET: -2600 mL       ==================>> LAB AND IMAGING <<==================                        10.5   6.78  )-----------( 208      ( 15 Serge 2019 07:15 )             35.6        01-15    135  |  96<L>  |  61<H>  ----------------------------<  162<H>  3.7   |  18<L>  |  8.76<H>    Ca    9.4      15 Serge 2019 07:15  Phos  4.9     01-14       TSH:      6.09   (12-14-18)           HgA1C: 6.6  (01-13-19)        , 7.7  (12-14-18)          WBC count:   6.78 <<== ,  7.06 <<== ,  6.34 <<== ,  6.33 <<==   Hemoglobin:   10.5 <<==,  10.4 <<==,  10.1 <<==,  9.9 <<==  platelets:  208 <==, 194 <==, 180 <==, 196 <==    Creatinine:  8.76  <<==, 9.30  <<==, 9.31  <<==, 9.14  <<==  Sodium:   135  <==, 140  <==, 143  <==, 141  <==
_________________________________________________________________________________________  ========>>  M E D I C A L   A T T E N D I N G    F O L L O W  U P  N O T E  <<=========  -----------------------------------------------------------------------------------------------------    - Patient seen and examined by me approximately sixty minutes ago.   - In summary,  KAPIL CAMPOVERDE is a 73y year old woman who originally presented with SOB  - Patient today overall doing ok, comfortable but still with some SOB, eating OK.     ==================>> REVIEW OF SYSTEM <<=================    GEN: no fever, no chills, no pain  RESP:  SOB much improved , no cough, no sputum  CVS: no chest pain, no palpitations, no edema  GI: no abdominal pain, no nausea, no constipation, no diarrhea  : no dysuria, no frequency, no hematuria  Neuro: no headache, no dizziness  Derm : no itching, no rash    ==================>> PHYSICAL EXAM <<=================    GEN: A&O X 3 , NAD , comfortable  HEENT: NCAT, PERRL, MMM, hearing intact  Neck: supple , no JVD  CVS: S1S2 , regular , No M/R/G appreciated  PULM: CTA B/L,  no W/R/R appreciated  ABD.: soft. non tender, non distended,  bowel sounds present, PD catheter in place, clean   Extrem: intact pulses , no edema   PSYCH : normal mood,  not anxious       ==================>> MEDICATIONS <<====================    amLODIPine   Tablet 10 milliGRAM(s) Oral daily  aspirin enteric coated 81 milliGRAM(s) Oral daily  atorvastatin 40 milliGRAM(s) Oral at bedtime  calcitriol   Capsule 0.25 MICROGram(s) Oral <User Schedule>  calcium acetate 667 milliGRAM(s) Oral three times a day with meals  clopidogrel Tablet 75 milliGRAM(s) Oral daily  dextrose 5%. 1000 milliLiter(s) IV Continuous <Continuous>  dextrose 50% Injectable 12.5 Gram(s) IV Push once  dextrose 50% Injectable 25 Gram(s) IV Push once  dextrose 50% Injectable 25 Gram(s) IV Push once  epoetin rocky Injectable 98717 Unit(s) SubCutaneous <User Schedule>  ergocalciferol 63372 Unit(s) Oral <User Schedule>  gabapentin 100 milliGRAM(s) Oral at bedtime  gentamicin 0.1% Cream 1 Application(s) Topical three times a day  heparin  Injectable 5000 Unit(s) SubCutaneous every 8 hours  insulin lispro (HumaLOG) corrective regimen sliding scale   SubCutaneous Before meals and at bedtime  labetalol 300 milliGRAM(s) Oral two times a day  levothyroxine 50 MICROGram(s) Oral daily  multivitamin 1 Tablet(s) Oral daily  pantoprazole    Tablet 40 milliGRAM(s) Oral before breakfast  sevelamer hydrochloride 800 milliGRAM(s) Oral three times a day    MEDICATIONS  (PRN):  dextrose 40% Gel 15 Gram(s) Oral once PRN Blood Glucose LESS THAN 70 milliGRAM(s)/deciliter  glucagon  Injectable 1 milliGRAM(s) IntraMuscular once PRN Glucose LESS THAN 70 milligrams/deciliter    ==================>> VITAL SIGNS <<==================    Vital Signs Last 24 Hrs  T(C): 36.4 (01-14-19 @ 15:32)  T(F): 97.5 (01-14-19 @ 15:32), Max: 97.6 (01-13-19 @ 17:53)  HR: 62 (01-14-19 @ 15:32) (59 - 69)  BP: 127/71 (01-14-19 @ 15:32)  RR: 18 (01-14-19 @ 15:32) (18 - 18)  SpO2: 97% (01-14-19 @ 14:47) (95% - 100%)      POCT Blood Glucose.: 372 mg/dL (14 Jan 2019 13:24)  POCT Blood Glucose.: 183 mg/dL (14 Jan 2019 09:06)  POCT Blood Glucose.: 292 mg/dL (13 Jan 2019 22:31)  POCT Blood Glucose.: 288 mg/dL (13 Jan 2019 21:21)  POCT Blood Glucose.: 229 mg/dL (13 Jan 2019 17:42)     ==================>> LAB AND IMAGING <<==================                        10.4   7.06  )-----------( 194      ( 14 Jan 2019 05:25 )             34.8       140  |  100  |  63<H>  ----------------------------<  61<L>  3.5   |  21<L>  |  9.30<H>    Ca    9.5      14 Jan 2019 05:25  Phos  4.9     01-14  Mg     1.9     01-13    ___________________________________________________________________________________  ===============>>  A S S E S S M E N T   A N D   P L A N <<===============  ------------------------------------------------------------------------------------------      · Assessment		   74 y/o F nonsmoker history of HTN, Dyslipidemia, DM2, hypothyroidism, ESRD on peritoneal dialysis (last dialysis 1/11/19 admitted for sob x 2 days likely due to fluid overload.  Renal consult appreciated     Problem/Plan - 1:  ·  Problem: ESRD on peritoneal dialysis with fluid overload, and likely component of  acute exacerbatin of chronic systolic HF   renal c/s appreciated: continue PD and UF as able   Continue Renagel.  cardio appreciated   OVERALL IMPROVED :: DISCHARGE PLANING HOME     Problem/Plan - 2:  ·  Problem: Essential hypertension.  Plan: Low salt diet.  Continue BP meds.   cardio f/u    Problem/Plan - 3:  ·  Problem: Dyslipidemia.  Plan: F/U FLP.  Continue Statin.     Problem/Plan - 4:  ·  Problem: Diabetes mellitus, type 2.  Plan: F/U A1C.  FS QID  DM diet  HISS.     Problem/Plan - 5:  ·  Problem: CAD (coronary artery disease).  Plan: Continue ASA, Plavix, BB, Statin.     Problem/Plan - 6:  Problem: Acid reflux. Plan: Continue PPI.    Problem/Plan - 7:  ·  Problem: Hypothyroid.  Plan: F/U TSH.  Continue Synthroid.     -GI/DVT Prophylaxis.    --------------------------------------------  Case discussed with pt  Education given on findings and plan of care  ___________________________  HMariama Abreu D.O.  Pager: [phone]
_________________________________________________________________________________________  ========>>  M E D I C A L   A T T E N D I N G    F O L L O W  U P  N O T E  <<=========  -----------------------------------------------------------------------------------------------------    - Patient seen and examined by me approximately sixty minutes ago.   - In summary,  KAPIL CAMPOVERDE is a 73y year old woman who originally presented with SOB  - Patient today overall doing ok, comfortable but still with some SOB, eating OK.     ==================>> REVIEW OF SYSTEM <<=================    GEN: no fever, no chills, no pain  RESP: + SOB, no cough, no sputum  CVS: no chest pain, no palpitations, no edema  GI: no abdominal pain, no nausea, no constipation, no diarrhea  : no dysuria, no frequency, no hematuria  Neuro: no headache, no dizziness  Derm : no itching, no rash    ==================>> PHYSICAL EXAM <<=================    GEN: A&O X 3 , NAD , comfortable  HEENT: NCAT, PERRL, MMM, hearing intact  Neck: supple , no JVD  CVS: S1S2 , regular , No M/R/G appreciated  PULM: CTA B/L,  no W/R/R appreciated  ABD.: soft. non tender, non distended,  bowel sounds present, PD catheter in place, clean   Extrem: intact pulses , no edema   PSYCH : normal mood,  not anxious      ==================>> MEDICATIONS <<====================    MEDICATIONS  (STANDING):  amLODIPine   Tablet 10 milliGRAM(s) Oral daily  aspirin enteric coated 81 milliGRAM(s) Oral daily  atorvastatin 40 milliGRAM(s) Oral at bedtime  calcitriol   Capsule 0.25 MICROGram(s) Oral <User Schedule>  calcium acetate 667 milliGRAM(s) Oral three times a day with meals  clopidogrel Tablet 75 milliGRAM(s) Oral daily  dextrose 5%. 1000 milliLiter(s) (50 mL/Hr) IV Continuous <Continuous>  dextrose 50% Injectable 12.5 Gram(s) IV Push once  dextrose 50% Injectable 25 Gram(s) IV Push once  dextrose 50% Injectable 25 Gram(s) IV Push once  epoetin rocky Injectable 64364 Unit(s) SubCutaneous <User Schedule>  ergocalciferol 60898 Unit(s) Oral every week  gabapentin 100 milliGRAM(s) Oral at bedtime  gentamicin 0.1% Cream 1 Application(s) Topical three times a day  heparin  Injectable 5000 Unit(s) SubCutaneous every 8 hours  insulin lispro (HumaLOG) corrective regimen sliding scale   SubCutaneous three times a day before meals  labetalol 300 milliGRAM(s) Oral two times a day  levothyroxine 50 MICROGram(s) Oral daily  multivitamin 1 Tablet(s) Oral daily  pantoprazole    Tablet 40 milliGRAM(s) Oral before breakfast  sevelamer hydrochloride 800 milliGRAM(s) Oral three times a day    MEDICATIONS  (PRN):  dextrose 40% Gel 15 Gram(s) Oral once PRN Blood Glucose LESS THAN 70 milliGRAM(s)/deciliter  glucagon  Injectable 1 milliGRAM(s) IntraMuscular once PRN Glucose LESS THAN 70 milligrams/deciliter      ==================>> VITAL SIGNS <<==================    T(C): 36.6 (01-13-19 @ 13:15), Max: 37.1 (01-13-19 @ 09:43)  HR: 71 (01-13-19 @ 13:15) (61 - 81)  BP: 134/64 (01-13-19 @ 13:15) (134/64 - 187/74)  RR: 18 (01-13-19 @ 13:15) (17 - 18)  SpO2: 100% (01-13-19 @ 09:43) (94% - 100%)    POCT Blood Glucose.: 211 mg/dL (13 Jan 2019 13:00)  POCT Blood Glucose.: 94 mg/dL (13 Jan 2019 08:37)  POCT Blood Glucose.: 255 mg/dL (12 Jan 2019 23:44)    I&O's Summary    12 Jan 2019 07:01  -  13 Jan 2019 07:00  --------------------------------------------------------  IN: 4000 mL / OUT: 4850 mL / NET: -850 mL    13 Jan 2019 07:01  -  13 Jan 2019 14:39  --------------------------------------------------------  IN: 2000 mL / OUT: 75 mL / NET: 1925 mL     ==================>> LAB AND IMAGING <<==================                        10.1   6.34  )-----------( 180      ( 13 Jan 2019 05:30 )             33.9        01-13    143  |  102  |  63<H>  ----------------------------<  99  3.6   |  20<L>  |  9.31<H>    Ca    9.6      13 Jan 2019 05:20  Phos  5.2     01-13  Mg     1.9     01-13    TPro  6.1  /  Alb  3.3  /  TBili  0.2  /  DBili  x   /  AST  28  /  ALT  18  /  AlkPhos  70  01-12                TSH:      6.09   (12-14-18)           HgA1C: 6.6  (01-13-19)        , 7.7  (12-14-18)            ___________________________________________________________________________________  ===============>>  A S S E S S M E N T   A N D   P L A N <<===============  ------------------------------------------------------------------------------------------      · Assessment       72 y/o F nonsmoker history of HTN, Dyslipidemia, DM2, hypothyroidism, ESRD on peritoneal dialysis (last dialysis 1/11/19 admitted for sob x 2 days likely due to fluid overload.  Renal consult appreciated     Problem/Plan - 1:  ·  Problem: ESRD on peritoneal dialysis with fluid overload, and likely component of  acute exacerbatin of chronic systolic HF   renal c/s appreciated: continue PD and UF as able   Continue Renagel.  cardio appreciated     Problem/Plan - 2:  ·  Problem: Essential hypertension.  Plan: Low salt diet.  Continue BP meds.   cardio f/u    Problem/Plan - 3:  ·  Problem: Dyslipidemia.  Plan: F/U FLP.  Continue Statin.     Problem/Plan - 4:  ·  Problem: Diabetes mellitus, type 2.  Plan: F/U A1C.  FS QID  DM diet  HISS.     Problem/Plan - 5:  ·  Problem: CAD (coronary artery disease).  Plan: Continue ASA, Plavix, BB, Statin.     Problem/Plan - 6:  Problem: Acid reflux. Plan: Continue PPI.    Problem/Plan - 7:  ·  Problem: Hypothyroid.  Plan: F/U TSH.  Continue Synthroid.     -GI/DVT Prophylaxis.    --------------------------------------------  Case discussed with pt  Education given on findings and plan of care  ___________________________  H. MARISOL Abreu.  Pager: [phone]
1. chronic ischemic heart diseae S/P CABG S/P PTCI of left main to LCX patent LIMA to LAD  2. recurrent CHF  3. sob improved  4. mild to mdoerate segmental LV dysfunction  5. ESRD of dilaysi  6. hypertensive cardiovascular disease    Recommend  continue present medications  peritoneal dialysis as per renal  daily weights
1. chronic ischemic heart diseae S/P CABG S/P PTCI of left main to LCX patent LIMA to LAD  2. recurrent CHF  3. sob improved  4. mild to mdoerate segmental LV dysfunction  5. ESRD of dilaysi  6. hypertensive cardiovascular disease    Recommend  continue present medications  peritoneal dialysis as per renal  discharge home today if ok with medicine and renal
72 y/o F nonsmoker PMHx HTN, HLD, DM, hypothyroidism, ESRD on peritoneal dialysis (last dialysis last night) p/w sob x 2 days.
74 y/o F nonsmoker PMHx HTN, HLD, DM, hypothyroidism, ESRD on peritoneal dialysis (last dialysis last night) p/w sob x 2 days.
74 y/o F nonsmoker PMHx HTN, HLD, DM, hypothyroidism, ESRD on peritoneal dialysis (last dialysis last night) p/w sob x 2 days.

## 2019-01-15 NOTE — PROGRESS NOTE ADULT - PROBLEM SELECTOR PLAN 1
ESRD on PD  PD as ordered.  Monitor BMP and BP.  stable for discharge from renal standpoint.  patient to continue pd at home, f/u PD clinic

## 2019-01-15 NOTE — DISCHARGE NOTE ADULT - HOSPITAL COURSE
73F, history of HTN, Dyslipidemia, DM2 , hypothyroidism, ESRD on peritoneal dialysis, last dialysis last night,  experiencing sob for the past few days.    Pt notes gradual onset shortness of breath, ROQUE, orthopnea, requiring two pillows,  associated with exertional substernal chest pain which improves with rest.    Pt notes chest pain is nonpleuritic, non radiating, non positional.  Pt notes bilateral lower extremity edema, which has been gradually improving since last admission, when admitted for complete heart block, CHF, and received PP).  Positive dry cough.    Pt denies any fevers, chills, sore throat, rhinorrhea, calf pain, hemoptysis, recent prolonged immobilization, jaw/arm/back/abdominal pain.  EKG:  hsTrop: 348--> 330                    BNP: > 56,000                         Bun/Cr:64/9.14  Glucose: 218                Hgb: 9.9/335  1/12 CXR - Generalized increased bilateral lung markings may reflect congestion with edema.Hazy indistinct right CP angle suggesting small right pleural reaction. Sharp left CP angle. No pneumothorax. Stable sternal wires, mediastinal surgical clips, left chest wall   biventricular pacemaker and slightly prominent appearing cardiac and mediastinal silhouettes and aortic calcifications. Trachea midline. There was left. Spinal degenerative changes again noted.  1/12 VQ Scan - very low probability of PE

## 2019-01-15 NOTE — PROGRESS NOTE ADULT - SUBJECTIVE AND OBJECTIVE BOX
[facility]   MD ROSITA APODACA MD ANGELA WONG, PA    TEL:  OFFICE: [phone]  DR SCOTT CELL: [phone]  DR. JOHNSON CELL: [phone]  KESHA HOUSTON CELL: [phone]        Patient is a 73y old  Female who presents with a chief complaint of SOB X 2 days (15 Serge 2019 10:45)      Patient seen and examined at bedside. No chest pain/sob    VITALS:  T(F): 97.6 (01-15-19 @ 07:40), Max: 98.4 (01-14-19 @ 20:37)  HR: 60 (01-15-19 @ 07:40)  BP: 112/57 (01-15-19 @ 07:40)  RR: 18 (01-15-19 @ 07:40)  SpO2: 100% (01-15-19 @ 05:47)  Wt(kg): --    01-14 @ 07:01  -  01-15 @ 07:00  --------------------------------------------------------  IN: 8000 mL / OUT: 7600 mL / NET: 400 mL    01-15 @ 07:01  -  01-15 @ 11:32  --------------------------------------------------------  IN: 2000 mL / OUT: 2100 mL / NET: -100 mL        Weight (kg): 63.5 (01-15 @ 05:47)    PHYSICAL EXAM:  Constitutional: NAD  Neck: No JVD  Respiratory: CTAB, no wheezes, rales or rhonchi  Cardiovascular: S1, S2, RRR  Gastrointestinal: BS+, soft, NT/ND  Extremities: No peripheral edema    Hospital Medications:   MEDICATIONS  (STANDING):  amLODIPine   Tablet 10 milliGRAM(s) Oral daily  aspirin enteric coated 81 milliGRAM(s) Oral daily  atorvastatin 40 milliGRAM(s) Oral at bedtime  calcitriol   Capsule 0.25 MICROGram(s) Oral <User Schedule>  calcium acetate 667 milliGRAM(s) Oral three times a day with meals  clopidogrel Tablet 75 milliGRAM(s) Oral daily  dextrose 5%. 1000 milliLiter(s) (50 mL/Hr) IV Continuous <Continuous>  dextrose 50% Injectable 12.5 Gram(s) IV Push once  dextrose 50% Injectable 25 Gram(s) IV Push once  dextrose 50% Injectable 25 Gram(s) IV Push once  epoetin rocky Injectable 40586 Unit(s) SubCutaneous <User Schedule>  ergocalciferol 22474 Unit(s) Oral <User Schedule>  gabapentin 100 milliGRAM(s) Oral at bedtime  gentamicin 0.1% Cream 1 Application(s) Topical three times a day  heparin  Injectable 5000 Unit(s) SubCutaneous every 8 hours  insulin lispro (HumaLOG) corrective regimen sliding scale   SubCutaneous Before meals and at bedtime  labetalol 300 milliGRAM(s) Oral two times a day  levothyroxine 50 MICROGram(s) Oral daily  multivitamin 1 Tablet(s) Oral daily  pantoprazole    Tablet 40 milliGRAM(s) Oral before breakfast  sevelamer hydrochloride 800 milliGRAM(s) Oral three times a day      LABS:  01-15    135  |  96<L>  |  61<H>  ----------------------------<  162<H>  3.7   |  18<L>  |  8.76<H>    Ca    9.4      15 Serge 2019 07:15  Phos  4.9     01-14      Creatinine Trend: 8.76 <--, 9.30 <--, 9.31 <--, 9.14 <--                                10.5   6.78  )-----------( 208      ( 15 Serge 2019 07:15 )             35.6     Urine Studies:      .4 (Ca --)      [01-13-19 @ 05:32]   --  .8 (Ca --)      [08-12-18 @ 06:00]   --  HbA1c 6.6      [01-13-19 @ 05:36]  TSH 6.09      [12-14-18 @ 01:08]  Lipid: chol 176, , HDL 32,       [12-14-18 @ 01:08]        RADIOLOGY & ADDITIONAL STUDIES:

## 2019-01-15 NOTE — DISCHARGE NOTE ADULT - CARE PLAN
Principal Discharge DX:	Congestive heart failure  Goal:	To relieve and prevent worsening symptoms associated with congestive heart failure, to improve quality of life, and to treat underlying conditions such as coronary heart disease, high blood pressure, or diabetes, and to maintain euvolemia.  Assessment and plan of treatment:	Low salt diet, fluid restriction to 1500 ml daily, monitor your fluid intake and weight daily, exercise as tolerated 30 minutes daily, and follow up with your physician within 1 to 2 weeks.  Secondary Diagnosis:	Coronary artery disease with other form of angina pectoris  Goal:	To be asymptomatic, to reduce risks factors such as hypertension, diabetes and hyperlipidemia to lower the risk of blood clots formation; and to prevent complications of coronary artery disease such as worsening chest pain, heart attack and death.  Assessment and plan of treatment:	Continue aspirin and Plavix, do not stop unless instructed by your physician.  Continue low salt, fat, cholesterol and carbohydrate diet. Follow up with cardiologist and primary care physician's routine appointment.  Secondary Diagnosis:	Diabetes mellitus, type 2  Goal:	To maintain a normal blood glucose level and HgA1C level < 5.7 and to prevent diabetic complications such as uncontrolled diabetes, diabetic coma, blindness, renal failure, and amputations.  Assessment and plan of treatment:	Monitor finger sticks pre-meal and bedtime, low salt, fat and carbohydrate diet, minimize glucose intake.  Exercise daily for at least 30 minutes and weight loss.  Follow up with primary care physician and endocrinologist for routine Hemoglobin A1C checks and management.  Follow up with your ophthalmologist for routine yearly vision exams.  Secondary Diagnosis:	Gastroesophageal reflux disease without esophagitis  Goal:	To prevent acid reflux, heartburn and chest pain.  Assessment and plan of treatment:	Avoid fatty, fried foods, acidic foods such as tomatoes, lime and chocolate. Continue to take your medications as prescribed, exercise daily and weight loss.  Secondary Diagnosis:	Dyslipidemia  Goal:	To maintain normal cholesterol levels to prevent stroke, coronary artery disease, peripheral vascular disease and heart attacks.  Assessment and plan of treatment:	Low fat diet, exercise daily and continue current medications. Follow up with primary care physician and cardiologist for management.  Secondary Diagnosis:	ESRD on peritoneal dialysis  Goal:	To improve quality of life and reduce mortality by preventing fluid overload and electrolytes abnormalities, and blood pressure control.  Assessment and plan of treatment:	Please follow up with your nephrologist for management, and continue your schedule hemodialysis.

## 2019-01-15 NOTE — DISCHARGE NOTE ADULT - MEDICATION SUMMARY - MEDICATIONS TO TAKE
I will START or STAY ON the medications listed below when I get home from the hospital:    aspirin 81 mg oral delayed release tablet  -- 1 tab(s) by mouth once a day  -- Indication: For CAD (coronary artery disease)    gabapentin 100 mg oral capsule  -- 1 cap(s) by mouth once a day (at bedtime)  -- Indication: For Neuropathy    glimepiride 1 mg oral tablet  -- 2 tab(s) by mouth once a day in the morning  and if FS >200 mg/dl before dinner take 1 mg of Amaryl while on Prednisone  -- Indication: For Diabetes mellitus, type 2    atorvastatin 40 mg oral tablet  -- 1 tab(s) by mouth once a day (at bedtime)  -- Indication: For Dyslipidemia    clopidogrel 75 mg oral tablet  -- 1 tab(s) by mouth once a day  -- Indication: For CAD (coronary artery disease)    labetalol 300 mg oral tablet  -- 1 tab(s) by mouth 2 times a day  -- Indication: For Hypertension    amLODIPine 10 mg oral tablet  -- 1 tab(s) by mouth once a day  -- Indication: For Hypertension    calcium acetate 667 mg oral capsule  -- 3 cap(s) by mouth 3 times a day (with meals)  -- Indication: For Chronic kidney disease-mineral and bone disorder    Renagel 800 mg oral tablet  -- 3 tab(s) by mouth 3 times a day  -- Indication: For Chronic kidney disease-mineral and bone disorder    pantoprazole 40 mg oral delayed release tablet  -- 1 tab(s) by mouth once a day (before a meal)  -- Indication: For GERD    levothyroxine 50 mcg (0.05 mg) oral tablet  -- 1 tab(s) by mouth once a day  -- Indication: For Hypothyroid    Multiple Vitamins oral tablet  -- 1 tab(s) by mouth once a day  -- Indication: For Need for prophylactic measure    ergocalciferol 50,000 intl units (1.25 mg) oral capsule  -- 1 cap(s) by mouth once a week  -- Indication: For Chronic kidney disease-mineral and bone disorder

## 2019-01-15 NOTE — DISCHARGE NOTE ADULT - PATIENT PORTAL LINK FT
You can access the [facility], offered by [facility], by registering with the following website: [email or web address]

## 2019-01-15 NOTE — DISCHARGE NOTE ADULT - CARE PROVIDER_API CALL
Talat Abreu (DO), Internal Medicine  [address] [place] [id number]  Phone: [phone]  Fax: [phone]

## 2019-01-15 NOTE — PROGRESS NOTE ADULT - SUBJECTIVE AND OBJECTIVE BOX
Subjective: Patient seen and examined. No new events except as noted.   Feels better and anxious to go home  no cp denies sob today  MEDICATIONS:  MEDICATIONS  (STANDING):  amLODIPine   Tablet 10 milliGRAM(s) Oral daily  aspirin enteric coated 81 milliGRAM(s) Oral daily  atorvastatin 40 milliGRAM(s) Oral at bedtime  calcitriol   Capsule 0.25 MICROGram(s) Oral <User Schedule>  calcium acetate 667 milliGRAM(s) Oral three times a day with meals  clopidogrel Tablet 75 milliGRAM(s) Oral daily  dextrose 5%. 1000 milliLiter(s) (50 mL/Hr) IV Continuous <Continuous>  dextrose 50% Injectable 12.5 Gram(s) IV Push once  dextrose 50% Injectable 25 Gram(s) IV Push once  dextrose 50% Injectable 25 Gram(s) IV Push once  epoetin rocky Injectable 02846 Unit(s) SubCutaneous <User Schedule>  ergocalciferol 79094 Unit(s) Oral <User Schedule>  gabapentin 100 milliGRAM(s) Oral at bedtime  gentamicin 0.1% Cream 1 Application(s) Topical three times a day  heparin  Injectable 5000 Unit(s) SubCutaneous every 8 hours  insulin lispro (HumaLOG) corrective regimen sliding scale   SubCutaneous Before meals and at bedtime  labetalol 300 milliGRAM(s) Oral two times a day  levothyroxine 50 MICROGram(s) Oral daily  multivitamin 1 Tablet(s) Oral daily  pantoprazole    Tablet 40 milliGRAM(s) Oral before breakfast  sevelamer hydrochloride 800 milliGRAM(s) Oral three times a day      PHYSICAL EXAM:  T(C): 36.4 (01-15-19 @ 07:40), Max: 36.9 (01-14-19 @ 20:37)  HR: 60 (01-15-19 @ 07:40) (60 - 73)  BP: 112/57 (01-15-19 @ 07:40) (112/57 - 141/58)  RR: 18 (01-15-19 @ 07:40) (17 - 18)  SpO2: 100% (01-15-19 @ 05:47) (96% - 100%)  Wt(kg): --  I&O's Summary    14 Jan 2019 07:01  -  15 Serge 2019 07:00  --------------------------------------------------------  IN: 8000 mL / OUT: 7600 mL / NET: 400 mL    15 Serge 2019 07:01  -  15 Serge 2019 10:45  --------------------------------------------------------  IN: 2000 mL / OUT: 2100 mL / NET: -100 mL        Weight (kg): 63.5 (01-15 @ 05:47)    Appearance: Normal awake alert NAD anxious to go home	  HEENT:   Normal oral mucosa, PERRL, EOMI	  Cardiovascular: Normal S1 S2, No JVD, No murmurs ,no s3  Respiratory: Lungs cfew basilar rales 	  Ext: No edema  Peripheral pulses palpable 2+ bilaterally      LABS:    CARDIAC MARKERS:  CARDIAC MARKERS ( 12 Jan 2019 17:55 )  x     / x     / 116 u/L / 7.02 ng/mL / x                                    10.5   6.78  )-----------( 208      ( 15 Serge 2019 07:15 )             35.6     01-15    135  |  96<L>  |  61<H>  ----------------------------<  162<H>  3.7   |  18<L>  |  8.76<H>    Ca    9.4      15 Serge 2019 07:15  Phos  4.9     01-14      proBNP:   Lipid Profile:   HgA1c:   TSH:           TELEMETRY: 	    ECG:  	  RADIOLOGY:   DIAGNOSTIC TESTING:  [ ] Echocardiogram:  [ ]  Catheterization:  [ ] Stress Test:    OTHER:

## 2019-01-15 NOTE — PROGRESS NOTE ADULT - REASON FOR ADMISSION
SOB X 2 days

## 2019-01-15 NOTE — DISCHARGE NOTE ADULT - SECONDARY DIAGNOSIS.
Coronary artery disease with other form of angina pectoris Diabetes mellitus, type 2 Gastroesophageal reflux disease without esophagitis Dyslipidemia ESRD on peritoneal dialysis

## 2019-02-05 ENCOUNTER — APPOINTMENT (OUTPATIENT)
Dept: ELECTROPHYSIOLOGY | Facility: CLINIC | Age: 74
End: 2019-02-05
Payer: MEDICARE

## 2019-02-05 VITALS — SYSTOLIC BLOOD PRESSURE: 156 MMHG | DIASTOLIC BLOOD PRESSURE: 82 MMHG | HEART RATE: 75 BPM | RESPIRATION RATE: 14 BRPM

## 2019-02-05 DIAGNOSIS — I44.2 ATRIOVENTRICULAR BLOCK, COMPLETE: ICD-10-CM

## 2019-02-05 PROCEDURE — 93281 PM DEVICE PROGR EVAL MULTI: CPT

## 2019-02-11 ENCOUNTER — INPATIENT (INPATIENT)
Facility: HOSPITAL | Age: 74
LOS: 2 days | Discharge: HOME | End: 2019-02-14
Attending: GENERAL PRACTICE | Admitting: GENERAL PRACTICE
Payer: MEDICARE

## 2019-02-11 VITALS
SYSTOLIC BLOOD PRESSURE: 157 MMHG | OXYGEN SATURATION: 100 % | HEART RATE: 90 BPM | DIASTOLIC BLOOD PRESSURE: 88 MMHG | TEMPERATURE: 98 F | RESPIRATION RATE: 18 BRPM

## 2019-02-11 DIAGNOSIS — N18.6 END STAGE RENAL DISEASE: ICD-10-CM

## 2019-02-11 DIAGNOSIS — J81.0 ACUTE PULMONARY EDEMA: ICD-10-CM

## 2019-02-11 DIAGNOSIS — E11.22 TYPE 2 DIABETES MELLITUS WITH DIABETIC CHRONIC KIDNEY DISEASE: ICD-10-CM

## 2019-02-11 DIAGNOSIS — R09.02 HYPOXEMIA: ICD-10-CM

## 2019-02-11 DIAGNOSIS — I50.23 ACUTE ON CHRONIC SYSTOLIC (CONGESTIVE) HEART FAILURE: ICD-10-CM

## 2019-02-11 DIAGNOSIS — Z95.1 PRESENCE OF AORTOCORONARY BYPASS GRAFT: Chronic | ICD-10-CM

## 2019-02-11 DIAGNOSIS — Z29.9 ENCOUNTER FOR PROPHYLACTIC MEASURES, UNSPECIFIED: ICD-10-CM

## 2019-02-11 DIAGNOSIS — I10 ESSENTIAL (PRIMARY) HYPERTENSION: ICD-10-CM

## 2019-02-11 DIAGNOSIS — N18.9 CHRONIC KIDNEY DISEASE, UNSPECIFIED: ICD-10-CM

## 2019-02-11 DIAGNOSIS — D64.9 ANEMIA, UNSPECIFIED: ICD-10-CM

## 2019-02-11 DIAGNOSIS — R74.8 ABNORMAL LEVELS OF OTHER SERUM ENZYMES: ICD-10-CM

## 2019-02-11 DIAGNOSIS — E78.5 HYPERLIPIDEMIA, UNSPECIFIED: ICD-10-CM

## 2019-02-11 DIAGNOSIS — J81.1 CHRONIC PULMONARY EDEMA: ICD-10-CM

## 2019-02-11 LAB
ALBUMIN SERPL ELPH-MCNC: 3.4 G/DL — SIGNIFICANT CHANGE UP (ref 3.3–5)
ALP SERPL-CCNC: 76 U/L — SIGNIFICANT CHANGE UP (ref 40–120)
ALT FLD-CCNC: 19 U/L — SIGNIFICANT CHANGE UP (ref 4–33)
ANION GAP SERPL CALC-SCNC: 23 MMO/L — HIGH (ref 7–14)
APTT BLD: 33.1 SEC — SIGNIFICANT CHANGE UP (ref 27.5–36.3)
AST SERPL-CCNC: 28 U/L — SIGNIFICANT CHANGE UP (ref 4–32)
B PERT DNA SPEC QL NAA+PROBE: NOT DETECTED — SIGNIFICANT CHANGE UP
BASE EXCESS BLDV CALC-SCNC: -2.5 MMOL/L — SIGNIFICANT CHANGE UP
BASOPHILS # BLD AUTO: 0.04 K/UL — SIGNIFICANT CHANGE UP (ref 0–0.2)
BASOPHILS NFR BLD AUTO: 0.4 % — SIGNIFICANT CHANGE UP (ref 0–2)
BILIRUB SERPL-MCNC: 0.2 MG/DL — SIGNIFICANT CHANGE UP (ref 0.2–1.2)
BLOOD GAS VENOUS - CREATININE: 10 MG/DL — HIGH (ref 0.5–1.3)
BUN SERPL-MCNC: 75 MG/DL — HIGH (ref 7–23)
C PNEUM DNA SPEC QL NAA+PROBE: NOT DETECTED — SIGNIFICANT CHANGE UP
CALCIUM SERPL-MCNC: 10.4 MG/DL — SIGNIFICANT CHANGE UP (ref 8.4–10.5)
CHLORIDE BLDV-SCNC: 103 MMOL/L — SIGNIFICANT CHANGE UP (ref 96–108)
CHLORIDE SERPL-SCNC: 96 MMOL/L — LOW (ref 98–107)
CO2 SERPL-SCNC: 17 MMOL/L — LOW (ref 22–31)
CREAT SERPL-MCNC: 9.06 MG/DL — HIGH (ref 0.5–1.3)
EOSINOPHIL # BLD AUTO: 0.18 K/UL — SIGNIFICANT CHANGE UP (ref 0–0.5)
EOSINOPHIL NFR BLD AUTO: 2 % — SIGNIFICANT CHANGE UP (ref 0–6)
FLUAV H1 2009 PAND RNA SPEC QL NAA+PROBE: NOT DETECTED — SIGNIFICANT CHANGE UP
FLUAV H1 RNA SPEC QL NAA+PROBE: NOT DETECTED — SIGNIFICANT CHANGE UP
FLUAV H3 RNA SPEC QL NAA+PROBE: NOT DETECTED — SIGNIFICANT CHANGE UP
FLUAV SUBTYP SPEC NAA+PROBE: NOT DETECTED — SIGNIFICANT CHANGE UP
FLUBV RNA SPEC QL NAA+PROBE: NOT DETECTED — SIGNIFICANT CHANGE UP
GAS PNL BLDV: 132 MMOL/L — LOW (ref 136–146)
GLUCOSE BLDC GLUCOMTR-MCNC: 267 MG/DL — HIGH (ref 70–99)
GLUCOSE BLDC GLUCOMTR-MCNC: 326 MG/DL — HIGH (ref 70–99)
GLUCOSE BLDV-MCNC: 310 — HIGH (ref 70–99)
GLUCOSE SERPL-MCNC: 307 MG/DL — HIGH (ref 70–99)
HADV DNA SPEC QL NAA+PROBE: NOT DETECTED — SIGNIFICANT CHANGE UP
HCO3 BLDV-SCNC: 22 MMOL/L — SIGNIFICANT CHANGE UP (ref 20–27)
HCOV PNL SPEC NAA+PROBE: SIGNIFICANT CHANGE UP
HCT VFR BLD CALC: 40.7 % — SIGNIFICANT CHANGE UP (ref 34.5–45)
HCT VFR BLDV CALC: 38 % — SIGNIFICANT CHANGE UP (ref 34.5–45)
HGB BLD-MCNC: 12.5 G/DL — SIGNIFICANT CHANGE UP (ref 11.5–15.5)
HGB BLDV-MCNC: 12.4 G/DL — SIGNIFICANT CHANGE UP (ref 11.5–15.5)
HMPV RNA SPEC QL NAA+PROBE: NOT DETECTED — SIGNIFICANT CHANGE UP
HPIV1 RNA SPEC QL NAA+PROBE: NOT DETECTED — SIGNIFICANT CHANGE UP
HPIV2 RNA SPEC QL NAA+PROBE: NOT DETECTED — SIGNIFICANT CHANGE UP
HPIV3 RNA SPEC QL NAA+PROBE: NOT DETECTED — SIGNIFICANT CHANGE UP
HPIV4 RNA SPEC QL NAA+PROBE: NOT DETECTED — SIGNIFICANT CHANGE UP
IMM GRANULOCYTES NFR BLD AUTO: 0.9 % — SIGNIFICANT CHANGE UP (ref 0–1.5)
INR BLD: 1.08 — SIGNIFICANT CHANGE UP (ref 0.88–1.17)
LACTATE BLDV-MCNC: 2.2 MMOL/L — HIGH (ref 0.5–2)
LYMPHOCYTES # BLD AUTO: 1.1 K/UL — SIGNIFICANT CHANGE UP (ref 1–3.3)
LYMPHOCYTES # BLD AUTO: 11.9 % — LOW (ref 13–44)
MCHC RBC-ENTMCNC: 26.3 PG — LOW (ref 27–34)
MCHC RBC-ENTMCNC: 30.7 % — LOW (ref 32–36)
MCV RBC AUTO: 85.7 FL — SIGNIFICANT CHANGE UP (ref 80–100)
MONOCYTES # BLD AUTO: 0.8 K/UL — SIGNIFICANT CHANGE UP (ref 0–0.9)
MONOCYTES NFR BLD AUTO: 8.7 % — SIGNIFICANT CHANGE UP (ref 2–14)
NEUTROPHILS # BLD AUTO: 7.01 K/UL — SIGNIFICANT CHANGE UP (ref 1.8–7.4)
NEUTROPHILS NFR BLD AUTO: 76.1 % — SIGNIFICANT CHANGE UP (ref 43–77)
NRBC # FLD: 0 K/UL — LOW (ref 25–125)
NT-PROBNP SERPL-SCNC: SIGNIFICANT CHANGE UP PG/ML
PCO2 BLDV: 40 MMHG — LOW (ref 41–51)
PH BLDV: 7.36 PH — SIGNIFICANT CHANGE UP (ref 7.32–7.43)
PLATELET # BLD AUTO: 228 K/UL — SIGNIFICANT CHANGE UP (ref 150–400)
PMV BLD: 10.6 FL — SIGNIFICANT CHANGE UP (ref 7–13)
PO2 BLDV: 58 MMHG — HIGH (ref 35–40)
POTASSIUM BLDV-SCNC: SIGNIFICANT CHANGE UP MMOL/L (ref 3.4–4.5)
POTASSIUM SERPL-MCNC: 4.4 MMOL/L — SIGNIFICANT CHANGE UP (ref 3.5–5.3)
POTASSIUM SERPL-SCNC: 4.4 MMOL/L — SIGNIFICANT CHANGE UP (ref 3.5–5.3)
PROT SERPL-MCNC: 6.7 G/DL — SIGNIFICANT CHANGE UP (ref 6–8.3)
PROTHROM AB SERPL-ACNC: 12 SEC — SIGNIFICANT CHANGE UP (ref 9.8–13.1)
RBC # BLD: 4.75 M/UL — SIGNIFICANT CHANGE UP (ref 3.8–5.2)
RBC # FLD: 15.4 % — HIGH (ref 10.3–14.5)
RSV RNA SPEC QL NAA+PROBE: NOT DETECTED — SIGNIFICANT CHANGE UP
RV+EV RNA SPEC QL NAA+PROBE: NOT DETECTED — SIGNIFICANT CHANGE UP
SAO2 % BLDV: 84.1 % — SIGNIFICANT CHANGE UP (ref 60–85)
SODIUM SERPL-SCNC: 136 MMOL/L — SIGNIFICANT CHANGE UP (ref 135–145)
TROPONIN T, HIGH SENSITIVITY: 819 NG/L — CRITICAL HIGH (ref ?–14)
TROPONIN T, HIGH SENSITIVITY: 858 NG/L — CRITICAL HIGH (ref ?–14)
WBC # BLD: 9.21 K/UL — SIGNIFICANT CHANGE UP (ref 3.8–10.5)
WBC # FLD AUTO: 9.21 K/UL — SIGNIFICANT CHANGE UP (ref 3.8–10.5)

## 2019-02-11 PROCEDURE — 71046 X-RAY EXAM CHEST 2 VIEWS: CPT | Mod: 26

## 2019-02-11 PROCEDURE — 99223 1ST HOSP IP/OBS HIGH 75: CPT

## 2019-02-11 RX ORDER — LABETALOL HCL 100 MG
300 TABLET ORAL
Refills: 0 | Status: DISCONTINUED | OUTPATIENT
Start: 2019-02-11 | End: 2019-02-14

## 2019-02-11 RX ORDER — LEVOTHYROXINE SODIUM 125 MCG
50 TABLET ORAL DAILY
Qty: 0 | Refills: 0 | Status: DISCONTINUED | OUTPATIENT
Start: 2019-02-11 | End: 2019-02-14

## 2019-02-11 RX ORDER — CALCITRIOL 0.5 UG/1
0.5 CAPSULE ORAL DAILY
Qty: 0 | Refills: 0 | Status: DISCONTINUED | OUTPATIENT
Start: 2019-02-11 | End: 2019-02-14

## 2019-02-11 RX ORDER — INSULIN LISPRO 100/ML
VIAL (ML) SUBCUTANEOUS AT BEDTIME
Qty: 0 | Refills: 0 | Status: DISCONTINUED | OUTPATIENT
Start: 2019-02-11 | End: 2019-02-14

## 2019-02-11 RX ORDER — DEXTROSE 50 % IN WATER 50 %
12.5 SYRINGE (ML) INTRAVENOUS ONCE
Qty: 0 | Refills: 0 | Status: DISCONTINUED | OUTPATIENT
Start: 2019-02-11 | End: 2019-02-14

## 2019-02-11 RX ORDER — HEPARIN SODIUM 5000 [USP'U]/ML
5000 INJECTION INTRAVENOUS; SUBCUTANEOUS EVERY 12 HOURS
Qty: 0 | Refills: 0 | Status: DISCONTINUED | OUTPATIENT
Start: 2019-02-11 | End: 2019-02-14

## 2019-02-11 RX ORDER — ALBUTEROL 90 UG/1
2.5 AEROSOL, METERED ORAL ONCE
Qty: 0 | Refills: 0 | Status: COMPLETED | OUTPATIENT
Start: 2019-02-11 | End: 2019-02-11

## 2019-02-11 RX ORDER — DEXTROSE 50 % IN WATER 50 %
25 SYRINGE (ML) INTRAVENOUS ONCE
Qty: 0 | Refills: 0 | Status: DISCONTINUED | OUTPATIENT
Start: 2019-02-11 | End: 2019-02-14

## 2019-02-11 RX ORDER — INSULIN LISPRO 100/ML
VIAL (ML) SUBCUTANEOUS
Qty: 0 | Refills: 0 | Status: DISCONTINUED | OUTPATIENT
Start: 2019-02-11 | End: 2019-02-14

## 2019-02-11 RX ORDER — SODIUM CHLORIDE 9 MG/ML
1000 INJECTION, SOLUTION INTRAVENOUS
Qty: 0 | Refills: 0 | Status: DISCONTINUED | OUTPATIENT
Start: 2019-02-11 | End: 2019-02-14

## 2019-02-11 RX ORDER — CALCIUM ACETATE 667 MG
667 TABLET ORAL
Qty: 0 | Refills: 0 | Status: DISCONTINUED | OUTPATIENT
Start: 2019-02-11 | End: 2019-02-14

## 2019-02-11 RX ORDER — LABETALOL HCL 100 MG
300 TABLET ORAL
Qty: 0 | Refills: 0 | Status: DISCONTINUED | OUTPATIENT
Start: 2019-02-11 | End: 2019-02-11

## 2019-02-11 RX ORDER — SEVELAMER CARBONATE 2400 MG/1
800 POWDER, FOR SUSPENSION ORAL THREE TIMES A DAY
Qty: 0 | Refills: 0 | Status: DISCONTINUED | OUTPATIENT
Start: 2019-02-11 | End: 2019-02-14

## 2019-02-11 RX ORDER — ASPIRIN/CALCIUM CARB/MAGNESIUM 324 MG
81 TABLET ORAL DAILY
Qty: 0 | Refills: 0 | Status: DISCONTINUED | OUTPATIENT
Start: 2019-02-11 | End: 2019-02-14

## 2019-02-11 RX ORDER — INSULIN LISPRO 100/ML
4 VIAL (ML) SUBCUTANEOUS ONCE
Qty: 0 | Refills: 0 | Status: COMPLETED | OUTPATIENT
Start: 2019-02-11 | End: 2019-02-11

## 2019-02-11 RX ORDER — ATORVASTATIN CALCIUM 80 MG/1
40 TABLET, FILM COATED ORAL AT BEDTIME
Qty: 0 | Refills: 0 | Status: DISCONTINUED | OUTPATIENT
Start: 2019-02-11 | End: 2019-02-14

## 2019-02-11 RX ORDER — CLOPIDOGREL BISULFATE 75 MG/1
75 TABLET, FILM COATED ORAL DAILY
Qty: 0 | Refills: 0 | Status: DISCONTINUED | OUTPATIENT
Start: 2019-02-11 | End: 2019-02-14

## 2019-02-11 RX ORDER — GLUCAGON INJECTION, SOLUTION 0.5 MG/.1ML
1 INJECTION, SOLUTION SUBCUTANEOUS ONCE
Qty: 0 | Refills: 0 | Status: DISCONTINUED | OUTPATIENT
Start: 2019-02-11 | End: 2019-02-14

## 2019-02-11 RX ORDER — GENTAMICIN SULFATE 0.1 %
1 OINTMENT (GRAM) TOPICAL
Qty: 0 | Refills: 0 | Status: DISCONTINUED | OUTPATIENT
Start: 2019-02-11 | End: 2019-02-14

## 2019-02-11 RX ORDER — DEXTROSE 50 % IN WATER 50 %
15 SYRINGE (ML) INTRAVENOUS ONCE
Qty: 0 | Refills: 0 | Status: DISCONTINUED | OUTPATIENT
Start: 2019-02-11 | End: 2019-02-14

## 2019-02-11 RX ADMIN — SEVELAMER CARBONATE 800 MILLIGRAM(S): 2400 POWDER, FOR SUSPENSION ORAL at 22:32

## 2019-02-11 RX ADMIN — HEPARIN SODIUM 5000 UNIT(S): 5000 INJECTION INTRAVENOUS; SUBCUTANEOUS at 22:32

## 2019-02-11 RX ADMIN — ATORVASTATIN CALCIUM 40 MILLIGRAM(S): 80 TABLET, FILM COATED ORAL at 22:33

## 2019-02-11 RX ADMIN — Medication 2: at 22:39

## 2019-02-11 RX ADMIN — ALBUTEROL 2.5 MILLIGRAM(S): 90 AEROSOL, METERED ORAL at 12:23

## 2019-02-11 RX ADMIN — Medication 4 UNIT(S): at 13:19

## 2019-02-11 RX ADMIN — Medication 667 MILLIGRAM(S): at 22:32

## 2019-02-11 RX ADMIN — Medication 300 MILLIGRAM(S): at 22:32

## 2019-02-11 NOTE — ED ADULT NURSE NOTE - NSIMPLEMENTINTERV_GEN_ALL_ED
Implemented All Fall with Harm Risk Interventions:  [place] to call system. Call bell, personal items and telephone within reach. Instruct patient to call for assistance. Room bathroom lighting operational. Non-slip footwear when patient is off stretcher. Physically safe environment: no spills, clutter or unnecessary equipment. Stretcher in lowest position, wheels locked, appropriate side rails in place. Provide visual cue, wrist band, yellow gown, etc. Monitor gait and stability. Monitor for mental status changes and reorient to person, place, and time. Review medications for side effects contributing to fall risk. Reinforce activity limits and safety measures with patient and family. Provide visual clues: red socks.

## 2019-02-11 NOTE — ED PROVIDER NOTE - PHYSICAL EXAMINATION
General: Elderly female, on supplemental O2   HEENT: Normocephalic and atraumatic, EOMI, Trachea midline.   Cardiac: Normal S1 and S2 w/ RRR. No MRG.  Pulmonary: Rales at BL bases. Mildly increased WOB   Abdominal: Soft, NT, some fluid in abdomen.  Neurologic: No focal sensory or motor deficits.  Musculoskeletal: No limited ROM.  Vascular: No LE edema. WWP  Skin: Color appropriate for race.   Psychiatric: Appropriate mood and affect. No apparent risk to self or others.  David Valencia, PGY-1

## 2019-02-11 NOTE — ED PROVIDER NOTE - NS ED ROS FT
Constitution: No Fever or chills  Eyes: No visual changes  HEENT: No URI symptoms  Cardio: No Chest pain  Resp: +SOB, +cough  GI: No abdominal pain  : No dysuria  MSK: No Back pain  Neuro: No Headache  Skin: No rashes  All other ROS as per HPI  David Valencia, PGY-1

## 2019-02-11 NOTE — H&P ADULT - PROBLEM SELECTOR PLAN 1
Pt w/shortness of breath found to have pulmonary edema on cxr. Also ESRD on peritoneal dialysis, will f/u with nephrology. Patient not completely anuric, maybe trial of lasix as well.  Continue supplemental O2 as needed  Measure I/Os

## 2019-02-11 NOTE — CONSULT NOTE ADULT - ASSESSMENT
73F, history of CHF, HTN, Dyslipidemia, DM2 , hypothyroidism, ESRD on peritoneal dialysis, last dialysis this AM p/w SOB

## 2019-02-11 NOTE — ED PROVIDER NOTE - PROGRESS NOTE DETAILS
Attempted to contact renal as per inpatient team Spoke with Renal who is aware of patient and will evaluate

## 2019-02-11 NOTE — ED PROVIDER NOTE - OBJECTIVE STATEMENT
73F, history of CHF, HTN, Dyslipidemia, DM2 , hypothyroidism, ESRD on peritoneal dialysis, last dialysis this AM p/w SOB. Pt reports since yesterday evening she has been getting increasingly SOB. +orthopnea. This AM she woke up and gave herself dialysis but was persistently SOB so came into ER. Reports has had dry cough as well. Denies f/c, CP, abd pain, n/v/d. Recently hospitalized in January for similar complaints.

## 2019-02-11 NOTE — H&P ADULT - HISTORY OF PRESENT ILLNESS
HPI:  Carlee Bell is a 73 year old lady with extensive medical history which includes. ESRD on peritoneal dialysis for the last 2 years, CHF (EF 46%, w/PAH), CAD (2 stents?), PPM, HTN, HLD, T2DM (not on insulin), and hypothyroidism. The patient was brought in by ambulance w/c of shortness of breath. The patient states that over the last few days she has been having progressing shortness of breath despite trying to dialyze herself. She also endorses nasal congestion, cough, she denies fevers, chills, chest pain, palpitations or swollen ankles. Previously had similar presentations.         PAST MEDICAL & SURGICAL HISTORY:  ESRD on peritoneal dialysis  Acid reflux  Hypertension  Dyslipidemia  Diabetes mellitus  CAD (coronary artery disease)  S/P CABG (coronary artery bypass graft): in 2012  H/O: hysterectomy  History of total knee replacement b/l  After cataract, bilateral      Review of Systems:   CONSTITUTIONAL: No fever, weight loss, or fatigue  EYES: No eye pain, visual disturbances, or discharge  ENMT:  No difficulty hearing, tinnitus, vertigo; No sinus or throat pain, +nasal congestion  NECK: No pain or stiffness  RESPIRATORY:+ cough, shortness of breath  CARDIOVASCULAR: No chest pain, palpitations, dizziness, or leg swelling  GASTROINTESTINAL: No abdominal or epigastric pain. No nausea, vomiting, or hematemesis; No diarrhea or constipation. No melena or hematochezia.  GENITOURINARY: No dysuria, frequency, hematuria, or incontinence. Not completely anuric  NEUROLOGICAL: No headaches, memory loss, loss of strength, numbness, or tremors  LYMPH NODES: No enlarged glands  ENDOCRINE: No heat or cold intolerance; No hair loss  MUSCULOSKELETAL: No joint pain or swelling; No muscle, back, or extremity pain  PSYCHIATRIC: No depression, anxiety, mood swings, or difficulty sleeping  HEME/LYMPH: No easy bruising, or bleeding gums  ALLERGY AND IMMUNOLOGIC: No hives or eczema    Allergies    Cipro (Unknown)  Diovan (Unknown)    Intolerances        Social History:   Denies smoking, drinking or drug use. Lives at home with her  who has parkinson's disease and dementia and her son who has polio    FAMILY HISTORY:  Family history of early CAD: mother had cad  Family history of diabetes mellitus: mother had dm      MEDICATIONS  (STANDING):  aspirin enteric coated 81 milliGRAM(s) Oral daily  atorvastatin 40 milliGRAM(s) Oral at bedtime  calcitriol   Capsule 0.5 MICROGram(s) Oral daily  calcium acetate 667 milliGRAM(s) Oral three times a day with meals  clopidogrel Tablet 75 milliGRAM(s) Oral daily  dextrose 5%. 1000 milliLiter(s) (50 mL/Hr) IV Continuous <Continuous>  dextrose 50% Injectable 12.5 Gram(s) IV Push once  dextrose 50% Injectable 25 Gram(s) IV Push once  dextrose 50% Injectable 25 Gram(s) IV Push once  heparin  Injectable 5000 Unit(s) SubCutaneous every 12 hours  insulin lispro (HumaLOG) corrective regimen sliding scale   SubCutaneous three times a day before meals  labetalol 300 milliGRAM(s) Oral two times a day  levothyroxine 50 MICROGram(s) Oral daily  multivitamin 1 Tablet(s) Oral daily  sevelamer hydrochloride 800 milliGRAM(s) Oral three times a day    MEDICATIONS  (PRN):  dextrose 40% Gel 15 Gram(s) Oral once PRN Blood Glucose LESS THAN 70 milliGRAM(s)/deciliter  gentamicin 0.1% Cream 1 Application(s) Topical <User Schedule> PRN PD catheter change  glucagon  Injectable 1 milliGRAM(s) IntraMuscular once PRN Glucose LESS THAN 70 milligrams/deciliter      T(C): 36.7 (02-11-19 @ 14:56), Max: 36.7 (02-11-19 @ 14:56)  HR: 93 (02-11-19 @ 14:56) (90 - 93)  BP: 163/79 (02-11-19 @ 14:56) (157/88 - 163/79)  RR: 20 (02-11-19 @ 14:56) (18 - 20)  SpO2: 96% (02-11-19 @ 14:56) (96% - 100%)    CAPILLARY BLOOD GLUCOSE      POCT Blood Glucose.: 267 mg/dL (11 Feb 2019 14:55)    I&O's Summary      PHYSICAL EXAM:  GENERAL: NAD, well-developed, pleasant, speaks in full sentences  HEAD:  Atraumatic, Normocephalic  EYES: EOMI, PERRLA, conjunctiva and sclera clear  CHEST/LUNG: Decreased breath sounds B/L especially towards the bases  HEART: Regular rate and rhythm; No murmurs, S3  ABDOMEN: Soft, Nontender, Nondistended; Bowel sounds present  EXTREMITIES:  2+ Peripheral Pulses, No clubbing, cyanosis, or edema  PSYCH: AAOx3  NEUROLOGY: CN II-XII grossly intact, moving all extremities  SKIN: No rashes or lesions    LABS:                        12.5   9.21  )-----------( 228      ( 11 Feb 2019 10:24 )             40.7     02-11    136  |  96<L>  |  75<H>  ----------------------------<  307<H>  4.4   |  17<L>  |  9.06<H>    Ca    10.4      11 Feb 2019 10:24    TPro  6.7  /  Alb  3.4  /  TBili  0.2  /  DBili  x   /  AST  28  /  ALT  19  /  AlkPhos  76  02-11    PT/INR - ( 11 Feb 2019 10:24 )   PT: 12.0 SEC;   INR: 1.08          PTT - ( 11 Feb 2019 10:24 )  PTT:33.1 SEC            RADIOLOGY & ADDITIONAL TESTS:    ECG Personally Reviewed - paced rhythm, compared to prior EKG poorer R wave progression noted    Imaging Personally Reviewed:  Shows cardiomegaly with pulmonary edema    Consultant(s) Notes Reviewed:      Care Discussed with Consultants/Other Providers:

## 2019-02-11 NOTE — ED PROVIDER NOTE - PROGRESS NOTE
Preop phone call completed.  Pt's medical history reviewed.  Pt denies cardiac issues, respiratory problems, history of stroke, seizure, sleep apnea and any recent changes in health.  All pt questions answered.  
Improved.

## 2019-02-11 NOTE — ED ADULT NURSE NOTE - OBJECTIVE STATEMENT
p/t is a 73 y old female received awake and responsive, c/o of mild sob since this am, p;/t denies any chest pain, bloods drawn and sent to lab, no further c/o noted will continue to monitor

## 2019-02-11 NOTE — H&P ADULT - PROBLEM SELECTOR PLAN 3
Last echo showed moderate systolic dysfunction with EF of 46%  ProBNP  >[id number] with trops, could be a result kidney failure and HF  Will continue with home meds  Dialysis, and since not anuric, we can try lasix

## 2019-02-11 NOTE — ED ADULT NURSE REASSESSMENT NOTE - NS ED NURSE REASSESS COMMENT FT1
Meds given and fbs elevated , call out to ADS notified need to place orders for PM slide scale and pt oxygen as requested by pt ventimask in place. Further admission orders to follow.

## 2019-02-11 NOTE — H&P ADULT - ASSESSMENT
73 year old lady with extensive medical history which includes. ESRD on peritoneal dialysis for the last 2 years, CHF (EF 46%, w/PAH), CAD (2 stents?), PPM, HTN, HLD, T2DM (not on insulin), and hypothyroidism p/w shortness of breath and found to be in pulmonary edema.

## 2019-02-11 NOTE — ED ADULT NURSE REASSESSMENT NOTE - GENERAL PATIENT STATE
comfortable appearance/resting/sleeping/Pt returned calm affect pt on 40%vm as per request pt st" don't like the canula."/smiling/interactive

## 2019-02-11 NOTE — ED PROVIDER NOTE - ATTENDING CONTRIBUTION TO CARE
AJM: Patient seen with resident and agree with above note. 73F, history of CHF, HTN, Dyslipidemia, DM2 , hypothyroidism, ESRD on peritoneal dialysis, last dialysis this AM p/w SOB. Pt reports since yesterday evening she has been getting increasingly SOB. +orthopnea. This AM she woke up and gave herself dialysis but was persistently SOB so came into ER. Reports has had dry cough as well. Denies f/c, CP, abd pain, n/v/d. Recently hospitalized in January for similar complaints. Pt with sob with minimal exertion in ED. poor breath sounds bilaterally. No LE edema. No hypoxia on room air, but pt notes improvement in symptoms with supplemental O2. Given degree of dyspnea will obtain acs workup, neb, cxr, ecg, admit

## 2019-02-11 NOTE — ED ADULT TRIAGE NOTE - CHIEF COMPLAINT QUOTE
pt BIBA from home, pt c/o SOB since last night.  pt recieves peritoneal dialysis daily.  abd distended

## 2019-02-11 NOTE — CONSULT NOTE ADULT - PROBLEM SELECTOR RECOMMENDATION 9
on PD. on cycler at home  plan to continue manual PD here. fluid overloaded, plan for 1 exchange with Dextrose 4.25%   consent in chart

## 2019-02-11 NOTE — CONSULT NOTE ADULT - SUBJECTIVE AND OBJECTIVE BOX
[facility]   MD ROSITA APODACA MD ANGELA WONG, PA    TEL:  OFFICE: [phone]  DR SCOTT CELL: [phone]  DR. JOHNSON CELL: [phone]  KESHA HOUSTON CELL: [phone]      HPI:  73F, history of CHF, HTN, Dyslipidemia, DM2 , hypothyroidism, ESRD on peritoneal dialysis, last dialysis this AM p/w SOB. Pt reports since yesterday evening she has been getting increasingly SOB. +orthopnea. This AM she woke up and gave herself dialysis but was persistently SOB so came into ER. Reports has had dry cough as well. Denies f/c, CP, abd pain, n/v/d. Recently hospitalized in January for similar complaints.    Allergies:  Cipro (Unknown)  Diovan (Unknown)      PAST MEDICAL & SURGICAL HISTORY:  ESRD on peritoneal dialysis  Acid reflux  Hypertension  Dyslipidemia  Diabetes mellitus  CAD (coronary artery disease)  S/P CABG (coronary artery bypass graft): in 2012  H/O: hysterectomy  History of total knee replacement b/l  After cataract, bilateral      Home Medications Reviewed    Hospital Medications:   MEDICATIONS  (STANDING):  aspirin enteric coated 81 milliGRAM(s) Oral daily  atorvastatin 40 milliGRAM(s) Oral at bedtime  calcitriol   Capsule 0.5 MICROGram(s) Oral daily  calcium acetate 667 milliGRAM(s) Oral three times a day with meals  clopidogrel Tablet 75 milliGRAM(s) Oral daily  dextrose 5%. 1000 milliLiter(s) (50 mL/Hr) IV Continuous <Continuous>  dextrose 50% Injectable 12.5 Gram(s) IV Push once  dextrose 50% Injectable 25 Gram(s) IV Push once  dextrose 50% Injectable 25 Gram(s) IV Push once  heparin  Injectable 5000 Unit(s) SubCutaneous every 12 hours  insulin lispro (HumaLOG) corrective regimen sliding scale   SubCutaneous three times a day before meals  labetalol 300 milliGRAM(s) Oral two times a day  levothyroxine 50 MICROGram(s) Oral daily  multivitamin 1 Tablet(s) Oral daily  sevelamer hydrochloride 800 milliGRAM(s) Oral three times a day      SOCIAL HISTORY:  Denies ETOh, Smoking,     FAMILY HISTORY:  Family history of early CAD: mother had cad  Family history of diabetes mellitus: mother had dm      REVIEW OF SYSTEMS:  CONSTITUTIONAL: No weakness, fevers or chills  EYES/ENT: No visual changes;  No vertigo or throat pain   NECK: No pain or stiffness  RESPIRATORY: +cough,+shortness of breath  CARDIOVASCULAR: No chest pain or palpitations.  GASTROINTESTINAL: No abdominal or epigastric pain. No nausea, vomiting, or hematemesis; No diarrhea or constipation. No melena or hematochezia.  GENITOURINARY: No dysuria, frequency, foamy urine, urinary urgency, incontinence or hematuria  NEUROLOGICAL: No numbness or weakness  SKIN: No itching, burning, rashes, or lesions   VASCULAR: No bilateral lower extremity edema.   All other review of systems is negative unless indicated above.    VITALS:  T(F): 98 (02-11-19 @ 14:56), Max: 98 (02-11-19 @ 14:56)  HR: 93 (02-11-19 @ 14:56)  BP: 163/79 (02-11-19 @ 14:56)  RR: 20 (02-11-19 @ 14:56)  SpO2: 96% (02-11-19 @ 14:56)  Wt(kg): --        PHYSICAL EXAM:  Constitutional: NAD  HEENT: anicteric sclera, oropharynx clear, MMM  Neck: No JVD  Respiratory: poor air entry b/l lung  Cardiovascular: S1, S2, RRR  Gastrointestinal: BS+, soft, NT/ND  Extremities: No cyanosis or clubbing. No peripheral edema  Neurological: A/O x 3, no focal deficits  Psychiatric: Normal mood, normal affect  : No CVA tenderness. No dyson.   Skin: No rashes  Vascular Access: peritoneal catheter     LABS:  02-11    136  |  96<L>  |  75<H>  ----------------------------<  307<H>  4.4   |  17<L>  |  9.06<H>    Ca    10.4      11 Feb 2019 10:24    TPro  6.7  /  Alb  3.4  /  TBili  0.2  /  DBili      /  AST  28  /  ALT  19  /  AlkPhos  76  02-11    Creatinine Trend: 9.06 <--                        12.5   9.21  )-----------( 228      ( 11 Feb 2019 10:24 )             40.7     Urine Studies:        RADIOLOGY & ADDITIONAL STUDIES:

## 2019-02-11 NOTE — CONSULT NOTE ADULT - PROBLEM SELECTOR RECOMMENDATION 5
check PTH, phos  continue renegel and phsolo and calcitirol  monitor phos and ca daily check PTH, phos  continue renagel and phoslo and calcitirol  monitor phos and ca daily

## 2019-02-11 NOTE — H&P ADULT - PROBLEM SELECTOR PLAN 7
Troponin T 858,819  Likely as result of CHF and kidney failure, while elevated, its not trending higher at this time. Prior Trops in december surpassed 3K

## 2019-02-11 NOTE — ED PROVIDER NOTE - MEDICAL DECISION MAKING DETAILS
Elderly female h/o CHF, CAD, ESRD p/w SOB. Likely 2/2 worsening CHF. Labs, CXR, Elderly female h/o CHF, CAD, ESRD p/w SOB. Likely 2/2 worsening CHF. Labs, CXR, EKG, trops. Dispo is likely admission

## 2019-02-12 ENCOUNTER — TRANSCRIPTION ENCOUNTER (OUTPATIENT)
Age: 74
End: 2019-02-12

## 2019-02-12 LAB
ALBUMIN SERPL ELPH-MCNC: 3.4 G/DL — SIGNIFICANT CHANGE UP (ref 3.3–5)
ALP SERPL-CCNC: 72 U/L — SIGNIFICANT CHANGE UP (ref 40–120)
ALT FLD-CCNC: 17 U/L — SIGNIFICANT CHANGE UP (ref 4–33)
ANION GAP SERPL CALC-SCNC: 25 MMO/L — HIGH (ref 7–14)
AST SERPL-CCNC: 21 U/L — SIGNIFICANT CHANGE UP (ref 4–32)
BASOPHILS # BLD AUTO: 0.04 K/UL — SIGNIFICANT CHANGE UP (ref 0–0.2)
BASOPHILS NFR BLD AUTO: 0.5 % — SIGNIFICANT CHANGE UP (ref 0–2)
BILIRUB SERPL-MCNC: < 0.2 MG/DL — LOW (ref 0.2–1.2)
BUN SERPL-MCNC: 79 MG/DL — HIGH (ref 7–23)
CALCIUM SERPL-MCNC: 10.3 MG/DL — SIGNIFICANT CHANGE UP (ref 8.4–10.5)
CHLORIDE SERPL-SCNC: 93 MMOL/L — LOW (ref 98–107)
CO2 SERPL-SCNC: 20 MMOL/L — LOW (ref 22–31)
CREAT SERPL-MCNC: 9.38 MG/DL — HIGH (ref 0.5–1.3)
EOSINOPHIL # BLD AUTO: 0.18 K/UL — SIGNIFICANT CHANGE UP (ref 0–0.5)
EOSINOPHIL NFR BLD AUTO: 2.4 % — SIGNIFICANT CHANGE UP (ref 0–6)
GLUCOSE BLDC GLUCOMTR-MCNC: 182 MG/DL — HIGH (ref 70–99)
GLUCOSE BLDC GLUCOMTR-MCNC: 313 MG/DL — HIGH (ref 70–99)
GLUCOSE BLDC GLUCOMTR-MCNC: 317 MG/DL — HIGH (ref 70–99)
GLUCOSE SERPL-MCNC: 290 MG/DL — HIGH (ref 70–99)
HBA1C BLD-MCNC: 8.2 % — HIGH (ref 4–5.6)
HCT VFR BLD CALC: 38.1 % — SIGNIFICANT CHANGE UP (ref 34.5–45)
HGB BLD-MCNC: 11.7 G/DL — SIGNIFICANT CHANGE UP (ref 11.5–15.5)
IMM GRANULOCYTES NFR BLD AUTO: 0.5 % — SIGNIFICANT CHANGE UP (ref 0–1.5)
LYMPHOCYTES # BLD AUTO: 1.09 K/UL — SIGNIFICANT CHANGE UP (ref 1–3.3)
LYMPHOCYTES # BLD AUTO: 14.6 % — SIGNIFICANT CHANGE UP (ref 13–44)
MAGNESIUM SERPL-MCNC: 1.9 MG/DL — SIGNIFICANT CHANGE UP (ref 1.6–2.6)
MCHC RBC-ENTMCNC: 26.4 PG — LOW (ref 27–34)
MCHC RBC-ENTMCNC: 30.7 % — LOW (ref 32–36)
MCV RBC AUTO: 85.8 FL — SIGNIFICANT CHANGE UP (ref 80–100)
MONOCYTES # BLD AUTO: 0.76 K/UL — SIGNIFICANT CHANGE UP (ref 0–0.9)
MONOCYTES NFR BLD AUTO: 10.2 % — SIGNIFICANT CHANGE UP (ref 2–14)
NEUTROPHILS # BLD AUTO: 5.37 K/UL — SIGNIFICANT CHANGE UP (ref 1.8–7.4)
NEUTROPHILS NFR BLD AUTO: 71.8 % — SIGNIFICANT CHANGE UP (ref 43–77)
NRBC # FLD: 0 K/UL — LOW (ref 25–125)
PHOSPHATE SERPL-MCNC: 6.1 MG/DL — HIGH (ref 2.5–4.5)
PLATELET # BLD AUTO: 236 K/UL — SIGNIFICANT CHANGE UP (ref 150–400)
PMV BLD: 10.7 FL — SIGNIFICANT CHANGE UP (ref 7–13)
POTASSIUM SERPL-MCNC: 4 MMOL/L — SIGNIFICANT CHANGE UP (ref 3.5–5.3)
POTASSIUM SERPL-SCNC: 4 MMOL/L — SIGNIFICANT CHANGE UP (ref 3.5–5.3)
PROT SERPL-MCNC: 6.5 G/DL — SIGNIFICANT CHANGE UP (ref 6–8.3)
RBC # BLD: 4.44 M/UL — SIGNIFICANT CHANGE UP (ref 3.8–5.2)
RBC # FLD: 15.7 % — HIGH (ref 10.3–14.5)
SODIUM SERPL-SCNC: 138 MMOL/L — SIGNIFICANT CHANGE UP (ref 135–145)
TROPONIN T, HIGH SENSITIVITY: 1040 NG/L — CRITICAL HIGH (ref ?–14)
WBC # BLD: 7.48 K/UL — SIGNIFICANT CHANGE UP (ref 3.8–10.5)
WBC # FLD AUTO: 7.48 K/UL — SIGNIFICANT CHANGE UP (ref 3.8–10.5)

## 2019-02-12 RX ADMIN — SEVELAMER CARBONATE 800 MILLIGRAM(S): 2400 POWDER, FOR SUSPENSION ORAL at 22:41

## 2019-02-12 RX ADMIN — Medication 81 MILLIGRAM(S): at 14:08

## 2019-02-12 RX ADMIN — Medication 300 MILLIGRAM(S): at 18:01

## 2019-02-12 RX ADMIN — SEVELAMER CARBONATE 800 MILLIGRAM(S): 2400 POWDER, FOR SUSPENSION ORAL at 05:43

## 2019-02-12 RX ADMIN — Medication 4: at 11:53

## 2019-02-12 RX ADMIN — CLOPIDOGREL BISULFATE 75 MILLIGRAM(S): 75 TABLET, FILM COATED ORAL at 14:07

## 2019-02-12 RX ADMIN — Medication 1 TABLET(S): at 14:08

## 2019-02-12 RX ADMIN — Medication 667 MILLIGRAM(S): at 08:06

## 2019-02-12 RX ADMIN — Medication 667 MILLIGRAM(S): at 18:01

## 2019-02-12 RX ADMIN — Medication 4: at 17:56

## 2019-02-12 RX ADMIN — CALCITRIOL 0.5 MICROGRAM(S): 0.5 CAPSULE ORAL at 14:08

## 2019-02-12 RX ADMIN — Medication 4: at 08:06

## 2019-02-12 RX ADMIN — HEPARIN SODIUM 5000 UNIT(S): 5000 INJECTION INTRAVENOUS; SUBCUTANEOUS at 18:01

## 2019-02-12 RX ADMIN — HEPARIN SODIUM 5000 UNIT(S): 5000 INJECTION INTRAVENOUS; SUBCUTANEOUS at 05:43

## 2019-02-12 RX ADMIN — Medication 50 MICROGRAM(S): at 05:43

## 2019-02-12 RX ADMIN — Medication 300 MILLIGRAM(S): at 08:06

## 2019-02-12 RX ADMIN — ATORVASTATIN CALCIUM 40 MILLIGRAM(S): 80 TABLET, FILM COATED ORAL at 22:42

## 2019-02-12 RX ADMIN — Medication 667 MILLIGRAM(S): at 14:07

## 2019-02-12 NOTE — DISCHARGE NOTE ADULT - SECONDARY DIAGNOSIS.
ESRD on peritoneal dialysis Acute on chronic systolic heart failure Elevated troponin Hypertension Type 2 diabetes mellitus with chronic kidney disease on chronic dialysis, without long-term current use of insulin

## 2019-02-12 NOTE — PATIENT PROFILE ADULT - NSPROGENPREVTRANSF_GEN_A_NUR
Clinic Care Coordination Contact  Care Coordination Communication    Referral Source: IP Report    Clinical Data: Patient was hospitalized at [facility] from 1/18/19 to 1/19/19 with diagnosis of physical deconditioning, atrial fibrillation; s/p AV node ablation and pacemaker placement.     Home Care Contact:              Home Care Agency: [facility]              Contact name () and phone number: Doris Rousseau, ph: [phone]              Care Coordination contacted home care: No - per chart review in Homecare Advisor, patient open with home care              Anticipated start of care date: 1/21/19    Patient Contact:                Discharge instructions were reviewed with patient/caregiver.               Do you have any questions about your medications? No              Follow up appointment is scheduled for 1/28/19 - pacemaker check.              Provided 24 Hour Nurse Line and/or 24 Hour Appointment Scheduling: No, patient had information              Home care has contacted patient: Yes              Patient questions/concerns: No - the patient stated home care plans to come see her today.  She would like to continue focusing on her goal of walking to the dining cantu for meals to keep her strength up, in addition to working with home PT.  This week she is unable to walk to the dining cantu as their assisted living is quarantined to their rooms related to a norovirus outbreak.  She will resume walking to the dining cantu when the quarantine is lifted.    Plan: RN/SW Care Coordinator will await notification from home care staff informing of patient's discharge plans/needs. RN/SW Care Coordinator will review chart and outreach to home care every 4 weeks and as needed.      Minesh Minor RN  Clinic Care Coordinator  [facility]  Ph: [phone]  
no

## 2019-02-12 NOTE — CHART NOTE - NSCHARTNOTEFT_GEN_A_CORE
Per Dr. Abreu, no need to trend trops- likely due to fluid overload. Patient denies any chest pain and reports improved with SOB. If patient does not improve with dialysis or develops chest pain will call Cards Dr. Jennings. Will continue to monitor.

## 2019-02-12 NOTE — DISCHARGE NOTE ADULT - MEDICATION SUMMARY - MEDICATIONS TO TAKE
I will START or STAY ON the medications listed below when I get home from the hospital:    aspirin 81 mg oral delayed release tablet  -- 1 tab(s) by mouth once a day  -- Indication: For blood thinner    glimepiride 1 mg oral tablet  -- 2 tab(s) by mouth once a day in the morning    -- Indication: For Diabetes    atorvastatin 40 mg oral tablet  -- 1 tab(s) by mouth once a day (at bedtime)  -- Indication: For Hyperlipidemia    Uloric 40 mg oral tablet  -- 1 tab(s) by mouth once a day  -- Indication: For Antihyperuricemia    clopidogrel 75 mg oral tablet  -- 1 tab(s) by mouth once a day  -- Indication: For blood thinner    labetalol 300 mg oral tablet  -- 1 tab(s) by mouth 2 times a day  -- Indication: For Hypertension    calcium acetate 667 mg oral capsule  -- 3 cap(s) by mouth 3 times a day (with meals)  -- Indication: For ESRD on peritoneal dialysis    Renagel 800 mg oral tablet  -- 3 tab(s) by mouth 3 times a day  -- Indication: For ESRD on peritoneal dialysis    levothyroxine 50 mcg (0.05 mg) oral tablet  -- 1 tab(s) by mouth once a day  -- Indication: For Hypothyroid    Daily Martha oral tablet  -- 1 tab(s) by mouth once a day  -- Indication: For supplement    calcitriol 0.5 mcg oral capsule  -- 1 cap(s) by mouth once a day  -- Indication: For supplement    ergocalciferol 50,000 intl units (1.25 mg) oral capsule  -- 1 cap(s) by mouth once a week  -- Indication: For supplement

## 2019-02-12 NOTE — DISCHARGE NOTE ADULT - COMMUNITY RESOURCES
Pt from [facility] [phone] on [facility] at home. Spoke with PD Nurse notified of discharge, faxed all needed documents to [phone].

## 2019-02-12 NOTE — DISCHARGE NOTE ADULT - HOSPITAL COURSE
73 year old lady with extensive medical history which includes. ESRD on peritoneal dialysis for the last 2 years, CHF (EF 46%, w/PAH), CAD (2 stents?), PPM, HTN, HLD, T2DM (not on insulin), and hypothyroidism p/w shortness of breath and found to be in pulmonary edema.    Hospital course: 73 year old lady with extensive medical history which includes. ESRD on peritoneal dialysis for the last 2 years, CHF (EF 46%, w/PAH), CAD (2 stents?), PPM, HTN, HLD, T2DM (not on insulin), and hypothyroidism p/w shortness of breath and found to be in pulmonary edema.    Hospital course:     Acute pulmonary edema  - with SOB, found to have pulmonary edema on CXR  - o2 PRN  - RVP negative   - peritoneal dialysis as directed     ESRD on peritoneal dialysis  - renal Dr Sanchez group consulted   - Continue calcitriol, phoslo, renagel  - Continue peritoneal dialysis as directed     Acute on chronic systolic HF   Last echo showed moderate systolic dysfunction with EF of 46%  ProBNP  >59944 with elevated trops  could be a result kidney failure and HF    Hypoxemia  - O2 PRN    HTN, HLD  - ASA, plavix   - statin   - labetalol    Elevated troponin  - Likely as result of CHF and kidney failure  - Prior Trops in december surpassed 3K.       DM2  - corrective sliding scale   - Finger sticks QID  - A1C 8.6     hypothyroid  - Cont. synthroid    Dispo: 73 year old lady with extensive medical history which includes. ESRD on peritoneal dialysis for the last 2 years, CHF (EF 46%, w/PAH), CAD (2 stents?), PPM, HTN, HLD, T2DM (not on insulin), and hypothyroidism p/w shortness of breath and found to be in pulmonary edema.    Hospital course:     Acute pulmonary edema  - with SOB, found to have pulmonary edema on CXR  - o2 PRN  - RVP negative   - peritoneal dialysis as directed   -resolved    ESRD on peritoneal dialysis  - renal Dr Sanchez group consulted   - Continue calcitriol, phoslo, renagel  - Continue peritoneal dialysis as directed   could be a result kidney failure and Continue recommended medication regimen. Monitor for signs/symptoms of fluid overload and electrolyte abnormalities, such as, shortness of breath, cough, swelling, chest discomfort, changes in heart rate, dizziness, fainting, or changes in mental status. Follow-up with your PCP/cardiologist outpatient after you've been discharged from the hospital.  Acute on chronic systolic HF   Last echo showed moderate systolic dysfunction with EF of 46%  ProBNP  >04591 with elevated trops  could be a result kidney failure and HF      Hypoxemia  - O2 PRN    HTN, HLD  - ASA, plavix   - statin   - labetalol    Elevated troponin  - Likely as result of CHF and kidney failure  - Prior Trops in december surpassed 3K.   stable      DM2  - corrective sliding scale   - Finger sticks QID  - A1C 8.2  -c/w home diabetes medication on discharge.   -f/u outpt with PCP     hypothyroid  - Cont. synthroid    Dispo: - home, c/w PD as scheduled. SW reinstating PD d/w Dr. Abreu and renal cleared pt for d/c 73 year old lady with extensive medical history which includes. ESRD on peritoneal dialysis for the last 2 years, CHF (EF 46%, w/PAH), CAD (2 stents?), PPM, HTN, HLD, T2DM (not on insulin), and hypothyroidism p/w shortness of breath and found to be in pulmonary edema.    [place]y of hospital course:  Patient was seen and evaluated and followed by multiple specialists throughout the stay and treated medically with improvement.  Patient was otherwise stable and had no other complications.  Patient was discharged to home in stable condition to follow up with primary doctor as outpatient for follow up and further care.

## 2019-02-12 NOTE — PROGRESS NOTE ADULT - PROBLEM SELECTOR PLAN 1
on PD. on cycler at home  plan to continue manual PD here. still fluid overloaded, plan for 1 exchange with Dextrose 4.25% today on PD. on cycler at home  plan to continue manual PD here. still fluid overloaded, plan for 1 exchange with Dextrose 4.25% today  Monitor BMP and BP

## 2019-02-12 NOTE — DISCHARGE NOTE ADULT - PATIENT PORTAL LINK FT
You can access the [facility] Portal, offered by [facility], by registering with the following website: [email or web address]

## 2019-02-12 NOTE — DISCHARGE NOTE ADULT - CARE PLAN
Principal Discharge DX:	Acute pulmonary edema  Assessment and plan of treatment:	Continue peritoneal dialysis as directed-------------------------  Secondary Diagnosis:	ESRD on peritoneal dialysis  Assessment and plan of treatment:	Please continue to follow your dialysis schedule and refer to your primary provider/nephrologist for further care/recommendations. Continue your medications and supplementation as directed.  Secondary Diagnosis:	Acute on chronic systolic heart failure  Assessment and plan of treatment:	Continue recommended medication regimen. Monitor for signs/symptoms of fluid overload and electrolyte abnormalities, such as, shortness of breath, cough, swelling, chest discomfort, changes in heart rate, dizziness, fainting, or changes in mental status. Follow-up with your PCP/cardiologist outpatient after you've been discharged from the hospital.  Secondary Diagnosis:	Elevated troponin  Secondary Diagnosis:	Hypertension  Assessment and plan of treatment:	Continue current blood pressure medication regimen as directed. Monitor for any visual changes, headaches or dizziness.  Monitor blood pressure regularly.  Follow up with your PCP for further management for high blood pressure, please call to make appointment within 1 week of discharge  Secondary Diagnosis:	Type 2 diabetes mellitus with chronic kidney disease on chronic dialysis, without long-term current use of insulin  Assessment and plan of treatment:	Continue consistent carbohydrate diet.  Monitor blood glucose levels throughout the day before meals and at bedtime.  Record blood sugars and bring to outpatient providers appointment in order to be reviewed by your doctor for management modifications.  Be aware of diabetes management symptoms including feeling cool and clammy may be related to low glucose levels.  Feeling hot and dry may indicate high glucose levels.  If  you feel these symptoms, check your blood sugar.  Make regular podiatry appointments in order to have feet checked for wounds and toe nails cut by a doctor to prevent infections. Principal Discharge DX:	Acute pulmonary edema  Goal:	stable  Assessment and plan of treatment:	Likely due to fluid overload- continue peritoneal dialysis as directed.  Secondary Diagnosis:	ESRD on peritoneal dialysis  Assessment and plan of treatment:	Please continue to follow your dialysis schedule and refer to your primary provider/nephrologist for further care/recommendations. Continue your medications and supplementation as directed.  Secondary Diagnosis:	Acute on chronic systolic heart failure  Assessment and plan of treatment:	Continue recommended medication regimen. Monitor for signs/symptoms of fluid overload and electrolyte abnormalities, such as, shortness of breath, cough, swelling, chest discomfort, changes in heart rate, dizziness, fainting, or changes in mental status. Follow-up with your PCP/cardiologist outpatient after you've been discharged from the hospital.  Secondary Diagnosis:	Elevated troponin  Assessment and plan of treatment:	Likely due to fluid overload. You did not have any chest pain, shortness of breath improved after receiving proper peritoneal dialysis  Secondary Diagnosis:	Hypertension  Assessment and plan of treatment:	Continue current blood pressure medication regimen as directed. Monitor for any visual changes, headaches or dizziness.  Monitor blood pressure regularly.  Follow up with your PCP for further management for high blood pressure, please call to make appointment within 1 week of discharge  Secondary Diagnosis:	Type 2 diabetes mellitus with chronic kidney disease on chronic dialysis, without long-term current use of insulin  Assessment and plan of treatment:	Continue consistent carbohydrate diet.  Monitor blood glucose levels throughout the day before meals and at bedtime.  Record blood sugars and bring to outpatient providers appointment in order to be reviewed by your doctor for management modifications.  Be aware of diabetes management symptoms including feeling cool and clammy may be related to low glucose levels.  Feeling hot and dry may indicate high glucose levels.  If  you feel these symptoms, check your blood sugar.  Make regular podiatry appointments in order to have feet checked for wounds and toe nails cut by a doctor to prevent infections. Principal Discharge DX:	Acute pulmonary edema  Goal:	stable  Assessment and plan of treatment:	Likely due to fluid overload- continue peritoneal dialysis as directed.  Secondary Diagnosis:	ESRD on peritoneal dialysis  Assessment and plan of treatment:	Please continue to follow your dialysis schedule and refer to your primary provider/nephrologist for further care/recommendations. Continue your medications and supplementation as directed.  Secondary Diagnosis:	Acute on chronic systolic heart failure  Assessment and plan of treatment:	Continue recommended medication regimen. Monitor for signs/symptoms of fluid overload and electrolyte abnormalities, such as, shortness of breath, cough, swelling, chest discomfort, changes in heart rate, dizziness, fainting, or changes in mental status. Follow-up with your PCP/cardiologist outpatient after you've been discharged from the hospital.  Secondary Diagnosis:	Elevated troponin  Assessment and plan of treatment:	Likely due to fluid overload. You did not have any chest pain, shortness of breath improved after receiving proper peritoneal dialysis  Secondary Diagnosis:	Hypertension  Assessment and plan of treatment:	Continue current blood pressure medication regimen as directed. Monitor for any visual changes, headaches or dizziness.  Monitor blood pressure regularly.  Follow up with your PCP for further management for high blood pressure, please call to make appointment within 1 week of discharge  Secondary Diagnosis:	Type 2 diabetes mellitus with chronic kidney disease on chronic dialysis, without long-term current use of insulin  Assessment and plan of treatment:	A1c- 8.2, continue with home diabetes medication. Continue consistent carbohydrate diet.  Monitor blood glucose levels throughout the day before meals and at bedtime.  Record blood sugars and bring to outpatient providers appointment in order to be reviewed by your doctor for management modifications.  Be aware of diabetes management symptoms including feeling cool and clammy may be related to low glucose levels.  Feeling hot and dry may indicate high glucose levels.  If  you feel these symptoms, check your blood sugar.  Make regular podiatry appointments in order to have feet checked for wounds and toe nails cut by a doctor to prevent infections. follow up with provider to continue to monitor diabetes

## 2019-02-12 NOTE — DISCHARGE NOTE ADULT - PLAN OF CARE
Continue peritoneal dialysis as directed------------------------- Please continue to follow your dialysis schedule and refer to your primary provider/nephrologist for further care/recommendations. Continue your medications and supplementation as directed. Continue recommended medication regimen. Monitor for signs/symptoms of fluid overload and electrolyte abnormalities, such as, shortness of breath, cough, swelling, chest discomfort, changes in heart rate, dizziness, fainting, or changes in mental status. Follow-up with your PCP/cardiologist outpatient after you've been discharged from the hospital. Continue current blood pressure medication regimen as directed. Monitor for any visual changes, headaches or dizziness.  Monitor blood pressure regularly.  Follow up with your PCP for further management for high blood pressure, please call to make appointment within 1 week of discharge Continue consistent carbohydrate diet.  Monitor blood glucose levels throughout the day before meals and at bedtime.  Record blood sugars and bring to outpatient providers appointment in order to be reviewed by your doctor for management modifications.  Be aware of diabetes management symptoms including feeling cool and clammy may be related to low glucose levels.  Feeling hot and dry may indicate high glucose levels.  If  you feel these symptoms, check your blood sugar.  Make regular podiatry appointments in order to have feet checked for wounds and toe nails cut by a doctor to prevent infections. stable Likely due to fluid overload- continue peritoneal dialysis as directed. Likely due to fluid overload. You did not have any chest pain, shortness of breath improved after receiving proper peritoneal dialysis A1c- 8.2, continue with home diabetes medication. Continue consistent carbohydrate diet.  Monitor blood glucose levels throughout the day before meals and at bedtime.  Record blood sugars and bring to outpatient providers appointment in order to be reviewed by your doctor for management modifications.  Be aware of diabetes management symptoms including feeling cool and clammy may be related to low glucose levels.  Feeling hot and dry may indicate high glucose levels.  If  you feel these symptoms, check your blood sugar.  Make regular podiatry appointments in order to have feet checked for wounds and toe nails cut by a doctor to prevent infections. follow up with provider to continue to monitor diabetes

## 2019-02-12 NOTE — ED ADULT NURSE REASSESSMENT NOTE - NS ED NURSE REASSESS COMMENT FT1
Pt resting quilety appears no acute  discomfort  resp even & unlabored. Pt report to [facility] RN Dominique awaits transport.

## 2019-02-12 NOTE — PROGRESS NOTE ADULT - SUBJECTIVE AND OBJECTIVE BOX
[facility]   MD ROSITA APODACA MD ANGELA WONG, PA    TEL:  OFFICE: [phone]  DR SCOTT CELL: [phone]  DR. JOHNSON CELL: [phone]  EKSHA HOUSTON CELL: [phone]        Patient is a 73y old  Female who presents with a chief complaint of sob (11 Feb 2019 15:48)      Patient seen and examined at bedside. No chest pain/sob    VITALS:  T(F): 97.8 (02-12-19 @ 14:05), Max: 98 (02-11-19 @ 14:56)  HR: 86 (02-12-19 @ 14:05)  BP: 140/76 (02-12-19 @ 14:05)  RR: 19 (02-12-19 @ 14:05)  SpO2: 99% (02-12-19 @ 14:05)  Wt(kg): --    02-11 @ 07:01  -  02-12 @ 07:00  --------------------------------------------------------  IN: 4000 mL / OUT: 2730 mL / NET: 1270 mL    02-12 @ 07:01  -  02-12 @ 14:49  --------------------------------------------------------  IN: 4000 mL / OUT: 5000 mL / NET: -1000 mL          PHYSICAL EXAM:  Constitutional: NAD  Neck: No JVD  Respiratory: b/l crackles  Cardiovascular: S1, S2, RRR  Gastrointestinal: BS+, soft, NT/ND  Extremities: No peripheral edema    Hospital Medications:   MEDICATIONS  (STANDING):  aspirin enteric coated 81 milliGRAM(s) Oral daily  atorvastatin 40 milliGRAM(s) Oral at bedtime  calcitriol   Capsule 0.5 MICROGram(s) Oral daily  calcium acetate 667 milliGRAM(s) Oral three times a day with meals  clopidogrel Tablet 75 milliGRAM(s) Oral daily  dextrose 5%. 1000 milliLiter(s) (50 mL/Hr) IV Continuous <Continuous>  dextrose 50% Injectable 12.5 Gram(s) IV Push once  dextrose 50% Injectable 25 Gram(s) IV Push once  dextrose 50% Injectable 25 Gram(s) IV Push once  heparin  Injectable 5000 Unit(s) SubCutaneous every 12 hours  insulin lispro (HumaLOG) corrective regimen sliding scale   SubCutaneous three times a day before meals  insulin lispro (HumaLOG) corrective regimen sliding scale   SubCutaneous at bedtime  labetalol 300 milliGRAM(s) Oral two times a day  levothyroxine 50 MICROGram(s) Oral daily  multivitamin 1 Tablet(s) Oral daily  sevelamer hydrochloride 800 milliGRAM(s) Oral three times a day      LABS:  02-12    138  |  93<L>  |  79<H>  ----------------------------<  290<H>  4.0   |  20<L>  |  9.38<H>    Ca    10.3      12 Feb 2019 06:28  Phos  6.1     02-12  Mg     1.9     02-12    TPro  6.5  /  Alb  3.4  /  TBili  < 0.2<L>  /  DBili      /  AST  21  /  ALT  17  /  AlkPhos  72  02-12    Creatinine Trend: 9.38 <--, 9.06 <--    Albumin, Serum: 3.4 g/dL (02-12 @ 06:28)  Phosphorus Level, Serum: 6.1 mg/dL (02-12 @ 06:28)                              11.7   7.48  )-----------( 236      ( 12 Feb 2019 06:28 )             38.1     Urine Studies:      .4 (Ca --)      [01-13-19 @ 05:32]   --  .8 (Ca --)      [08-12-18 @ 06:00]   --  HbA1c 8.2      [02-12-19 @ 06:28]  TSH 6.09      [12-14-18 @ 01:08]  Lipid: chol 176, , HDL 32,       [12-14-18 @ 01:08]        RADIOLOGY & ADDITIONAL STUDIES:

## 2019-02-13 DIAGNOSIS — E87.1 HYPO-OSMOLALITY AND HYPONATREMIA: ICD-10-CM

## 2019-02-13 LAB
ANION GAP SERPL CALC-SCNC: 21 MMO/L — HIGH (ref 7–14)
BASOPHILS # BLD AUTO: 0.03 K/UL — SIGNIFICANT CHANGE UP (ref 0–0.2)
BASOPHILS NFR BLD AUTO: 0.4 % — SIGNIFICANT CHANGE UP (ref 0–2)
BUN SERPL-MCNC: 69 MG/DL — HIGH (ref 7–23)
CALCIUM SERPL-MCNC: 10 MG/DL — SIGNIFICANT CHANGE UP (ref 8.4–10.5)
CHLORIDE SERPL-SCNC: 89 MMOL/L — LOW (ref 98–107)
CO2 SERPL-SCNC: 22 MMOL/L — SIGNIFICANT CHANGE UP (ref 22–31)
CREAT SERPL-MCNC: 9.34 MG/DL — HIGH (ref 0.5–1.3)
EOSINOPHIL # BLD AUTO: 0.3 K/UL — SIGNIFICANT CHANGE UP (ref 0–0.5)
EOSINOPHIL NFR BLD AUTO: 4.3 % — SIGNIFICANT CHANGE UP (ref 0–6)
GLUCOSE BLDC GLUCOMTR-MCNC: 235 MG/DL — HIGH (ref 70–99)
GLUCOSE BLDC GLUCOMTR-MCNC: 261 MG/DL — HIGH (ref 70–99)
GLUCOSE BLDC GLUCOMTR-MCNC: 360 MG/DL — HIGH (ref 70–99)
GLUCOSE BLDC GLUCOMTR-MCNC: 366 MG/DL — HIGH (ref 70–99)
GLUCOSE SERPL-MCNC: 209 MG/DL — HIGH (ref 70–99)
HCT VFR BLD CALC: 38.7 % — SIGNIFICANT CHANGE UP (ref 34.5–45)
HGB BLD-MCNC: 12 G/DL — SIGNIFICANT CHANGE UP (ref 11.5–15.5)
IMM GRANULOCYTES NFR BLD AUTO: 0.6 % — SIGNIFICANT CHANGE UP (ref 0–1.5)
LYMPHOCYTES # BLD AUTO: 1.11 K/UL — SIGNIFICANT CHANGE UP (ref 1–3.3)
LYMPHOCYTES # BLD AUTO: 15.8 % — SIGNIFICANT CHANGE UP (ref 13–44)
MAGNESIUM SERPL-MCNC: 1.8 MG/DL — SIGNIFICANT CHANGE UP (ref 1.6–2.6)
MCHC RBC-ENTMCNC: 26.5 PG — LOW (ref 27–34)
MCHC RBC-ENTMCNC: 31 % — LOW (ref 32–36)
MCV RBC AUTO: 85.6 FL — SIGNIFICANT CHANGE UP (ref 80–100)
MONOCYTES # BLD AUTO: 0.97 K/UL — HIGH (ref 0–0.9)
MONOCYTES NFR BLD AUTO: 13.8 % — SIGNIFICANT CHANGE UP (ref 2–14)
NEUTROPHILS # BLD AUTO: 4.59 K/UL — SIGNIFICANT CHANGE UP (ref 1.8–7.4)
NEUTROPHILS NFR BLD AUTO: 65.1 % — SIGNIFICANT CHANGE UP (ref 43–77)
NRBC # FLD: 0 K/UL — LOW (ref 25–125)
PHOSPHATE SERPL-MCNC: 5.8 MG/DL — HIGH (ref 2.5–4.5)
PLATELET # BLD AUTO: 233 K/UL — SIGNIFICANT CHANGE UP (ref 150–400)
PMV BLD: 10.1 FL — SIGNIFICANT CHANGE UP (ref 7–13)
POTASSIUM SERPL-MCNC: 3.6 MMOL/L — SIGNIFICANT CHANGE UP (ref 3.5–5.3)
POTASSIUM SERPL-SCNC: 3.6 MMOL/L — SIGNIFICANT CHANGE UP (ref 3.5–5.3)
RBC # BLD: 4.52 M/UL — SIGNIFICANT CHANGE UP (ref 3.8–5.2)
RBC # FLD: 15.9 % — HIGH (ref 10.3–14.5)
SODIUM SERPL-SCNC: 132 MMOL/L — LOW (ref 135–145)
WBC # BLD: 7.04 K/UL — SIGNIFICANT CHANGE UP (ref 3.8–10.5)
WBC # FLD AUTO: 7.04 K/UL — SIGNIFICANT CHANGE UP (ref 3.8–10.5)

## 2019-02-13 RX ADMIN — HEPARIN SODIUM 5000 UNIT(S): 5000 INJECTION INTRAVENOUS; SUBCUTANEOUS at 19:20

## 2019-02-13 RX ADMIN — Medication 667 MILLIGRAM(S): at 13:03

## 2019-02-13 RX ADMIN — Medication 667 MILLIGRAM(S): at 08:19

## 2019-02-13 RX ADMIN — CLOPIDOGREL BISULFATE 75 MILLIGRAM(S): 75 TABLET, FILM COATED ORAL at 13:03

## 2019-02-13 RX ADMIN — Medication 1 TABLET(S): at 13:03

## 2019-02-13 RX ADMIN — Medication 81 MILLIGRAM(S): at 13:03

## 2019-02-13 RX ADMIN — ATORVASTATIN CALCIUM 40 MILLIGRAM(S): 80 TABLET, FILM COATED ORAL at 23:22

## 2019-02-13 RX ADMIN — Medication 5: at 19:20

## 2019-02-13 RX ADMIN — Medication 5: at 13:04

## 2019-02-13 RX ADMIN — Medication 1 APPLICATION(S): at 07:38

## 2019-02-13 RX ADMIN — Medication 300 MILLIGRAM(S): at 19:20

## 2019-02-13 RX ADMIN — Medication 300 MILLIGRAM(S): at 05:41

## 2019-02-13 RX ADMIN — Medication 3: at 08:19

## 2019-02-13 RX ADMIN — CALCITRIOL 0.5 MICROGRAM(S): 0.5 CAPSULE ORAL at 13:03

## 2019-02-13 RX ADMIN — HEPARIN SODIUM 5000 UNIT(S): 5000 INJECTION INTRAVENOUS; SUBCUTANEOUS at 05:42

## 2019-02-13 RX ADMIN — Medication 50 MICROGRAM(S): at 05:41

## 2019-02-13 NOTE — PROGRESS NOTE ADULT - PROBLEM SELECTOR PLAN 6
likely sec to increase fluid status in ESRD patient and hyperglycemia  UF with PD  optimize DM control

## 2019-02-13 NOTE — PROGRESS NOTE ADULT - PROBLEM SELECTOR PLAN 1
on PD. on cycler at home  plan to continue manual PD here. still fluid overloaded, plan for 1 exchange with Dextrose 4.25% today  Monitor BMP and BP

## 2019-02-13 NOTE — PROGRESS NOTE ADULT - SUBJECTIVE AND OBJECTIVE BOX
[facility]   MD ROSITA APODACA MD ANGELA WONG, PA    TEL:  OFFICE: [phone]  DR SCOTT CELL: [phone]  DR. JOHNSON CELL: [phone]  KESHA HOUSTON CELL: [phone]        Patient is a 73y old  Female who presents with a chief complaint of shortness of breath (13 Feb 2019 12:56)      Patient seen and examined at bedside. No chest pain/sob. feeling much better today    VITALS:  T(F): 97.6 (02-13-19 @ 17:00), Max: 98.6 (02-13-19 @ 05:39)  HR: 63 (02-13-19 @ 17:00)  BP: 136/83 (02-13-19 @ 17:00)  RR: 17 (02-13-19 @ 17:00)  SpO2: 100% (02-13-19 @ 10:08)  Wt(kg): --    02-12 @ 07:01  -  02-13 @ 07:00  --------------------------------------------------------  IN: 8000 mL / OUT: 75378 mL / NET: -2500 mL    02-13 @ 07:01  -  02-13 @ 17:20  --------------------------------------------------------  IN: 6000 mL / OUT: 7800 mL / NET: -1800 mL          PHYSICAL EXAM:  Constitutional: NAD  Neck: No JVD  Respiratory: CTAB, no wheezes, rales or rhonchi  Cardiovascular: S1, S2, RRR  Gastrointestinal: BS+, soft, NT/ND  Extremities: No peripheral edema    Hospital Medications:   MEDICATIONS  (STANDING):  aspirin enteric coated 81 milliGRAM(s) Oral daily  atorvastatin 40 milliGRAM(s) Oral at bedtime  calcitriol   Capsule 0.5 MICROGram(s) Oral daily  calcium acetate 667 milliGRAM(s) Oral three times a day with meals  clopidogrel Tablet 75 milliGRAM(s) Oral daily  dextrose 5%. 1000 milliLiter(s) (50 mL/Hr) IV Continuous <Continuous>  dextrose 50% Injectable 12.5 Gram(s) IV Push once  dextrose 50% Injectable 25 Gram(s) IV Push once  dextrose 50% Injectable 25 Gram(s) IV Push once  heparin  Injectable 5000 Unit(s) SubCutaneous every 12 hours  insulin lispro (HumaLOG) corrective regimen sliding scale   SubCutaneous three times a day before meals  insulin lispro (HumaLOG) corrective regimen sliding scale   SubCutaneous at bedtime  labetalol 300 milliGRAM(s) Oral two times a day  levothyroxine 50 MICROGram(s) Oral daily  multivitamin 1 Tablet(s) Oral daily  sevelamer hydrochloride 800 milliGRAM(s) Oral three times a day      LABS:  02-13    132<L>  |  89<L>  |  69<H>  ----------------------------<  209<H>  3.6   |  22  |  9.34<H>    Ca    10.0      13 Feb 2019 05:15  Phos  5.8     02-13  Mg     1.8     02-13    TPro  6.5  /  Alb  3.4  /  TBili  < 0.2<L>  /  DBili      /  AST  21  /  ALT  17  /  AlkPhos  72  02-12    Creatinine Trend: 9.34 <--, 9.38 <--, 9.06 <--    Phosphorus Level, Serum: 5.8 mg/dL (02-13 @ 05:15)                              12.0   7.04  )-----------( 233      ( 13 Feb 2019 05:15 )             38.7     Urine Studies:      .4 (Ca --)      [01-13-19 @ 05:32]   --  .8 (Ca --)      [08-12-18 @ 06:00]   --  HbA1c 8.2      [02-12-19 @ 06:28]  TSH 6.09      [12-14-18 @ 01:08]  Lipid: chol 176, , HDL 32,       [12-14-18 @ 01:08]        RADIOLOGY & ADDITIONAL STUDIES:

## 2019-02-14 VITALS
OXYGEN SATURATION: 99 % | RESPIRATION RATE: 18 BRPM | DIASTOLIC BLOOD PRESSURE: 78 MMHG | HEART RATE: 75 BPM | SYSTOLIC BLOOD PRESSURE: 121 MMHG | TEMPERATURE: 98 F

## 2019-02-14 LAB
ANION GAP SERPL CALC-SCNC: 24 MMO/L — HIGH (ref 7–14)
BUN SERPL-MCNC: 67 MG/DL — HIGH (ref 7–23)
CALCIUM SERPL-MCNC: 9.6 MG/DL — SIGNIFICANT CHANGE UP (ref 8.4–10.5)
CHLORIDE SERPL-SCNC: 91 MMOL/L — LOW (ref 98–107)
CO2 SERPL-SCNC: 18 MMOL/L — LOW (ref 22–31)
CREAT SERPL-MCNC: 8.57 MG/DL — HIGH (ref 0.5–1.3)
GLUCOSE BLDC GLUCOMTR-MCNC: 216 MG/DL — HIGH (ref 70–99)
GLUCOSE BLDC GLUCOMTR-MCNC: 258 MG/DL — HIGH (ref 70–99)
GLUCOSE BLDC GLUCOMTR-MCNC: 370 MG/DL — HIGH (ref 70–99)
GLUCOSE SERPL-MCNC: 294 MG/DL — HIGH (ref 70–99)
HCT VFR BLD CALC: 38.9 % — SIGNIFICANT CHANGE UP (ref 34.5–45)
HGB BLD-MCNC: 11.9 G/DL — SIGNIFICANT CHANGE UP (ref 11.5–15.5)
MAGNESIUM SERPL-MCNC: 1.9 MG/DL — SIGNIFICANT CHANGE UP (ref 1.6–2.6)
MCHC RBC-ENTMCNC: 26.3 PG — LOW (ref 27–34)
MCHC RBC-ENTMCNC: 30.6 % — LOW (ref 32–36)
MCV RBC AUTO: 85.9 FL — SIGNIFICANT CHANGE UP (ref 80–100)
NRBC # FLD: 0.02 K/UL — LOW (ref 25–125)
PHOSPHATE SERPL-MCNC: 5.7 MG/DL — HIGH (ref 2.5–4.5)
PLATELET # BLD AUTO: 260 K/UL — SIGNIFICANT CHANGE UP (ref 150–400)
PMV BLD: 10.5 FL — SIGNIFICANT CHANGE UP (ref 7–13)
POTASSIUM SERPL-MCNC: 3.7 MMOL/L — SIGNIFICANT CHANGE UP (ref 3.5–5.3)
POTASSIUM SERPL-SCNC: 3.7 MMOL/L — SIGNIFICANT CHANGE UP (ref 3.5–5.3)
RBC # BLD: 4.53 M/UL — SIGNIFICANT CHANGE UP (ref 3.8–5.2)
RBC # FLD: 15.9 % — HIGH (ref 10.3–14.5)
SODIUM SERPL-SCNC: 133 MMOL/L — LOW (ref 135–145)
WBC # BLD: 7.55 K/UL — SIGNIFICANT CHANGE UP (ref 3.8–10.5)
WBC # FLD AUTO: 7.55 K/UL — SIGNIFICANT CHANGE UP (ref 3.8–10.5)

## 2019-02-14 RX ADMIN — HEPARIN SODIUM 5000 UNIT(S): 5000 INJECTION INTRAVENOUS; SUBCUTANEOUS at 05:31

## 2019-02-14 RX ADMIN — Medication 5: at 12:37

## 2019-02-14 RX ADMIN — Medication 667 MILLIGRAM(S): at 12:37

## 2019-02-14 RX ADMIN — Medication 81 MILLIGRAM(S): at 12:37

## 2019-02-14 RX ADMIN — Medication 667 MILLIGRAM(S): at 18:11

## 2019-02-14 RX ADMIN — SEVELAMER CARBONATE 800 MILLIGRAM(S): 2400 POWDER, FOR SUSPENSION ORAL at 12:37

## 2019-02-14 RX ADMIN — Medication 3: at 07:53

## 2019-02-14 RX ADMIN — CLOPIDOGREL BISULFATE 75 MILLIGRAM(S): 75 TABLET, FILM COATED ORAL at 12:37

## 2019-02-14 RX ADMIN — CALCITRIOL 0.5 MICROGRAM(S): 0.5 CAPSULE ORAL at 12:37

## 2019-02-14 RX ADMIN — Medication 300 MILLIGRAM(S): at 05:31

## 2019-02-14 RX ADMIN — Medication 1 TABLET(S): at 12:37

## 2019-02-14 RX ADMIN — Medication 50 MICROGRAM(S): at 05:31

## 2019-02-14 RX ADMIN — Medication 2: at 18:11

## 2019-02-14 RX ADMIN — HEPARIN SODIUM 5000 UNIT(S): 5000 INJECTION INTRAVENOUS; SUBCUTANEOUS at 18:11

## 2019-02-14 RX ADMIN — Medication 667 MILLIGRAM(S): at 07:51

## 2019-02-14 NOTE — PROGRESS NOTE ADULT - PROBLEM SELECTOR PLAN 1
on PD. on cycler at home  plan to continue manual PD here.   Pt with improved fluid status --   Pt seen on dialysis, tolerating well   4 exchanges of 2.5% today  Monitor BMP and BP

## 2019-02-14 NOTE — PROGRESS NOTE ADULT - SUBJECTIVE AND OBJECTIVE BOX
[facility]   MD ROSITA APODACA MD RUORU WONG, PA    TEL:  OFFICE: [phone]  DR SCOTT CELL: [phone]  [place] CELL: [phone]  DR. JOHNSON CELL: [phone]    RENAL FOLLOW UP NOTE  --------------------------------------------------------------------------------  HPI:      Pt seen and examined at bedside.   [place] SOB, chest pain     PAST HISTORY  --------------------------------------------------------------------------------  No significant changes to PMH, PSH, FHx, SHx, unless otherwise noted    ALLERGIES & MEDICATIONS  --------------------------------------------------------------------------------  Allergies    Cipro (Unknown)  Diovan (Unknown)    Intolerances      Standing Inpatient Medications  aspirin enteric coated 81 milliGRAM(s) Oral daily  atorvastatin 40 milliGRAM(s) Oral at bedtime  calcitriol   Capsule 0.5 MICROGram(s) Oral daily  calcium acetate 667 milliGRAM(s) Oral three times a day with meals  clopidogrel Tablet 75 milliGRAM(s) Oral daily  dextrose 5%. 1000 milliLiter(s) IV Continuous <Continuous>  dextrose 50% Injectable 12.5 Gram(s) IV Push once  dextrose 50% Injectable 25 Gram(s) IV Push once  dextrose 50% Injectable 25 Gram(s) IV Push once  heparin  Injectable 5000 Unit(s) SubCutaneous every 12 hours  insulin lispro (HumaLOG) corrective regimen sliding scale   SubCutaneous three times a day before meals  insulin lispro (HumaLOG) corrective regimen sliding scale   SubCutaneous at bedtime  labetalol 300 milliGRAM(s) Oral two times a day  levothyroxine 50 MICROGram(s) Oral daily  multivitamin 1 Tablet(s) Oral daily  sevelamer hydrochloride 800 milliGRAM(s) Oral three times a day    PRN Inpatient Medications  dextrose 40% Gel 15 Gram(s) Oral once PRN  gentamicin 0.1% Cream 1 Application(s) Topical <User Schedule> PRN  glucagon  Injectable 1 milliGRAM(s) IntraMuscular once PRN      REVIEW OF SYSTEMS  --------------------------------------------------------------------------------  General: no fever  CVS: no chest pain  RESP: no sob, no cough      VITALS/PHYSICAL EXAM  --------------------------------------------------------------------------------  T(C): 36.3 (02-14-19 @ 07:51), Max: 36.6 (02-14-19 @ 06:50)  HR: 60 (02-14-19 @ 07:51) (60 - 78)  BP: 119/71 (02-14-19 @ 07:51) (109/64 - 136/83)  RR: 18 (02-14-19 @ 07:51) (17 - 18)  SpO2: 100% (02-14-19 @ 07:51) (100% - 100%)  Wt(kg): --        02-13-19 @ 07:01  -  02-14-19 @ 07:00  --------------------------------------------------------  IN: 38200 mL / OUT: 75335 mL / NET: -2800 mL      Physical Exam:  	Gen: NAD  	HEENT: MMM  	Pulm: CTA B/L  	CV: S1S2  	Abd: Soft, +BS  	Ext: No LE edema B/L                      Neuro: Awake   	Skin: Warm and Dry   	Vascular access: PD catheter    LABS/STUDIES  --------------------------------------------------------------------------------              11.9   7.55  >-----------<  260      [02-14-19 @ 05:34]              38.9     133  |  91  |  67  ----------------------------<  294      [02-14-19 @ 05:34]  3.7   |  18  |  8.57        Ca     9.6     [02-14-19 @ 05:34]      Mg     1.9     [02-14-19 @ 05:34]      Phos  5.7     [02-14-19 @ 05:34]            Creatinine Trend:  SCr 8.57 [02-14 @ 05:34]  SCr 9.34 [02-13 @ 05:15]  SCr 9.38 [02-12 @ 06:28]  SCr 9.06 [02-11 @ 10:24]        .4 (Ca --)      [01-13-19 @ 05:32]   --  .8 (Ca --)      [08-12-18 @ 06:00]   --  HbA1c 8.2      [02-12-19 @ 06:28]  TSH 6.09      [12-14-18 @ 01:08]  Lipid: chol 176, , HDL 32,       [12-14-18 @ 01:08]

## 2019-02-14 NOTE — PROVIDER CONTACT NOTE (OTHER) - ASSESSMENT
Patient AxOx4, no acute distress noted, denies chest pain, SOB, N/V/dizziness at this time. No dyspnea on exertion noted. Provider came to bedside and did an assessment. Family educated by provider. VS taken at bedside, stable as per flowsheet.

## 2019-02-14 NOTE — CHART NOTE - NSCHARTNOTEFT_GEN_A_CORE
ADS NIGHT COVERAGE:    Notified by RN, pt due for discharge, but pts family member at bedside states pt is SOB. Pt seen and examined at bedside. Pt not complaining of SOB or difficulty breathing. She reports feeling a lot better now then when she first came in seeking medical treatment. Physical Exam unremarkable. VSS. Lungs clear to auscultation bilaterally, no wheezes, crackles, or rhonchi. Cardiac exam reveals RRR, +s1 and +s2, no murmurs, gallops, or rubs.     Vital Signs Last 24 Hrs  T(C): 36.5 (14 Feb 2019 20:00), Max: 36.6 (14 Feb 2019 06:50)  T(F): 97.7 (14 Feb 2019 20:00), Max: 97.9 (14 Feb 2019 06:50)  HR: 86 (14 Feb 2019 20:00) (60 - 97)  BP: 112/73 (14 Feb 2019 20:00) (109/64 - 129/73)  BP(mean): --  RR: 17 (14 Feb 2019 20:00) (17 - 18)  SpO2: 100% (14 Feb 2019 15:00) (96% - 100%) ADS NIGHT COVERAGE:    Notified by RN, pt due for discharge, but pts family member at bedside states pt is SOB. Pt seen and examined at bedside. Pt not complaining of SOB or difficulty breathing. She reports feeling a lot better now then when she first came in seeking medical treatment. Transport waiting to assist pt and family member for discharge. Physical Exam unremarkable. VSS. Lungs clear to auscultation bilaterally, no wheezes, crackles, or rhonchi. Cardiac exam reveals RRR, +s1 and +s2, no murmurs, gallops, or rubs.     Vital Signs Last 24 Hrs  T(C): 36.5 (14 Feb 2019 20:00), Max: 36.6 (14 Feb 2019 06:50)  T(F): 97.7 (14 Feb 2019 20:00), Max: 97.9 (14 Feb 2019 06:50)  HR: 86 (14 Feb 2019 20:00) (60 - 97)  BP: 112/73 (14 Feb 2019 20:00) (109/64 - 129/73)  RR: 17 (14 Feb 2019 20:00) (17 - 18)  SpO2: 100% (14 Feb 2019 15:00) (96% - 100%)    A/P:  - Patient Stable with no difficulty breathing  - Proceed with Discharge.     LEVAR Michelle PA-C  ADS [id number]

## 2019-02-14 NOTE — PROGRESS NOTE ADULT - ASSESSMENT
_________________________________________________________________________________________  ========>>  M E D I C A L   A T T E N D I N G    F O L L O W  U P  N O T E  <<=========  -----------------------------------------------------------------------------------------------------    - Patient seen and examined by me approximately sixty minutes ago.   - In summary,  KAPIL CAMPOVERDE is a 73y year old woman who originally presented with SOB  - Patient today overall doing better,  SOB improved , comfortable, eating OK.     ==================>> REVIEW OF SYSTEM <<=================    GEN: no fever, no chills, no pain  RESP:  SOB improved , no cough, no sputum  CVS: no chest pain, no palpitations, no edema  GI: no abdominal pain, no nausea, no constipation, no diarrhea  : no dysuria, no frequency, no hematuria  Neuro: no headache, no dizziness  Derm : no itching, no rash    ==================>> PHYSICAL EXAM <<=================    GEN: A&O X 3 , NAD , comfortable  HEENT: NCAT, PERRL, MMM, hearing intact  Neck: supple , no JVD  CVS: S1S2 , regular , No M/R/G appreciated  PULM: scattered crackles B/L   ABD.: soft. non tender, non distended,  bowel sounds present  Extrem: intact pulses , no edema   PSYCH : normal mood,  not anxious      ==================>> MEDICATIONS <<====================    aspirin enteric coated 81 milliGRAM(s) Oral daily  atorvastatin 40 milliGRAM(s) Oral at bedtime  calcitriol   Capsule 0.5 MICROGram(s) Oral daily  calcium acetate 667 milliGRAM(s) Oral three times a day with meals  clopidogrel Tablet 75 milliGRAM(s) Oral daily  dextrose 5%. 1000 milliLiter(s) IV Continuous <Continuous>  dextrose 50% Injectable 12.5 Gram(s) IV Push once  dextrose 50% Injectable 25 Gram(s) IV Push once  dextrose 50% Injectable 25 Gram(s) IV Push once  heparin  Injectable 5000 Unit(s) SubCutaneous every 12 hours  insulin lispro (HumaLOG) corrective regimen sliding scale   SubCutaneous three times a day before meals  insulin lispro (HumaLOG) corrective regimen sliding scale   SubCutaneous at bedtime  labetalol 300 milliGRAM(s) Oral two times a day  levothyroxine 50 MICROGram(s) Oral daily  multivitamin 1 Tablet(s) Oral daily  sevelamer hydrochloride 800 milliGRAM(s) Oral three times a day    MEDICATIONS  (PRN):  dextrose 40% Gel 15 Gram(s) Oral once PRN Blood Glucose LESS THAN 70 milliGRAM(s)/deciliter  gentamicin 0.1% Cream 1 Application(s) Topical <User Schedule> PRN PD catheter change  glucagon  Injectable 1 milliGRAM(s) IntraMuscular once PRN Glucose LESS THAN 70 milligrams/deciliter    ==================>> VITAL SIGNS <<==================    Vital Signs Last 24 Hrs  T(C): 36.5 (02-13-19 @ 12:19)  T(F): 97.7 (02-13-19 @ 12:19), Max: 98.6 (02-13-19 @ 05:39)  HR: 60 (02-13-19 @ 12:19) (60 - 86)  BP: 123/67 (02-13-19 @ 12:19)  RR: 18 (02-13-19 @ 12:19) (17 - 19)  SpO2: 100% (02-13-19 @ 10:08) (99% - 100%)      POCT Blood Glucose.: 360 mg/dL (13 Feb 2019 13:00)  POCT Blood Glucose.: 261 mg/dL (13 Feb 2019 08:12)  POCT Blood Glucose.: 182 mg/dL (12 Feb 2019 22:32)  POCT Blood Glucose.: 317 mg/dL (12 Feb 2019 17:35)     ==================>> LAB AND IMAGING <<==================                        12.0   7.04  )-----------( 233      ( 13 Feb 2019 05:15 )             38.7        132<L>  |  89<L>  |  69<H>  ----------------------------<  209<H>  3.6   |  22  |  9.34<H>    Ca    10.0      13 Feb 2019 05:15  Phos  5.8     02-13  Mg     1.8     02-13    TPro  6.5  /  Alb  3.4  /  TBili  < 0.2<L>  /  DBili  x   /  AST  21  /  ALT  17  /  AlkPhos  72  02-12    WBC count:   7.04 <<== ,  7.48 <<== ,  9.21 <<==   Hemoglobin:   12.0 <<==,  11.7 <<==,  12.5 <<==  platelets:  233 <==, 236 <==, 228 <==    Creatinine:  9.34  <<==, 9.38  <<==, 9.06  <<==  Sodium:   132  <==, 138  <==, 136  <==       AST:          21 <== , 28 <==      ALT:        17  <== , 19  <==      AP:        72  <=, 76  <=     Bili:        < 0.2  <=, 0.2  <=  ___________________________________________________________________________________  ===============>>  A S S E S S M E N T   A N D   P L A N <<===============  ------------------------------------------------------------------------------------------    · Assessment		   73 year old lady with extensive medical history which includes. ESRD on peritoneal dialysis for the last 2 years, CHF (EF 46%, w/PAH), CAD (2 stents?), PPM, HTN, HLD, T2DM (not on insulin), and hypothyroidism p/w shortness of breath and found to be in pulmonary edema.    Problem/Plan - 1:  ·  Problem: Acute pulmonary edema, likley due to to ESRD on peritoneal dialysis  nephrology appreciated  repeat PD with fluid removal   supplemental O2 as needed  pt with known ischemic cardiomyopathy: needs better fluid mgmt with dialysis  : doubt acute heart failure     elevated troponin and pro-BNP likely related to pt being in renal failure   if chest pain or other symptoms cardio re eval: Dr Jennings      pt denies any cardiac symptoms and has been stable and improved post dialysis      Problem/Plan - 2:  ·  Problem:  Hypoxemia, improved  due to above  Supplemental O2 as needed.     Problem/Plan - 3:  ·  Problem: Dyslipidemia.    Continue home statin.     Problem/Plan - 4:  Problem: Essential hypertension. Plan: Continue home meds  monitor BP, adjust meds if needed.    Problem/Plan - 5:  ·  Problem: Type 2 diabetes mellitus   Insulin sliding scale  A1C w/AM labs.     -GI/DVT Prophylaxis.    --------------------------------------------  Case discussed with pt, PA  Education given on findings and plan of care  ___________________________  H. MARISOL Abreu.  Pager: [phone]
_________________________________________________________________________________________  ========>>  M E D I C A L   A T T E N D I N G    F O L L O W  U P  N O T E  <<=========  -----------------------------------------------------------------------------------------------------    - Patient seen and examined by me approximately sixty minutes ago.   - In summary,  KAPIL CAMPOVERDE is a 73y year old woman who originally presented with SOB  - Patient today overall doing better,  SOB improved , comfortable, eating OK.     ==================>> REVIEW OF SYSTEM <<=================    GEN: no fever, no chills, no pain  RESP:  SOB improved , no cough, no sputum, no ROQUE  CVS: no chest pain, no palpitations, no edema no CP a with ambulation   GI: no abdominal pain, no nausea, no constipation, no diarrhea  : no dysuria, no frequency, no hematuria  Neuro: no headache, no dizziness  Derm : no itching, no rash    ==================>> PHYSICAL EXAM <<=================    GEN: A&O X 3 , NAD , comfortable  HEENT: NCAT, PERRL, MMM, hearing intact  Neck: supple , no JVD  CVS: S1S2 , regular , No M/R/G appreciated  PULM: CTA B/L  ABD.: soft. non tender, non distended,  bowel sounds present  Extrem: intact pulses , no edema   PSYCH : normal mood,  not anxious      ==================>> MEDICATIONS <<====================    aspirin enteric coated 81 milliGRAM(s) Oral daily  atorvastatin 40 milliGRAM(s) Oral at bedtime  calcitriol   Capsule 0.5 MICROGram(s) Oral daily  calcium acetate 667 milliGRAM(s) Oral three times a day with meals  clopidogrel Tablet 75 milliGRAM(s) Oral daily  dextrose 5%. 1000 milliLiter(s) IV Continuous <Continuous>  dextrose 50% Injectable 12.5 Gram(s) IV Push once  dextrose 50% Injectable 25 Gram(s) IV Push once  dextrose 50% Injectable 25 Gram(s) IV Push once  heparin  Injectable 5000 Unit(s) SubCutaneous every 12 hours  insulin lispro (HumaLOG) corrective regimen sliding scale   SubCutaneous three times a day before meals  insulin lispro (HumaLOG) corrective regimen sliding scale   SubCutaneous at bedtime  labetalol 300 milliGRAM(s) Oral two times a day  levothyroxine 50 MICROGram(s) Oral daily  multivitamin 1 Tablet(s) Oral daily  sevelamer hydrochloride 800 milliGRAM(s) Oral three times a day    MEDICATIONS  (PRN):  dextrose 40% Gel 15 Gram(s) Oral once PRN Blood Glucose LESS THAN 70 milliGRAM(s)/deciliter  gentamicin 0.1% Cream 1 Application(s) Topical <User Schedule> PRN PD catheter change  glucagon  Injectable 1 milliGRAM(s) IntraMuscular once PRN Glucose LESS THAN 70 milligrams/deciliter    ==================>> VITAL SIGNS <<==================    Vital Signs Last 24 Hrs  T(C): 36.4 (02-14-19 @ 13:02)  T(F): 97.5 (02-14-19 @ 13:02), Max: 97.9 (02-14-19 @ 06:50)  HR: 64 (02-14-19 @ 13:02) (60 - 78)  BP: 129/73 (02-14-19 @ 13:02)  RR: 17 (02-14-19 @ 13:02) (17 - 18)  SpO2: 96% (02-14-19 @ 13:02) (96% - 100%)      POCT Blood Glucose.: 370 mg/dL (14 Feb 2019 12:37)  POCT Blood Glucose.: 258 mg/dL (14 Feb 2019 07:42)  POCT Blood Glucose.: 235 mg/dL (13 Feb 2019 23:06)  POCT Blood Glucose.: 366 mg/dL (13 Feb 2019 19:15)     ==================>> LAB AND IMAGING <<==================                        11.9   7.55  )-----------( 260      ( 14 Feb 2019 05:34 )             38.9        133<L>  |  91<L>  |  67<H>  ----------------------------<  294<H>  3.7   |  18<L>  |  8.57<H>    Ca    9.6      14 Feb 2019 05:34  Phos  5.7     02-14  Mg     1.9     02-14    WBC count:   7.55 <<== ,  7.04 <<== ,  7.48 <<== ,  9.21 <<==   Hemoglobin:   11.9 <<==,  12.0 <<==,  11.7 <<==,  12.5 <<==  platelets:  260 <==, 233 <==, 236 <==, 228 <==    Creatinine:  8.57  <<==, 9.34  <<==, 9.38  <<==, 9.06  <<==  Sodium:   133  <==, 132  <==, 138  <==, 136  <==       AST:          21 <== , 28 <==      ALT:        17  <== , 19  <==      AP:        72  <=, 76  <=     Bili:        < 0.2  <=, 0.2  <=  ___________________________________________________________________________________  ===============>>  A S S E S S M E N T   A N D   P L A N <<===============  ------------------------------------------------------------------------------------------    · Assessment		   73 year old lady with extensive medical history which includes. ESRD on peritoneal dialysis for the last 2 years, CHF (EF 46%, w/PAH), CAD (2 stents?), PPM, HTN, HLD, T2DM (not on insulin), and hypothyroidism p/w shortness of breath and found to be in pulmonary edema.    Problem/Plan - 1:  ·  Problem: Acute pulmonary edema, likley due to to ESRD on peritoneal dialysis  nephrology appreciated  post repeat PD with fluid removal   overall feels a lot better   pt with known ischemic cardiomyopathy: needs better fluid mgmt with dialysis  : doubt acute heart failure     elevated troponin and pro-BNP likely related to pt being in renal failure       pt denies any cardiac symptoms and has been stable and improved post dialysis      Problem/Plan - 2:  ·  Problem:  Hypoxemia, resolved   due to above  Supplemental O2 as needed.     Problem/Plan - 3:  ·  Problem: Dyslipidemia.    Continue home statin.     Problem/Plan - 4:  Problem: Essential hypertension. Plan: Continue home meds  monitor BP, adjust meds if needed.    Problem/Plan - 5:  ·  Problem: Type 2 diabetes mellitus   Insulin sliding scale  A1C w/AM labs.     -GI/DVT Prophylaxis.    DC plan home if cleared by renal   --------------------------------------------  Case discussed with pt, PA  Education given on findings and plan of care  ___________________________  H. MARISOL Abreu.  Pager: [phone]
_________________________________________________________________________________________  ========>>  M E D I C A L   A T T E N D I N G    F O L L O W  U P  N O T E  <<=========  -----------------------------------------------------------------------------------------------------    - Patient seen and examined by me earlier today.   - In summary,  KAPIL CAMPOVERDE is a 73y year old woman who originally presented with SOB  - Patient today overall doing better but still SOB, comfortable, eating OK.     ==================>> REVIEW OF SYSTEM <<=================    GEN: no fever, no chills, no pain  RESP: + SOB, ++ cough, no sputum  CVS: no chest pain, no palpitations, no edema  GI: no abdominal pain, no nausea, no constipation, no diarrhea  : no dysuria, no frequency, no hematuria  Neuro: no headache, no dizziness  Derm : no itching, no rash    ==================>> PHYSICAL EXAM <<=================    GEN: A&O X 3 , NAD , comfortable  HEENT: NCAT, PERRL, MMM, hearing intact  Neck: supple , no JVD  CVS: S1S2 , regular , No M/R/G appreciated  PULM: scattered crackles B/L   ABD.: soft. non tender, non distended,  bowel sounds present  Extrem: intact pulses , no edema   PSYCH : normal mood,  not anxious      ==================>> MEDICATIONS <<====================    MEDICATIONS  (STANDING):  aspirin enteric coated 81 milliGRAM(s) Oral daily  atorvastatin 40 milliGRAM(s) Oral at bedtime  calcitriol   Capsule 0.5 MICROGram(s) Oral daily  calcium acetate 667 milliGRAM(s) Oral three times a day with meals  clopidogrel Tablet 75 milliGRAM(s) Oral daily  dextrose 5%. 1000 milliLiter(s) (50 mL/Hr) IV Continuous <Continuous>  dextrose 50% Injectable 12.5 Gram(s) IV Push once  dextrose 50% Injectable 25 Gram(s) IV Push once  dextrose 50% Injectable 25 Gram(s) IV Push once  heparin  Injectable 5000 Unit(s) SubCutaneous every 12 hours  insulin lispro (HumaLOG) corrective regimen sliding scale   SubCutaneous three times a day before meals  insulin lispro (HumaLOG) corrective regimen sliding scale   SubCutaneous at bedtime  labetalol 300 milliGRAM(s) Oral two times a day  levothyroxine 50 MICROGram(s) Oral daily  multivitamin 1 Tablet(s) Oral daily  sevelamer hydrochloride 800 milliGRAM(s) Oral three times a day    MEDICATIONS  (PRN):  dextrose 40% Gel 15 Gram(s) Oral once PRN Blood Glucose LESS THAN 70 milliGRAM(s)/deciliter  gentamicin 0.1% Cream 1 Application(s) Topical <User Schedule> PRN PD catheter change  glucagon  Injectable 1 milliGRAM(s) IntraMuscular once PRN Glucose LESS THAN 70 milligrams/deciliter      ==================>> VITAL SIGNS <<==================    T(C): 36.9 (02-12-19 @ 16:40), Max: 36.9 (02-12-19 @ 16:40)  HR: 86 (02-12-19 @ 16:40) (72 - 88)  BP: 145/76 (02-12-19 @ 16:40) (121/67 - 157/88)  RR: 18 (02-12-19 @ 16:40) (17 - 19)  SpO2: 99% (02-12-19 @ 14:05) (98% - 100%)    POCT Blood Glucose.: 313 mg/dL (12 Feb 2019 11:45)  POCT Blood Glucose.: 336 mg/dL (12 Feb 2019 07:44)  POCT Blood Glucose.: 326 mg/dL (11 Feb 2019 22:08)    I&O's Summary    11 Feb 2019 07:01  -  12 Feb 2019 07:00  --------------------------------------------------------  IN: 4000 mL / OUT: 2730 mL / NET: 1270 mL    12 Feb 2019 07:01  -  12 Feb 2019 17:46  --------------------------------------------------------  IN: 6000 mL / OUT: 8100 mL / NET: -2100 mL     ==================>> LAB AND IMAGING <<==================                        11.7   7.48  )-----------( 236      ( 12 Feb 2019 06:28 )             38.1     138  |  93<L>  |  79<H>  ----------------------------<  290<H>  4.0   |  20<L>  |  9.38<H>    Ca    10.3      12 Feb 2019 06:28  Phos  6.1     02-12  Mg     1.9     02-12    TPro  6.5  /  Alb  3.4  /  TBili  < 0.2<L>  /  DBili  x   /  AST  21  /  ALT  17  /  AlkPhos  72  02-12    PT/INR - ( 11 Feb 2019 10:24 )   PT: 12.0 SEC;   INR: 1.08     PTT - ( 11 Feb 2019 10:24 )  PTT:33.1 SEC              HgA1C: 8.2  (02-12-19)        , 6.6  (01-13-19)        , 7.7  (12-14-18)            ___________________________________________________________________________________  ===============>>  A S S E S S M E N T   A N D   P L A N <<===============  ------------------------------------------------------------------------------------------    · Assessment		   73 year old lady with extensive medical history which includes. ESRD on peritoneal dialysis for the last 2 years, CHF (EF 46%, w/PAH), CAD (2 stents?), PPM, HTN, HLD, T2DM (not on insulin), and hypothyroidism p/w shortness of breath and found to be in pulmonary edema.    Problem/Plan - 1:  ·  Problem: Acute pulmonary edema, likley due to to ESRD on peritoneal dialysis  nephrology appreciated  pt getting repeat PD with fluid removal   supplemental O2 as needed  pt with known ischemic cardiomyopathy: needs better fluid mgmt with dialysis  : doubt acute heart failure     elevated troponin and pro-BNP likely related to pt being in renal failure   if chest pain or other symptoms cardio re eval: Dr Jennings     Problem/Plan - 2:  ·  Problem:  Hypoxemia, improved  due to above  Supplemental O2 as needed.     Problem/Plan - 3:  ·  Problem: Dyslipidemia.    Continue home statin.     Problem/Plan - 4:  Problem: Essential hypertension. Plan: Continue home meds  monitor BP, adjust meds if needed.    Problem/Plan - 5:  ·  Problem: Type 2 diabetes mellitus   Insulin sliding scale  A1C w/AM labs.     -GI/DVT Prophylaxis.    --------------------------------------------  Case discussed with pt, RN  Education given on findings and plan of care  ___________________________  H. MARISOL Abreu.  Pager: [phone]
73F, history of CHF, HTN, Dyslipidemia, DM2 , hypothyroidism, ESRD on peritoneal dialysis, last dialysis this AM p/w SOB

## 2019-02-14 NOTE — PROVIDER CONTACT NOTE (OTHER) - SITUATION
Patient arrived back from peritoneal dialysis, patient was already in for discharge. Reviewing/educating patient and family on discharge instructions and had a question about recurrent SOB

## 2019-04-23 ENCOUNTER — INPATIENT (INPATIENT)
Facility: HOSPITAL | Age: 74
LOS: 15 days | Discharge: HOME HEALTH | End: 2019-05-09
Attending: GENERAL PRACTICE | Admitting: GENERAL PRACTICE
Payer: MEDICARE

## 2019-04-23 VITALS
DIASTOLIC BLOOD PRESSURE: 68 MMHG | OXYGEN SATURATION: 98 % | RESPIRATION RATE: 18 BRPM | HEART RATE: 54 BPM | SYSTOLIC BLOOD PRESSURE: 123 MMHG | TEMPERATURE: 98 F

## 2019-04-23 DIAGNOSIS — I50.22 CHRONIC SYSTOLIC (CONGESTIVE) HEART FAILURE: ICD-10-CM

## 2019-04-23 DIAGNOSIS — R06.02 SHORTNESS OF BREATH: ICD-10-CM

## 2019-04-23 DIAGNOSIS — Z95.1 PRESENCE OF AORTOCORONARY BYPASS GRAFT: Chronic | ICD-10-CM

## 2019-04-23 DIAGNOSIS — N18.6 END STAGE RENAL DISEASE: ICD-10-CM

## 2019-04-23 LAB
ALBUMIN SERPL ELPH-MCNC: 3.4 G/DL — SIGNIFICANT CHANGE UP (ref 3.3–5)
ALP SERPL-CCNC: 71 U/L — SIGNIFICANT CHANGE UP (ref 40–120)
ALT FLD-CCNC: 33 U/L — SIGNIFICANT CHANGE UP (ref 4–33)
ANION GAP SERPL CALC-SCNC: 27 MMO/L — HIGH (ref 7–14)
APTT BLD: 30.9 SEC — SIGNIFICANT CHANGE UP (ref 27.5–36.3)
AST SERPL-CCNC: 28 U/L — SIGNIFICANT CHANGE UP (ref 4–32)
BASOPHILS # BLD AUTO: 0.04 K/UL — SIGNIFICANT CHANGE UP (ref 0–0.2)
BASOPHILS NFR BLD AUTO: 0.5 % — SIGNIFICANT CHANGE UP (ref 0–2)
BILIRUB SERPL-MCNC: < 0.2 MG/DL — LOW (ref 0.2–1.2)
BUN SERPL-MCNC: 65 MG/DL — HIGH (ref 7–23)
CALCIUM SERPL-MCNC: 11.1 MG/DL — HIGH (ref 8.4–10.5)
CHLORIDE SERPL-SCNC: 91 MMOL/L — LOW (ref 98–107)
CO2 SERPL-SCNC: 24 MMOL/L — SIGNIFICANT CHANGE UP (ref 22–31)
CREAT SERPL-MCNC: 11.39 MG/DL — HIGH (ref 0.5–1.3)
EOSINOPHIL # BLD AUTO: 0.25 K/UL — SIGNIFICANT CHANGE UP (ref 0–0.5)
EOSINOPHIL NFR BLD AUTO: 3 % — SIGNIFICANT CHANGE UP (ref 0–6)
GLUCOSE SERPL-MCNC: 263 MG/DL — HIGH (ref 70–99)
HCT VFR BLD CALC: 36.6 % — SIGNIFICANT CHANGE UP (ref 34.5–45)
HGB BLD-MCNC: 11.1 G/DL — LOW (ref 11.5–15.5)
IMM GRANULOCYTES NFR BLD AUTO: 0.6 % — SIGNIFICANT CHANGE UP (ref 0–1.5)
INR BLD: 1.07 — SIGNIFICANT CHANGE UP (ref 0.88–1.17)
LYMPHOCYTES # BLD AUTO: 1.19 K/UL — SIGNIFICANT CHANGE UP (ref 1–3.3)
LYMPHOCYTES # BLD AUTO: 14.1 % — SIGNIFICANT CHANGE UP (ref 13–44)
MCHC RBC-ENTMCNC: 25.6 PG — LOW (ref 27–34)
MCHC RBC-ENTMCNC: 30.3 % — LOW (ref 32–36)
MCV RBC AUTO: 84.3 FL — SIGNIFICANT CHANGE UP (ref 80–100)
MONOCYTES # BLD AUTO: 0.95 K/UL — HIGH (ref 0–0.9)
MONOCYTES NFR BLD AUTO: 11.3 % — SIGNIFICANT CHANGE UP (ref 2–14)
NEUTROPHILS # BLD AUTO: 5.94 K/UL — SIGNIFICANT CHANGE UP (ref 1.8–7.4)
NEUTROPHILS NFR BLD AUTO: 70.5 % — SIGNIFICANT CHANGE UP (ref 43–77)
NRBC # FLD: 0.02 K/UL — SIGNIFICANT CHANGE UP (ref 0–0)
NT-PROBNP SERPL-SCNC: SIGNIFICANT CHANGE UP PG/ML
PLATELET # BLD AUTO: 220 K/UL — SIGNIFICANT CHANGE UP (ref 150–400)
PMV BLD: 10.6 FL — SIGNIFICANT CHANGE UP (ref 7–13)
POTASSIUM SERPL-MCNC: 4.7 MMOL/L — SIGNIFICANT CHANGE UP (ref 3.5–5.3)
POTASSIUM SERPL-SCNC: 4.7 MMOL/L — SIGNIFICANT CHANGE UP (ref 3.5–5.3)
PROT SERPL-MCNC: 6.3 G/DL — SIGNIFICANT CHANGE UP (ref 6–8.3)
PROTHROM AB SERPL-ACNC: 11.9 SEC — SIGNIFICANT CHANGE UP (ref 9.8–13.1)
RBC # BLD: 4.34 M/UL — SIGNIFICANT CHANGE UP (ref 3.8–5.2)
RBC # FLD: 18.4 % — HIGH (ref 10.3–14.5)
SODIUM SERPL-SCNC: 142 MMOL/L — SIGNIFICANT CHANGE UP (ref 135–145)
TROPONIN T, HIGH SENSITIVITY: 365 NG/L — CRITICAL HIGH (ref ?–14)
TROPONIN T, HIGH SENSITIVITY: 375 NG/L — CRITICAL HIGH (ref ?–14)
WBC # BLD: 8.42 K/UL — SIGNIFICANT CHANGE UP (ref 3.8–10.5)
WBC # FLD AUTO: 8.42 K/UL — SIGNIFICANT CHANGE UP (ref 3.8–10.5)

## 2019-04-23 PROCEDURE — 71046 X-RAY EXAM CHEST 2 VIEWS: CPT | Mod: 26

## 2019-04-23 RX ORDER — DEXTROSE 50 % IN WATER 50 %
12.5 SYRINGE (ML) INTRAVENOUS ONCE
Qty: 0 | Refills: 0 | Status: DISCONTINUED | OUTPATIENT
Start: 2019-04-23 | End: 2019-05-09

## 2019-04-23 RX ORDER — LABETALOL HCL 100 MG
300 TABLET ORAL
Qty: 0 | Refills: 0 | Status: DISCONTINUED | OUTPATIENT
Start: 2019-04-23 | End: 2019-04-24

## 2019-04-23 RX ORDER — DEXTROSE 50 % IN WATER 50 %
15 SYRINGE (ML) INTRAVENOUS ONCE
Qty: 0 | Refills: 0 | Status: DISCONTINUED | OUTPATIENT
Start: 2019-04-23 | End: 2019-05-09

## 2019-04-23 RX ORDER — ACETAMINOPHEN 500 MG
650 TABLET ORAL EVERY 6 HOURS
Qty: 0 | Refills: 0 | Status: DISCONTINUED | OUTPATIENT
Start: 2019-04-23 | End: 2019-05-06

## 2019-04-23 RX ORDER — DEXTROSE 50 % IN WATER 50 %
25 SYRINGE (ML) INTRAVENOUS ONCE
Qty: 0 | Refills: 0 | Status: DISCONTINUED | OUTPATIENT
Start: 2019-04-23 | End: 2019-05-09

## 2019-04-23 RX ORDER — CALCITRIOL 0.5 UG/1
0.5 CAPSULE ORAL DAILY
Qty: 0 | Refills: 0 | Status: DISCONTINUED | OUTPATIENT
Start: 2019-04-23 | End: 2019-04-24

## 2019-04-23 RX ORDER — ASPIRIN/CALCIUM CARB/MAGNESIUM 324 MG
81 TABLET ORAL DAILY
Qty: 0 | Refills: 0 | Status: DISCONTINUED | OUTPATIENT
Start: 2019-04-23 | End: 2019-05-09

## 2019-04-23 RX ORDER — GLUCAGON INJECTION, SOLUTION 0.5 MG/.1ML
1 INJECTION, SOLUTION SUBCUTANEOUS ONCE
Qty: 0 | Refills: 0 | Status: DISCONTINUED | OUTPATIENT
Start: 2019-04-23 | End: 2019-05-09

## 2019-04-23 RX ORDER — CLOPIDOGREL BISULFATE 75 MG/1
75 TABLET, FILM COATED ORAL DAILY
Qty: 0 | Refills: 0 | Status: DISCONTINUED | OUTPATIENT
Start: 2019-04-23 | End: 2019-05-09

## 2019-04-23 RX ORDER — SODIUM CHLORIDE 9 MG/ML
1000 INJECTION, SOLUTION INTRAVENOUS
Qty: 0 | Refills: 0 | Status: DISCONTINUED | OUTPATIENT
Start: 2019-04-23 | End: 2019-05-09

## 2019-04-23 RX ORDER — SEVELAMER CARBONATE 2400 MG/1
800 POWDER, FOR SUSPENSION ORAL
Qty: 0 | Refills: 0 | Status: DISCONTINUED | OUTPATIENT
Start: 2019-04-23 | End: 2019-04-24

## 2019-04-23 RX ORDER — INSULIN LISPRO 100/ML
VIAL (ML) SUBCUTANEOUS
Qty: 0 | Refills: 0 | Status: DISCONTINUED | OUTPATIENT
Start: 2019-04-23 | End: 2019-05-09

## 2019-04-23 RX ORDER — CALCIUM ACETATE 667 MG
1334 TABLET ORAL
Qty: 0 | Refills: 0 | Status: DISCONTINUED | OUTPATIENT
Start: 2019-04-23 | End: 2019-04-24

## 2019-04-23 RX ORDER — DOCUSATE SODIUM 100 MG
100 CAPSULE ORAL THREE TIMES A DAY
Qty: 0 | Refills: 0 | Status: DISCONTINUED | OUTPATIENT
Start: 2019-04-23 | End: 2019-05-09

## 2019-04-23 RX ORDER — LEVOTHYROXINE SODIUM 125 MCG
50 TABLET ORAL DAILY
Qty: 0 | Refills: 0 | Status: DISCONTINUED | OUTPATIENT
Start: 2019-04-23 | End: 2019-05-09

## 2019-04-23 RX ORDER — ALLOPURINOL 300 MG
300 TABLET ORAL DAILY
Qty: 0 | Refills: 0 | Status: DISCONTINUED | OUTPATIENT
Start: 2019-04-23 | End: 2019-05-04

## 2019-04-23 RX ORDER — ATORVASTATIN CALCIUM 80 MG/1
40 TABLET, FILM COATED ORAL AT BEDTIME
Qty: 0 | Refills: 0 | Status: DISCONTINUED | OUTPATIENT
Start: 2019-04-23 | End: 2019-05-09

## 2019-04-23 RX ADMIN — ATORVASTATIN CALCIUM 40 MILLIGRAM(S): 80 TABLET, FILM COATED ORAL at 22:41

## 2019-04-23 NOTE — ED PROCEDURE NOTE - PROCEDURE ADDITIONAL DETAILS
ED focused limited thoracic [place] [id number]    Focused ED ultrasound of the lungs and pleura:    Findings: Normal lung sliding bilaterally  No signs of interstitial syndrome  No pleural effusions  No pneumonia visualized    Impression: Normal focused lung ultrasound    Procedure note and images placed in chart

## 2019-04-23 NOTE — CONSULT NOTE ADULT - SUBJECTIVE AND OBJECTIVE BOX
[facility] [place] NEPHROLOGY PRACTICE   MD ROSITA APODACA MD ANGELA WONG, PA    TEL:  OFFICE: [phone]  DR SCOTT CELL: [phone]  DR. JOHNSON CELL: [phone]  KESHA HOUSTON CELL: [phone]      HPI:  74F w/ ESRD on PD at home, HTN, DM, CAD s/p CABG and stents, on AC, p/w SOB x1 day. multiple admissions in the past for fluid overload. c/o 2 weeks of dry cough, started feeling very sob last nigh used 4.25% solution for PD slightly better breathing but still sob hence went to the ED. Denies fevers, chills, chest pain, abdominal pain, n/v/d.    Allergies:  Cipro (Unknown)  Diovan (Unknown)      PAST MEDICAL & SURGICAL HISTORY:  ESRD on peritoneal dialysis  Acid reflux  Hypertension  Dyslipidemia  Diabetes mellitus  CAD (coronary artery disease)  S/P CABG (coronary artery bypass graft): in 2012  H/O: hysterectomy  History of total knee replacement b/l  After cataract, bilateral      Home Medications Reviewed    Hospital Medications:   MEDICATIONS  (STANDING):      SOCIAL HISTORY:  Denies ETOh, Smoking,     FAMILY HISTORY:  Family history of early CAD: mother had cad  Family history of diabetes mellitus: mother had dm      REVIEW OF SYSTEMS:  CONSTITUTIONAL: No weakness, fevers or chills  EYES/ENT: No visual changes;  No vertigo or throat pain   NECK: No pain or stiffness  RESPIRATORY: + cough, + shortness of breath  CARDIOVASCULAR: No chest pain or palpitations.  GASTROINTESTINAL: No abdominal or epigastric pain. No nausea, vomiting, or hematemesis; No diarrhea or constipation. No melena or hematochezia.  GENITOURINARY: No dysuria, frequency, foamy urine, urinary urgency, incontinence or hematuria  NEUROLOGICAL: No numbness or weakness  SKIN: No itching, burning, rashes, or lesions   VASCULAR: No bilateral lower extremity edema.   All other review of systems is negative unless indicated above.    VITALS:  T(F): 98 (04-23-19 @ 16:41), Max: 98.4 (04-23-19 @ 12:16)  HR: 75 (04-23-19 @ 16:41)  BP: 121/87 (04-23-19 @ 16:41)  RR: 18 (04-23-19 @ 16:41)  SpO2: 100% (04-23-19 @ 16:41)  Wt(kg): --        PHYSICAL EXAM:  Constitutional: NAD  HEENT: anicteric sclera, oropharynx clear, MMM  Neck: No JVD  Respiratory: CTAB, no wheezes, rales or rhonchi  Cardiovascular: S1, S2, RRR  Gastrointestinal: BS+, soft, NT/ND  Extremities: No cyanosis or clubbing. No peripheral edema  Neurological: A/O x 3, no focal deficits  Psychiatric: Normal mood, normal affect  : No CVA tenderness. No dyson.   Skin: No rashes  Vascular Access: PD catheter    LABS:  04-23    142  |  91<L>  |  65<H>  ----------------------------<  263<H>  4.7   |  24  |  11.39<H>    Ca    11.1<H>      23 Apr 2019 12:12    TPro  6.3  /  Alb  3.4  /  TBili  < 0.2<L>  /  DBili      /  AST  28  /  ALT  33  /  AlkPhos  71  04-23    Creatinine Trend: 11.39 <--                        11.1   8.42  )-----------( 220      ( 23 Apr 2019 12:12 )             36.6     Urine Studies:        RADIOLOGY & ADDITIONAL STUDIES:

## 2019-04-23 NOTE — ED ADULT TRIAGE NOTE - CHIEF COMPLAINT QUOTE
Pt BIBEMS for shortness of breath and low O2, pt states difficulty breathing when laying down, no resp distress while sitting up, pt on 2L at present O2 sat 98%, pt denies any present pain. Pt has peritoneal dialysis everyday.

## 2019-04-23 NOTE — ED PROVIDER NOTE - OBJECTIVE STATEMENT
74F w/ ESRD on PD at home, states she completed course last night, HTN, DM, CAD s/p CABG and stents, on AC, p/w SOB x1 day, worse with laying 74F w/ ESRD on PD at home, states she completed course last night, HTN, DM, CAD s/p CABG and stents, on AC, p/w SOB x1 day, worse with laying down, using 2 pillows at home. Denies fevers, chills, chest pain, abdominal pain, n/v/d.

## 2019-04-23 NOTE — H&P ADULT - PROBLEM SELECTOR PLAN 4
mildly reduced EF on last Echo  fluid overload likely due to renal failure  continue home meds otherwise   monitor   cardio f/u as needed: Dr Jennings

## 2019-04-23 NOTE — ED PROVIDER NOTE - PHYSICAL EXAMINATION
CONSTITUTIONAL: NAD, awake, alert, no respiratory distress  HEAD: Normocephalic; atraumatic  ENMT: External appears normal, MMM  CARD: Normal Sl, S2; no audible murmurs  RESP: Breathing comfortably on RA, normal wob, mild bibasilar rales  ABD: Soft, non-distended; non-tender  EXT: No cyanosis/clubbing/edema, normal ROM in all four extremities  SKIN: Warm, dry, no rashes  NEURO: aaox3, moving all extremities spontaneously

## 2019-04-23 NOTE — CONSULT NOTE ADULT - ASSESSMENT
74F w/ ESRD on PD at home, HTN, DM, CAD s/p CABG and stents, on AC, p/w SOB x1 day.    ESRD on PD  plan for PD today  consent in chart    SOB  multiple admission for fluid overload  however chest xray is clear no edema  r/o cardiac cause     Hypercalcemia  check PTH, phos level  check vit d 1,25 and vit d 25  hold phoslo and calcitriol   monitor  PD with low ca     anemia  at goal   no need for epo 74F w/ ESRD on PD at home, HTN, DM, CAD s/p CABG and stents, on AC, p/w SOB x1 day.    ESRD on PD  plan for PD today  consent in chart  Monitor BMP and BP    SOB  multiple admission for fluid overload  however chest xray is clear no edema  r/o cardiac cause     Hypercalcemia  check PTH, phos level  check vit d 1,25 and vit d 25  hold phoslo and calcitriol   monitor serum calcium      Anemia  at goal for ESRD  no need for epo

## 2019-04-23 NOTE — H&P ADULT - NSICDXPASTMEDICALHX_GEN_ALL_CORE_FT
PAST MEDICAL HISTORY:  Acid reflux     CAD (coronary artery disease)     Diabetes mellitus     Dyslipidemia     ESRD on peritoneal dialysis     Hypertension

## 2019-04-23 NOTE — ED PROVIDER NOTE - ATTENDING CONTRIBUTION TO CARE
Pt was seen and evaluated by me. Pt is a 75 y/o female with PMHx of ESRD on PD, HTN, DM, and CAD s/p CABG who presented to the ED for increased SOB X 1 day. Pt states over the past day having increased SOB, worse with exertion and lying down. Pt denies any fever, chills, nausea, vomiting, chest pain, or abd pain. Rales at bases. RRR. Abd soft, non-tender. No calf tenderness or swelling.

## 2019-04-23 NOTE — H&P ADULT - HISTORY OF PRESENT ILLNESS
HPI:  Carlee Bell is a 73 year old lady with extensive medical history which includes. ESRD on peritoneal dialysis for the last 2 years, CHF (EF 46%, w/PAH), CAD (2 stents?), PPM, HTN, HLD, T2DM (not on insulin), and hypothyroidism. The patient was brought in by ambulance w/c of shortness of breath.   The patient states that she was overall doing ok but over the past day or so she has been having progressing shortness of breath , not being able to lay flat.. decided to come to ED   in ED pt was noted to be fluid overloaded, nephrology consulted and pt now getting another session of peritoneal dialysis       PAST MEDICAL & SURGICAL HISTORY:  ESRD on peritoneal dialysis  Acid reflux  Hypertension  Dyslipidemia  Diabetes mellitus  CAD (coronary artery disease)  S/P CABG (coronary artery bypass graft): in 2012  H/O: hysterectomy  History of total knee replacement b/l  After cataract, bilateral      Review of Systems:   CONSTITUTIONAL: No fever, weight loss, or fatigue  EYES: No eye pain, visual disturbances, or discharge  ENMT:  No difficulty hearing, tinnitus, vertigo; No sinus or throat pain, +nasal congestion  NECK: No pain or stiffness  RESPIRATORY:+ cough, shortness of breath  CARDIOVASCULAR: No chest pain, palpitations, dizziness, or leg swelling  GASTROINTESTINAL: No abdominal or epigastric pain. No nausea, vomiting, or hematemesis; No diarrhea or constipation. No melena or hematochezia.  GENITOURINARY: No dysuria, frequency, hematuria, or incontinence. Not completely anuric  NEUROLOGICAL: No headaches, memory loss, loss of strength, numbness, or tremors  LYMPH NODES: No enlarged glands  ENDOCRINE: No heat or cold intolerance; No hair loss  MUSCULOSKELETAL: No joint pain or swelling; No muscle, back, or extremity pain  PSYCHIATRIC: No depression, anxiety, mood swings, or difficulty sleeping  HEME/LYMPH: No easy bruising, or bleeding gums  ALLERGY AND IMMUNOLOGIC: No hives or eczema    Allergies    Cipro (Unknown)  Diovan (Unknown)    Intolerances        Social History:   Denies smoking, drinking or drug use. Lives at home with her  who has parkinson's disease and dementia and her son who has polio    FAMILY HISTORY:  Family history of early CAD: mother had cad  Family history of diabetes mellitus: mother had dm      MEDICATIONS  (STANDING):  reviewed and reconciled home meds       Vital Signs Last 24 Hrs  T(C): 36.4 (04-23-19 @ 18:15)  T(F): 97.5 (04-23-19 @ 18:15), Max: 98.4 (04-23-19 @ 12:16)  HR: 81 (04-23-19 @ 18:15) (54 - 88)  BP: 118/81 (04-23-19 @ 18:15)  RR: 18 (04-23-19 @ 18:15) (18 - 18)  SpO2: 100% (04-23-19 @ 18:15) (98% - 100%)      PHYSICAL EXAM:  GENERAL: NAD, well-developed, pleasant, speaks in full sentences  HEAD:  Atraumatic, Normocephalic  EYES: EOMI, PERRLA, conjunctiva and sclera clear  CHEST/LUNG: Decreased breath sounds B/L especially towards the bases  HEART: Regular rate and rhythm; No murmurs, S3  ABDOMEN: Soft, Nontender, Nondistended; Bowel sounds present  EXTREMITIES:  2+ Peripheral Pulses, No clubbing, cyanosis, or edema  PSYCH: AAOx3  NEUROLOGY: CN II-XII grossly intact, moving all extremities  SKIN: No rashes or lesions    LABS:    as bellow

## 2019-04-23 NOTE — ED ADULT NURSE NOTE - OBJECTIVE STATEMENT
Pt presents to ED complaining of shortness of breath that started last night. Pt breathing even and unlabored. Pt O2 saturation 100% on room air. Pt performs peritoneal dialysis every night. Pt AxOx3. Pt denies chest pain, lightheadedness and dizziness. pt denies shortness of breath. Breathing even and unlabored. Pt denies abd pain, n/v/d. pt anuric. Pt skin clean dry and intact. 20g IVL placed in the left arm. Will continue to monitor.

## 2019-04-23 NOTE — H&P ADULT - NSICDXFAMILYHX_GEN_ALL_CORE_FT
FAMILY HISTORY:  Family history of diabetes mellitus, mother had dm  Family history of early CAD, mother had cad

## 2019-04-23 NOTE — ED PROVIDER NOTE - CLINICAL SUMMARY MEDICAL DECISION MAKING FREE TEXT BOX
74F w/ ESRD, SOB, appears to be CHF, will check labs and cxr, US, low suspicion for ACS as patient w/o chest pain but will check trops 74F w/ ESRD, SOB, concern for CHF, will check labs and cxr, US, low suspicion for ACS as patient w/o chest pain but will check trops

## 2019-04-23 NOTE — H&P ADULT - PROBLEM SELECTOR PLAN 1
SOB likely due to fluid overload due to renal failure   appreciated renal eval and mgmt  getting fluid removed via PD now  monitor closley  O2 as needed

## 2019-04-23 NOTE — H&P ADULT - NSICDXPASTSURGICALHX_GEN_ALL_CORE_FT
PAST SURGICAL HISTORY:  After cataract, bilateral     H/O: hysterectomy     History of total knee replacement b/l     S/P CABG (coronary artery bypass graft) in 2012

## 2019-04-23 NOTE — ED PROVIDER NOTE - NS ED ROS FT
General: denies fever, chills  HENT: denies nasal congestion, rhinorrhea  Eyes: denies visual changes, blurred vision  CV: denies chest pain, palpitations  Resp: + difficulty breathing, + cough  Abdominal: denies nausea, vomiting, diarrhea, abdominal pain  MSK: denies leg pain, leg swelling  Neuro: denies headaches, numbness  Skin: denies rashes, cuts, bruises

## 2019-04-24 LAB
24R-OH-CALCIDIOL SERPL-MCNC: 24.6 NG/ML — LOW (ref 30–80)
ALBUMIN SERPL ELPH-MCNC: 3.9 G/DL — SIGNIFICANT CHANGE UP (ref 3.3–5)
ALP SERPL-CCNC: 68 U/L — SIGNIFICANT CHANGE UP (ref 40–120)
ALT FLD-CCNC: 31 U/L — SIGNIFICANT CHANGE UP (ref 4–33)
ANION GAP SERPL CALC-SCNC: 27 MMO/L — HIGH (ref 7–14)
AST SERPL-CCNC: 21 U/L — SIGNIFICANT CHANGE UP (ref 4–32)
BASOPHILS # BLD AUTO: 0.05 K/UL — SIGNIFICANT CHANGE UP (ref 0–0.2)
BASOPHILS NFR BLD AUTO: 0.6 % — SIGNIFICANT CHANGE UP (ref 0–2)
BILIRUB SERPL-MCNC: 0.2 MG/DL — SIGNIFICANT CHANGE UP (ref 0.2–1.2)
BUN SERPL-MCNC: 67 MG/DL — HIGH (ref 7–23)
CALCIUM SERPL-MCNC: 11.1 MG/DL — HIGH (ref 8.4–10.5)
CHLORIDE SERPL-SCNC: 90 MMOL/L — LOW (ref 98–107)
CHOLEST SERPL-MCNC: 167 MG/DL — SIGNIFICANT CHANGE UP (ref 120–199)
CO2 SERPL-SCNC: 24 MMOL/L — SIGNIFICANT CHANGE UP (ref 22–31)
CREAT SERPL-MCNC: 11.48 MG/DL — HIGH (ref 0.5–1.3)
EOSINOPHIL # BLD AUTO: 0.25 K/UL — SIGNIFICANT CHANGE UP (ref 0–0.5)
EOSINOPHIL NFR BLD AUTO: 3 % — SIGNIFICANT CHANGE UP (ref 0–6)
GLUCOSE BLDC GLUCOMTR-MCNC: 147 MG/DL — HIGH (ref 70–99)
GLUCOSE BLDC GLUCOMTR-MCNC: 151 MG/DL — HIGH (ref 70–99)
GLUCOSE BLDC GLUCOMTR-MCNC: 163 MG/DL — HIGH (ref 70–99)
GLUCOSE BLDC GLUCOMTR-MCNC: 245 MG/DL — HIGH (ref 70–99)
GLUCOSE BLDC GLUCOMTR-MCNC: 252 MG/DL — HIGH (ref 70–99)
GLUCOSE SERPL-MCNC: 187 MG/DL — HIGH (ref 70–99)
HBA1C BLD-MCNC: 9.8 % — HIGH (ref 4–5.6)
HCT VFR BLD CALC: 38.7 % — SIGNIFICANT CHANGE UP (ref 34.5–45)
HDLC SERPL-MCNC: 45 MG/DL — SIGNIFICANT CHANGE UP (ref 45–65)
HGB BLD-MCNC: 11.8 G/DL — SIGNIFICANT CHANGE UP (ref 11.5–15.5)
IMM GRANULOCYTES NFR BLD AUTO: 0.7 % — SIGNIFICANT CHANGE UP (ref 0–1.5)
LIPID PNL WITH DIRECT LDL SERPL: 99 MG/DL — SIGNIFICANT CHANGE UP
LYMPHOCYTES # BLD AUTO: 1.31 K/UL — SIGNIFICANT CHANGE UP (ref 1–3.3)
LYMPHOCYTES # BLD AUTO: 15.6 % — SIGNIFICANT CHANGE UP (ref 13–44)
MAGNESIUM SERPL-MCNC: 2.1 MG/DL — SIGNIFICANT CHANGE UP (ref 1.6–2.6)
MCHC RBC-ENTMCNC: 25.9 PG — LOW (ref 27–34)
MCHC RBC-ENTMCNC: 30.5 % — LOW (ref 32–36)
MCV RBC AUTO: 85.1 FL — SIGNIFICANT CHANGE UP (ref 80–100)
MONOCYTES # BLD AUTO: 0.81 K/UL — SIGNIFICANT CHANGE UP (ref 0–0.9)
MONOCYTES NFR BLD AUTO: 9.6 % — SIGNIFICANT CHANGE UP (ref 2–14)
NEUTROPHILS # BLD AUTO: 5.94 K/UL — SIGNIFICANT CHANGE UP (ref 1.8–7.4)
NEUTROPHILS NFR BLD AUTO: 70.5 % — SIGNIFICANT CHANGE UP (ref 43–77)
NRBC # FLD: 0 K/UL — SIGNIFICANT CHANGE UP (ref 0–0)
PHOSPHATE SERPL-MCNC: 7.5 MG/DL — HIGH (ref 2.5–4.5)
PLATELET # BLD AUTO: 238 K/UL — SIGNIFICANT CHANGE UP (ref 150–400)
PMV BLD: 10.3 FL — SIGNIFICANT CHANGE UP (ref 7–13)
POTASSIUM SERPL-MCNC: 4.7 MMOL/L — SIGNIFICANT CHANGE UP (ref 3.5–5.3)
POTASSIUM SERPL-SCNC: 4.7 MMOL/L — SIGNIFICANT CHANGE UP (ref 3.5–5.3)
PREALB SERPL-MCNC: 36 MG/DL — SIGNIFICANT CHANGE UP (ref 20–40)
PROT SERPL-MCNC: 6.2 G/DL — SIGNIFICANT CHANGE UP (ref 6–8.3)
PTH-INTACT SERPL-MCNC: 355.8 PG/ML — HIGH (ref 15–65)
RBC # BLD: 4.55 M/UL — SIGNIFICANT CHANGE UP (ref 3.8–5.2)
RBC # FLD: 18.5 % — HIGH (ref 10.3–14.5)
SODIUM SERPL-SCNC: 141 MMOL/L — SIGNIFICANT CHANGE UP (ref 135–145)
TRIGL SERPL-MCNC: 191 MG/DL — HIGH (ref 10–149)
TSH SERPL-MCNC: 8.74 UIU/ML — HIGH (ref 0.27–4.2)
VIT D25+D1,25 OH+D1,25 PNL SERPL-MCNC: 43.7 PG/ML — SIGNIFICANT CHANGE UP (ref 19.9–79.3)
WBC # BLD: 8.42 K/UL — SIGNIFICANT CHANGE UP (ref 3.8–10.5)
WBC # FLD AUTO: 8.42 K/UL — SIGNIFICANT CHANGE UP (ref 3.8–10.5)

## 2019-04-24 RX ORDER — GENTAMICIN SULFATE 0.1 %
1 OINTMENT (GRAM) TOPICAL
Qty: 0 | Refills: 0 | Status: DISCONTINUED | OUTPATIENT
Start: 2019-04-24 | End: 2019-05-09

## 2019-04-24 RX ORDER — LABETALOL HCL 100 MG
200 TABLET ORAL
Qty: 0 | Refills: 0 | Status: DISCONTINUED | OUTPATIENT
Start: 2019-04-24 | End: 2019-04-25

## 2019-04-24 RX ORDER — SEVELAMER CARBONATE 2400 MG/1
1600 POWDER, FOR SUSPENSION ORAL
Qty: 0 | Refills: 0 | Status: DISCONTINUED | OUTPATIENT
Start: 2019-04-24 | End: 2019-04-29

## 2019-04-24 RX ADMIN — Medication 1 TABLET(S): at 12:40

## 2019-04-24 RX ADMIN — Medication 2: at 09:01

## 2019-04-24 RX ADMIN — SEVELAMER CARBONATE 1600 MILLIGRAM(S): 2400 POWDER, FOR SUSPENSION ORAL at 17:27

## 2019-04-24 RX ADMIN — Medication 1: at 17:27

## 2019-04-24 RX ADMIN — SEVELAMER CARBONATE 1600 MILLIGRAM(S): 2400 POWDER, FOR SUSPENSION ORAL at 12:40

## 2019-04-24 RX ADMIN — Medication 300 MILLIGRAM(S): at 12:40

## 2019-04-24 RX ADMIN — CLOPIDOGREL BISULFATE 75 MILLIGRAM(S): 75 TABLET, FILM COATED ORAL at 12:40

## 2019-04-24 RX ADMIN — Medication 50 MICROGRAM(S): at 05:48

## 2019-04-24 RX ADMIN — SEVELAMER CARBONATE 800 MILLIGRAM(S): 2400 POWDER, FOR SUSPENSION ORAL at 09:46

## 2019-04-24 RX ADMIN — Medication 81 MILLIGRAM(S): at 12:40

## 2019-04-24 RX ADMIN — Medication 300 MILLIGRAM(S): at 05:48

## 2019-04-24 RX ADMIN — Medication 100 MILLIGRAM(S): at 22:29

## 2019-04-24 RX ADMIN — Medication 3: at 12:40

## 2019-04-24 RX ADMIN — Medication 1334 MILLIGRAM(S): at 09:45

## 2019-04-24 RX ADMIN — ATORVASTATIN CALCIUM 40 MILLIGRAM(S): 80 TABLET, FILM COATED ORAL at 22:29

## 2019-04-24 NOTE — PROGRESS NOTE ADULT - SUBJECTIVE AND OBJECTIVE BOX
[facility] NEPHROLOGY PRACTICE   MD ROSIAT APODACA MD ANGELA WONG, PA    TEL:  OFFICE: [phone]  DR SCOTT CELL: [phone]  DR. JOHNSON CELL: [phone]  KESHA HOUSTON CELL: [phone]        Patient is a 74y old  Female who presents with a chief complaint of shortness of breath (24 Apr 2019 10:42)      Patient seen and examined at bedside. No chest pain/sob. feeling dizzy and lightheaded     VITALS:  T(F): 97.7 (04-24-19 @ 11:00), Max: 98.4 (04-23-19 @ 12:16)  HR: 60 (04-24-19 @ 11:00)  BP: 101/57 (04-24-19 @ 11:00)  RR: 17 (04-24-19 @ 11:00)  SpO2: 100% (04-24-19 @ 09:48)  Wt(kg): --    04-23 @ 07:01  -  04-24 @ 07:00  --------------------------------------------------------  IN: 2000 mL / OUT: 4100 mL / NET: -2100 mL    04-24 @ 07:01  -  04-24 @ 11:33  --------------------------------------------------------  IN: 2000 mL / OUT: 2300 mL / NET: -300 mL          PHYSICAL EXAM:  Constitutional: NAD  Neck: No JVD  Respiratory: CTAB, no wheezes, rales or rhonchi  Cardiovascular: S1, S2, RRR  Gastrointestinal: BS+, soft, NT/ND  Extremities: No peripheral edema    Hospital Medications:   MEDICATIONS  (STANDING):  allopurinol 300 milliGRAM(s) Oral daily  aspirin enteric coated 81 milliGRAM(s) Oral daily  atorvastatin 40 milliGRAM(s) Oral at bedtime  clopidogrel Tablet 75 milliGRAM(s) Oral daily  dextrose 5%. 1000 milliLiter(s) (50 mL/Hr) IV Continuous <Continuous>  dextrose 50% Injectable 12.5 Gram(s) IV Push once  dextrose 50% Injectable 25 Gram(s) IV Push once  dextrose 50% Injectable 25 Gram(s) IV Push once  docusate sodium 100 milliGRAM(s) Oral three times a day  insulin lispro (HumaLOG) corrective regimen sliding scale   SubCutaneous three times a day before meals  labetalol 300 milliGRAM(s) Oral two times a day  levothyroxine 50 MICROGram(s) Oral daily  multivitamin 1 Tablet(s) Oral daily  sevelamer carbonate 800 milliGRAM(s) Oral three times a day with meals      LABS:  04-24    141  |  90<L>  |  67<H>  ----------------------------<  187<H>  4.7   |  24  |  11.48<H>    Ca    11.1<H>      24 Apr 2019 05:17  Phos  7.5     04-24  Mg     2.1     04-24    TPro  6.2  /  Alb  3.9  /  TBili  0.2  /  DBili      /  AST  21  /  ALT  31  /  AlkPhos  68  04-24    Creatinine Trend: 11.48 <--, 11.39 <--    Phosphorus Level, Serum: 7.5 mg/dL (04-24 @ 05:17)  Albumin, Serum: 3.9 g/dL (04-24 @ 05:17)  Albumin, Serum: 3.4 g/dL (04-23 @ 12:12)    calcium--  intact pth--  parathyroid hormone intact, [id number]                            11.8   8.42  )-----------( 238      ( 24 Apr 2019 05:17 )             38.7     Urine Studies:      .8 (Ca --)      [04-24-19 @ 05:17]   --  .4 (Ca --)      [01-13-19 @ 05:32]   --  .8 (Ca --)      [08-12-18 @ 06:00]   --  HbA1c 9.8      [04-24-19 @ 05:17]  TSH 8.74      [04-24-19 @ 05:17]  Lipid: chol 167, , HDL 45, LDL 99      [04-24-19 @ 05:17]        RADIOLOGY & ADDITIONAL STUDIES:

## 2019-04-24 NOTE — CONSULT NOTE ADULT - ASSESSMENT
1. Mild to mdoerate LV dysfunction  2. CAD s/p CABG s/p PTCI  3. ESRD on peritoneal dialysis  4. recurrent sob without fluid oveload usually responds to more dialysis rule out ischemia as possible cause  5. signicnat orthopnea improved today    Recommend  2 Decho  renal evaluation aggessive dialysis  may need to consider left and right heart cath

## 2019-04-24 NOTE — PROGRESS NOTE ADULT - ASSESSMENT
_________________________________________________________________________________________  ========>>  M E D I C A L   A T T E N D I N G    F O L L O W  U P  N O T E  <<=========  -----------------------------------------------------------------------------------------------------    - Patient seen and examined by me approximately sixty minutes ago.   - In summary,  KAPIL CAMPOVERDE is a 74y year old woman who originally presented with SOB  - Patient today overall doing ok, comfortable, eating OK. overall better post two PD treatments        pt denies having had any chest discomfort     ==================>> REVIEW OF SYSTEM <<=================    GEN: no fever, no chills, no pain  RESP: no SOB now , no cough, no sputum  CVS: no chest pain, no palpitations, no edema  GI: no abdominal pain, no nausea, no constipation, no diarrhea  : no dysuria, no frequency, no hematuria  Neuro: no headache, no dizziness  Derm : no itching, no rash    ==================>> PHYSICAL EXAM <<=================    GEN: A&O X 3 , NAD , comfortable  HEENT: NCAT, PERRL, MMM, hearing intact  Neck: supple , no JVD  CVS: S1S2 , regular , No M/R/G appreciated  PULM: CTA B/L,  decreased BS B/L  ABD.: soft. non tender, non distended,  bowel sounds present  Extrem: intact pulses , no edema   PSYCH : normal mood,  not anxious      ==================>> MEDICATIONS <<====================    MEDICATIONS  (STANDING):  allopurinol 300 milliGRAM(s) Oral daily  aspirin enteric coated 81 milliGRAM(s) Oral daily  atorvastatin 40 milliGRAM(s) Oral at bedtime  calcitriol   Capsule 0.5 MICROGram(s) Oral daily  calcium acetate 1334 milliGRAM(s) Oral three times a day with meals  clopidogrel Tablet 75 milliGRAM(s) Oral daily  dextrose 5%. 1000 milliLiter(s) (50 mL/Hr) IV Continuous <Continuous>  dextrose 50% Injectable 12.5 Gram(s) IV Push once  dextrose 50% Injectable 25 Gram(s) IV Push once  dextrose 50% Injectable 25 Gram(s) IV Push once  docusate sodium 100 milliGRAM(s) Oral three times a day  insulin lispro (HumaLOG) corrective regimen sliding scale   SubCutaneous three times a day before meals  labetalol 300 milliGRAM(s) Oral two times a day  levothyroxine 50 MICROGram(s) Oral daily  multivitamin 1 Tablet(s) Oral daily  sevelamer carbonate 800 milliGRAM(s) Oral three times a day with meals    MEDICATIONS  (PRN):  acetaminophen   Tablet .. 650 milliGRAM(s) Oral every 6 hours PRN Temp greater or equal to 38C (100.4F), Mild Pain (1 - 3)  dextrose 40% Gel 15 Gram(s) Oral once PRN Blood Glucose LESS THAN 70 milliGRAM(s)/deciliter  gentamicin 0.1% Cream 1 Application(s) Topical <User Schedule> PRN Pd catheter dressing change  glucagon  Injectable 1 milliGRAM(s) IntraMuscular once PRN Glucose LESS THAN 70 milligrams/deciliter      ==================>> VITAL SIGNS <<==================    T(C): 36.6 (04-24-19 @ 07:12), Max: 36.9 (04-23-19 @ 12:16)  HR: 59 (04-24-19 @ 07:35) (54 - 88)  BP: 90/60 (04-24-19 @ 07:35) (88/61 - 129/86)  RR: 18 (04-24-19 @ 07:12) (18 - 18)  SpO2: 100% (04-24-19 @ 07:12) (98% - 100%)    POCT Blood Glucose.: 245 mg/dL (24 Apr 2019 08:42)  POCT Blood Glucose.: 163 mg/dL (24 Apr 2019 06:20)    I&O's Summary    23 Apr 2019 07:01  -  24 Apr 2019 07:00  --------------------------------------------------------  IN: 2000 mL / OUT: 4100 mL / NET: -2100 mL    24 Apr 2019 07:01  -  24 Apr 2019 09:32  --------------------------------------------------------  IN: 2000 mL / OUT: 2300 mL / NET: -300 mL       ==================>> LAB AND IMAGING <<==================                        11.8   8.42  )-----------( 238      ( 24 Apr 2019 05:17 )             38.7        04-24    141  |  90<L>  |  67<H>  ----------------------------<  187<H>  4.7   |  24  |  11.48<H>    Ca    11.1<H>      24 Apr 2019 05:17  Phos  7.5     04-24  Mg     2.1     04-24    TPro  6.2  /  Alb  3.9  /  TBili  0.2  /  DBili  x   /  AST  21  /  ALT  31  /  AlkPhos  68  04-24    PT/INR - ( 23 Apr 2019 12:12 )   PT: 11.9 SEC;   INR: 1.07     PTT - ( 23 Apr 2019 12:12 )  PTT:30.9 SEC              ~~ High Sensitivity Troponin  ~~  365  <<--, 375  <<--     TSH:      8.74   (04-24-19)           Lipid profile:  (04-24-19)     Total: 167     LDL  : 99     HDL  :45     TG   :191     HgA1C: 9.8  (04-24-19)        , 8.2  (02-12-19)        , 6.6  (01-13-19)            ___________________________________________________________________________________  ===============>>  A S S E S S M E N T   A N D   P L A N <<===============  ------------------------------------------------------------------------------------------    · Assessment		  SOB    ESRD  mild systolic CHF  demand ischemia  DM    Problem/Plan - 1:  ·  Problem: Shortness of breath       likely due to fluid overload due to renal failure   appreciated renal eval and mgmt  fluid removal via PD   monitor closley  O2 as needed. and wean off as able     Problem/Plan - 2:  ·  Problem: ESRD on peritoneal dialysis.    nephro follow up and mgmtt appreciated   dialysis as above.     Problem/Plan - 3:  ·  Problem: Hypoxemia / acute hypoxemic respiratory failure due to pulmonary edema    Supplemental O2 as needed. and wean as able     Problem/Plan - 4:  ·  Problem: Chronic systolic congestive heart failure, with demand ischemia given elevated troponing in this pt with ESRD     mildly reduced EF on last Echo  fluid overload likely due to renal failure  continue home meds otherwise  pt denies chest pain    monitor   cardio f/u as needed: Dr Jennings. ( made aware)     Problem/Plan - 5:  ·  Problem: Dyslipidemia.    Continue home statin.     Problem/Plan - 6:  Problem: Essential hypertension. Plan: Continue home meds  monitor BP, adjust meds if needed.    Problem/Plan - 7:  ·  Problem: Type 2 diabetes mellitus with chronic kidney disease on chronic dialysis, without long-term current use of insulin.  Plan: Insulin sliding scale  A1C w/AM labs.     chino   --------------------------------------------  Case discussed with pt  Education given on findings and plan of care  ___________________________  H. MARISOL Abreu.  Pager: [phone]

## 2019-04-24 NOTE — PROGRESS NOTE ADULT - ASSESSMENT
74F w/ ESRD on PD at home, HTN, DM, CAD s/p CABG and stents, on AC, p/w SOB x1 day.    ESRD on PD  plan for PD today  Monitor BMP and BP    SOB  multiple admission for fluid overload  however chest xray is clear no edema  r/o cardiac cause   f/u cardio eval    Hypercalcemia  elevated PTH, phos level  pending vit d 1,25 and vit d 25  hold phoslo and calcitriol   increase renagel 1600 tid with meal  monitor serum calcium    Anemia  at goal for ESRD  no need for epo    HTN  on labetalol 300 bid  symptomatic hypotensive  decrease labetalol 200mg bid  monitor bp 74F w/ ESRD on PD at home, HTN, DM, CAD s/p CABG and stents, on AC, p/w SOB x1 day.    ESRD on PD  Continue PD with 4 exchanges daily  However pt hypotensive, unable to tolerate aggressive fluid removal  Continue PD with UF as tolerated  Monitor BMP and BP    SOB  multiple admission for fluid overload  however chest xray is clear no edema  Cardiology follow up -- May need cath     Hypercalcemia  Elevated PTH, phos level  Pending vit d 1,25 and vit d 25  Hold calcitriol   Increase renagel 1600 tid with meal  Monitor serum calcium    Anemia  At goal for ESRD  No need for epo    HTN  on labetalol 300 bid  symptomatic hypotensive  decrease labetalol 200mg bid  monitor bp

## 2019-04-25 LAB
ANION GAP SERPL CALC-SCNC: 26 MMO/L — HIGH (ref 7–14)
BASOPHILS # BLD AUTO: 0.04 K/UL — SIGNIFICANT CHANGE UP (ref 0–0.2)
BASOPHILS NFR BLD AUTO: 0.5 % — SIGNIFICANT CHANGE UP (ref 0–2)
BUN SERPL-MCNC: 67 MG/DL — HIGH (ref 7–23)
CALCIUM SERPL-MCNC: 10.6 MG/DL — HIGH (ref 8.4–10.5)
CHLORIDE SERPL-SCNC: 87 MMOL/L — LOW (ref 98–107)
CO2 SERPL-SCNC: 24 MMOL/L — SIGNIFICANT CHANGE UP (ref 22–31)
CREAT SERPL-MCNC: 10.69 MG/DL — HIGH (ref 0.5–1.3)
EOSINOPHIL # BLD AUTO: 0.26 K/UL — SIGNIFICANT CHANGE UP (ref 0–0.5)
EOSINOPHIL NFR BLD AUTO: 3 % — SIGNIFICANT CHANGE UP (ref 0–6)
GLUCOSE BLDC GLUCOMTR-MCNC: 231 MG/DL — HIGH (ref 70–99)
GLUCOSE BLDC GLUCOMTR-MCNC: 231 MG/DL — HIGH (ref 70–99)
GLUCOSE BLDC GLUCOMTR-MCNC: 323 MG/DL — HIGH (ref 70–99)
GLUCOSE BLDC GLUCOMTR-MCNC: 342 MG/DL — HIGH (ref 70–99)
GLUCOSE SERPL-MCNC: 261 MG/DL — HIGH (ref 70–99)
HBV SURFACE AG SER-ACNC: NEGATIVE — SIGNIFICANT CHANGE UP
HCT VFR BLD CALC: 34.5 % — SIGNIFICANT CHANGE UP (ref 34.5–45)
HGB BLD-MCNC: 10.7 G/DL — LOW (ref 11.5–15.5)
IMM GRANULOCYTES NFR BLD AUTO: 0.6 % — SIGNIFICANT CHANGE UP (ref 0–1.5)
LYMPHOCYTES # BLD AUTO: 1.44 K/UL — SIGNIFICANT CHANGE UP (ref 1–3.3)
LYMPHOCYTES # BLD AUTO: 16.8 % — SIGNIFICANT CHANGE UP (ref 13–44)
MAGNESIUM SERPL-MCNC: 1.9 MG/DL — SIGNIFICANT CHANGE UP (ref 1.6–2.6)
MCHC RBC-ENTMCNC: 25.7 PG — LOW (ref 27–34)
MCHC RBC-ENTMCNC: 31 % — LOW (ref 32–36)
MCV RBC AUTO: 82.7 FL — SIGNIFICANT CHANGE UP (ref 80–100)
MONOCYTES # BLD AUTO: 1.02 K/UL — HIGH (ref 0–0.9)
MONOCYTES NFR BLD AUTO: 11.9 % — SIGNIFICANT CHANGE UP (ref 2–14)
NEUTROPHILS # BLD AUTO: 5.76 K/UL — SIGNIFICANT CHANGE UP (ref 1.8–7.4)
NEUTROPHILS NFR BLD AUTO: 67.2 % — SIGNIFICANT CHANGE UP (ref 43–77)
NRBC # FLD: 0.04 K/UL — SIGNIFICANT CHANGE UP (ref 0–0)
PHOSPHATE SERPL-MCNC: 6.7 MG/DL — HIGH (ref 2.5–4.5)
PLATELET # BLD AUTO: 212 K/UL — SIGNIFICANT CHANGE UP (ref 150–400)
PMV BLD: 10.2 FL — SIGNIFICANT CHANGE UP (ref 7–13)
POTASSIUM SERPL-MCNC: 4.1 MMOL/L — SIGNIFICANT CHANGE UP (ref 3.5–5.3)
POTASSIUM SERPL-SCNC: 4.1 MMOL/L — SIGNIFICANT CHANGE UP (ref 3.5–5.3)
RBC # BLD: 4.17 M/UL — SIGNIFICANT CHANGE UP (ref 3.8–5.2)
RBC # FLD: 17.8 % — HIGH (ref 10.3–14.5)
SODIUM SERPL-SCNC: 137 MMOL/L — SIGNIFICANT CHANGE UP (ref 135–145)
WBC # BLD: 8.57 K/UL — SIGNIFICANT CHANGE UP (ref 3.8–10.5)
WBC # FLD AUTO: 8.57 K/UL — SIGNIFICANT CHANGE UP (ref 3.8–10.5)

## 2019-04-25 PROCEDURE — 93306 TTE W/DOPPLER COMPLETE: CPT | Mod: 26

## 2019-04-25 RX ORDER — LABETALOL HCL 100 MG
100 TABLET ORAL
Qty: 0 | Refills: 0 | Status: DISCONTINUED | OUTPATIENT
Start: 2019-04-25 | End: 2019-04-28

## 2019-04-25 RX ADMIN — Medication 2: at 08:55

## 2019-04-25 RX ADMIN — CLOPIDOGREL BISULFATE 75 MILLIGRAM(S): 75 TABLET, FILM COATED ORAL at 19:33

## 2019-04-25 RX ADMIN — Medication 81 MILLIGRAM(S): at 19:33

## 2019-04-25 RX ADMIN — SEVELAMER CARBONATE 1600 MILLIGRAM(S): 2400 POWDER, FOR SUSPENSION ORAL at 08:57

## 2019-04-25 RX ADMIN — Medication 50 MICROGRAM(S): at 05:02

## 2019-04-25 RX ADMIN — ATORVASTATIN CALCIUM 40 MILLIGRAM(S): 80 TABLET, FILM COATED ORAL at 22:46

## 2019-04-25 RX ADMIN — Medication 300 MILLIGRAM(S): at 19:34

## 2019-04-25 RX ADMIN — Medication 100 MILLIGRAM(S): at 05:02

## 2019-04-25 NOTE — PROGRESS NOTE ADULT - ASSESSMENT
1. recurerent SOB/CHF improved with dialysis  2. s/p pacemaker  3. s/p CABG S/P PTCI of left main proximal cx patent graft to LAD  4. worsening LV function since december 2018  5. hypothyroid    Recommend  start unloading hydralazine 10 TID add nitrates as BP tolerates  nuclear stress test  will consider cath pending results of nuclear stress  prognosis is guarded

## 2019-04-25 NOTE — PROGRESS NOTE ADULT - SUBJECTIVE AND OBJECTIVE BOX
Subjective: Patient seen and examined. No new events except as noted.   Patient feels better less sob no cp  echo today LV function has deteriorated since last echo    MEDICATIONS:  MEDICATIONS  (STANDING):  allopurinol 300 milliGRAM(s) Oral daily  aspirin enteric coated 81 milliGRAM(s) Oral daily  atorvastatin 40 milliGRAM(s) Oral at bedtime  clopidogrel Tablet 75 milliGRAM(s) Oral daily  dextrose 5%. 1000 milliLiter(s) (50 mL/Hr) IV Continuous <Continuous>  dextrose 50% Injectable 12.5 Gram(s) IV Push once  dextrose 50% Injectable 25 Gram(s) IV Push once  dextrose 50% Injectable 25 Gram(s) IV Push once  docusate sodium 100 milliGRAM(s) Oral three times a day  insulin lispro (HumaLOG) corrective regimen sliding scale   SubCutaneous three times a day before meals  labetalol 100 milliGRAM(s) Oral two times a day  levothyroxine 50 MICROGram(s) Oral daily  multivitamin 1 Tablet(s) Oral daily  sevelamer carbonate 1600 milliGRAM(s) Oral three times a day with meals      PHYSICAL EXAM:  T(C): 36.6 (04-25-19 @ 15:45), Max: 37.1 (04-25-19 @ 05:01)  HR: 69 (04-25-19 @ 15:45) (69 - 87)  BP: 105/68 (04-25-19 @ 15:45) (103/66 - 144/84)  RR: 18 (04-25-19 @ 15:45) (15 - 18)  SpO2: 100% (04-25-19 @ 14:28) (99% - 100%)  Wt(kg): --  I&O's Summary    24 Apr 2019 07:01  -  25 Apr 2019 07:00  --------------------------------------------------------  IN: 8000 mL / OUT: 8800 mL / NET: -800 mL    25 Apr 2019 07:01  -  25 Apr 2019 18:27  --------------------------------------------------------  IN: 6000 mL / OUT: 7300 mL / NET: -1300 mL          Appearance: Normal NAD able lie flat today	  HEENT:   Normal oral mucosa, PERRL, EOMI	  Cardiovascular: Normal S1 S2, No JVD, No murmurs ,  Respiratory: Lungs clear to auscultation, normal effort 	  Gastrointestinal:  Soft, Non-tender, + BS	  Psychiatry:  Mood & affect appropriate  Ext: No edema  Peripheral pulses palpable 2+ bilaterally      LABS:    CARDIAC MARKERS:                                10.7   8.57  )-----------( 212      ( 25 Apr 2019 05:22 )             34.5     04-25    137  |  87<L>  |  67<H>  ----------------------------<  261<H>  4.1   |  24  |  10.69<H>    Ca    10.6<H>      25 Apr 2019 05:22  Phos  6.7     04-25  Mg     1.9     04-25    TPro  6.2  /  Alb  3.9  /  TBili  0.2  /  DBili  x   /  AST  21  /  ALT  31  /  AlkPhos  68  04-24    proBNP:   Lipid Profile:   HgA1c:   TSH:     < from: TTE with Doppler (w/Cont) (04.25.19 @ 10:08) >  CONCLUSIONS:  1. Moderate-severe mitral regurgitation.  2. Moderate left ventricular enlargement.  3. Severe global left ventricular systolic dysfunction.  Endocardial visualization enhanced with intravenous  injection of echo contrast (Definity). No left ventricular  thrombus.  4. Mild diastolic dysfunction (Stage I).  5. Normal right ventricular size and function.  *** Compared with echocardiogram of 12/15/2018, LV function  has deteriorated significantly.  ------------------------------------------------------------------------  Confirmed on  4/25/2019 - 11:28:47 by Amauri Purvis M.D.  ------------------------------------------------------------------------    < end of copied text >

## 2019-04-25 NOTE — PROGRESS NOTE ADULT - SUBJECTIVE AND OBJECTIVE BOX
[facility] [place] [facility]   MD ROSITA APODACA MD ANGELA WONG, PA    TEL:  OFFICE: [phone]  DR SCOTT CELL: [phone]  DR. JOHNSON CELL: [phone]  KESHA HOUSTON CELL: [phone]        Patient is a 74y old  Female who presents with a chief complaint of shortness of breath (25 Apr 2019 15:42)      Patient seen and examined at bedside. No chest pain/sob    VITALS:  T(F): 97.8 (04-25-19 @ 15:45), Max: 98.7 (04-25-19 @ 05:01)  HR: 69 (04-25-19 @ 15:45)  BP: 105/68 (04-25-19 @ 15:45)  RR: 18 (04-25-19 @ 15:45)  SpO2: 100% (04-25-19 @ 14:28)  Wt(kg): --    04-24 @ 07:01  -  04-25 @ 07:00  --------------------------------------------------------  IN: 8000 mL / OUT: 8800 mL / NET: -800 mL    04-25 @ 07:01  -  04-25 @ 16:39  --------------------------------------------------------  IN: 6000 mL / OUT: 7300 mL / NET: -1300 mL          PHYSICAL EXAM:  Constitutional: NAD  Neck: No JVD  Respiratory: CTAB, no wheezes, rales or rhonchi  Cardiovascular: S1, S2, RRR  Gastrointestinal: BS+, soft, NT/ND  Extremities: No peripheral edema    Hospital Medications:   MEDICATIONS  (STANDING):  allopurinol 300 milliGRAM(s) Oral daily  aspirin enteric coated 81 milliGRAM(s) Oral daily  atorvastatin 40 milliGRAM(s) Oral at bedtime  clopidogrel Tablet 75 milliGRAM(s) Oral daily  dextrose 5%. 1000 milliLiter(s) (50 mL/Hr) IV Continuous <Continuous>  dextrose 50% Injectable 12.5 Gram(s) IV Push once  dextrose 50% Injectable 25 Gram(s) IV Push once  dextrose 50% Injectable 25 Gram(s) IV Push once  docusate sodium 100 milliGRAM(s) Oral three times a day  insulin lispro (HumaLOG) corrective regimen sliding scale   SubCutaneous three times a day before meals  labetalol 200 milliGRAM(s) Oral two times a day  levothyroxine 50 MICROGram(s) Oral daily  multivitamin 1 Tablet(s) Oral daily  sevelamer carbonate 1600 milliGRAM(s) Oral three times a day with meals      LABS:  04-25    137  |  87<L>  |  67<H>  ----------------------------<  261<H>  4.1   |  24  |  10.69<H>    Ca    10.6<H>      25 Apr 2019 05:22  Phos  6.7     04-25  Mg     1.9     04-25    TPro  6.2  /  Alb  3.9  /  TBili  0.2  /  DBili      /  AST  21  /  ALT  31  /  AlkPhos  68  04-24    Creatinine Trend: 10.69 <--, 11.48 <--, 11.39 <--    Phosphorus Level, Serum: 6.7 mg/dL (04-25 @ 05:22)                              10.7   8.57  )-----------( 212      ( 25 Apr 2019 05:22 )             34.5     Urine Studies:      .8 (Ca --)      [04-24-19 @ 05:17]   --  .4 (Ca --)      [01-13-19 @ 05:32]   --  .8 (Ca --)      [08-12-18 @ 06:00]   --  Vitamin D (25OH) 24.6      [04-24-19 @ 05:17]  HbA1c 9.8      [04-24-19 @ 05:17]  TSH 8.74      [04-24-19 @ 05:17]  Lipid: chol 167, , HDL 45, LDL 99      [04-24-19 @ 05:17]    HBsAg NEGATIVE      [04-25-19 @ 14:53]      RADIOLOGY & ADDITIONAL STUDIES:

## 2019-04-25 NOTE — PROGRESS NOTE ADULT - ASSESSMENT
_________________________________________________________________________________________  ========>>  M E D I C A L   A T T E N D I N G    F O L L O W  U P  N O T E  <<=========  -----------------------------------------------------------------------------------------------------    - Patient seen and examined by me approximately sixty minutes ago.   - In summary,  KAPIL CAMPOVERDE is a 74y year old woman who originally presented with SOB  - Patient today overall doing ok, comfortable, eating OK. overall better post two PD treatments, off O2        pt denies having had any chest discomfort but still feels weak and get SOB with ambulation     ==================>> REVIEW OF SYSTEM <<=================    GEN: no fever, no chills, no pain  RESP: + SOB only with ambulation , no cough, no sputum  CVS: no chest pain, no palpitations, no edema  GI: no abdominal pain, no nausea, no constipation, no diarrhea  : no dysuria, no frequency, no hematuria  Neuro: no headache, no dizziness  Derm : no itching, no rash    ==================>> PHYSICAL EXAM <<=================    GEN: A&O X 3 , NAD , comfortable  HEENT: NCAT, PERRL, MMM, hearing intact  Neck: supple , no JVD  CVS: S1S2 , regular , No M/R/G appreciated  PULM: CTA B/L,  decreased BS B/L  ABD.: soft. non tender, non distended,  bowel sounds present  Extrem: intact pulses , no edema   PSYCH : normal mood,  not anxious      ==================>> MEDICATIONS <<====================    allopurinol 300 milliGRAM(s) Oral daily  aspirin enteric coated 81 milliGRAM(s) Oral daily  atorvastatin 40 milliGRAM(s) Oral at bedtime  clopidogrel Tablet 75 milliGRAM(s) Oral daily  dextrose 5%. 1000 milliLiter(s) IV Continuous <Continuous>  dextrose 50% Injectable 12.5 Gram(s) IV Push once  dextrose 50% Injectable 25 Gram(s) IV Push once  dextrose 50% Injectable 25 Gram(s) IV Push once  docusate sodium 100 milliGRAM(s) Oral three times a day  insulin lispro (HumaLOG) corrective regimen sliding scale   SubCutaneous three times a day before meals  labetalol 200 milliGRAM(s) Oral two times a day  levothyroxine 50 MICROGram(s) Oral daily  multivitamin 1 Tablet(s) Oral daily  sevelamer carbonate 1600 milliGRAM(s) Oral three times a day with meals    MEDICATIONS  (PRN):  acetaminophen   Tablet .. 650 milliGRAM(s) Oral every 6 hours PRN Temp greater or equal to 38C (100.4F), Mild Pain (1 - 3)  dextrose 40% Gel 15 Gram(s) Oral once PRN Blood Glucose LESS THAN 70 milliGRAM(s)/deciliter  gentamicin 0.1% Cream 1 Application(s) Topical <User Schedule> PRN Pd catheter dressing change  glucagon  Injectable 1 milliGRAM(s) IntraMuscular once PRN Glucose LESS THAN 70 milligrams/deciliter    ==================>> VITAL SIGNS <<==================    Vital Signs Last 24 Hrs  T(C): 36.8 (04-25-19 @ 14:28)  T(F): 98.2 (04-25-19 @ 14:28), Max: 98.7 (04-25-19 @ 05:01)  HR: 69 (04-25-19 @ 14:28) (69 - 87)  BP: 121/67 (04-25-19 @ 14:28)  RR: 16 (04-25-19 @ 14:28) (15 - 17)  SpO2: 100% (04-25-19 @ 14:28) (99% - 100%)      POCT Blood Glucose.: 231 mg/dL (25 Apr 2019 12:24)  POCT Blood Glucose.: 231 mg/dL (25 Apr 2019 08:14)  POCT Blood Glucose.: 147 mg/dL (24 Apr 2019 22:25)  POCT Blood Glucose.: 151 mg/dL (24 Apr 2019 16:55)     ==================>> LAB AND IMAGING <<==================                        10.7   8.57  )-----------( 212      ( 25 Apr 2019 05:22 )             34.5        137  |  87<L>  |  67<H>  ----------------------------<  261<H>  4.1   |  24  |  10.69<H>    Ca    10.6<H>      25 Apr 2019 05:22  Phos  6.7     04-25  Mg     1.9     04-25    TPro  6.2  /  Alb  3.9  /  TBili  0.2  /  DBili  x   /  AST  21  /  ALT  31  /  AlkPhos  68  04-24    WBC count:   8.57 <<== ,  8.42 <<== ,  8.42 <<==   Hemoglobin:   10.7 <<==,  11.8 <<==,  11.1 <<==  platelets:  212 <==, 238 <==, 220 <==    Creatinine:  10.69  <<==, 11.48  <<==, 11.39  <<==  Sodium:   137  <==, 141  <==, 142  <==       AST:          21 <== , 28 <==      ALT:        31  <== , 33  <==      AP:        68  <=, 71  <=     Bili:        0.2  <=, < 0.2  <=    < from: TTE with Doppler (w/Cont) (04.25.19 @ 10:08) >  CONCLUSIONS:  1. Moderate-severe mitral regurgitation.  2. Moderate left ventricular enlargement.  3. Severe global left ventricular systolic dysfunction.  Endocardial visualization enhanced with intravenous  injection of echo contrast (Definity). No left ventricular  thrombus.  4. Mild diastolic dysfunction (Stage I).  5. Normal right ventricular size and function.  *** Compared with echocardiogram of 12/15/2018, LV function  has deteriorated significantly.  < end of copied text >    ___________________________________________________________________________________  ===============>>  A S S E S S M E N T   A N D   P L A N <<===============  ------------------------------------------------------------------------------------------    · Assessment		  SOB    ESRD  worsening systolic CHF  demand ischemia  DM    Problem/Plan - 1:  ·  Problem: Shortness of breath       likely due to fluid overload due to renal failure      pt on echo also now showing worsening systolic heart failure: likely contributing to symptoms   appreciated renal mgmt  fluid removal via PD as BP allows       Labetalol decreased due to Low BP  monitor closely    Problem/Plan - 2:  ·  Problem: ESRD on peritoneal dialysis.    nephro follow up and mgmtt appreciated   dialysis as above.     Problem/Plan - 3:  ·  Problem: acute hypoxemic respiratory failure due to pulmonary edema: resolved     pt off O2 and doing well    monitor     Problem/Plan - 4:  ·  Problem: Chronic systolic congestive heart failure, with demand ischemia given elevated troponin in this pt with ESRD     LV EF reduced as above on Echo  continue beta blocker as reduced   pt off ACEI / ARB due to recorded allergy   ordered stress test ( pt in agreement)   discussed with cardio     Problem/Plan - 5:  ·  Problem: Dyslipidemia.    Continue home statin.     Problem/Plan - 6:  Problem: Essential hypertension.   monitor BP on lower dose labetalol    Problem/Plan - 7:  ·  Problem: Type 2 diabetes mellitus with chronic kidney disease on chronic dialysis, without long-term current use of insulin.  Plan: Insulin sliding scale  A1C w/AM labs.     chino   --------------------------------------------  Case discussed with pt, cardio   Education given on findings and plan of care  ___________________________  H. MARISOL Abreu.  Pager: [phone]

## 2019-04-25 NOTE — PROGRESS NOTE ADULT - ASSESSMENT
74F w/ ESRD on PD at home, HTN, DM, CAD s/p CABG and stents, on AC, p/w SOB x1 day.    ESRD on PD  Continue PD with 4 exchanges daily  However pt hypotensive, unable to tolerate aggressive fluid removal  Continue PD with UF as tolerated  Monitor BMP and BP    SOB  multiple admission for fluid overload  however chest xray is clear no edema  Cardiology follow up --pending NST tmr    Hypercalcemia  Elevated PTH, phos level  Pending vit d 1,25 and vit d 25  Hold calcitriol   Increase renagel 1600 tid with meal  Monitor serum calcium and phos    Anemia  At goal for ESRD  No need for epo    HTN  on labetalol 300 bid  symptomatic hypotensive  decrease labetalol 100mg bid  monitor bp 74F w/ ESRD on PD at home, HTN, DM, CAD s/p CABG and stents, on AC, p/w SOB x1 day.    ESRD on PD  Continue PD with 4 exchanges daily  However pt hypotensive, unable to tolerate aggressive fluid removal  Continue PD with UF as tolerated  Monitor BMP and BP    SOB  multiple admission for fluid overload  chest xray is clear no edema  Cardiology follow up --pending NST tmr    Hypercalcemia  Elevated PTH, phos level  Pending vit d 1,25 and vit d 25  Hold calcitriol   Increase renagel 1600 tid with meal  Monitor serum calcium and phos    Anemia  At goal for ESRD  No need for epo    HTN  on labetalol 300 bid  symptomatic hypotensive  decrease labetalol 100mg bid  monitor bp

## 2019-04-26 LAB
ALBUMIN SERPL ELPH-MCNC: 3.4 G/DL — SIGNIFICANT CHANGE UP (ref 3.3–5)
ALP SERPL-CCNC: 63 U/L — SIGNIFICANT CHANGE UP (ref 40–120)
ALT FLD-CCNC: 24 U/L — SIGNIFICANT CHANGE UP (ref 4–33)
ANION GAP SERPL CALC-SCNC: 26 MMO/L — HIGH (ref 7–14)
AST SERPL-CCNC: 18 U/L — SIGNIFICANT CHANGE UP (ref 4–32)
BASOPHILS # BLD AUTO: 0.04 K/UL — SIGNIFICANT CHANGE UP (ref 0–0.2)
BASOPHILS NFR BLD AUTO: 0.5 % — SIGNIFICANT CHANGE UP (ref 0–2)
BILIRUB SERPL-MCNC: 0.3 MG/DL — SIGNIFICANT CHANGE UP (ref 0.2–1.2)
BUN SERPL-MCNC: 65 MG/DL — HIGH (ref 7–23)
CALCIUM SERPL-MCNC: 10.8 MG/DL — HIGH (ref 8.4–10.5)
CHLORIDE SERPL-SCNC: 87 MMOL/L — LOW (ref 98–107)
CO2 SERPL-SCNC: 23 MMOL/L — SIGNIFICANT CHANGE UP (ref 22–31)
CREAT SERPL-MCNC: 10.13 MG/DL — HIGH (ref 0.5–1.3)
EOSINOPHIL # BLD AUTO: 0.23 K/UL — SIGNIFICANT CHANGE UP (ref 0–0.5)
EOSINOPHIL NFR BLD AUTO: 2.6 % — SIGNIFICANT CHANGE UP (ref 0–6)
GLUCOSE BLDC GLUCOMTR-MCNC: 201 MG/DL — HIGH (ref 70–99)
GLUCOSE BLDC GLUCOMTR-MCNC: 221 MG/DL — HIGH (ref 70–99)
GLUCOSE BLDC GLUCOMTR-MCNC: 280 MG/DL — HIGH (ref 70–99)
GLUCOSE BLDC GLUCOMTR-MCNC: 287 MG/DL — HIGH (ref 70–99)
GLUCOSE BLDC GLUCOMTR-MCNC: 505 MG/DL — CRITICAL HIGH (ref 70–99)
GLUCOSE SERPL-MCNC: 171 MG/DL — HIGH (ref 70–99)
HBV CORE AB SER-ACNC: NONREACTIVE — SIGNIFICANT CHANGE UP
HBV SURFACE AB SER-ACNC: 23.8 MLU/ML — SIGNIFICANT CHANGE UP
HCT VFR BLD CALC: 37.8 % — SIGNIFICANT CHANGE UP (ref 34.5–45)
HCV AB S/CO SERPL IA: 0.04 S/CO — SIGNIFICANT CHANGE UP (ref 0–0.99)
HCV AB SERPL-IMP: SIGNIFICANT CHANGE UP
HGB BLD-MCNC: 11.4 G/DL — LOW (ref 11.5–15.5)
IMM GRANULOCYTES NFR BLD AUTO: 0.6 % — SIGNIFICANT CHANGE UP (ref 0–1.5)
LYMPHOCYTES # BLD AUTO: 1.44 K/UL — SIGNIFICANT CHANGE UP (ref 1–3.3)
LYMPHOCYTES # BLD AUTO: 16.4 % — SIGNIFICANT CHANGE UP (ref 13–44)
MCHC RBC-ENTMCNC: 26 PG — LOW (ref 27–34)
MCHC RBC-ENTMCNC: 30.2 % — LOW (ref 32–36)
MCV RBC AUTO: 86.1 FL — SIGNIFICANT CHANGE UP (ref 80–100)
MONOCYTES # BLD AUTO: 1.08 K/UL — HIGH (ref 0–0.9)
MONOCYTES NFR BLD AUTO: 12.3 % — SIGNIFICANT CHANGE UP (ref 2–14)
NEUTROPHILS # BLD AUTO: 5.95 K/UL — SIGNIFICANT CHANGE UP (ref 1.8–7.4)
NEUTROPHILS NFR BLD AUTO: 67.6 % — SIGNIFICANT CHANGE UP (ref 43–77)
NRBC # FLD: 0 K/UL — SIGNIFICANT CHANGE UP (ref 0–0)
PLATELET # BLD AUTO: 200 K/UL — SIGNIFICANT CHANGE UP (ref 150–400)
PMV BLD: 10 FL — SIGNIFICANT CHANGE UP (ref 7–13)
POTASSIUM SERPL-MCNC: 4.3 MMOL/L — SIGNIFICANT CHANGE UP (ref 3.5–5.3)
POTASSIUM SERPL-SCNC: 4.3 MMOL/L — SIGNIFICANT CHANGE UP (ref 3.5–5.3)
PROT SERPL-MCNC: 6.7 G/DL — SIGNIFICANT CHANGE UP (ref 6–8.3)
RBC # BLD: 4.39 M/UL — SIGNIFICANT CHANGE UP (ref 3.8–5.2)
RBC # FLD: 17.5 % — HIGH (ref 10.3–14.5)
SODIUM SERPL-SCNC: 136 MMOL/L — SIGNIFICANT CHANGE UP (ref 135–145)
WBC # BLD: 8.79 K/UL — SIGNIFICANT CHANGE UP (ref 3.8–10.5)
WBC # FLD AUTO: 8.79 K/UL — SIGNIFICANT CHANGE UP (ref 3.8–10.5)

## 2019-04-26 PROCEDURE — 78452 HT MUSCLE IMAGE SPECT MULT: CPT | Mod: 26

## 2019-04-26 PROCEDURE — 93018 CV STRESS TEST I&R ONLY: CPT | Mod: GC

## 2019-04-26 PROCEDURE — 93016 CV STRESS TEST SUPVJ ONLY: CPT | Mod: GC

## 2019-04-26 RX ADMIN — Medication 2: at 12:45

## 2019-04-26 RX ADMIN — Medication 1 TABLET(S): at 12:46

## 2019-04-26 RX ADMIN — SEVELAMER CARBONATE 1600 MILLIGRAM(S): 2400 POWDER, FOR SUSPENSION ORAL at 17:27

## 2019-04-26 RX ADMIN — Medication 100 MILLIGRAM(S): at 12:46

## 2019-04-26 RX ADMIN — Medication 100 MILLIGRAM(S): at 06:48

## 2019-04-26 RX ADMIN — Medication 81 MILLIGRAM(S): at 12:46

## 2019-04-26 RX ADMIN — ATORVASTATIN CALCIUM 40 MILLIGRAM(S): 80 TABLET, FILM COATED ORAL at 21:22

## 2019-04-26 RX ADMIN — Medication 2: at 17:27

## 2019-04-26 RX ADMIN — Medication 300 MILLIGRAM(S): at 12:45

## 2019-04-26 RX ADMIN — SEVELAMER CARBONATE 1600 MILLIGRAM(S): 2400 POWDER, FOR SUSPENSION ORAL at 12:46

## 2019-04-26 RX ADMIN — CLOPIDOGREL BISULFATE 75 MILLIGRAM(S): 75 TABLET, FILM COATED ORAL at 12:46

## 2019-04-26 RX ADMIN — Medication 3: at 09:22

## 2019-04-26 RX ADMIN — Medication 50 MICROGRAM(S): at 06:48

## 2019-04-26 NOTE — PROGRESS NOTE ADULT - ASSESSMENT
74F w/ ESRD on PD at home, HTN, DM, CAD s/p CABG and stents, on AC, p/w SOB x1 day.    ESRD on PD  Continue PD with 4 exchanges daily  PD with 2.2L UF  Continue PD with UF as tolerated  Monitor BMP and BP    SOB  multiple admission for fluid overload  chest xray is clear no edema  Cardiology follow up --pending NST today    Hypercalcemia  Elevated PTH, phos level  Pending vit d 1,25 and vit d 25  Hold calcitriol   Increase renagel 1600 tid with meal  Monitor serum calcium and phos    Anemia  At goal for ESRD  No need for epo    HTN  on labetalol 100 bid  again with bp dropped to 70s today however pt is asymptomatic   monitor bp  hold bp meds per perimeter

## 2019-04-26 NOTE — PROGRESS NOTE ADULT - ASSESSMENT
Problem/Plan - 1:  ·  Problem: Shortness of breath       likely due to fluid overload due to renal failure      pt on echo also now showing worsening systolic heart failure: likely contributing to symptoms   appreciated renal mgmt  fluid removal via PD as BP allows       Labetalol decreased due to Low BP  monitor closely     Problem/Plan - 2:  ·  Problem: ESRD on peritoneal dialysis.    nephro follow up and mgmtt appreciated   dialysis as above.      Problem/Plan - 3:  ·  Problem: acute hypoxemic respiratory failure due to pulmonary edema: resolved     pt off O2 and doing well    monitor      Problem/Plan - 4:  ·  Problem: Chronic systolic congestive heart failure, with demand ischemia given elevated troponin in this pt with ESRD     LV EF reduced as above on Echo  continue beta blocker as reduced   pt off ACEI / ARB due to recorded allergy   stress test mostly fixed ischemia with some salvatore-infarct ischemia  d/w cardiology attending  likely coronary angiogram Monday     Problem/Plan - 5:  ·  Problem: Dyslipidemia.    Continue home statin.      Problem/Plan - 6:  Problem: Essential hypertension.   monitor BP on lower dose labetalol     Problem/Plan - 7:  ·  Problem: Type 2 diabetes mellitus with chronic kidney disease on chronic dialysis, without long-term current use of insulin.  Plan: Insulin sliding scale  A1C w/AM labs.

## 2019-04-26 NOTE — PROGRESS NOTE ADULT - SUBJECTIVE AND OBJECTIVE BOX
Patient is a 74y old  Female who presents with a chief complaint of shortness of breath (26 Apr 2019 15:10)      SUBJECTIVE / OVERNIGHT EVENTS: no overnight events    ROS:  Resp: No cough no sputum production  CVS: No chest pain no palpitations no orthopnea  GI: no N/V/D  : no dysuria, no hematuria  Neuro: no weakness no paresthesias  Heme: No petechiae no easy bruising  Msk: No joint pain no swelling  Skin: No rash no itching        MEDICATIONS  (STANDING):  allopurinol 300 milliGRAM(s) Oral daily  aspirin enteric coated 81 milliGRAM(s) Oral daily  atorvastatin 40 milliGRAM(s) Oral at bedtime  clopidogrel Tablet 75 milliGRAM(s) Oral daily  dextrose 5%. 1000 milliLiter(s) (50 mL/Hr) IV Continuous <Continuous>  dextrose 50% Injectable 12.5 Gram(s) IV Push once  dextrose 50% Injectable 25 Gram(s) IV Push once  dextrose 50% Injectable 25 Gram(s) IV Push once  docusate sodium 100 milliGRAM(s) Oral three times a day  insulin lispro (HumaLOG) corrective regimen sliding scale   SubCutaneous three times a day before meals  labetalol 100 milliGRAM(s) Oral two times a day  levothyroxine 50 MICROGram(s) Oral daily  multivitamin 1 Tablet(s) Oral daily  sevelamer carbonate 1600 milliGRAM(s) Oral three times a day with meals    MEDICATIONS  (PRN):  acetaminophen   Tablet .. 650 milliGRAM(s) Oral every 6 hours PRN Temp greater or equal to 38C (100.4F), Mild Pain (1 - 3)  dextrose 40% Gel 15 Gram(s) Oral once PRN Blood Glucose LESS THAN 70 milliGRAM(s)/deciliter  gentamicin 0.1% Cream 1 Application(s) Topical <User Schedule> PRN Pd catheter dressing change  glucagon  Injectable 1 milliGRAM(s) IntraMuscular once PRN Glucose LESS THAN 70 milligrams/deciliter        CAPILLARY BLOOD GLUCOSE      POCT Blood Glucose.: 221 mg/dL (26 Apr 2019 17:14)  POCT Blood Glucose.: 201 mg/dL (26 Apr 2019 12:43)  POCT Blood Glucose.: 280 mg/dL (26 Apr 2019 09:14)  POCT Blood Glucose.: 323 mg/dL (25 Apr 2019 22:50)    I&O's Summary    25 Apr 2019 07:01  -  26 Apr 2019 07:00  --------------------------------------------------------  IN: 8000 mL / OUT: 9700 mL / NET: -1700 mL    26 Apr 2019 07:01  -  26 Apr 2019 17:57  --------------------------------------------------------  IN: 2000 mL / OUT: 2500 mL / NET: -500 mL        Vital Signs Last 24 Hrs  T(C): 36.6 (26 Apr 2019 14:15), Max: 36.9 (25 Apr 2019 21:30)  T(F): 97.8 (26 Apr 2019 14:15), Max: 98.5 (25 Apr 2019 21:30)  HR: 79 (26 Apr 2019 17:19) (68 - 87)  BP: 99/76 (26 Apr 2019 17:19) (77/55 - 122/70)  BP(mean): --  RR: 16 (26 Apr 2019 14:15) (16 - 18)  SpO2: 99% (26 Apr 2019 06:46) (99% - 100%)    GEN: A&O X 3 , NAD , comfortable  HEENT: NCAT, PERRL, MMM, hearing intact  Neck: supple , no JVD  CVS: S1S2 , regular , No M/R/G appreciated  PULM: CTA B/L,  decreased BS B/L  ABD.: soft. non tender, non distended,  bowel sounds present  Extrem: intact pulses , no edema   PSYCH : normal mood,  not anxious    LABS:                        11.4   8.79  )-----------( 200      ( 26 Apr 2019 05:44 )             37.8     04-26    136  |  87<L>  |  65<H>  ----------------------------<  171<H>  4.3   |  23  |  10.13<H>    Ca    10.8<H>      26 Apr 2019 05:44  Phos  6.7     04-25  Mg     1.9     04-25    TPro  6.7  /  Alb  3.4  /  TBili  0.3  /  DBili  x   /  AST  18  /  ALT  24  /  AlkPhos  63  04-26                All consultant(s) notes reviewed and care discussed with other providers        Contact Number, Dr Duncan [phone]

## 2019-04-26 NOTE — PROGRESS NOTE ADULT - SUBJECTIVE AND OBJECTIVE BOX
[facility] – [place], [place] [facility]ACT[facility]   MD ROSITA APODACA MD ANGELA WONG, PA    TEL:  OFFICE: [phone]  DR SCOTT CELL: [phone]  DR. JOHNSON CELL: [phone]  KESHA HOUSTON CELL: [phone]        Patient is a 74y old  Female who presents with a chief complaint of shortness of breath (25 Apr 2019 18:27)      Patient seen and examined at bedside. No chest pain/sob    VITALS:  T(F): 97.8 (04-26-19 @ 14:15), Max: 98.5 (04-25-19 @ 21:30)  HR: 77 (04-26-19 @ 14:15)  BP: 94/63 (04-26-19 @ 14:15)  RR: 16 (04-26-19 @ 14:15)  SpO2: 99% (04-26-19 @ 06:46)  Wt(kg): --    04-25 @ 07:01  -  04-26 @ 07:00  --------------------------------------------------------  IN: 8000 mL / OUT: 9700 mL / NET: -1700 mL    04-26 @ 07:01  -  04-26 @ 15:10  --------------------------------------------------------  IN: 2000 mL / OUT: 2500 mL / NET: -500 mL          PHYSICAL EXAM:  Constitutional: NAD  Neck: No JVD  Respiratory: CTAB, no wheezes, rales or rhonchi  Cardiovascular: S1, S2, RRR  Gastrointestinal: BS+, soft, NT/ND  Extremities: No peripheral edema    Hospital Medications:   MEDICATIONS  (STANDING):  allopurinol 300 milliGRAM(s) Oral daily  aspirin enteric coated 81 milliGRAM(s) Oral daily  atorvastatin 40 milliGRAM(s) Oral at bedtime  clopidogrel Tablet 75 milliGRAM(s) Oral daily  dextrose 5%. 1000 milliLiter(s) (50 mL/Hr) IV Continuous <Continuous>  dextrose 50% Injectable 12.5 Gram(s) IV Push once  dextrose 50% Injectable 25 Gram(s) IV Push once  dextrose 50% Injectable 25 Gram(s) IV Push once  docusate sodium 100 milliGRAM(s) Oral three times a day  insulin lispro (HumaLOG) corrective regimen sliding scale   SubCutaneous three times a day before meals  labetalol 100 milliGRAM(s) Oral two times a day  levothyroxine 50 MICROGram(s) Oral daily  multivitamin 1 Tablet(s) Oral daily  sevelamer carbonate 1600 milliGRAM(s) Oral three times a day with meals      LABS:  04-26    136  |  87<L>  |  65<H>  ----------------------------<  171<H>  4.3   |  23  |  10.13<H>    Ca    10.8<H>      26 Apr 2019 05:44  Phos  6.7     04-25  Mg     1.9     04-25    TPro  6.7  /  Alb  3.4  /  TBili  0.3  /  DBili      /  AST  18  /  ALT  24  /  AlkPhos  63  04-26    Creatinine Trend: 10.13 <--, 10.69 <--, 11.48 <--, 11.39 <--    Albumin, Serum: 3.4 g/dL (04-26 @ 05:44)                              11.4   8.79  )-----------( 200      ( 26 Apr 2019 05:44 )             37.8     Urine Studies:      .8 (Ca --)      [04-24-19 @ 05:17]   --  .4 (Ca --)      [01-13-19 @ 05:32]   --  .8 (Ca --)      [08-12-18 @ 06:00]   --  Vitamin D (25OH) 24.6      [04-24-19 @ 05:17]  HbA1c 9.8      [04-24-19 @ 05:17]  TSH 8.74      [04-24-19 @ 05:17]  Lipid: chol 167, , HDL 45, LDL 99      [04-24-19 @ 05:17]    HBsAb 23.8      [04-25-19 @ 14:53]  HBsAg NEGATIVE      [04-25-19 @ 14:53]  HBcAb Nonreactive      [04-25-19 @ 14:53]  HCV 0.04, Nonreactive Hepatitis C AB  S/CO Ratio                        Interpretation  < 1.00                                   Non-Reactive  1.00 - 4.99                         Weakly-Reactive  > 5.00                                Reactive  Non-Reactive: A person with a non-reactive HCV antibody  result is considered uninfected.  No further action is  needed unless recent infection is suspected.  In these  cases, consider repeat testing later to detect  seroconversion..  Weakly-Reactive: HCV antibody test is abnormal, HCV RNA  Qualitative test will follow.  Reactive: HCV antibody test is abnormal, HCV RNA  Qualitative test will follow.  Note: HCV antibody testing is performed on the [employer]   system.      [04-25-19 @ 14:53]      RADIOLOGY & ADDITIONAL STUDIES:

## 2019-04-27 LAB
ALBUMIN SERPL ELPH-MCNC: 3.2 G/DL — LOW (ref 3.3–5)
ALBUMIN SERPL ELPH-MCNC: 3.2 G/DL — LOW (ref 3.3–5)
ALP SERPL-CCNC: 65 U/L — SIGNIFICANT CHANGE UP (ref 40–120)
ALP SERPL-CCNC: 67 U/L — SIGNIFICANT CHANGE UP (ref 40–120)
ALT FLD-CCNC: 18 U/L — SIGNIFICANT CHANGE UP (ref 4–33)
ALT FLD-CCNC: 21 U/L — SIGNIFICANT CHANGE UP (ref 4–33)
ANION GAP SERPL CALC-SCNC: 25 MMO/L — HIGH (ref 7–14)
ANION GAP SERPL CALC-SCNC: 25 MMO/L — HIGH (ref 7–14)
APTT BLD: 29.7 SEC — SIGNIFICANT CHANGE UP (ref 27.5–36.3)
AST SERPL-CCNC: 19 U/L — SIGNIFICANT CHANGE UP (ref 4–32)
AST SERPL-CCNC: 20 U/L — SIGNIFICANT CHANGE UP (ref 4–32)
BASOPHILS # BLD AUTO: 0.04 K/UL — SIGNIFICANT CHANGE UP (ref 0–0.2)
BASOPHILS NFR BLD AUTO: 0.5 % — SIGNIFICANT CHANGE UP (ref 0–2)
BILIRUB SERPL-MCNC: 0.2 MG/DL — SIGNIFICANT CHANGE UP (ref 0.2–1.2)
BILIRUB SERPL-MCNC: < 0.2 MG/DL — LOW (ref 0.2–1.2)
BUN SERPL-MCNC: 63 MG/DL — HIGH (ref 7–23)
BUN SERPL-MCNC: 66 MG/DL — HIGH (ref 7–23)
CALCIUM SERPL-MCNC: 10.2 MG/DL — SIGNIFICANT CHANGE UP (ref 8.4–10.5)
CALCIUM SERPL-MCNC: 10.4 MG/DL — SIGNIFICANT CHANGE UP (ref 8.4–10.5)
CHLORIDE SERPL-SCNC: 85 MMOL/L — LOW (ref 98–107)
CHLORIDE SERPL-SCNC: 85 MMOL/L — LOW (ref 98–107)
CO2 SERPL-SCNC: 17 MMOL/L — LOW (ref 22–31)
CO2 SERPL-SCNC: 20 MMOL/L — LOW (ref 22–31)
CREAT SERPL-MCNC: 8.72 MG/DL — HIGH (ref 0.5–1.3)
CREAT SERPL-MCNC: 9.06 MG/DL — HIGH (ref 0.5–1.3)
EOSINOPHIL # BLD AUTO: 0.25 K/UL — SIGNIFICANT CHANGE UP (ref 0–0.5)
EOSINOPHIL NFR BLD AUTO: 3.1 % — SIGNIFICANT CHANGE UP (ref 0–6)
GLUCOSE BLDC GLUCOMTR-MCNC: 187 MG/DL — HIGH (ref 70–99)
GLUCOSE BLDC GLUCOMTR-MCNC: 206 MG/DL — HIGH (ref 70–99)
GLUCOSE BLDC GLUCOMTR-MCNC: 229 MG/DL — HIGH (ref 70–99)
GLUCOSE BLDC GLUCOMTR-MCNC: 295 MG/DL — HIGH (ref 70–99)
GLUCOSE SERPL-MCNC: 211 MG/DL — HIGH (ref 70–99)
GLUCOSE SERPL-MCNC: 297 MG/DL — HIGH (ref 70–99)
HCT VFR BLD CALC: 34.7 % — SIGNIFICANT CHANGE UP (ref 34.5–45)
HGB BLD-MCNC: 10.4 G/DL — LOW (ref 11.5–15.5)
IMM GRANULOCYTES NFR BLD AUTO: 0.6 % — SIGNIFICANT CHANGE UP (ref 0–1.5)
INR BLD: 1.03 — SIGNIFICANT CHANGE UP (ref 0.88–1.17)
LYMPHOCYTES # BLD AUTO: 1.62 K/UL — SIGNIFICANT CHANGE UP (ref 1–3.3)
LYMPHOCYTES # BLD AUTO: 19.8 % — SIGNIFICANT CHANGE UP (ref 13–44)
MCHC RBC-ENTMCNC: 25.9 PG — LOW (ref 27–34)
MCHC RBC-ENTMCNC: 30 % — LOW (ref 32–36)
MCV RBC AUTO: 86.3 FL — SIGNIFICANT CHANGE UP (ref 80–100)
MONOCYTES # BLD AUTO: 1.11 K/UL — HIGH (ref 0–0.9)
MONOCYTES NFR BLD AUTO: 13.6 % — SIGNIFICANT CHANGE UP (ref 2–14)
NEUTROPHILS # BLD AUTO: 5.11 K/UL — SIGNIFICANT CHANGE UP (ref 1.8–7.4)
NEUTROPHILS NFR BLD AUTO: 62.4 % — SIGNIFICANT CHANGE UP (ref 43–77)
NRBC # FLD: 0 K/UL — SIGNIFICANT CHANGE UP (ref 0–0)
PLATELET # BLD AUTO: 210 K/UL — SIGNIFICANT CHANGE UP (ref 150–400)
PMV BLD: 10.1 FL — SIGNIFICANT CHANGE UP (ref 7–13)
POTASSIUM SERPL-MCNC: 4.7 MMOL/L — SIGNIFICANT CHANGE UP (ref 3.5–5.3)
POTASSIUM SERPL-MCNC: 5.1 MMOL/L — SIGNIFICANT CHANGE UP (ref 3.5–5.3)
POTASSIUM SERPL-SCNC: 4.7 MMOL/L — SIGNIFICANT CHANGE UP (ref 3.5–5.3)
POTASSIUM SERPL-SCNC: 5.1 MMOL/L — SIGNIFICANT CHANGE UP (ref 3.5–5.3)
PROT SERPL-MCNC: 6.3 G/DL — SIGNIFICANT CHANGE UP (ref 6–8.3)
PROT SERPL-MCNC: 6.3 G/DL — SIGNIFICANT CHANGE UP (ref 6–8.3)
PROTHROM AB SERPL-ACNC: 11.7 SEC — SIGNIFICANT CHANGE UP (ref 9.8–13.1)
RBC # BLD: 4.02 M/UL — SIGNIFICANT CHANGE UP (ref 3.8–5.2)
RBC # FLD: 17.7 % — HIGH (ref 10.3–14.5)
SODIUM SERPL-SCNC: 127 MMOL/L — LOW (ref 135–145)
SODIUM SERPL-SCNC: 130 MMOL/L — LOW (ref 135–145)
WBC # BLD: 8.18 K/UL — SIGNIFICANT CHANGE UP (ref 3.8–10.5)
WBC # FLD AUTO: 8.18 K/UL — SIGNIFICANT CHANGE UP (ref 3.8–10.5)

## 2019-04-27 RX ADMIN — SEVELAMER CARBONATE 1600 MILLIGRAM(S): 2400 POWDER, FOR SUSPENSION ORAL at 18:54

## 2019-04-27 RX ADMIN — Medication 100 MILLIGRAM(S): at 18:57

## 2019-04-27 RX ADMIN — Medication 100 MILLIGRAM(S): at 05:46

## 2019-04-27 RX ADMIN — Medication 81 MILLIGRAM(S): at 11:40

## 2019-04-27 RX ADMIN — Medication 1: at 08:26

## 2019-04-27 RX ADMIN — Medication 100 MILLIGRAM(S): at 14:10

## 2019-04-27 RX ADMIN — ATORVASTATIN CALCIUM 40 MILLIGRAM(S): 80 TABLET, FILM COATED ORAL at 21:45

## 2019-04-27 RX ADMIN — Medication 300 MILLIGRAM(S): at 14:10

## 2019-04-27 RX ADMIN — SEVELAMER CARBONATE 1600 MILLIGRAM(S): 2400 POWDER, FOR SUSPENSION ORAL at 14:09

## 2019-04-27 RX ADMIN — SEVELAMER CARBONATE 1600 MILLIGRAM(S): 2400 POWDER, FOR SUSPENSION ORAL at 08:27

## 2019-04-27 RX ADMIN — Medication 3: at 14:12

## 2019-04-27 RX ADMIN — Medication 1 TABLET(S): at 11:40

## 2019-04-27 RX ADMIN — Medication 50 MICROGRAM(S): at 05:46

## 2019-04-27 RX ADMIN — Medication 2: at 18:53

## 2019-04-27 RX ADMIN — CLOPIDOGREL BISULFATE 75 MILLIGRAM(S): 75 TABLET, FILM COATED ORAL at 11:40

## 2019-04-27 NOTE — CHART NOTE - NSCHARTNOTEFT_GEN_A_CORE
EP service not available over the weekend. Will need Pacemaker interrogated - [employer] Serial # [id number].

## 2019-04-27 NOTE — PROGRESS NOTE ADULT - SUBJECTIVE AND OBJECTIVE BOX
Patient is a 74y old  Female who presents with a chief complaint of shortness of breath (27 Apr 2019 07:38)      SUBJECTIVE / OVERNIGHT EVENTS: no overnight events    ROS:  Resp: No cough no sputum production  CVS: No chest pain no palpitations no orthopnea  GI: no N/V/D  : no dysuria, no hematuria  Neuro: no weakness no paresthesias  Heme: No petechiae no easy bruising  Msk: No joint pain no swelling  Skin: No rash no itching        MEDICATIONS  (STANDING):  allopurinol 300 milliGRAM(s) Oral daily  aspirin enteric coated 81 milliGRAM(s) Oral daily  atorvastatin 40 milliGRAM(s) Oral at bedtime  clopidogrel Tablet 75 milliGRAM(s) Oral daily  dextrose 5%. 1000 milliLiter(s) (50 mL/Hr) IV Continuous <Continuous>  dextrose 50% Injectable 12.5 Gram(s) IV Push once  dextrose 50% Injectable 25 Gram(s) IV Push once  dextrose 50% Injectable 25 Gram(s) IV Push once  docusate sodium 100 milliGRAM(s) Oral three times a day  insulin lispro (HumaLOG) corrective regimen sliding scale   SubCutaneous three times a day before meals  labetalol 100 milliGRAM(s) Oral two times a day  levothyroxine 50 MICROGram(s) Oral daily  multivitamin 1 Tablet(s) Oral daily  sevelamer carbonate 1600 milliGRAM(s) Oral three times a day with meals    MEDICATIONS  (PRN):  acetaminophen   Tablet .. 650 milliGRAM(s) Oral every 6 hours PRN Temp greater or equal to 38C (100.4F), Mild Pain (1 - 3)  dextrose 40% Gel 15 Gram(s) Oral once PRN Blood Glucose LESS THAN 70 milliGRAM(s)/deciliter  gentamicin 0.1% Cream 1 Application(s) Topical <User Schedule> PRN Pd catheter dressing change  glucagon  Injectable 1 milliGRAM(s) IntraMuscular once PRN Glucose LESS THAN 70 milligrams/deciliter        CAPILLARY BLOOD GLUCOSE      POCT Blood Glucose.: 295 mg/dL (27 Apr 2019 14:02)  POCT Blood Glucose.: 187 mg/dL (27 Apr 2019 08:07)  POCT Blood Glucose.: 287 mg/dL (26 Apr 2019 21:57)  POCT Blood Glucose.: 505 mg/dL (26 Apr 2019 21:54)  POCT Blood Glucose.: 221 mg/dL (26 Apr 2019 17:14)    I&O's Summary    26 Apr 2019 07:01  -  27 Apr 2019 07:00  --------------------------------------------------------  IN: 8200 mL / OUT: 8500 mL / NET: -300 mL    27 Apr 2019 07:01  -  27 Apr 2019 15:09  --------------------------------------------------------  IN: 4000 mL / OUT: 4200 mL / NET: -200 mL        Vital Signs Last 24 Hrs  T(C): 36.9 (27 Apr 2019 14:10), Max: 36.9 (27 Apr 2019 14:10)  T(F): 98.5 (27 Apr 2019 14:10), Max: 98.5 (27 Apr 2019 14:10)  HR: 79 (27 Apr 2019 14:10) (70 - 94)  BP: 126/68 (27 Apr 2019 14:10) (99/76 - 126/68)  BP(mean): --  RR: 17 (27 Apr 2019 14:10) (16 - 18)  SpO2: 100% (27 Apr 2019 14:10) (98% - 100%)    GEN: A&O X 3 , NAD , comfortable  HEENT: NCAT, PERRL, MMM, hearing intact  Neck: supple , no JVD  CVS: S1S2 , regular , No M/R/G appreciated  PULM: CTA B/L,  decreased BS B/L  ABD.: soft. non tender, non distended,  bowel sounds present  Extrem: intact pulses , no edema   PSYCH : normal mood,  not anxious    LABS:                        10.4   8.18  )-----------( 210      ( 27 Apr 2019 07:34 )             34.7     04-27    127<L>  |  85<L>  |  66<H>  ----------------------------<  211<H>  4.7   |  17<L>  |  9.06<H>    Ca    10.2      27 Apr 2019 06:00    TPro  6.3  /  Alb  3.2<L>  /  TBili  < 0.2<L>  /  DBili  x   /  AST  19  /  ALT  21  /  AlkPhos  65  04-27    PT/INR - ( 27 Apr 2019 06:00 )   PT: 11.7 SEC;   INR: 1.03          PTT - ( 27 Apr 2019 06:00 )  PTT:29.7 SEC            All consultant(s) notes reviewed and care discussed with other providers        Contact Number, Dr Duncan [phone]

## 2019-04-27 NOTE — PROGRESS NOTE ADULT - ASSESSMENT
1. ESRD  2. sob chf  3. worsening LV fucntion infarct with minimal ischemia  4. s/p CABG s/p PTCI  5. ischemic cardiomyopathy    Recommend  1. to better assess reason for deterioration of LV fucntion. will proceed with left and right heart catheterization on 4/29/19  2. add hydralzine 10 TID and if BP tolerates isordil 10 BTID  3. check pacemaker (EP) and consider upgrade of pacemaker to biventricular device pending cath results    Patient agreeable to left and right heart cath  oveall prognosis is guarded      patient agreeable

## 2019-04-27 NOTE — PROGRESS NOTE ADULT - ASSESSMENT
Problem/Plan - 1:  ·  Problem: Shortness of breath    improved   will continue to monitor   likely coronary angiogram Monday as per Dr Jennings     Problem/Plan - 2:  ·  Problem: ESRD on peritoneal dialysis.    nephro follow up and mgmtt appreciated   dialysis as above.      Problem/Plan - 3:  ·  Problem: acute hypoxemic respiratory failure due to pulmonary edema: resolved     pt off O2 and doing well    monitor      Problem/Plan - 4:  ·  Problem: Chronic systolic congestive heart failure, with demand ischemia given elevated troponin in this pt with ESRD     LV EF reduced as above on Echo  continue beta blocker as reduced   pt off ACEI / ARB due to recorded allergy   stress test mostly fixed ischemia with some salvatore-infarct ischemia  d/w cardiology attending  likely coronary angiogram Monday     Problem/Plan - 5:  ·  Problem: Dyslipidemia.    Continue home statin.      Problem/Plan - 6:  Problem: Essential hypertension.   monitor BP on lower dose labetalol     Problem/Plan - 7:  ·  Problem: Type 2 diabetes mellitus with chronic kidney disease on chronic dialysis, without long-term current use of insulin.  Plan: Insulin sliding scale  A1C w/AM labs.

## 2019-04-27 NOTE — PROGRESS NOTE ADULT - SUBJECTIVE AND OBJECTIVE BOX
Subjective: Patient seen and examined. No new events except as noted.   Less sob but still sob at nite coughing non productive  no dp  echo severe LV dysfunction  nuclear stress severe LV dysfunction minimla periinfarct ischemia  this is change in noninvasive testing with worsening LV fucntion  MEDICATIONS:  MEDICATIONS  (STANDING):  allopurinol 300 milliGRAM(s) Oral daily  aspirin enteric coated 81 milliGRAM(s) Oral daily  atorvastatin 40 milliGRAM(s) Oral at bedtime  clopidogrel Tablet 75 milliGRAM(s) Oral daily  dextrose 5%. 1000 milliLiter(s) (50 mL/Hr) IV Continuous <Continuous>  dextrose 50% Injectable 12.5 Gram(s) IV Push once  dextrose 50% Injectable 25 Gram(s) IV Push once  dextrose 50% Injectable 25 Gram(s) IV Push once  docusate sodium 100 milliGRAM(s) Oral three times a day  insulin lispro (HumaLOG) corrective regimen sliding scale   SubCutaneous three times a day before meals  labetalol 100 milliGRAM(s) Oral two times a day  levothyroxine 50 MICROGram(s) Oral daily  multivitamin 1 Tablet(s) Oral daily  sevelamer carbonate 1600 milliGRAM(s) Oral three times a day with meals      PHYSICAL EXAM:  T(C): 36.5 (04-27-19 @ 05:47), Max: 36.7 (04-26-19 @ 23:00)  HR: 75 (04-27-19 @ 05:47) (68 - 94)  BP: 117/58 (04-27-19 @ 05:47) (77/55 - 120/80)  RR: 18 (04-27-19 @ 05:47) (16 - 18)  SpO2: 98% (04-27-19 @ 05:47) (98% - 100%)  Wt(kg): --  I&O's Summary    26 Apr 2019 07:01  -  27 Apr 2019 07:00  --------------------------------------------------------  IN: 8200 mL / OUT: 8500 mL / NET: -300 mL          Appearance: Normal awake alert NAD sitting up	  HEENT:   Normal oral mucosa, PERRL, EOMI	  Cardiovascular: Normal S1 S2, No JVD, 1/6 murmur at apex  Respiratory: Lungs clear to auscultation, normal effort no rales rhochi or wheezes	  Gastrointestinal:  Soft, Non-tender, + BS	  Psychiatry:  Mood & affect appropriate  Ext: No edema        LABS:    CARDIAC MARKERS:                                11.4   8.79  )-----------( 200      ( 26 Apr 2019 05:44 )             37.8     04-27    127<L>  |  85<L>  |  66<H>  ----------------------------<  211<H>  4.7   |  17<L>  |  9.06<H>    Ca    10.2      27 Apr 2019 06:00    TPro  6.3  /  Alb  3.2<L>  /  TBili  < 0.2<L>  /  DBili  x   /  AST  19  /  ALT  21  /  AlkPhos  65  04-27    proBNP:   Lipid Profile:   HgA1c:   TSH:           TELEMETRY: 	    ECG:  < from: 12 Lead ECG (02.11.19 @ 10:00) >  Diagnosis Line Atrial-sensed ventricular-paced rhythm  Abnormal ECG      < from: Nuclear Stress Test-Pharmacologic (04.26.19 @ 08:05) >  NUCLEAR FINDINGS:  There are large, moderate to severe defects in anterior  and anteroseptal, anterolateral, apical, inferior and  inferolateral, lateral walls that are fixed, suggestive of  infarct. There is a mild to moderate amount of  salvatore-infarct ischemia in the anterior wall, extending into  the anteroapex.  ------------------------------------------------------------------------  GATED ANALYSIS:  Post-stress gated wall motion analysis was performed (LVEF  = 20 %;LVEDV = 146 ml.)  ------------------------------------------------------------------------  IMPRESSIONS:Abnormal Study  * There are large, moderate to severe defects in anterior  and anteroseptal, anterolateral, apical, inferior and  inferolateral, lateral walls that are fixed, suggestive of  infarct. There is a mild to moderate amount of  salvatore-infarct ischemia in the anterior wall, extending into  the anteroapex.  * Post-stress gated wall motion analysis was performed  (LVEF = 20 %;LVEDV = 146 ml.)  ------------------------------------------------------------------------    < end of copied text >      < from: TTE with Doppler (w/Cont) (04.25.19 @ 10:08) >  CONCLUSIONS:  1. Moderate-severe mitral regurgitation.  2. Moderate left ventricular enlargement.  3. Severe global left ventricular systolic dysfunction.  Endocardial visualization enhanced with intravenous  injection of echo contrast (Definity). No left ventricular  thrombus.  4. Mild diastolic dysfunction (Stage I).  5. Normal right ventricular size and function.  *** Compared with echocardiogram of 12/15/2018, LV function  has deteriorated significantly.  ------------------------------------------------------------------------  Confirmed on  4/25/2019 - 11:28:47 by Amauri Purvis M.D.  ------------------------------------------------------------------------    < end of copied text >

## 2019-04-28 LAB
ALBUMIN SERPL ELPH-MCNC: 3 G/DL — LOW (ref 3.3–5)
ALP SERPL-CCNC: 59 U/L — SIGNIFICANT CHANGE UP (ref 40–120)
ALT FLD-CCNC: 19 U/L — SIGNIFICANT CHANGE UP (ref 4–33)
ANION GAP SERPL CALC-SCNC: 22 MMO/L — HIGH (ref 7–14)
ANION GAP SERPL CALC-SCNC: 23 MMO/L — HIGH (ref 7–14)
AST SERPL-CCNC: 18 U/L — SIGNIFICANT CHANGE UP (ref 4–32)
BASOPHILS # BLD AUTO: 0.04 K/UL — SIGNIFICANT CHANGE UP (ref 0–0.2)
BASOPHILS NFR BLD AUTO: 0.6 % — SIGNIFICANT CHANGE UP (ref 0–2)
BILIRUB SERPL-MCNC: < 0.2 MG/DL — LOW (ref 0.2–1.2)
BUN SERPL-MCNC: 64 MG/DL — HIGH (ref 7–23)
BUN SERPL-MCNC: 70 MG/DL — HIGH (ref 7–23)
CALCIUM SERPL-MCNC: 10.4 MG/DL — SIGNIFICANT CHANGE UP (ref 8.4–10.5)
CALCIUM SERPL-MCNC: 9.8 MG/DL — SIGNIFICANT CHANGE UP (ref 8.4–10.5)
CHLORIDE SERPL-SCNC: 83 MMOL/L — LOW (ref 98–107)
CHLORIDE SERPL-SCNC: 86 MMOL/L — LOW (ref 98–107)
CO2 SERPL-SCNC: 20 MMOL/L — LOW (ref 22–31)
CO2 SERPL-SCNC: 22 MMOL/L — SIGNIFICANT CHANGE UP (ref 22–31)
CREAT SERPL-MCNC: 8.65 MG/DL — HIGH (ref 0.5–1.3)
CREAT SERPL-MCNC: 9.04 MG/DL — HIGH (ref 0.5–1.3)
EOSINOPHIL # BLD AUTO: 0.22 K/UL — SIGNIFICANT CHANGE UP (ref 0–0.5)
EOSINOPHIL NFR BLD AUTO: 3.1 % — SIGNIFICANT CHANGE UP (ref 0–6)
GLUCOSE BLDC GLUCOMTR-MCNC: 155 MG/DL — HIGH (ref 70–99)
GLUCOSE BLDC GLUCOMTR-MCNC: 178 MG/DL — HIGH (ref 70–99)
GLUCOSE BLDC GLUCOMTR-MCNC: 214 MG/DL — HIGH (ref 70–99)
GLUCOSE BLDC GLUCOMTR-MCNC: 225 MG/DL — HIGH (ref 70–99)
GLUCOSE SERPL-MCNC: 149 MG/DL — HIGH (ref 70–99)
GLUCOSE SERPL-MCNC: 222 MG/DL — HIGH (ref 70–99)
HCT VFR BLD CALC: 30.6 % — LOW (ref 34.5–45)
HGB BLD-MCNC: 9.5 G/DL — LOW (ref 11.5–15.5)
IMM GRANULOCYTES NFR BLD AUTO: 0.6 % — SIGNIFICANT CHANGE UP (ref 0–1.5)
LYMPHOCYTES # BLD AUTO: 1.45 K/UL — SIGNIFICANT CHANGE UP (ref 1–3.3)
LYMPHOCYTES # BLD AUTO: 20.4 % — SIGNIFICANT CHANGE UP (ref 13–44)
MAGNESIUM SERPL-MCNC: 1.7 MG/DL — SIGNIFICANT CHANGE UP (ref 1.6–2.6)
MCHC RBC-ENTMCNC: 25.8 PG — LOW (ref 27–34)
MCHC RBC-ENTMCNC: 31 % — LOW (ref 32–36)
MCV RBC AUTO: 83.2 FL — SIGNIFICANT CHANGE UP (ref 80–100)
MONOCYTES # BLD AUTO: 0.96 K/UL — HIGH (ref 0–0.9)
MONOCYTES NFR BLD AUTO: 13.5 % — SIGNIFICANT CHANGE UP (ref 2–14)
NEUTROPHILS # BLD AUTO: 4.41 K/UL — SIGNIFICANT CHANGE UP (ref 1.8–7.4)
NEUTROPHILS NFR BLD AUTO: 61.8 % — SIGNIFICANT CHANGE UP (ref 43–77)
NRBC # FLD: 0 K/UL — SIGNIFICANT CHANGE UP (ref 0–0)
OSMOLALITY SERPL: 294 MOSMO/KG — SIGNIFICANT CHANGE UP (ref 275–295)
PHOSPHATE SERPL-MCNC: 7.4 MG/DL — HIGH (ref 2.5–4.5)
PLATELET # BLD AUTO: 182 K/UL — SIGNIFICANT CHANGE UP (ref 150–400)
PMV BLD: 10.1 FL — SIGNIFICANT CHANGE UP (ref 7–13)
POTASSIUM SERPL-MCNC: 4.7 MMOL/L — SIGNIFICANT CHANGE UP (ref 3.5–5.3)
POTASSIUM SERPL-MCNC: 5.3 MMOL/L — SIGNIFICANT CHANGE UP (ref 3.5–5.3)
POTASSIUM SERPL-MCNC: 5.6 MMOL/L — HIGH (ref 3.5–5.3)
POTASSIUM SERPL-SCNC: 4.7 MMOL/L — SIGNIFICANT CHANGE UP (ref 3.5–5.3)
POTASSIUM SERPL-SCNC: 5.3 MMOL/L — SIGNIFICANT CHANGE UP (ref 3.5–5.3)
POTASSIUM SERPL-SCNC: 5.6 MMOL/L — HIGH (ref 3.5–5.3)
PROT SERPL-MCNC: 5.8 G/DL — LOW (ref 6–8.3)
RBC # BLD: 3.68 M/UL — LOW (ref 3.8–5.2)
RBC # FLD: 17.2 % — HIGH (ref 10.3–14.5)
SODIUM SERPL-SCNC: 127 MMOL/L — LOW (ref 135–145)
SODIUM SERPL-SCNC: 129 MMOL/L — LOW (ref 135–145)
WBC # BLD: 7.12 K/UL — SIGNIFICANT CHANGE UP (ref 3.8–10.5)
WBC # FLD AUTO: 7.12 K/UL — SIGNIFICANT CHANGE UP (ref 3.8–10.5)

## 2019-04-28 RX ORDER — HYDRALAZINE HCL 50 MG
10 TABLET ORAL EVERY 12 HOURS
Qty: 0 | Refills: 0 | Status: DISCONTINUED | OUTPATIENT
Start: 2019-04-29 | End: 2019-05-06

## 2019-04-28 RX ORDER — LABETALOL HCL 100 MG
100 TABLET ORAL DAILY
Qty: 0 | Refills: 0 | Status: DISCONTINUED | OUTPATIENT
Start: 2019-04-28 | End: 2019-05-06

## 2019-04-28 RX ORDER — ALBUMIN HUMAN 25 %
50 VIAL (ML) INTRAVENOUS ONCE
Qty: 0 | Refills: 0 | Status: COMPLETED | OUTPATIENT
Start: 2019-04-28 | End: 2019-04-28

## 2019-04-28 RX ADMIN — Medication 1: at 08:34

## 2019-04-28 RX ADMIN — ATORVASTATIN CALCIUM 40 MILLIGRAM(S): 80 TABLET, FILM COATED ORAL at 22:06

## 2019-04-28 RX ADMIN — CLOPIDOGREL BISULFATE 75 MILLIGRAM(S): 75 TABLET, FILM COATED ORAL at 12:38

## 2019-04-28 RX ADMIN — Medication 81 MILLIGRAM(S): at 12:37

## 2019-04-28 RX ADMIN — Medication 50 MILLILITER(S): at 08:55

## 2019-04-28 RX ADMIN — Medication 50 MICROGRAM(S): at 05:19

## 2019-04-28 RX ADMIN — SEVELAMER CARBONATE 1600 MILLIGRAM(S): 2400 POWDER, FOR SUSPENSION ORAL at 08:37

## 2019-04-28 RX ADMIN — Medication 300 MILLIGRAM(S): at 12:35

## 2019-04-28 RX ADMIN — Medication 2: at 12:34

## 2019-04-28 RX ADMIN — SEVELAMER CARBONATE 1600 MILLIGRAM(S): 2400 POWDER, FOR SUSPENSION ORAL at 12:36

## 2019-04-28 RX ADMIN — SEVELAMER CARBONATE 1600 MILLIGRAM(S): 2400 POWDER, FOR SUSPENSION ORAL at 17:19

## 2019-04-28 RX ADMIN — Medication 1: at 17:17

## 2019-04-28 RX ADMIN — Medication 1 TABLET(S): at 12:37

## 2019-04-28 NOTE — PROGRESS NOTE ADULT - SUBJECTIVE AND OBJECTIVE BOX
KAPIL CAMPOVERDE  74y  Patient is a 74y old  Female who presents with a chief complaint of shortness of breath (2019 13:22)    HPI:  This is a patient with ESRD on CAPD. She was admitted with dyspnea. Did not miss PD. Had Hypotension after agressive UF, but is stable.  Still dyspneic but is much better today.        MEDICATIONS  (STANDING):  allopurinol 300 milliGRAM(s) Oral daily  aspirin enteric coated 81 milliGRAM(s) Oral daily  atorvastatin 40 milliGRAM(s) Oral at bedtime  clopidogrel Tablet 75 milliGRAM(s) Oral daily  dextrose 5%. 1000 milliLiter(s) (50 mL/Hr) IV Continuous <Continuous>  dextrose 50% Injectable 12.5 Gram(s) IV Push once  dextrose 50% Injectable 25 Gram(s) IV Push once  dextrose 50% Injectable 25 Gram(s) IV Push once  docusate sodium 100 milliGRAM(s) Oral three times a day  insulin lispro (HumaLOG) corrective regimen sliding scale   SubCutaneous three times a day before meals  labetalol 100 milliGRAM(s) Oral daily  levothyroxine 50 MICROGram(s) Oral daily  multivitamin 1 Tablet(s) Oral daily  sevelamer carbonate 1600 milliGRAM(s) Oral three times a day with meals    MEDICATIONS  (PRN):  acetaminophen   Tablet .. 650 milliGRAM(s) Oral every 6 hours PRN Temp greater or equal to 38C (100.4F), Mild Pain (1 - 3)  dextrose 40% Gel 15 Gram(s) Oral once PRN Blood Glucose LESS THAN 70 milliGRAM(s)/deciliter  gentamicin 0.1% Cream 1 Application(s) Topical <User Schedule> PRN Pd catheter dressing change  glucagon  Injectable 1 milliGRAM(s) IntraMuscular once PRN Glucose LESS THAN 70 milligrams/deciliter    Vital Signs Last 24 Hrs  T(C): 36.3 (2019 21:43), Max: 36.8 (2019 05:15)  T(F): 97.3 (2019 21:43), Max: 98.2 (2019 05:15)  HR: 79 (2019 21:43) (72 - 82)  BP: 131/72 (2019 21:43) (99/55 - 131/72)  BP(mean): --  RR: 18 (2019 21:43) (17 - 18)  SpO2: 100% (2019 21:43) (100% - 100%)  Daily     Daily Weight in k.5 (2019 05:15)    PHYSICAL EXAM:    Constitutional:  She appears comfortable and not distressed. Not diaphoretic.    Neck:  The thyroid is normal. Trachea is midline.     Respiratory: The lungs are clear to auscultation. No dullness and expansion is normal.    Cardiovascular: S1 and S2 are normal. No mummurs, rubs or gallops are present.    Gastrointestinal: The abdomen is soft. No tenderness is present. No masses are present. Bowel sounds are normal.    Genitourinary: The bladder is not distended. No CVA tenderness is present.    Extremities: No edema is noted. No deformities are present.    Neurological: Cognition is normal. Tone, power and sensation are normal.     Skin: No lesions are seen  or palpated.    Lymph Nodes: No lymphadenopathy is present.                            9.5    7.12  )-----------( 182      ( 2019 05:57 )             30.6         x   |  x   |  x   ----------------------------<  x   5.3   |  x   |  x     Ca    10.4      2019 18:15  Phos  7.4       Mg     1.7         TPro  5.8<L>  /  Alb  3.0<L>  /  TBili  < 0.2<L>  /  DBili  x   /  AST  18  /  ALT  19  /  AlkPhos  59

## 2019-04-28 NOTE — PROGRESS NOTE ADULT - ASSESSMENT
1.	ESRD, on CAPD. Still dyspneic but BP is better so will attmpt more UF tomorrow.  2.	Dyspnea, rule out CAD. She is for Angiogram tomorrow.  3.	Anemia of ESRD.  4.	Hyperphosphatemia.    PLAN:  1.	Resume PO4 binders.  2.	Follow up BMP.  3.	CAPD tomorrow.  4.	EPO at Dialysis.

## 2019-04-28 NOTE — PROGRESS NOTE ADULT - ASSESSMENT
_________________________________________________________________________________________  ========>>  M E D I C A L   A T T E N D I N G    F O L L O W  U P  N O T E  <<=========  -----------------------------------------------------------------------------------------------------    - Patient seen and examined by me approximately sixty minutes ago.   - In summary,  KAPIL CAMPOVERDE is a 74y year old woman who originally presented with SOB  - Patient today overall doing ok, comfortable, eating OK.      pt still with some ROQUE, otherwise doing ok, gets OOB to chair at times     ==================>> REVIEW OF SYSTEM <<=================    GEN: no fever, no chills, no pain  RESP: no SOB at rest , no cough, no sputum  CVS: no chest pain, no palpitations, no edema  GI: no abdominal pain, no nausea, no constipation, no diarrhea  : no dysuria, no frequency, no hematuria  Neuro: no headache, no dizziness  Derm : no itching, no rash    ==================>> PHYSICAL EXAM <<=================    GEN: A&O X 3 , NAD , comfortable  HEENT: NCAT, PERRL, MMM, hearing intact, on nasal canula   Neck: supple , no JVD  CVS: S1S2 , regular , No M/R/G appreciated  PULM: CTA B/L,  no W/R/R appreciated  ABD.: soft. non tender, non distended,  bowel sounds present  Extrem: intact pulses , no edema   PSYCH : normal mood,  not anxious      ==================>> MEDICATIONS <<====================    MEDICATIONS  (STANDING):  allopurinol 300 milliGRAM(s) Oral daily  aspirin enteric coated 81 milliGRAM(s) Oral daily  atorvastatin 40 milliGRAM(s) Oral at bedtime  clopidogrel Tablet 75 milliGRAM(s) Oral daily  dextrose 5%. 1000 milliLiter(s) (50 mL/Hr) IV Continuous <Continuous>  dextrose 50% Injectable 12.5 Gram(s) IV Push once  dextrose 50% Injectable 25 Gram(s) IV Push once  dextrose 50% Injectable 25 Gram(s) IV Push once  docusate sodium 100 milliGRAM(s) Oral three times a day  insulin lispro (HumaLOG) corrective regimen sliding scale   SubCutaneous three times a day before meals  labetalol 100 milliGRAM(s) Oral daily  levothyroxine 50 MICROGram(s) Oral daily  multivitamin 1 Tablet(s) Oral daily  sevelamer carbonate 1600 milliGRAM(s) Oral three times a day with meals    MEDICATIONS  (PRN):  acetaminophen   Tablet .. 650 milliGRAM(s) Oral every 6 hours PRN Temp greater or equal to 38C (100.4F), Mild Pain (1 - 3)  dextrose 40% Gel 15 Gram(s) Oral once PRN Blood Glucose LESS THAN 70 milliGRAM(s)/deciliter  gentamicin 0.1% Cream 1 Application(s) Topical <User Schedule> PRN Pd catheter dressing change  glucagon  Injectable 1 milliGRAM(s) IntraMuscular once PRN Glucose LESS THAN 70 milligrams/deciliter      ==================>> VITAL SIGNS <<==================    T(C): 36.7 (04-28-19 @ 09:00), Max: 36.9 (04-27-19 @ 14:10)  HR: 82 (04-28-19 @ 09:00) (79 - 82)  BP: 99/55 (04-28-19 @ 09:00) (99/55 - 126/68)  RR: 17 (04-28-19 @ 09:00) (17 - 18)  SpO2: 100% (04-28-19 @ 09:00) (98% - 100%)    POCT Blood Glucose.: 214 mg/dL (28 Apr 2019 12:31)  POCT Blood Glucose.: 178 mg/dL (28 Apr 2019 08:17)  POCT Blood Glucose.: 206 mg/dL (27 Apr 2019 21:22)  POCT Blood Glucose.: 229 mg/dL (27 Apr 2019 18:33)  POCT Blood Glucose.: 295 mg/dL (27 Apr 2019 14:02)    I&O's Summary    27 Apr 2019 07:01  -  28 Apr 2019 07:00  --------------------------------------------------------  IN: 8000 mL / OUT: 83219 mL / NET: -3100 mL     ==================>> LAB AND IMAGING <<==================                        9.5    7.12  )-----------( 182      ( 28 Apr 2019 05:57 )             30.6     Hemoglobin:   9.5 <<==,  10.4 <<==,  11.4 <<==,  10.7 <<==,  11.8 <<==       129<L>  |  86<L>  |  64<H>  ----------------------------<  222<H>  4.7   |  20<L>  |  8.65<H>    Sodium:   129  <==, 130  <==, 127  <==, 136  <==, 137  <==, 141  <==, 142  <==    Ca    9.8      28 Apr 2019 05:57    TPro  5.8<L>  /  Alb  3.0<L>  /  TBili  < 0.2<L>  /  DBili  x   /  AST  18  /  ALT  19  /  AlkPhos  59  04-28    PT/INR - ( 27 Apr 2019 06:00 )   PT: 11.7 SEC;   INR: 1.03     PTT - ( 27 Apr 2019 06:00 )  PTT:29.7 SEC               TSH:      8.74   (04-24-19)           Lipid profile:  (04-24-19)     Total: 167     LDL  : 99     HDL  :45     TG   :191     HgA1C: 9.8  (04-24-19)        , 8.2  (02-12-19)        , 6.6  (01-13-19)            ___________________________________________________________________________________  ===============>>  A S S E S S M E N T   A N D   P L A N <<===============  ------------------------------------------------------------------------------------------    · Assessment		  Problem/Plan - 1:  ·  Problem: Shortness of breath    improved   will continue to monitor   plan for Lt/Rt heart cath tomorrow   fluid mgmt via dialysis      Problem/Plan - 2:  ·  Problem: ESRD on peritoneal dialysis.    nephro follow up and mgmtt appreciated   dialysis as above.      Problem/Plan - 3:  ·  Problem: acute hypoxemic respiratory failure due to pulmonary edema: resolved     pt was apparently off O2 and doing well but seen again on O2    monitor and wean off as able      Problem/Plan - 4:  ·  Problem: Chronic systolic congestive heart failure, with demand ischemia given elevated troponin in this pt with ESRD     LV EF reduced as above on Echo  continue beta blocker as reduced   pt off ACEI / ARB due to recorded allergy   stress test mostly fixed ischemia with some salvatore-infarct ischemia  d/w cardiology attending  meds adjusted based on conversation with addition of hydralazine as BP allows ( pt with hypotension)   likely coronary angiogram tomorrow      Problem/Plan - 5:  ·  Problem: Dyslipidemia.    Continue home statin.      Problem/Plan - 6:  Problem: Type 2 diabetes mellitus   Insulin sliding scale    -GI/DVT Prophylaxis.    --------------------------------------------  Case discussed with pt, cardio   Education given on findings and plan of care  ___________________________  H. MARISOL Abreu.  Pager: [phone]  `

## 2019-04-29 LAB
ALBUMIN SERPL ELPH-MCNC: 3.5 G/DL — SIGNIFICANT CHANGE UP (ref 3.3–5)
ALP SERPL-CCNC: 71 U/L — SIGNIFICANT CHANGE UP (ref 40–120)
ALT FLD-CCNC: 25 U/L — SIGNIFICANT CHANGE UP (ref 4–33)
ANION GAP SERPL CALC-SCNC: 25 MMO/L — HIGH (ref 7–14)
APTT BLD: 30.9 SEC — SIGNIFICANT CHANGE UP (ref 27.5–36.3)
AST SERPL-CCNC: 27 U/L — SIGNIFICANT CHANGE UP (ref 4–32)
BASOPHILS # BLD AUTO: 0.04 K/UL — SIGNIFICANT CHANGE UP (ref 0–0.2)
BASOPHILS NFR BLD AUTO: 0.5 % — SIGNIFICANT CHANGE UP (ref 0–2)
BILIRUB SERPL-MCNC: 0.3 MG/DL — SIGNIFICANT CHANGE UP (ref 0.2–1.2)
BUN SERPL-MCNC: 76 MG/DL — HIGH (ref 7–23)
C DIFF TOX GENS STL QL NAA+PROBE: SIGNIFICANT CHANGE UP
CALCIUM SERPL-MCNC: 10.5 MG/DL — SIGNIFICANT CHANGE UP (ref 8.4–10.5)
CHLORIDE SERPL-SCNC: 82 MMOL/L — LOW (ref 98–107)
CO2 SERPL-SCNC: 18 MMOL/L — LOW (ref 22–31)
CREAT SERPL-MCNC: 9.48 MG/DL — HIGH (ref 0.5–1.3)
EOSINOPHIL # BLD AUTO: 0.22 K/UL — SIGNIFICANT CHANGE UP (ref 0–0.5)
EOSINOPHIL NFR BLD AUTO: 2.7 % — SIGNIFICANT CHANGE UP (ref 0–6)
GLUCOSE BLDC GLUCOMTR-MCNC: 183 MG/DL — HIGH (ref 70–99)
GLUCOSE BLDC GLUCOMTR-MCNC: 183 MG/DL — HIGH (ref 70–99)
GLUCOSE BLDC GLUCOMTR-MCNC: 216 MG/DL — HIGH (ref 70–99)
GLUCOSE BLDC GLUCOMTR-MCNC: 273 MG/DL — HIGH (ref 70–99)
GLUCOSE BLDC GLUCOMTR-MCNC: 278 MG/DL — HIGH (ref 70–99)
GLUCOSE SERPL-MCNC: 176 MG/DL — HIGH (ref 70–99)
HCT VFR BLD CALC: 31.5 % — LOW (ref 34.5–45)
HGB BLD-MCNC: 9.9 G/DL — LOW (ref 11.5–15.5)
IMM GRANULOCYTES NFR BLD AUTO: 1 % — SIGNIFICANT CHANGE UP (ref 0–1.5)
INR BLD: 1 — SIGNIFICANT CHANGE UP (ref 0.88–1.17)
LYMPHOCYTES # BLD AUTO: 1.98 K/UL — SIGNIFICANT CHANGE UP (ref 1–3.3)
LYMPHOCYTES # BLD AUTO: 24.4 % — SIGNIFICANT CHANGE UP (ref 13–44)
MCHC RBC-ENTMCNC: 26.3 PG — LOW (ref 27–34)
MCHC RBC-ENTMCNC: 31.4 % — LOW (ref 32–36)
MCV RBC AUTO: 83.8 FL — SIGNIFICANT CHANGE UP (ref 80–100)
MONOCYTES # BLD AUTO: 0.93 K/UL — HIGH (ref 0–0.9)
MONOCYTES NFR BLD AUTO: 11.5 % — SIGNIFICANT CHANGE UP (ref 2–14)
NEUTROPHILS # BLD AUTO: 4.87 K/UL — SIGNIFICANT CHANGE UP (ref 1.8–7.4)
NEUTROPHILS NFR BLD AUTO: 59.9 % — SIGNIFICANT CHANGE UP (ref 43–77)
NRBC # FLD: 0 K/UL — SIGNIFICANT CHANGE UP (ref 0–0)
PLATELET # BLD AUTO: 188 K/UL — SIGNIFICANT CHANGE UP (ref 150–400)
PMV BLD: 10.5 FL — SIGNIFICANT CHANGE UP (ref 7–13)
POTASSIUM SERPL-MCNC: 5.5 MMOL/L — HIGH (ref 3.5–5.3)
POTASSIUM SERPL-SCNC: 5.5 MMOL/L — HIGH (ref 3.5–5.3)
PROT SERPL-MCNC: 6.5 G/DL — SIGNIFICANT CHANGE UP (ref 6–8.3)
PROTHROM AB SERPL-ACNC: 11.4 SEC — SIGNIFICANT CHANGE UP (ref 9.8–13.1)
RBC # BLD: 3.76 M/UL — LOW (ref 3.8–5.2)
RBC # FLD: 17.5 % — HIGH (ref 10.3–14.5)
SODIUM SERPL-SCNC: 125 MMOL/L — LOW (ref 135–145)
WBC # BLD: 8.12 K/UL — SIGNIFICANT CHANGE UP (ref 3.8–10.5)
WBC # FLD AUTO: 8.12 K/UL — SIGNIFICANT CHANGE UP (ref 3.8–10.5)

## 2019-04-29 RX ORDER — ERYTHROPOIETIN 10000 [IU]/ML
10000 INJECTION, SOLUTION INTRAVENOUS; SUBCUTANEOUS
Qty: 0 | Refills: 0 | Status: DISCONTINUED | OUTPATIENT
Start: 2019-04-29 | End: 2019-05-09

## 2019-04-29 RX ORDER — LOPERAMIDE HCL 2 MG
4 TABLET ORAL ONCE
Qty: 0 | Refills: 0 | Status: COMPLETED | OUTPATIENT
Start: 2019-04-29 | End: 2019-04-29

## 2019-04-29 RX ORDER — CHLORHEXIDINE GLUCONATE 213 G/1000ML
1 SOLUTION TOPICAL
Qty: 0 | Refills: 0 | Status: DISCONTINUED | OUTPATIENT
Start: 2019-04-29 | End: 2019-05-09

## 2019-04-29 RX ORDER — SEVELAMER CARBONATE 2400 MG/1
2400 POWDER, FOR SUSPENSION ORAL
Qty: 0 | Refills: 0 | Status: DISCONTINUED | OUTPATIENT
Start: 2019-04-29 | End: 2019-05-06

## 2019-04-29 RX ORDER — LOPERAMIDE HCL 2 MG
2 TABLET ORAL
Qty: 0 | Refills: 0 | Status: DISCONTINUED | OUTPATIENT
Start: 2019-04-29 | End: 2019-05-06

## 2019-04-29 RX ADMIN — Medication 300 MILLIGRAM(S): at 10:56

## 2019-04-29 RX ADMIN — Medication 3: at 16:40

## 2019-04-29 RX ADMIN — SEVELAMER CARBONATE 2400 MILLIGRAM(S): 2400 POWDER, FOR SUSPENSION ORAL at 16:41

## 2019-04-29 RX ADMIN — ATORVASTATIN CALCIUM 40 MILLIGRAM(S): 80 TABLET, FILM COATED ORAL at 22:10

## 2019-04-29 RX ADMIN — Medication 81 MILLIGRAM(S): at 08:01

## 2019-04-29 RX ADMIN — Medication 1: at 08:01

## 2019-04-29 RX ADMIN — CLOPIDOGREL BISULFATE 75 MILLIGRAM(S): 75 TABLET, FILM COATED ORAL at 08:01

## 2019-04-29 RX ADMIN — Medication 3: at 14:09

## 2019-04-29 RX ADMIN — Medication 50 MICROGRAM(S): at 05:38

## 2019-04-29 RX ADMIN — Medication 100 MILLIGRAM(S): at 05:38

## 2019-04-29 RX ADMIN — Medication 4 MILLIGRAM(S): at 14:10

## 2019-04-29 RX ADMIN — SEVELAMER CARBONATE 1600 MILLIGRAM(S): 2400 POWDER, FOR SUSPENSION ORAL at 10:57

## 2019-04-29 RX ADMIN — Medication 10 MILLIGRAM(S): at 17:08

## 2019-04-29 RX ADMIN — Medication 1 TABLET(S): at 10:57

## 2019-04-29 NOTE — CHART NOTE - NSCHARTNOTEFT_GEN_A_CORE
Notified by RN that pt continue having loose stools. Per pt she had loose stool today and 2-3 overnight. Stool for C-Diff- negative. Will check GI PCR, O&P. Diet changed to Lactose free. Started Imodium PRN. Will continue to monitor pts clinical status.

## 2019-04-29 NOTE — PROGRESS NOTE ADULT - SUBJECTIVE AND OBJECTIVE BOX
Subjective: Patient seen and examined. No new events except as noted.   c/ sob orthopnea  Na  hyponatremic potasium 5.5  MEDICATIONS:  MEDICATIONS  (STANDING):  allopurinol 300 milliGRAM(s) Oral daily  aspirin enteric coated 81 milliGRAM(s) Oral daily  atorvastatin 40 milliGRAM(s) Oral at bedtime  clopidogrel Tablet 75 milliGRAM(s) Oral daily  dextrose 5%. 1000 milliLiter(s) (50 mL/Hr) IV Continuous <Continuous>  dextrose 50% Injectable 12.5 Gram(s) IV Push once  dextrose 50% Injectable 25 Gram(s) IV Push once  dextrose 50% Injectable 25 Gram(s) IV Push once  docusate sodium 100 milliGRAM(s) Oral three times a day  hydrALAZINE 10 milliGRAM(s) Oral every 12 hours  insulin lispro (HumaLOG) corrective regimen sliding scale   SubCutaneous three times a day before meals  labetalol 100 milliGRAM(s) Oral daily  levothyroxine 50 MICROGram(s) Oral daily  multivitamin 1 Tablet(s) Oral daily  sevelamer carbonate 1600 milliGRAM(s) Oral three times a day with meals      PHYSICAL EXAM:  T(C): 36.6 (04-29-19 @ 05:36), Max: 36.7 (04-28-19 @ 14:26)  HR: 76 (04-29-19 @ 05:36) (72 - 79)  BP: 131/78 (04-29-19 @ 05:36) (107/55 - 131/78)  RR: 17 (04-29-19 @ 05:36) (17 - 18)  SpO2: 100% (04-29-19 @ 05:36) (100% - 100%)  Wt(kg): --  I&O's Summary    28 Apr 2019 07:01  -  29 Apr 2019 07:00  --------------------------------------------------------  IN: 0 mL / OUT: 1 mL / NET: -1 mL      Height (cm): 152.4 (04-28 @ 21:10)  Weight (kg): 60.5 (04-29 @ 00:21)  BMI (kg/m2): 26 (04-29 @ 00:21)  BSA (m2): 1.57 (04-29 @ 00:21)    Appearance: Normal	chronically ill appearing  HEENT:   Normal oral mucosa, PERRL, EOMI	  Cardiovascular: Normal S1 S2, No JVD, No murmurs ,n s3  Respiratory: Lungs clear to auscultation, normal effort 	  Gastrointestinal:  Soft, Non-tender, + BS	  Psychiatry:  Mood & affect appropriate  Ext: No edema  Peripheral pulses palpable 2+ bilaterally      LABS:    CARDIAC MARKERS:                                9.9    8.12  )-----------( 188      ( 29 Apr 2019 06:25 )             31.5     04-29    125<L>  |  82<L>  |  76<H>  ----------------------------<  176<H>  5.5<H>   |  18<L>  |  9.48<H>    Ca    10.5      29 Apr 2019 06:25  Phos  7.4     04-28  Mg     1.7     04-28    TPro  6.5  /  Alb  3.5  /  TBili  0.3  /  DBili  x   /  AST  27  /  ALT  25  /  AlkPhos  71  04-29    proBNP:   Lipid Profile:   HgA1c:   TSH:           TELEMETRY: 	    ECG:  	  RADIOLOGY:   DIAGNOSTIC TESTING:  [ ] Echocardiogram:  [ ]  Catheterization:  [ ] Stress Test:    OTHER:

## 2019-04-29 NOTE — PROGRESS NOTE ADULT - ASSESSMENT
_________________________________________________________________________________________  ========>>  M E D I C A L   A T T E N D I N G    F O L L O W  U P  N O T E  <<=========  -----------------------------------------------------------------------------------------------------    - Patient seen and examined by me approximately sixty minutes ago.   - In summary,  KAPIL CAMPOVERDE is a 74y year old woman who originally presented with SOB  - Patient today overall doing ok, comfortable, eating OK.     ==================>> REVIEW OF SYSTEM <<=================    GEN: no fever, no chills, no pain  RESP: no SOB at rest , no cough, no sputum  CVS: no chest pain, no palpitations, no edema  GI: no abdominal pain, no nausea, no constipation, no diarrhea  : no dysuria, no frequency, no hematuria  Neuro: no headache, no dizziness  Derm : no itching, no rash    ==================>> PHYSICAL EXAM <<=================    GEN: A&O X 3 , NAD , comfortable  HEENT: NCAT, PERRL, MMM, hearing intact, on nasal canula   Neck: supple , no JVD  CVS: S1S2 , regular , No M/R/G appreciated  PULM: CTA B/L,  no W/R/R appreciated  ABD.: soft. non tender, non distended,  bowel sounds present  Extrem: intact pulses , no edema   PSYCH : normal mood,  not anxious       ==================>> MEDICATIONS <<====================    allopurinol 300 milliGRAM(s) Oral daily  aspirin enteric coated 81 milliGRAM(s) Oral daily  atorvastatin 40 milliGRAM(s) Oral at bedtime  clopidogrel Tablet 75 milliGRAM(s) Oral daily  dextrose 5%. 1000 milliLiter(s) IV Continuous <Continuous>  dextrose 50% Injectable 12.5 Gram(s) IV Push once  dextrose 50% Injectable 25 Gram(s) IV Push once  dextrose 50% Injectable 25 Gram(s) IV Push once  docusate sodium 100 milliGRAM(s) Oral three times a day  hydrALAZINE 10 milliGRAM(s) Oral every 12 hours  insulin lispro (HumaLOG) corrective regimen sliding scale   SubCutaneous three times a day before meals  labetalol 100 milliGRAM(s) Oral daily  levothyroxine 50 MICROGram(s) Oral daily  multivitamin 1 Tablet(s) Oral daily  sevelamer carbonate 1600 milliGRAM(s) Oral three times a day with meals    MEDICATIONS  (PRN):  acetaminophen   Tablet .. 650 milliGRAM(s) Oral every 6 hours PRN Temp greater or equal to 38C (100.4F), Mild Pain (1 - 3)  dextrose 40% Gel 15 Gram(s) Oral once PRN Blood Glucose LESS THAN 70 milliGRAM(s)/deciliter  gentamicin 0.1% Cream 1 Application(s) Topical <User Schedule> PRN Pd catheter dressing change  glucagon  Injectable 1 milliGRAM(s) IntraMuscular once PRN Glucose LESS THAN 70 milligrams/deciliter    ==================>> VITAL SIGNS <<==================    Vital Signs Last 24 Hrs  T(C): 36.6 (04-29-19 @ 05:36)  T(F): 97.9 (04-29-19 @ 05:36), Max: 98 (04-28-19 @ 14:26)  HR: 76 (04-29-19 @ 05:36) (72 - 79)  BP: 131/78 (04-29-19 @ 05:36)  RR: 17 (04-29-19 @ 05:36) (17 - 18)  SpO2: 100% (04-29-19 @ 05:36) (100% - 100%)      POCT Blood Glucose.: 183 mg/dL (29 Apr 2019 08:07)  POCT Blood Glucose.: 183 mg/dL (29 Apr 2019 06:27)  POCT Blood Glucose.: 225 mg/dL (28 Apr 2019 22:08)  POCT Blood Glucose.: 155 mg/dL (28 Apr 2019 17:16)  POCT Blood Glucose.: 214 mg/dL (28 Apr 2019 12:31)     ==================>> LAB AND IMAGING <<==================                        9.9    8.12  )-----------( 188      ( 29 Apr 2019 06:25 )             31.5        125<L>  |  82<L>  |  76<H>  ----------------------------<  176<H>  5.5<H>   |  18<L>  |  9.48<H>    Sodium:   125  <==, 127  <==, 129  <==, 130  <==, 127  <==, 136  <==, 137  <==    Ca    10.5      29 Apr 2019 06:25  Phos  7.4     04-28  Mg     1.7     04-28    TPro  6.5  /  Alb  3.5  /  TBili  0.3  /  DBili  x   /  AST  27  /  ALT  25  /  AlkPhos  71  04-29    WBC count:   8.12 <<== ,  7.12 <<== ,  8.18 <<== ,  8.79 <<== ,  8.57 <<==   Hemoglobin:   9.9 <<==,  9.5 <<==,  10.4 <<==,  11.4 <<==,  10.7 <<==  platelets:  188 <==, 182 <==, 210 <==, 200 <==, 212 <==, 238 <==, 220 <==    Creatinine:  9.48  <<==, 9.04  <<==, 8.65  <<==, 8.72  <<==, 9.06  <<==, 10.13  <<==  Sodium:   125  <==, 127  <==, 129  <==, 130  <==, 127  <==, 136  <==, 137  <==       AST:          27 <== , 18 <== , 20 <== , 19 <== , 18 <==      ALT:        25  <== , 19  <== , 18  <== , 21  <== , 24  <==      AP:        71  <=, 59  <=, 67  <=, 65  <=, 63  <=     Bili:        0.3  <=, < 0.2  <=, 0.2  <=, < 0.2  <=, 0.3  <=     TSH:      8.74   (04-24-19)           Lipid profile:  (04-24-19)     Total: 167     LDL  : 99     HDL  :45     TG   :191     HgA1C: 9.8  (04-24-19)        , 8.2  (02-12-19)        , 6.6  (01-13-19)            ___________________________________________________________________________________  ===============>>  A S S E S S M E N T   A N D   P L A N <<===============  ------------------------------------------------------------------------------------------    · Assessment		  Problem/Plan - 1:  ·  Problem: Shortness of breath    improved overall   will continue to monitor   plan for Lt/Rt heart cath today  fluid mgmt via dialysis      Problem/Plan - 2:  ·  Problem: ESRD on peritoneal dialysis.    nephro follow up and mgmtt appreciated   dialysis   monitor and treat hyponatremia per renal      Problem/Plan - 3:  ·  Problem: acute hypoxemic respiratory failure due to pulmonary edema: resolved     pt was apparently off O2 and doing well but seen again on O2    monitor and wean off o2 as able      Problem/Plan - 4:  ·  Problem: Chronic systolic congestive heart failure, with demand ischemia given elevated troponin in this pt with ESRD     LV EF reduced as above on Echo  continue beta blocker as reduced   pt off ACEI / ARB due to recorded allergy   stress test mostly fixed ischemia with some salvatore-infarct ischemia  med optimization as able   for heart cath today      Problem/Plan - 5:  ·  Problem: Dyslipidemia.    Continue home statin.      Problem/Plan - 6:  Problem: Type 2 diabetes mellitus   Insulin sliding scale    -GI/DVT Prophylaxis.    --------------------------------------------  Case discussed with pt  Education given on findings and plan of care  ___________________________  HMariama Abreu D.O.  Pager: [phone]  `

## 2019-04-29 NOTE — PROGRESS NOTE ADULT - ASSESSMENT
74F w/ ESRD on PD at home, HTN, DM, CAD s/p CABG and stents, on AC, p/w SOB x1 day.    ESRD on PD  Continue PD with 4 exchanges daily  Continue PD with UF as tolerated  Monitor BMP and BP    SOB  multiple admission for fluid overload  chest xray is clear no edema  Cardiology follow up --abnormal NST pending cath    Hypercalcemia  Elevated PTH, phos level  Hold calcitriol   Increase renagel 2400 tid with meal  Monitor serum calcium and phos    Anemia  epo [id number] sq tiw  monitor Hb    HTN  on labetalol 100 daily  hydralazine 10 tid started  intermittent hypotensive better.   UF as tolerated.   monitor bp  hold bp meds per perimeter     Hyponatremia   likely sec to increase fluid status in ESRD pt  UF with PD as tolerated  free water restriction <1L/day    Hyperkalemia  sec to renal failure  low k diet  PD as ordered  monitor 74F w/ ESRD on PD at home, HTN, DM, CAD s/p CABG and stents, on AC, p/w SOB x1 day.    ESRD on PD  Continue PD with 4 exchanges daily  Continue PD with UF as tolerated  Monitor BMP and BP    SOB  multiple admission for fluid overload  chest xray is clear no edema  Cardiology follow up --abnormal NST pending cath    Hypercalcemia  Elevated PTH, phos level  Hold calcitriol   Increase renagel 2400 tid with meal  Monitor serum calcium and phos    Anemia  epo [id number] sq tiw  monitor Hb    HTN  on labetalol 100 daily  hydralazine 10 tid started  intermittent hypotensive improved   UF as tolerated.   monitor bp  hold bp meds per perimeter     Hyponatremia   likely sec to increase fluid status in ESRD pt  UF with PD as tolerated  free water restriction <1L/day    Hyperkalemia  sec to renal failure  low k diet  PD as ordered  monitor 74F w/ ESRD on PD at home, HTN, DM, CAD s/p CABG and stents, on AC, p/w SOB x1 day.    ESRD on PD  Continue PD with 4 exchanges daily  Continue PD with UF as tolerated  Monitor BMP and BP    SOB  multiple admission for fluid overload  chest xray is clear no edema  Cardiology follow up --abnormal NST-- pending cath    Hypercalcemia  Elevated PTH, phos level  Hold calcitriol   Increase renagel 2400 tid with meal  Monitor serum calcium and phos    Anemia  epo [id number] sq tiw  monitor Hb    HTN  on labetalol 100 daily  hydralazine 10 tid started  intermittent hypotensive improved   UF as tolerated.   monitor bp  hold bp meds per perimeter     Hyponatremia   likely sec to increase fluid status in ESRD pt  UF with PD as tolerated  free water restriction <1L/day    Hyperkalemia  sec to renal failure  low k diet  PD as ordered  monitor

## 2019-04-29 NOTE — PROGRESS NOTE ADULT - ASSESSMENT
1. ESRD  2. sob chf  3. worsening LV fucntion infarct with minimal ischemia  4. s/p CABG s/p PTCI  5. ischemic cardiomyopathy  6. hyponatremis potassium 5.5    Recommend  1. to better assess reason for deterioration of LV fucntion. will proceed with left and right heart catheterization on 4/30/19  2. add hydralzine 10 TID and if BP tolerates isordil 10 BTID  3. check pacemaker (EP) and consider upgrade of pacemaker to biventricular device pending cath results  4. peritoneal dialysis today  5. proceed with cath 4/30/19    patient agreeable

## 2019-04-30 LAB
ANION GAP SERPL CALC-SCNC: 25 MMO/L — HIGH (ref 7–14)
ANION GAP SERPL CALC-SCNC: 29 MMO/L — HIGH (ref 7–14)
BUN SERPL-MCNC: 72 MG/DL — HIGH (ref 7–23)
BUN SERPL-MCNC: 73 MG/DL — HIGH (ref 7–23)
CALCIUM SERPL-MCNC: 10.2 MG/DL — SIGNIFICANT CHANGE UP (ref 8.4–10.5)
CALCIUM SERPL-MCNC: 10.3 MG/DL — SIGNIFICANT CHANGE UP (ref 8.4–10.5)
CHLORIDE SERPL-SCNC: 85 MMOL/L — LOW (ref 98–107)
CHLORIDE SERPL-SCNC: 87 MMOL/L — LOW (ref 98–107)
CO2 SERPL-SCNC: 18 MMOL/L — LOW (ref 22–31)
CO2 SERPL-SCNC: 20 MMOL/L — LOW (ref 22–31)
CREAT SERPL-MCNC: 9.31 MG/DL — HIGH (ref 0.5–1.3)
CREAT SERPL-MCNC: 9.35 MG/DL — HIGH (ref 0.5–1.3)
GLUCOSE BLDC GLUCOMTR-MCNC: 156 MG/DL — HIGH (ref 70–99)
GLUCOSE BLDC GLUCOMTR-MCNC: 223 MG/DL — HIGH (ref 70–99)
GLUCOSE BLDC GLUCOMTR-MCNC: 224 MG/DL — HIGH (ref 70–99)
GLUCOSE BLDC GLUCOMTR-MCNC: 89 MG/DL — SIGNIFICANT CHANGE UP (ref 70–99)
GLUCOSE SERPL-MCNC: 201 MG/DL — HIGH (ref 70–99)
GLUCOSE SERPL-MCNC: 273 MG/DL — HIGH (ref 70–99)
HCT VFR BLD CALC: 30.9 % — LOW (ref 34.5–45)
HGB BLD-MCNC: 9.6 G/DL — LOW (ref 11.5–15.5)
MAGNESIUM SERPL-MCNC: 1.8 MG/DL — SIGNIFICANT CHANGE UP (ref 1.6–2.6)
MAGNESIUM SERPL-MCNC: 1.8 MG/DL — SIGNIFICANT CHANGE UP (ref 1.6–2.6)
MCHC RBC-ENTMCNC: 26.1 PG — LOW (ref 27–34)
MCHC RBC-ENTMCNC: 31.1 % — LOW (ref 32–36)
MCV RBC AUTO: 84 FL — SIGNIFICANT CHANGE UP (ref 80–100)
NRBC # FLD: 0 K/UL — SIGNIFICANT CHANGE UP (ref 0–0)
PHOSPHATE SERPL-MCNC: 7.6 MG/DL — HIGH (ref 2.5–4.5)
PHOSPHATE SERPL-MCNC: 7.7 MG/DL — HIGH (ref 2.5–4.5)
PLATELET # BLD AUTO: 163 K/UL — SIGNIFICANT CHANGE UP (ref 150–400)
PMV BLD: 10.4 FL — SIGNIFICANT CHANGE UP (ref 7–13)
POTASSIUM SERPL-MCNC: 4.5 MMOL/L — SIGNIFICANT CHANGE UP (ref 3.5–5.3)
POTASSIUM SERPL-MCNC: 4.6 MMOL/L — SIGNIFICANT CHANGE UP (ref 3.5–5.3)
POTASSIUM SERPL-SCNC: 4.5 MMOL/L — SIGNIFICANT CHANGE UP (ref 3.5–5.3)
POTASSIUM SERPL-SCNC: 4.6 MMOL/L — SIGNIFICANT CHANGE UP (ref 3.5–5.3)
RBC # BLD: 3.68 M/UL — LOW (ref 3.8–5.2)
RBC # FLD: 17 % — HIGH (ref 10.3–14.5)
SODIUM SERPL-SCNC: 130 MMOL/L — LOW (ref 135–145)
SODIUM SERPL-SCNC: 134 MMOL/L — LOW (ref 135–145)
WBC # BLD: 5.17 K/UL — SIGNIFICANT CHANGE UP (ref 3.8–10.5)
WBC # FLD AUTO: 5.17 K/UL — SIGNIFICANT CHANGE UP (ref 3.8–10.5)

## 2019-04-30 PROCEDURE — 93281 PM DEVICE PROGR EVAL MULTI: CPT | Mod: 26,GC

## 2019-04-30 PROCEDURE — 99233 SBSQ HOSP IP/OBS HIGH 50: CPT | Mod: 25

## 2019-04-30 RX ORDER — INSULIN GLARGINE 100 [IU]/ML
5 INJECTION, SOLUTION SUBCUTANEOUS AT BEDTIME
Qty: 0 | Refills: 0 | Status: DISCONTINUED | OUTPATIENT
Start: 2019-04-30 | End: 2019-05-09

## 2019-04-30 RX ORDER — CALCIUM ACETATE 667 MG
3 TABLET ORAL
Qty: 0 | Refills: 0 | COMMUNITY

## 2019-04-30 RX ORDER — GLIMEPIRIDE 1 MG
2 TABLET ORAL
Qty: 0 | Refills: 0 | COMMUNITY

## 2019-04-30 RX ORDER — SEVELAMER CARBONATE 2400 MG/1
3 POWDER, FOR SUSPENSION ORAL
Qty: 0 | Refills: 0 | COMMUNITY

## 2019-04-30 RX ORDER — FLUTICASONE PROPIONATE 50 MCG
1 SPRAY, SUSPENSION NASAL
Qty: 0 | Refills: 0 | COMMUNITY

## 2019-04-30 RX ORDER — FEBUXOSTAT 40 MG/1
1 TABLET ORAL
Qty: 0 | Refills: 0 | COMMUNITY

## 2019-04-30 RX ORDER — ERGOCALCIFEROL 1.25 MG/1
1 CAPSULE ORAL
Qty: 0 | Refills: 0 | COMMUNITY

## 2019-04-30 RX ORDER — INSULIN LISPRO 100/ML
2 VIAL (ML) SUBCUTANEOUS
Qty: 0 | Refills: 0 | Status: DISCONTINUED | OUTPATIENT
Start: 2019-04-30 | End: 2019-05-09

## 2019-04-30 RX ORDER — INSULIN LISPRO 100/ML
4 VIAL (ML) SUBCUTANEOUS
Qty: 0 | Refills: 0 | Status: DISCONTINUED | OUTPATIENT
Start: 2019-04-30 | End: 2019-04-30

## 2019-04-30 RX ADMIN — SEVELAMER CARBONATE 2400 MILLIGRAM(S): 2400 POWDER, FOR SUSPENSION ORAL at 12:53

## 2019-04-30 RX ADMIN — Medication 1 TABLET(S): at 12:54

## 2019-04-30 RX ADMIN — CHLORHEXIDINE GLUCONATE 1 APPLICATION(S): 213 SOLUTION TOPICAL at 17:42

## 2019-04-30 RX ADMIN — Medication 1: at 12:55

## 2019-04-30 RX ADMIN — INSULIN GLARGINE 5 UNIT(S): 100 INJECTION, SOLUTION SUBCUTANEOUS at 22:05

## 2019-04-30 RX ADMIN — Medication 2: at 08:36

## 2019-04-30 RX ADMIN — Medication 100 MILLIGRAM(S): at 13:03

## 2019-04-30 RX ADMIN — SEVELAMER CARBONATE 2400 MILLIGRAM(S): 2400 POWDER, FOR SUSPENSION ORAL at 08:37

## 2019-04-30 RX ADMIN — CLOPIDOGREL BISULFATE 75 MILLIGRAM(S): 75 TABLET, FILM COATED ORAL at 12:54

## 2019-04-30 RX ADMIN — CHLORHEXIDINE GLUCONATE 1 APPLICATION(S): 213 SOLUTION TOPICAL at 05:47

## 2019-04-30 RX ADMIN — SEVELAMER CARBONATE 2400 MILLIGRAM(S): 2400 POWDER, FOR SUSPENSION ORAL at 17:35

## 2019-04-30 RX ADMIN — Medication 50 MICROGRAM(S): at 05:46

## 2019-04-30 RX ADMIN — Medication 81 MILLIGRAM(S): at 12:54

## 2019-04-30 RX ADMIN — ATORVASTATIN CALCIUM 40 MILLIGRAM(S): 80 TABLET, FILM COATED ORAL at 22:05

## 2019-04-30 RX ADMIN — Medication 10 MILLIGRAM(S): at 17:38

## 2019-04-30 RX ADMIN — Medication 4 UNIT(S): at 12:52

## 2019-04-30 RX ADMIN — Medication 300 MILLIGRAM(S): at 12:54

## 2019-04-30 RX ADMIN — Medication 4 UNIT(S): at 17:37

## 2019-04-30 NOTE — CONSULT NOTE ADULT - SUBJECTIVE AND OBJECTIVE BOX
Patient seen and evaluated at bedside    Chief Complaint: SOB    HPI:  Pt is a 74 year old F with a PMH of ESRD on PD, HTN, CAD s/p CABG and PCI (last of which was in 2017), BiV Medtronic CRT device implanted       PAST MEDICAL & SURGICAL HISTORY:  ESRD on peritoneal dialysis  Acid reflux  Hypertension  Dyslipidemia  Diabetes mellitus  CAD (coronary artery disease)  S/P CABG (coronary artery bypass graft): in 2012  H/O: hysterectomy  History of total knee replacement b/l  After cataract, bilateral      Review of Systems:   CONSTITUTIONAL: No fever, weight loss, or fatigue  EYES: No eye pain, visual disturbances, or discharge  ENMT:  No difficulty hearing, tinnitus, vertigo; No sinus or throat pain, +nasal congestion  NECK: No pain or stiffness  RESPIRATORY:+ cough, shortness of breath  CARDIOVASCULAR: No chest pain, palpitations, dizziness, or leg swelling  GASTROINTESTINAL: No abdominal or epigastric pain. No nausea, vomiting, or hematemesis; No diarrhea or constipation. No melena or hematochezia.  GENITOURINARY: No dysuria, frequency, hematuria, or incontinence. Not completely anuric  NEUROLOGICAL: No headaches, memory loss, loss of strength, numbness, or tremors  LYMPH NODES: No enlarged glands  ENDOCRINE: No heat or cold intolerance; No hair loss  MUSCULOSKELETAL: No joint pain or swelling; No muscle, back, or extremity pain  PSYCHIATRIC: No depression, anxiety, mood swings, or difficulty sleeping  HEME/LYMPH: No easy bruising, or bleeding gums  ALLERGY AND IMMUNOLOGIC: No hives or eczema    Allergies    Cipro (Unknown)  Diovan (Unknown)    Intolerances        Social History:   Denies smoking, drinking or drug use. Lives at home with her  who has parkinson's disease and dementia and her son who has polio    FAMILY HISTORY:  Family history of early CAD: mother had cad  Family history of diabetes mellitus: mother had dm      MEDICATIONS  (STANDING):  reviewed and reconciled home meds       Vital Signs Last 24 Hrs  T(C): 36.4 (04-23-19 @ 18:15)  T(F): 97.5 (04-23-19 @ 18:15), Max: 98.4 (04-23-19 @ 12:16)  HR: 81 (04-23-19 @ 18:15) (54 - 88)  BP: 118/81 (04-23-19 @ 18:15)  RR: 18 (04-23-19 @ 18:15) (18 - 18)  SpO2: 100% (04-23-19 @ 18:15) (98% - 100%)      PHYSICAL EXAM:  GENERAL: NAD, well-developed, pleasant, speaks in full sentences  HEAD:  Atraumatic, Normocephalic  EYES: EOMI, PERRLA, conjunctiva and sclera clear  CHEST/LUNG: Decreased breath sounds B/L especially towards the bases  HEART: Regular rate and rhythm; No murmurs, S3  ABDOMEN: Soft, Nontender, Nondistended; Bowel sounds present  EXTREMITIES:  2+ Peripheral Pulses, No clubbing, cyanosis, or edema  PSYCH: AAOx3  NEUROLOGY: CN II-XII grossly intact, moving all extremities  SKIN: No rashes or lesions    LABS:    as bellow (23 Apr 2019 17:13)      PMHx:   ESRD on peritoneal dialysis  Acid reflux  Hypertension  Dyslipidemia  Diabetes mellitus  CAD (coronary artery disease)      PSHx:   S/P CABG (coronary artery bypass graft)  H/O: hysterectomy  History of total knee replacement b/l  After cataract, bilateral      Allergies:  Cipro (Unknown)  Diovan (Unknown)      Home Meds:    Current Medications:   acetaminophen   Tablet .. 650 milliGRAM(s) Oral every 6 hours PRN  allopurinol 300 milliGRAM(s) Oral daily  aspirin enteric coated 81 milliGRAM(s) Oral daily  atorvastatin 40 milliGRAM(s) Oral at bedtime  chlorhexidine 4% Liquid 1 Application(s) Topical two times a day  clopidogrel Tablet 75 milliGRAM(s) Oral daily  dextrose 40% Gel 15 Gram(s) Oral once PRN  dextrose 5%. 1000 milliLiter(s) IV Continuous <Continuous>  dextrose 50% Injectable 12.5 Gram(s) IV Push once  dextrose 50% Injectable 25 Gram(s) IV Push once  dextrose 50% Injectable 25 Gram(s) IV Push once  docusate sodium 100 milliGRAM(s) Oral three times a day  epoetin rocky Injectable 09157 Unit(s) SubCutaneous <User Schedule>  gentamicin 0.1% Cream 1 Application(s) Topical <User Schedule> PRN  glucagon  Injectable 1 milliGRAM(s) IntraMuscular once PRN  hydrALAZINE 10 milliGRAM(s) Oral every 12 hours  insulin lispro (HumaLOG) corrective regimen sliding scale   SubCutaneous three times a day before meals  labetalol 100 milliGRAM(s) Oral daily  levothyroxine 50 MICROGram(s) Oral daily  loperamide 2 milliGRAM(s) Oral four times a day PRN  multivitamin 1 Tablet(s) Oral daily  sevelamer carbonate 2400 milliGRAM(s) Oral three times a day with meals      FAMILY HISTORY:  Family history of early CAD: mother had cad  Family history of diabetes mellitus: mother had dm      Social History:  Smoking History:  Alcohol Use:  Drug Use:    REVIEW OF SYSTEMS:  Constitutional:     [ ] negative [ ] fevers [ ] chills [ ] weight loss [ ] weight gain  HEENT:                  [ ] negative [ ] dry eyes [ ] eye irritation [ ] postnasal drip [ ] nasal congestion  CV:                         [ ] negative  [ ] chest pain [ ] orthopnea [ ] palpitations [ ] murmur  Resp:                     [ ] negative [ ] cough [ ] shortness of breath [ ] dyspnea [ ] wheezing [ ] sputum [ ]hemoptysis  GI:                          [ ] negative [ ] nausea [ ] vomiting [ ] diarrhea [ ] constipation [ ] abd pain [ ] dysphagia   :                        [ ] negative [ ] dysuria [ ] nocturia [ ] hematuria [ ] increased urinary frequency  Musculoskeletal: [ ] negative [ ] back pain [ ] myalgias [ ] arthralgias [ ] fracture  Skin:                       [ ] negative [ ] rash [ ] itch  Neurological:        [ ] negative [ ] headache [ ] dizziness [ ] syncope [ ] weakness [ ] numbness  Psychiatric:           [ ] negative [ ] anxiety [ ] depression  Endocrine:            [ ] negative [ ] diabetes [ ] thyroid problem  Heme/Lymph:      [ ] negative [ ] anemia [ ] bleeding problem  Allergic/Immune: [ ] negative [ ] itchy eyes [ ] nasal discharge [ ] hives [ ] angioedema    [ ] All other systems negative  [ ] Unable to assess ROS because sedated with anoxic brain injury.      Physical Exam:  T(F): 98 (04-30), Max: 98.4 (04-29)  HR: 85 (04-30) (67 - 86)  BP: 109/70 (04-30) (96/60 - 142/85)  RR: 16 (04-30)  SpO2: 100% (04-30)  GENERAL: No acute distress, well-developed  HEAD:  Atraumatic, Normocephalic  ENT: EOMI, PERRLA, conjunctiva and sclera clear, Neck supple, No JVD, moist mucosa  CHEST/LUNG: Clear to auscultation bilaterally; No wheeze, equal breath sounds bilaterally   BACK: No spinal tenderness  HEART: Regular rate and rhythm; No murmurs, rubs, or gallops  ABDOMEN: Soft, Nontender, Nondistended; Bowel sounds present  EXTREMITIES:  No clubbing, cyanosis, or edema  PSYCH: Nl behavior, nl affect  NEUROLOGY: AAOx3, non-focal, cranial nerves intact  SKIN: Normal color, No rashes or lesions  LINES:    Cardiovascular Diagnostic Testing:    ECG: Personally reviewed:    Echo: Personally reviewed:    Stress Testing:    Cath:    Imaging:    CXR: Personally reviewed    Labs: Personally reviewed                        9.6    5.17  )-----------( 163      ( 30 Apr 2019 06:00 )             30.9     04-30    130<L>  |  85<L>  |  73<H>  ----------------------------<  201<H>  4.6   |  20<L>  |  9.31<H>    Ca    10.2      30 Apr 2019 06:00  Phos  7.7     04-30  Mg     1.8     04-30    TPro  6.5  /  Alb  3.5  /  TBili  0.3  /  DBili  x   /  AST  27  /  ALT  25  /  AlkPhos  71  04-29    PT/INR - ( 29 Apr 2019 06:25 )   PT: 11.4 SEC;   INR: 1.00          PTT - ( 29 Apr 2019 06:25 )  PTT:30.9 SEC  Serum Pro-Brain Natriuretic Peptide: > 89797 pg/mL (04-23 @ 12:12)      Hemoglobin A1C, Whole Blood: 9.8 % (04-24 @ 05:17)    Thyroid Stimulating Hormone, Serum: 8.74 uIU/mL (04-24 @ 05:17) Patient seen and evaluated at bedside    Chief Complaint: SOB    HPI:  Pt is a 74 year old F with a PMH of ESRD on PD, HTN, CAD s/p CABG and PCI (last of which was a year ago), BiV Medtronic CRT device w/o ICD, and DM2 that presented to the hospital with worsening SOB. She has been taking all of her medications and has been compliant with her dialysis regimen. Over the past month she has developed worsening ROQUE, orthopnea, and at time PND. She has had intermittent LE edema. She denies cough, fevers, HA, chest pain, arm/shoulder pain, syncope, dizziness, palpitations, or LOC. In the hospital she was evaluated with a echocardiogram which showed a new severely reduced LVSF when compared to and EF of 46% in 2018. She has been seen by renal who has been removing more fluid during HD.      PMHx:   ESRD on peritoneal dialysis  Acid reflux  Hypertension  Dyslipidemia  Diabetes mellitus  CAD (coronary artery disease)      PSHx:   S/P CABG (coronary artery bypass graft)  H/O: hysterectomy  History of total knee replacement b/l  After cataract, bilateral      Allergies:  Cipro (Unknown)  Diovan (Unknown)      Current Medications:   acetaminophen   Tablet .. 650 milliGRAM(s) Oral every 6 hours PRN  allopurinol 300 milliGRAM(s) Oral daily  aspirin enteric coated 81 milliGRAM(s) Oral daily  atorvastatin 40 milliGRAM(s) Oral at bedtime  chlorhexidine 4% Liquid 1 Application(s) Topical two times a day  clopidogrel Tablet 75 milliGRAM(s) Oral daily  dextrose 40% Gel 15 Gram(s) Oral once PRN  dextrose 5%. 1000 milliLiter(s) IV Continuous <Continuous>  dextrose 50% Injectable 12.5 Gram(s) IV Push once  dextrose 50% Injectable 25 Gram(s) IV Push once  dextrose 50% Injectable 25 Gram(s) IV Push once  docusate sodium 100 milliGRAM(s) Oral three times a day  epoetin rocky Injectable 32463 Unit(s) SubCutaneous <User Schedule>  gentamicin 0.1% Cream 1 Application(s) Topical <User Schedule> PRN  glucagon  Injectable 1 milliGRAM(s) IntraMuscular once PRN  hydrALAZINE 10 milliGRAM(s) Oral every 12 hours  insulin lispro (HumaLOG) corrective regimen sliding scale   SubCutaneous three times a day before meals  labetalol 100 milliGRAM(s) Oral daily  levothyroxine 50 MICROGram(s) Oral daily  loperamide 2 milliGRAM(s) Oral four times a day PRN  multivitamin 1 Tablet(s) Oral daily  sevelamer carbonate 2400 milliGRAM(s) Oral three times a day with meals      FAMILY HISTORY:  Family history of early CAD: mother had cad  Family history of diabetes mellitus: mother had dm      Social History: lives with son  Smoking History: never smoker, but son that smokes  Alcohol Use: none  Drug Use: none    REVIEW OF SYSTEMS:  Constitutional:     [ ] negative [ ] fevers [ ] chills [ ] weight loss [ ] weight gain  HEENT:                  [ ] negative [ ] dry eyes [ ] eye irritation [ ] postnasal drip [ ] nasal congestion  CV:                         [ ] negative  [ ] chest pain [ ] orthopnea [ ] palpitations [ ] murmur  Resp:                     [ ] negative [ ] cough [x ] shortness of breath [ ] dyspnea [ ] wheezing [ ] sputum [ ]hemoptysis  GI:                          [ ] negative [ ] nausea [ ] vomiting [ ] diarrhea [ ] constipation [ ] abd pain [ ] dysphagia   :                        [ ] negative [ ] dysuria [ ] nocturia [ ] hematuria [ ] increased urinary frequency  Musculoskeletal: [ ] negative [ ] back pain [ ] myalgias [ ] arthralgias [ ] fracture  Skin:                       [ ] negative [ ] rash [ ] itch  Neurological:        [ ] negative [ ] headache [ ] dizziness [ ] syncope [ ] weakness [ ] numbness  Psychiatric:           [ ] negative [ ] anxiety [ ] depression  Endocrine:            [ ] negative [ ] diabetes [ ] thyroid problem  Heme/Lymph:      [ ] negative [ ] anemia [ ] bleeding problem  Allergic/Immune: [ ] negative [ ] itchy eyes [ ] nasal discharge [ ] hives [ ] angioedema    [ x] All other systems negative  [ ] Unable to assess ROS because sedated with anoxic brain injury.      Physical Exam:  T(F): 98 (04-30), Max: 98.4 (04-29)  HR: 85 (04-30) (67 - 86)  BP: 109/70 (04-30) (96/60 - 142/85)  RR: 16 (04-30)  SpO2: 100% (04-30)  GENERAL: No acute distress, well-developed  HEAD:  Atraumatic, Normocephalic  ENT: EOMI, PERRLA, conjunctiva and sclera clear, Neck supple, No JVD, moist mucosa  CHEST/LUNG: Clear to auscultation bilaterally; No wheeze, equal breath sounds bilaterally   BACK: No spinal tenderness  HEART: Regular rate and rhythm; No murmurs, rubs, or gallops  ABDOMEN: Soft, Nontender, Nondistended; Bowel sounds present  EXTREMITIES:  No clubbing, cyanosis, or edema  PSYCH: Nl behavior, nl affect  NEUROLOGY: AAOx3, non-focal, cranial nerves intact  SKIN: Normal color, No rashes or lesions  LINES:    Cardiovascular Diagnostic Testing:    ECG: Personally reviewed:  BiV paced.   Echo: Personally reviewed:  < from: TTE with Doppler (w/Cont) (04.25.19 @ 10:08) >  ------------------------------------------------------------------------  CONCLUSIONS:  1. Moderate-severe mitral regurgitation.  2. Moderate left ventricular enlargement.  3. Severe global left ventricular systolic dysfunction.  Endocardial visualization enhanced with intravenous  injection of echo contrast (Definity). No left ventricular  thrombus.  4. Mild diastolic dysfunction (Stage I).  5. Normal right ventricular size and function.  *** Compared with echocardiogram of 12/15/2018, LV function  has deteriorated significantly.  ------------------------------------------------------------------------  Confirmed on  4/25/2019 - 11:28:47 by Amauri Purvis M.D.  ------------------------------------------------------------------------    < end of copied text >    Stress Testing:  < from: Nuclear Stress Test-Pharmacologic (04.26.19 @ 08:05) >  ------------------------------------------------------------------------  GATED ANALYSIS:  Post-stress gated wall motion analysis was performed (LVEF  = 20 %;LVEDV = 146 ml.)  ------------------------------------------------------------------------  IMPRESSIONS:Abnormal Study  * There are large, moderate to severe defects in anterior  and anteroseptal, anterolateral, apical, inferior and  inferolateral, lateral walls that are fixed, suggestive of  infarct. There is a mild to moderate amount of  salvatore-infarct ischemia in the anterior wall, extending into  the anteroapex.  * Post-stress gated wall motion analysis was performed  (LVEF = 20 %;LVEDV = 146 ml.)  ------------------------------------------------------------------------  Confirmed on  4/26/2019 - 12:04:39 by Amauri Purvis M.D.  ------------------------------------------------------------------------    < end of copied text >      CXR: Personally reviewed    Labs: Personally reviewed                        9.6    5.17  )-----------( 163      ( 30 Apr 2019 06:00 )             30.9     04-30    130<L>  |  85<L>  |  73<H>  ----------------------------<  201<H>  4.6   |  20<L>  |  9.31<H>    Ca    10.2      30 Apr 2019 06:00  Phos  7.7     04-30  Mg     1.8     04-30    TPro  6.5  /  Alb  3.5  /  TBili  0.3  /  DBili  x   /  AST  27  /  ALT  25  /  AlkPhos  71  04-29    PT/INR - ( 29 Apr 2019 06:25 )   PT: 11.4 SEC;   INR: 1.00          PTT - ( 29 Apr 2019 06:25 )  PTT:30.9 SEC  Serum Pro-Brain Natriuretic Peptide: > 47895 pg/mL (04-23 @ 12:12)      Hemoglobin A1C, Whole Blood: 9.8 % (04-24 @ 05:17)    Thyroid Stimulating Hormone, Serum: 8.74 uIU/mL (04-24 @ 05:17)

## 2019-04-30 NOTE — CONSULT NOTE ADULT - ASSESSMENT
74 year old F with a PMH of ESRD on PD, HTN, CAD s/p CABG and PCI (last of which was a year ago), BiV Medtronic CRT device w/o ICD, and DM2 that presented to the hospital with worsening SOB.    She was admitted with ADHFrEF. Echo revealed a worsening EF now 21% from 46%. NST was performed and showed mild to moderate salvatore-infarct ischemia in the anterior wall extending to the apex.     EP was called to eval PPM and to see if pt would benefit from upgrade to ICD device. Interrogation revealed undersensing of the atria and lower then expected BiV CRT pacing. Optivol also revealed worsening heart failure decompensation at the time the atrial sensing and pacing percentage decreased from 99% to 88%. This may have been due to the undersensing.     #ADHFrEF w/ new reduced EF w/ BiV PPM and no ICD.   - Pts decompensation may have been partially related to inadequate CRT pacing therapy. She was not adequately being BiV paced because her atrial lead was undersensing.  - Atrial lead made more sensitive on 4/30/19 -  .3 mV to .15 mV.   - Her device seems to be pacing more appropriately now.   - At the same time the pts percentage of paced beats dropped, her decompensation started based on Optivol reads.   - She does have salvatore-infarct ischemia, so would agree with ischemic eval as well with cardiac cath.   - Will hold off on ICD for now, pending cath results and f/u 3 month echocardiogram now that pt is pacing more adequately.    Ritu Murray MD  Cardiology Fellow - PGY-4  [id number]

## 2019-04-30 NOTE — DIETITIAN INITIAL EVALUATION ADULT. - NUTRITION INTERVENTION
Medical Food Supplements/Feeding Assistance/Meals and Snack/Nutrition Education/Vitamin/Nutrition - Related Medication Management/Collaboration and Referral of Nutrition Care

## 2019-04-30 NOTE — PROCEDURE NOTE - ADDITIONAL PROCEDURE DETAILS
1) Underlying rhythm is sinus. She is not pacemaker dependent.   2) Device is a BiV PPM without ICD function.   3) Device is set to monitor for events for ventricular rates > 150 and Atrial rates > 171  4) There were no episodes of supraventricular tachycardia or atrial fibrillation.  5) There were no episodes of sustained VT, however, there are a few episodes of NSVT longest of which was 5 seconds.    6) Pacing threshold on A lead, RV lead, and LV lead are all within acceptable limits.  7) The patient is only BiV paced 88%, and she should be BiV paced > 90% of the time.  8) Ventricular sensing was normal, however, atria was not being sensed adequately over the past month.   9) Atrial sensing changed from 0.3 mV to 0.15 mV with resolution of the issue.   10) OptiVol fluid trend are consistent with a increase in volume / fluid overload over the past month.   11) CRT pacing also dropped off around April 2019. Her HR variability also changed around this time.

## 2019-04-30 NOTE — DIETITIAN INITIAL EVALUATION ADULT. - NS AS NUTRI INTERV MEALS SNACK
Recommend to change diet to Consistent Carbohydrate (evening snack), Renal Replacement - No Protein Restr, No Conc K, No Conc Phos, Low Sodium (RENAL) diet. Fluid Restriction per medical team discretion.

## 2019-04-30 NOTE — DIETITIAN INITIAL EVALUATION ADULT. - PERTINENT LABORATORY DATA
04-30 Na 134 mmol/L<L> Glu 273 mg/dL<H> K+ 4.5 mmol/L Cr 9.35 mg/dL<H> BUN 72 mg/dL<H> Phos 7.6 mg/dL<H> Alb n/a   PAB n/a   Hgb n/a   Hct n/a  Hemoglobin A1C, Whole Blood: 9.8 % (04-24-19 @ 05:17)

## 2019-04-30 NOTE — DIETITIAN INITIAL EVALUATION ADULT. - ENERGY NEEDS
Height (cm): 152.4 (28 Apr 2019 21:10)  Weight (kg): 56.7kg (30 Apr 2019)  BMI (kg/m2): 24.4kg +/-10%  IBW: 47.7kg +/-10%  No edema noted. No pressure ulcers/DTI noted per flowsheets.

## 2019-04-30 NOTE — DIETITIAN INITIAL EVALUATION ADULT. - PERTINENT MEDS FT
acetaminophen   Tablet .. PRN  allopurinol  aspirin enteric coated  atorvastatin  chlorhexidine 4% Liquid  clopidogrel Tablet  dextrose 40% Gel PRN  dextrose 5%.  dextrose 50% Injectable  dextrose 50% Injectable  dextrose 50% Injectable  docusate sodium  epoetin rocky Injectable  gentamicin 0.1% Cream PRN  glucagon  Injectable PRN  hydrALAZINE  insulin glargine Injectable (LANTUS)  insulin lispro (HumaLOG) corrective regimen sliding scale  insulin lispro Injectable (HumaLOG)  labetalol  levothyroxine  loperamide PRN  multivitamin  sevelamer carbonate

## 2019-04-30 NOTE — PROGRESS NOTE ADULT - ASSESSMENT
1. ESRD  2. sob chf  3. worsening LV fucntion infarct with minimal ischemia  4. s/p CABG s/p PTCI  5. ischemic cardiomyopathy  6. hyponatremis potassium 5.5    Recommend  1. to better assess reason for deterioration of LV fucntion. will proceed with left and right heart catheterization on 5/1/19  2.  hydralzine 10 BiD and if BP tolerates isordil 10 BTID  3. check pacemaker (EP) and consider upgrade of pacemaker to biventricular device pending cath results  4. peritoneal dialysis today  5. proceed with cath 5/1/19    patient understands and is agreeable

## 2019-04-30 NOTE — PROGRESS NOTE ADULT - SUBJECTIVE AND OBJECTIVE BOX
Subjective: Patient seen and examined. No new events except as noted.   feels less sob today  hydralazine started  peritoneal dialysis today - fluid placed in abdomen to drain later cath on hold until tomorrow  no cp less ortopneic  MEDICATIONS:  MEDICATIONS  (STANDING):  allopurinol 300 milliGRAM(s) Oral daily  aspirin enteric coated 81 milliGRAM(s) Oral daily  atorvastatin 40 milliGRAM(s) Oral at bedtime  clopidogrel Tablet 75 milliGRAM(s) Oral daily  dextrose 5%. 1000 milliLiter(s) (50 mL/Hr) IV Continuous <Continuous>  dextrose 50% Injectable 12.5 Gram(s) IV Push once  dextrose 50% Injectable 25 Gram(s) IV Push once  dextrose 50% Injectable 25 Gram(s) IV Push once  docusate sodium 100 milliGRAM(s) Oral three times a day  hydrALAZINE 10 milliGRAM(s) Oral every 12 hours  insulin lispro (HumaLOG) corrective regimen sliding scale   SubCutaneous three times a day before meals  labetalol 100 milliGRAM(s) Oral daily  levothyroxine 50 MICROGram(s) Oral daily  multivitamin 1 Tablet(s) Oral daily  sevelamer carbonate 1600 milliGRAM(s) Oral three times a day with meals    Vital Signs Last 24 Hrs  T(C): 36.7 (04-30-19 @ 07:02), Max: 36.9 (04-29-19 @ 14:40)  T(F): 98 (04-30-19 @ 07:02), Max: 98.4 (04-29-19 @ 14:40)  HR: 85 (04-30-19 @ 07:02) (67 - 86)  BP: 109/70 (04-30-19 @ 07:02) (96/60 - 142/85)  BP(mean): --  RR: 16 (04-30-19 @ 07:02) (16 - 18)  SpO2: 100% (04-30-19 @ 07:02) (98% - 100%)    Height (cm): 152.4 (04-28 @ 21:10)  Weight (kg): 60.5 (04-29 @ 00:21)  BMI (kg/m2): 26 (04-29 @ 00:21)  BSA (m2): 1.57 (04-29 @ 00:21)    Appearance: Normal	chronically ill appearing able to lie flat  HEENT:   Normal oral mucosa, PERRL, EOMI	  Cardiovascular: Normal S1 S2, No JVD, No murmurs ,n s3  Respiratory: Lungs clear to auscultation, normal effort 	  Gastrointestinal:  Soft, Non-tender, + BS	  Psychiatry:  Mood & affect appropriate  Ext: No edema        LABS:    CARDIAC MARKERS:                                9.9    8.12  )-----------( 188      ( 29 Apr 2019 06:25 )             31.5     04-29    125<L>  |  82<L>  |  76<H>  ----------------------------<  176<H>  5.5<H>   |  18<L>  |  9.48<H>    Ca    10.5      29 Apr 2019 06:25  Phos  7.4     04-28  Mg     1.7     04-28    TPro  6.5  /  Alb  3.5  /  TBili  0.3  /  DBili  x   /  AST  27  /  ALT  25  /  AlkPhos  71  04-29    proBNP:   Lipid Profile:   HgA1c:   TSH:           TELEMETRY: 	    ECG:  	  RADIOLOGY:   DIAGNOSTIC TESTING:  [ ] Echocardiogram:  [ ]  Catheterization:  [ ] Stress Test:    OTHER:

## 2019-04-30 NOTE — PROGRESS NOTE ADULT - ASSESSMENT
74F w/ ESRD on PD at home, HTN, DM, CAD s/p CABG and stents, on AC, p/w SOB x1 day.    ESRD on PD  Continue PD with 4 exchanges daily  Continue PD with UF as tolerated  Monitor BMP and BP    SOB  multiple admission for fluid overload  chest xray is clear no edema  Cardiology follow up --abnormal NST-- pending cath    Hypercalcemia  Elevated PTH, phos level  Hold calcitriol   Increase renagel 2400 tid with meal  Monitor serum calcium and phos    Anemia  epo [id number] sq tiw  monitor Hb    HTN  on labetalol 100 daily  hydralazine 10 tid started  intermittent hypotensive improved   UF as tolerated.   monitor bp  hold bp meds per perimeter     Hyponatremia   likely sec to increase fluid status in ESRD pt  UF with PD as tolerated  free water restriction <1L/day    Hyperkalemia  sec to renal failure  low k diet  PD as ordered  monitor

## 2019-04-30 NOTE — DIETITIAN INITIAL EVALUATION ADULT. - DIET TYPE
consistent carbohydrate (evening snack)/Lactose Restricted/low sodium/no concentrated potassium/regular/1500ml

## 2019-04-30 NOTE — CHART NOTE - NSCHARTNOTEFT_GEN_A_CORE
NUTRITION SERVICES                                                                                  MALNUTRITION ALERT     Attention Health Care Provider: Upon nutritional assessment by the Registered Dietitian your patient was determined to meet criteria / has evidence of the following diagnosis/diagnoses:    [ ] Mild Protein Calorie Malnutrition   [ ] Moderate Protein Calorie Malnutrition   [x] Severe Protein Calorie Malnutrition   [ ] Unspecified Protein Calorie Malnutrition   [ ] Underweight / BMI <19  [ ] Morbid Obesity / BMI >40      By signing this assessment you are acknowledging the diagnosis/diagnoses.       PLAN OF CARE: Refer to Initial Dietitian Evaluation or Nutrition Follow-Up Documentation for Nutritional Recommendations.

## 2019-04-30 NOTE — PROGRESS NOTE ADULT - ASSESSMENT
_________________________________________________________________________________________  ========>>  M E D I C A L   A T T E N D I N G    F O L L O W  U P  N O T E  <<=========  -----------------------------------------------------------------------------------------------------    - Patient seen and examined by me approximately sixty minutes ago.   - In summary,  KAPIL CAMPOVERDE is a 74y year old woman who originally presented with SOB  - Patient today overall doing ok, comfortable, eating fairly      pt reportedly had some episodes of diarrhea, Cardiac testing was held        pt reports diarrhea has stopped since yestrerday     ==================>> REVIEW OF SYSTEM <<=================    GEN: no fever, no chills, no pain  RESP: no SOB at rest , no cough, no sputum  CVS: no chest pain, no palpitations, no edema  GI: no abdominal pain, no nausea, no diarrhea today  : no dysuria, no frequency, no hematuria  Neuro: no headache, no dizziness  Derm : no itching, no rash    ==================>> PHYSICAL EXAM <<=================    GEN: A&O X 3 , NAD , comfortable, getting PD  HEENT: NCAT, PERRL, MMM, hearing intact, on nasal canula   Neck: supple , no JVD  CVS: S1S2 , regular , No M/R/G appreciated  PULM: CTA B/L,  no W/R/R appreciated  ABD.: soft. non tender, non distended,  bowel sounds present  Extrem: intact pulses , no edema   PSYCH : normal mood,  not anxious       ==================>> MEDICATIONS <<====================    allopurinol 300 milliGRAM(s) Oral daily  aspirin enteric coated 81 milliGRAM(s) Oral daily  atorvastatin 40 milliGRAM(s) Oral at bedtime  chlorhexidine 4% Liquid 1 Application(s) Topical two times a day  clopidogrel Tablet 75 milliGRAM(s) Oral daily  dextrose 5%. 1000 milliLiter(s) IV Continuous <Continuous>  dextrose 50% Injectable 12.5 Gram(s) IV Push once  dextrose 50% Injectable 25 Gram(s) IV Push once  dextrose 50% Injectable 25 Gram(s) IV Push once  docusate sodium 100 milliGRAM(s) Oral three times a day  epoetin rocky Injectable 18521 Unit(s) SubCutaneous <User Schedule>  hydrALAZINE 10 milliGRAM(s) Oral every 12 hours  insulin lispro (HumaLOG) corrective regimen sliding scale   SubCutaneous three times a day before meals  labetalol 100 milliGRAM(s) Oral daily  levothyroxine 50 MICROGram(s) Oral daily  multivitamin 1 Tablet(s) Oral daily  sevelamer carbonate 2400 milliGRAM(s) Oral three times a day with meals    MEDICATIONS  (PRN):  acetaminophen   Tablet .. 650 milliGRAM(s) Oral every 6 hours PRN Temp greater or equal to 38C (100.4F), Mild Pain (1 - 3)  dextrose 40% Gel 15 Gram(s) Oral once PRN Blood Glucose LESS THAN 70 milliGRAM(s)/deciliter  gentamicin 0.1% Cream 1 Application(s) Topical <User Schedule> PRN Pd catheter dressing change  glucagon  Injectable 1 milliGRAM(s) IntraMuscular once PRN Glucose LESS THAN 70 milligrams/deciliter  loperamide 2 milliGRAM(s) Oral four times a day PRN Diarrhea    ==================>> VITAL SIGNS <<==================    Vital Signs Last 24 Hrs  T(C): 36.7 (04-30-19 @ 10:55)  T(F): 98.1 (04-30-19 @ 10:55), Max: 98.4 (04-29-19 @ 14:40)  HR: 89 (04-30-19 @ 10:55) (67 - 89)  BP: 105/66 (04-30-19 @ 10:55)  RR: 16 (04-30-19 @ 10:55) (16 - 18)  SpO2: 100% (04-30-19 @ 10:55) (98% - 100%)      POCT Blood Glucose.: 223 mg/dL (30 Apr 2019 08:25)  POCT Blood Glucose.: 216 mg/dL (29 Apr 2019 22:56)  POCT Blood Glucose.: 278 mg/dL (29 Apr 2019 16:35)  POCT Blood Glucose.: 273 mg/dL (29 Apr 2019 13:47)     ==================>> LAB AND IMAGING <<==================                        9.6    5.17  )-----------( 163      ( 30 Apr 2019 06:00 )             30.9     134<L>  |  87<L>  |  72<H>  ----------------------------<  273<H>  4.5   |  18<L>  |  9.35<H>    Ca    10.3      30 Apr 2019 10:48  Phos  7.6     04-30  Mg     1.8     04-30    TPro  6.5  /  Alb  3.5  /  TBili  0.3  /  DBili  x   /  AST  27  /  ALT  25  /  AlkPhos  71  04-29    WBC count:   5.17 <<== ,  8.12 <<== ,  7.12 <<== ,  8.18 <<== ,  8.79 <<==   Hemoglobin:   9.6 <<==,  9.9 <<==,  9.5 <<==,  10.4 <<==,  11.4 <<==  platelets:  163 <==, 188 <==, 182 <==, 210 <==, 200 <==, 212 <==    Creatinine:  9.35  <<==, 9.31  <<==, 9.48  <<==, 9.04  <<==, 8.65  <<==, 8.72  <<==  Sodium:   134  <==, 130  <==, 125  <==, 127  <==, 129  <==, 130  <==, 127  <==       AST:          27 <== , 18 <== , 20 <== , 19 <== , 18 <==      ALT:        25  <== , 19  <== , 18  <== , 21  <== , 24  <==      AP:        71  <=, 59  <=, 67  <=, 65  <=, 63  <=     Bili:        0.3  <=, < 0.2  <=, 0.2  <=, < 0.2  <=, 0.3  <=    ___________________________________________________________________________________  ===============>>  A S S E S S M E N T   A N D   P L A N <<===============  ------------------------------------------------------------------------------------------    · Assessment		  Problem/Plan - 1:  ·  Problem: Shortness of breath ( due to fluid overload, due to ESRD)     improved overall with dialysis   will continue to monitor   plan for Lt/Rt heart cath >> now for tomorrow   fluid mgmt via dialysis      Problem/Plan - 2:  ·  Problem: ESRD on peritoneal dialysis.    nephro follow up and mgmtt appreciated   dialysis   monitor and treat hyponatremia per renal      Problem/Plan - 3:  ·  Problem: acute hypoxemic respiratory failure due to pulmonary edema: resolved     pt was apparently off O2 and doing well but seen again on O2    monitor and wean off o2 as able      Problem/Plan - 4:  ·  Problem: Chronic systolic congestive heart failure, with demand ischemia given elevated troponin in this pt with ESRD     LV EF reduced as above on Echo  continue beta blocker as reduced   pt off ACEI / ARB due to recorded allergy   stress test mostly fixed ischemia with some salvatore-infarct ischemia  med optimization as able   for heart cath tomorrow      Problem/Plan - 5:  ·  Problem: Dyslipidemia.    Continue home statin.      Problem/Plan - 6:  Problem: Type 2 diabetes mellitus   Insulin sliding scale    -GI/DVT Prophylaxis.    --------------------------------------------  Case discussed with pt, NP...   Education given on findings and plan of care  ___________________________  H. MARISOL Abreu.  Pager: [phone]  `

## 2019-04-30 NOTE — PROGRESS NOTE ADULT - SUBJECTIVE AND OBJECTIVE BOX
[facility] [facility]   MD ROSITA APODACA MD ANGELA WONG, PA    TEL:  OFFICE: [phone]  DR SCOTT CELL: [phone]  DR. JOHNSON CELL: [phone]  KESHA HOUSTON CELL: [phone]        Patient is a 74y old  Female who presents with a chief complaint of shortness of breath (30 Apr 2019 11:50)      Patient seen and examined at bedside. No chest pain/sob    VITALS:  T(F): 98.1 (04-30-19 @ 13:00), Max: 98.4 (04-29-19 @ 14:40)  HR: 82 (04-30-19 @ 13:00)  BP: 114/74 (04-30-19 @ 13:00)  RR: 16 (04-30-19 @ 13:00)  SpO2: 100% (04-30-19 @ 13:00)  Wt(kg): --    04-29 @ 07:01  -  04-30 @ 07:00  --------------------------------------------------------  IN: 87608 mL / OUT: 79827 mL / NET: -500 mL    04-30 @ 07:01  -  04-30 @ 14:29  --------------------------------------------------------  IN: 2000 mL / OUT: 2500 mL / NET: -500 mL          PHYSICAL EXAM:  Constitutional: NAD  Neck: No JVD  Respiratory: CTAB, no wheezes, rales or rhonchi  Cardiovascular: S1, S2, RRR  Gastrointestinal: BS+, soft, NT/ND  Extremities: No peripheral edema    Hospital Medications:   MEDICATIONS  (STANDING):  allopurinol 300 milliGRAM(s) Oral daily  aspirin enteric coated 81 milliGRAM(s) Oral daily  atorvastatin 40 milliGRAM(s) Oral at bedtime  chlorhexidine 4% Liquid 1 Application(s) Topical two times a day  clopidogrel Tablet 75 milliGRAM(s) Oral daily  dextrose 5%. 1000 milliLiter(s) (50 mL/Hr) IV Continuous <Continuous>  dextrose 50% Injectable 12.5 Gram(s) IV Push once  dextrose 50% Injectable 25 Gram(s) IV Push once  dextrose 50% Injectable 25 Gram(s) IV Push once  docusate sodium 100 milliGRAM(s) Oral three times a day  epoetin rocky Injectable 42795 Unit(s) SubCutaneous <User Schedule>  hydrALAZINE 10 milliGRAM(s) Oral every 12 hours  insulin glargine Injectable (LANTUS) 5 Unit(s) SubCutaneous at bedtime  insulin lispro (HumaLOG) corrective regimen sliding scale   SubCutaneous three times a day before meals  insulin lispro Injectable (HumaLOG) 4 Unit(s) SubCutaneous three times a day before meals  labetalol 100 milliGRAM(s) Oral daily  levothyroxine 50 MICROGram(s) Oral daily  multivitamin 1 Tablet(s) Oral daily  sevelamer carbonate 2400 milliGRAM(s) Oral three times a day with meals      LABS:  04-30    134<L>  |  87<L>  |  72<H>  ----------------------------<  273<H>  4.5   |  18<L>  |  9.35<H>    Ca    10.3      30 Apr 2019 10:48  Phos  7.6     04-30  Mg     1.8     04-30    TPro  6.5  /  Alb  3.5  /  TBili  0.3  /  DBili      /  AST  27  /  ALT  25  /  AlkPhos  71  04-29    Creatinine Trend: 9.35 <--, 9.31 <--, 9.48 <--, 9.04 <--, 8.65 <--, 8.72 <--, 9.06 <--, 10.13 <--, 10.69 <--, 11.48 <--    Phosphorus Level, Serum: 7.6 mg/dL (04-30 @ 10:48)  Phosphorus Level, Serum: 7.7 mg/dL (04-30 @ 06:00)                              9.6    5.17  )-----------( 163      ( 30 Apr 2019 06:00 )             30.9     Urine Studies:      .8 (Ca --)      [04-24-19 @ 05:17]   --  .4 (Ca --)      [01-13-19 @ 05:32]   --  .8 (Ca --)      [08-12-18 @ 06:00]   --  Vitamin D (25OH) 24.6      [04-24-19 @ 05:17]  HbA1c 9.8      [04-24-19 @ 05:17]  TSH 8.74      [04-24-19 @ 05:17]  Lipid: chol 167, , HDL 45, LDL 99      [04-24-19 @ 05:17]    HBsAb 23.8      [04-25-19 @ 14:53]  HBsAg NEGATIVE      [04-25-19 @ 14:53]  HBcAb Nonreactive      [04-25-19 @ 14:53]  HCV 0.04, Nonreactive Hepatitis C AB  S/CO Ratio                        Interpretation  < 1.00                                   Non-Reactive  1.00 - 4.99                         Weakly-Reactive  > 5.00                                Reactive  Non-Reactive: A person with a non-reactive HCV antibody  result is considered uninfected.  No further action is  needed unless recent infection is suspected.  In these  cases, consider repeat testing later to detect  seroconversion..  Weakly-Reactive: HCV antibody test is abnormal, HCV RNA  Qualitative test will follow.  Reactive: HCV antibody test is abnormal, HCV RNA  Qualitative test will follow.  Note: HCV antibody testing is performed on the [employer] system.      [04-25-19 @ 14:53]      RADIOLOGY & ADDITIONAL STUDIES:

## 2019-04-30 NOTE — DIETITIAN INITIAL EVALUATION ADULT. - ADHERENCE
Patient understanding of renal diet modifications - monitoring phosphorous, potassium content of foods. However, patient with elevated HbA1c 9.8%./fair

## 2019-04-30 NOTE — DIETITIAN INITIAL EVALUATION ADULT. - OTHER INFO
Initial Dietitian Evaluation 2/2 to extended length of stay. Per chart review patient with medical history of " ESRD on PD, CHF (EF 46%, w/PAH), CAD, PPM, HTN, HLD, T2DM (not on insulin), and hypothyroidism." Presented with SOB. Patient reports poor appetite for a couple of months and that she has to "force" herself to eat. Follows renal diet restrictions. HbA1c 9.8% indicative of poor glycemic control - patient not on insulin at home. Patient reports checking Finger sticks daily and she completes PD every night. Will only consume ginger ale soda occasionally. NKFA. Endorses weight loss over the past couple of years: 185#->125#. No nausea/vomiting. Patient reported having loose stools yesterday but has resolved today. Provided diet education regarding renal, Consistent Carbohydrate modifications. Patient amenable to education. Patient tolerating diet in-house but eating small amounts. Patient willing to try Nepro supplement in-house to optimize intake.

## 2019-05-01 LAB
ANION GAP SERPL CALC-SCNC: 28 MMO/L — HIGH (ref 7–14)
BUN SERPL-MCNC: 70 MG/DL — HIGH (ref 7–23)
CALCIUM SERPL-MCNC: 10.3 MG/DL — SIGNIFICANT CHANGE UP (ref 8.4–10.5)
CHLORIDE SERPL-SCNC: 90 MMOL/L — LOW (ref 98–107)
CO2 SERPL-SCNC: 15 MMOL/L — LOW (ref 22–31)
CREAT SERPL-MCNC: 9.14 MG/DL — HIGH (ref 0.5–1.3)
GLUCOSE BLDC GLUCOMTR-MCNC: 171 MG/DL — HIGH (ref 70–99)
GLUCOSE BLDC GLUCOMTR-MCNC: 198 MG/DL — HIGH (ref 70–99)
GLUCOSE BLDC GLUCOMTR-MCNC: 298 MG/DL — HIGH (ref 70–99)
GLUCOSE BLDC GLUCOMTR-MCNC: 313 MG/DL — HIGH (ref 70–99)
GLUCOSE SERPL-MCNC: 197 MG/DL — HIGH (ref 70–99)
HCT VFR BLD CALC: 35.3 % — SIGNIFICANT CHANGE UP (ref 34.5–45)
HGB BLD-MCNC: 10.6 G/DL — LOW (ref 11.5–15.5)
MAGNESIUM SERPL-MCNC: 1.8 MG/DL — SIGNIFICANT CHANGE UP (ref 1.6–2.6)
MCHC RBC-ENTMCNC: 26 PG — LOW (ref 27–34)
MCHC RBC-ENTMCNC: 30 % — LOW (ref 32–36)
MCV RBC AUTO: 86.7 FL — SIGNIFICANT CHANGE UP (ref 80–100)
NRBC # FLD: 0 K/UL — SIGNIFICANT CHANGE UP (ref 0–0)
PHOSPHATE SERPL-MCNC: 7.1 MG/DL — HIGH (ref 2.5–4.5)
PLATELET # BLD AUTO: 179 K/UL — SIGNIFICANT CHANGE UP (ref 150–400)
PMV BLD: 10.1 FL — SIGNIFICANT CHANGE UP (ref 7–13)
POTASSIUM SERPL-MCNC: 4.9 MMOL/L — SIGNIFICANT CHANGE UP (ref 3.5–5.3)
POTASSIUM SERPL-SCNC: 4.9 MMOL/L — SIGNIFICANT CHANGE UP (ref 3.5–5.3)
RBC # BLD: 4.07 M/UL — SIGNIFICANT CHANGE UP (ref 3.8–5.2)
RBC # FLD: 17.4 % — HIGH (ref 10.3–14.5)
SODIUM SERPL-SCNC: 133 MMOL/L — LOW (ref 135–145)
WBC # BLD: 6.52 K/UL — SIGNIFICANT CHANGE UP (ref 3.8–10.5)
WBC # FLD AUTO: 6.52 K/UL — SIGNIFICANT CHANGE UP (ref 3.8–10.5)

## 2019-05-01 PROCEDURE — 99232 SBSQ HOSP IP/OBS MODERATE 35: CPT

## 2019-05-01 RX ADMIN — CHLORHEXIDINE GLUCONATE 1 APPLICATION(S): 213 SOLUTION TOPICAL at 05:55

## 2019-05-01 RX ADMIN — INSULIN GLARGINE 5 UNIT(S): 100 INJECTION, SOLUTION SUBCUTANEOUS at 22:07

## 2019-05-01 RX ADMIN — Medication 50 MICROGRAM(S): at 05:55

## 2019-05-01 RX ADMIN — ERYTHROPOIETIN 10000 UNIT(S): 10000 INJECTION, SOLUTION INTRAVENOUS; SUBCUTANEOUS at 19:04

## 2019-05-01 RX ADMIN — Medication 3: at 23:26

## 2019-05-01 RX ADMIN — Medication 1 APPLICATION(S): at 06:39

## 2019-05-01 RX ADMIN — SEVELAMER CARBONATE 2400 MILLIGRAM(S): 2400 POWDER, FOR SUSPENSION ORAL at 20:24

## 2019-05-01 RX ADMIN — ATORVASTATIN CALCIUM 40 MILLIGRAM(S): 80 TABLET, FILM COATED ORAL at 22:56

## 2019-05-01 NOTE — PROGRESS NOTE ADULT - ASSESSMENT
_________________________________________________________________________________________  ========>>  M E D I C A L   A T T E N D I N G    F O L L O W  U P  N O T E  <<=========  -----------------------------------------------------------------------------------------------------    - Patient seen and examined by me earlier today.   - In summary,  KAPIL CAMPOVERDE is a 74y year old woman who originally presented with SOB  - Patient today overall doing ok, comfortable, post cath        pt otherwise ok, no diarrhea reported .    ==================>> REVIEW OF SYSTEM <<=================    GEN: no fever, no chills, no pain  RESP: no SOB at rest , no cough, no sputum  CVS: no chest pain, no palpitations, no edema  GI: no abdominal pain, no nausea, no diarrhea today  : no dysuria, no frequency, no hematuria  Neuro: no headache, no dizziness  Derm : no itching, no rash    ==================>> PHYSICAL EXAM <<=================    GEN: A&O X 3 , NAD , comfortable, getting PD  HEENT: NCAT, PERRL, MMM, hearing intact, on nasal canula   Neck: supple , no JVD  CVS: S1S2 , regular , No M/R/G appreciated  PULM: CTA B/L,  no W/R/R appreciated  ABD.: soft. non tender, non distended,  bowel sounds present  Extrem: intact pulses , no edema   PSYCH : normal mood,  not anxious       ==================>> MEDICATIONS <<====================    allopurinol 300 milliGRAM(s) Oral daily  aspirin enteric coated 81 milliGRAM(s) Oral daily  atorvastatin 40 milliGRAM(s) Oral at bedtime  benzonatate 100 milliGRAM(s) Oral once  chlorhexidine 4% Liquid 1 Application(s) Topical two times a day  clopidogrel Tablet 75 milliGRAM(s) Oral daily  dextrose 5%. 1000 milliLiter(s) IV Continuous <Continuous>  dextrose 50% Injectable 12.5 Gram(s) IV Push once  dextrose 50% Injectable 25 Gram(s) IV Push once  dextrose 50% Injectable 25 Gram(s) IV Push once  docusate sodium 100 milliGRAM(s) Oral three times a day  epoetin rocky Injectable 11266 Unit(s) SubCutaneous <User Schedule>  hydrALAZINE 10 milliGRAM(s) Oral every 12 hours  insulin glargine Injectable (LANTUS) 5 Unit(s) SubCutaneous at bedtime  insulin lispro (HumaLOG) corrective regimen sliding scale   SubCutaneous three times a day before meals  insulin lispro Injectable (HumaLOG) 2 Unit(s) SubCutaneous three times a day before meals  labetalol 100 milliGRAM(s) Oral daily  levothyroxine 50 MICROGram(s) Oral daily  multivitamin 1 Tablet(s) Oral daily  sevelamer carbonate 2400 milliGRAM(s) Oral three times a day with meals    MEDICATIONS  (PRN):  acetaminophen   Tablet .. 650 milliGRAM(s) Oral every 6 hours PRN Temp greater or equal to 38C (100.4F), Mild Pain (1 - 3)  dextrose 40% Gel 15 Gram(s) Oral once PRN Blood Glucose LESS THAN 70 milliGRAM(s)/deciliter  gentamicin 0.1% Cream 1 Application(s) Topical <User Schedule> PRN Pd catheter dressing change  glucagon  Injectable 1 milliGRAM(s) IntraMuscular once PRN Glucose LESS THAN 70 milligrams/deciliter  loperamide 2 milliGRAM(s) Oral four times a day PRN Diarrhea    ==================>> VITAL SIGNS <<==================    Vital Signs Last 24 Hrs  T(C): 36.6 (05-01-19 @ 06:37)  T(F): 97.8 (05-01-19 @ 06:37), Max: 98.6 (04-30-19 @ 15:03)  HR: 93 (05-01-19 @ 06:37) (71 - 96)  BP: 94/62 (05-01-19 @ 06:37)  BP(mean): --  RR: 16 (05-01-19 @ 06:37) (15 - 19)  SpO2: 98% (05-01-19 @ 06:37) (97% - 100%)    CAPILLARY BLOOD GLUCOSE      POCT Blood Glucose.: 198 mg/dL (01 May 2019 06:01)  POCT Blood Glucose.: 224 mg/dL (30 Apr 2019 21:55)  POCT Blood Glucose.: 89 mg/dL (30 Apr 2019 17:02)     ==================>> LAB AND IMAGING <<==================                        10.6   6.52  )-----------( 179      ( 01 May 2019 05:31 )             35.3        05-01    133<L>  |  90<L>  |  70<H>  ----------------------------<  197<H>  4.9   |  15<L>  |  9.14<H>    Ca    10.3      01 May 2019 05:31  Phos  7.1     05-01  Mg     1.8     05-01      WBC count:   6.52 <<== ,  5.17 <<== ,  8.12 <<== ,  7.12 <<== ,  8.18 <<==   Hemoglobin:   10.6 <<==,  9.6 <<==,  9.9 <<==,  9.5 <<==,  10.4 <<==  platelets:  179 <==, 163 <==, 188 <==, 182 <==, 210 <==, 200 <==    Creatinine:  9.14  <<==, 9.35  <<==, 9.31  <<==, 9.48  <<==, 9.04  <<==, 8.65  <<==  Sodium:   133  <==, 134  <==, 130  <==, 125  <==, 127  <==, 129  <==, 130  <==       AST:          27 <== , 18 <== , 20 <== , 19 <==      ALT:        25  <== , 19  <== , 18  <== , 21  <==      AP:        71  <=, 59  <=, 67  <=, 65  <=     Bili:        0.3  <=, < 0.2  <=, 0.2  <=, < 0.2  <=    < from: Cardiac Cath Lab - Adult (05.01.19 @ 10:20) >  CORONARY VESSELS: The coronary circulation is right dominant.  LM:   --  Distal left main: There was adiscrete 99 % stenosis at the site  of a prior stent. The lesion was complex, eccentric, and heavily  calcified. severe restnosis since stent of November 2018  LAD:   --  Ostial LAD: There was a 100 % stenosis. LIMA to mid LAD patent and fills the RCA  CX:   --  Proximal circumflex: There was a 90 % stenosis. restenosis from nov 2018  --  Mid circumflex: There was a 60 % stenosis.  RCA:   --  Proximal RCA: There was a 100 % stenosis. There was a  moderate-sized vascular territory distal to the lesion and good collateral  blood supply to the distal myocardium. This lesion progressed since November 2018  COMPLICATIONS: There were no complications.  DIAGNOSTIC IMPRESSIONS: Patient has normal to low filling pressures but low  CO and CI with severe restenosis of left main and proximal LCX complex and  calcified and progressive diseae of RCA.  DIAGNOSTIC RECOMMENDATIONS: Viability study anf if viable muscle in lateral  wall, consider high risk PTCA of left main and proximal LCX and aggressive  medical therapy for LV dysfunction,  < end of copied text >    ___________________________________________________________________________________  ===============>>  A S S E S S M E N T   A N D   P L A N <<===============  ------------------------------------------------------------------------------------------    · Assessment		  Problem/Plan - 1:  ·  Problem: Shortness of breath ( due to fluid overload, due to ESRD) , overall improved with dialysis   will continue to monitor   post Lt/Rt heart cath >> showing significant and worsening of CAD >> as bellow   fluid mgmt via dialysis      Problem/Plan - 2:  ·  Problem: ESRD on peritoneal dialysis.    nephro follow up and mgmtt appreciated   dialysis   monitor and treat hyponatremia per renal      Problem/Plan - 3:  ·  Problem: acute hypoxemic respiratory failure due to pulmonary edema: resolved     pt was apparently off O2 and doing well but seen again on O2    monitor and wean off o2 as able      Problem/Plan - 4:  ·  Problem: Chronic systolic congestive heart failure, with demand ischemia given elevated troponin in this pt with ESRD with significant worsening of CAD and LV dysfunction   continue medical mgmt and optimization   pt off ACEI / ARB due to recorded allergy   plan for viability test  discussed with cardio and EP appreciated ( holding AICD until above)      Problem/Plan - 5:  ·  Problem: Dyslipidemia.    Continue home statin.      Problem/Plan - 6:  Problem: Type 2 diabetes mellitus   Insulin sliding scale    -GI/DVT Prophylaxis.    --------------------------------------------  Case discussed with pt, cardio  Education given on findings and plan of care  ___________________________  LASHANDA Durant: [phone]  `

## 2019-05-01 NOTE — PROGRESS NOTE ADULT - ASSESSMENT
74F w/ ESRD on PD at home, HTN, DM, CAD s/p CABG and stents, on AC, p/w SOB x1 day.    ESRD on PD  Continue PD with UF as tolerated  Monitor BMP and BP    SOB  multiple admission for fluid overload  chest xray is clear no edema  Cardiology follow up     Hypercalcemia  Elevated PTH, phos level  Hold calcitriol   Increase renagel 2400 tid with meal  Monitor serum calcium and phos    Anemia  epo 33752 sq tiw  monitor Hb    HTN  on labetalol 100 daily  hydralazine 10 tid started  intermittent hypotensive improved   UF as tolerated.   monitor bp  hold bp meds per perimeter     Hyponatremia   likely sec to increase fluid status in ESRD pt  UF with PD as tolerated  free water restriction <1L/day    Hyperkalemia  sec to renal failure  low k diet  PD as ordered  monitor

## 2019-05-01 NOTE — CHART NOTE - NSCHARTNOTEFT_GEN_A_CORE
KAPIL CAMPOVERDE  [id number] 74y    Patient status post cath via RFV/RFA. Dressing clean/dry/intact. Without hematoma.  Pulses present. Patient resting comfortable without complaint. Will continue to  follow closely.    Vital Signs Last 24 Hrs  T(C): 36.9 (01 May 2019 18:15), Max: 36.9 (01 May 2019 18:15)  T(F): 98.5 (01 May 2019 18:15), Max: 98.5 (01 May 2019 18:15)  HR: 82 (01 May 2019 18:15) (71 - 93)  BP: 103/65 (01 May 2019 18:15) (88/57 - 103/65)  BP(mean): --  RR: 18 (01 May 2019 18:15) (16 - 18)  SpO2: 99% (01 May 2019 17:38) (97% - 99%)

## 2019-05-01 NOTE — PROGRESS NOTE ADULT - ASSESSMENT
74F with ESRD on PD, HTN, CAD s/p CABG and PCI (last of which was a year ago), BiV [employer] CRT device w/o ICD, and DM2 who presented with worsening SOB. She was admitted with ADHFrEF. Echo revealed a worsening EF now 21% from 46%. NST was performed and showed mild to moderate salvatore-infarct ischemia in the anterior wall extending to the apex. EP was called to eval PPM and to see if pt would benefit from upgrade to ICD device. Interrogation revealed undersensing of the atria and lower than expected BiV CRT pacing. Optivol also revealed worsening heart failure decompensation at the time the atrial sensing and pacing percentage decreased from 99% to 88%. This may have been due to the undersensing.     #ADHFrEF w/ new reduced EF w/ BiV PPM and no ICD.   - Pts decompensation may have been partially related to inadequate CRT pacing therapy. She was not adequately being BiV paced because her atrial lead was undersensing.  - Atrial lead made more sensitive on 4/30/19 -  0.3 mV to 0.15 mV.   - Her device seems to be pacing more appropriately now  - At the same time the pts percentage of paced beats dropped, her decompensation started based on Optivol reads  - [facility] today  - Will hold off on ICD for now, pending cath results and f/u 3 month echocardiogram now that pt is pacing more adequately.    WILFREDO Vazquez  [id number]  New EP consults: [id number]

## 2019-05-01 NOTE — CHART NOTE - NSCHARTNOTEFT_GEN_A_CORE
Notified by RN patient c/o shortness of breath.   Patient admitted with similar complaint due to volume overload vs. Acute CHF exacerbation.   Patient at bedside, speaking in full sentences, states as she was falling asleep she began to feel short of breath. Vitals are stable, clear lung sounds, speaking in full sentences, in no apparent distress.   Patient states nasal cannula was helpful in the past.   Will place patient on 2 L nasal cannula prn for comfort.   Patient is pending L and R heart catheterization for assessment of LV function.     Will continue to monitor

## 2019-05-01 NOTE — PROGRESS NOTE ADULT - SUBJECTIVE AND OBJECTIVE BOX
[facility] [place] [facility]   MD ROSITA APODACA MD ANGELA WONG, PA    TEL:  OFFICE: [phone]  DR SOCTT CELL: [phone]  DR. JOHNSON CELL: [phone]  KESHA HOUSTON CELL: [phone]        Patient is a 74y old  Female who presents with a chief complaint of shortness of breath (01 May 2019 12:50)      Patient seen and examined at bedside. No chest pain/sob    VITALS:  T(F): 97.8 (05-01-19 @ 06:37), Max: 98.5 (04-30-19 @ 17:46)  HR: 93 (05-01-19 @ 06:37)  BP: 94/62 (05-01-19 @ 06:37)  RR: 16 (05-01-19 @ 06:37)  SpO2: 98% (05-01-19 @ 06:37)  Wt(kg): --    04-30 @ 07:01  -  05-01 @ 07:00  --------------------------------------------------------  IN: 6000 mL / OUT: 97846 mL / NET: -4100 mL          PHYSICAL EXAM:  Constitutional: NAD  Neck: No JVD  Respiratory: CTAB, no wheezes, rales or rhonchi  Cardiovascular: S1, S2, RRR  Gastrointestinal: BS+, soft, NT/ND  Extremities: No peripheral edema    Hospital Medications:   MEDICATIONS  (STANDING):  allopurinol 300 milliGRAM(s) Oral daily  aspirin enteric coated 81 milliGRAM(s) Oral daily  atorvastatin 40 milliGRAM(s) Oral at bedtime  benzonatate 100 milliGRAM(s) Oral once  chlorhexidine 4% Liquid 1 Application(s) Topical two times a day  clopidogrel Tablet 75 milliGRAM(s) Oral daily  dextrose 5%. 1000 milliLiter(s) (50 mL/Hr) IV Continuous <Continuous>  dextrose 50% Injectable 12.5 Gram(s) IV Push once  dextrose 50% Injectable 25 Gram(s) IV Push once  dextrose 50% Injectable 25 Gram(s) IV Push once  docusate sodium 100 milliGRAM(s) Oral three times a day  epoetin rocky Injectable 39295 Unit(s) SubCutaneous <User Schedule>  hydrALAZINE 10 milliGRAM(s) Oral every 12 hours  insulin glargine Injectable (LANTUS) 5 Unit(s) SubCutaneous at bedtime  insulin lispro (HumaLOG) corrective regimen sliding scale   SubCutaneous three times a day before meals  insulin lispro Injectable (HumaLOG) 2 Unit(s) SubCutaneous three times a day before meals  labetalol 100 milliGRAM(s) Oral daily  levothyroxine 50 MICROGram(s) Oral daily  multivitamin 1 Tablet(s) Oral daily  sevelamer carbonate 2400 milliGRAM(s) Oral three times a day with meals      LABS:  05-01    133<L>  |  90<L>  |  70<H>  ----------------------------<  197<H>  4.9   |  15<L>  |  9.14<H>    Ca    10.3      01 May 2019 05:31  Phos  7.1     05-01  Mg     1.8     05-01      Creatinine Trend: 9.14 <--, 9.35 <--, 9.31 <--, 9.48 <--, 9.04 <--, 8.65 <--, 8.72 <--, 9.06 <--, 10.13 <--, 10.69 <--    Phosphorus Level, Serum: 7.1 mg/dL (05-01 @ 05:31)                              10.6   6.52  )-----------( 179      ( 01 May 2019 05:31 )             35.3     Urine Studies:      .8 (Ca --)      [04-24-19 @ 05:17]   --  .4 (Ca --)      [01-13-19 @ 05:32]   --  .8 (Ca --)      [08-12-18 @ 06:00]   --  Vitamin D (25OH) 24.6      [04-24-19 @ 05:17]  HbA1c 9.8      [04-24-19 @ 05:17]  TSH 8.74      [04-24-19 @ 05:17]  Lipid: chol 167, , HDL 45, LDL 99      [04-24-19 @ 05:17]    HBsAb 23.8      [04-25-19 @ 14:53]  HBsAg NEGATIVE      [04-25-19 @ 14:53]  HBcAb Nonreactive      [04-25-19 @ 14:53]  HCV 0.04, Nonreactive Hepatitis C AB  S/CO Ratio                        Interpretation  < 1.00                                   Non-Reactive  1.00 - 4.99                         Weakly-Reactive  > 5.00                                Reactive  Non-Reactive: A person with a non-reactive HCV antibody  result is considered uninfected.  No further action is  needed unless recent infection is suspected.  In these  cases, consider repeat testing later to detect  seroconversion..  Weakly-Reactive: HCV antibody test is abnormal, HCV RNA  Qualitative test will follow.  Reactive: HCV antibody test is abnormal, HCV RNA  Qualitative test will follow.  Note: HCV antibody testing is performed on the Abbott   system.      [04-25-19 @ 14:53]      RADIOLOGY & ADDITIONAL STUDIES:

## 2019-05-01 NOTE — PROGRESS NOTE ADULT - SUBJECTIVE AND OBJECTIVE BOX
Patient seen and examined at bedside.    Overnight Events: Denies chest pain, SOB, palpitations, LE edema.       REVIEW OF SYSTEMS:  Constitutional:     No fevers, chills, weight loss, weight gain  HEENT:                  No dry eyes, nasal congestion, postnasal drip  CV:                         No chest pain, palpitations, orthopnea, PND  Resp:                     No cough, SOB, dyspnea, wheezing, sputum  GI:                          No nausea, vomiting, abdominal pain, diarrhea, constipation  :                        No dysuria, nocturia, hematuria, increased urinary frequency  Musculoskeletal: No back pain, myalgias, arthralgias   Skin:                       No rash, pruritus, ecchymoses  Neurological:        No headache, dizziness, syncope, weakness, numbness  Psychiatric:           No anxiety, depression   Endocrine:            No hot/cold intolerance, polydipsia  Heme/Lymph:      No bleeding, easy bruising  Allergic/Immune: No itchy eyes, rhinorrhea, hives angioedema      Current Meds:  acetaminophen   Tablet .. 650 milliGRAM(s) Oral every 6 hours PRN  allopurinol 300 milliGRAM(s) Oral daily  aspirin enteric coated 81 milliGRAM(s) Oral daily  atorvastatin 40 milliGRAM(s) Oral at bedtime  benzonatate 100 milliGRAM(s) Oral once  chlorhexidine 4% Liquid 1 Application(s) Topical two times a day  clopidogrel Tablet 75 milliGRAM(s) Oral daily  dextrose 40% Gel 15 Gram(s) Oral once PRN  dextrose 5%. 1000 milliLiter(s) IV Continuous <Continuous>  dextrose 50% Injectable 12.5 Gram(s) IV Push once  dextrose 50% Injectable 25 Gram(s) IV Push once  dextrose 50% Injectable 25 Gram(s) IV Push once  docusate sodium 100 milliGRAM(s) Oral three times a day  epoetin rocky Injectable 89966 Unit(s) SubCutaneous <User Schedule>  gentamicin 0.1% Cream 1 Application(s) Topical <User Schedule> PRN  glucagon  Injectable 1 milliGRAM(s) IntraMuscular once PRN  hydrALAZINE 10 milliGRAM(s) Oral every 12 hours  insulin glargine Injectable (LANTUS) 5 Unit(s) SubCutaneous at bedtime  insulin lispro (HumaLOG) corrective regimen sliding scale   SubCutaneous three times a day before meals  insulin lispro Injectable (HumaLOG) 2 Unit(s) SubCutaneous three times a day before meals  labetalol 100 milliGRAM(s) Oral daily  levothyroxine 50 MICROGram(s) Oral daily  loperamide 2 milliGRAM(s) Oral four times a day PRN  multivitamin 1 Tablet(s) Oral daily  sevelamer carbonate 2400 milliGRAM(s) Oral three times a day with meals      PAST MEDICAL & SURGICAL HISTORY:  ESRD on peritoneal dialysis  Acid reflux  Hypertension  Dyslipidemia  Diabetes mellitus  CAD (coronary artery disease)  S/P CABG (coronary artery bypass graft): in 2012  H/O: hysterectomy  History of total knee replacement b/l  After cataract, bilateral      Vitals:  T(F): 97.8 (05-01), Max: 98.6 (04-30)  HR: 93 (05-01) (71 - 96)  BP: 94/62 (05-01) (88/57 - 115/72)  RR: 16 (05-01)  SpO2: 98% (05-01)  I&O's Summary    30 Apr 2019 07:01  -  01 May 2019 07:00  --------------------------------------------------------  IN: 6000 mL / OUT: 84631 mL / NET: -4100 mL        Physical Exam:  Appearance: No acute distress; well appearing  Eyes: PERRL, EOMI, pink conjunctiva  HENT: Normal oral mucosa  Cardiovascular: RRR, S1, S2, no murmurs, rubs, or gallops; no edema; no JVD  Respiratory: Clear to auscultation bilaterally  Gastrointestinal: soft, non-tender, non-distended with normal bowel sounds  Musculoskeletal: No clubbing; no joint deformity   Neurologic: Non-focal  Lymphatic: No lymphadenopathy  Psychiatry: AAOx3, mood & affect appropriate  Skin: No rashes, ecchymoses, or cyanosis                          10.6   6.52  )-----------( 179      ( 01 May 2019 05:31 )             35.3     05-01    133<L>  |  90<L>  |  70<H>  ----------------------------<  197<H>  4.9   |  15<L>  |  9.14<H>    Ca    10.3      01 May 2019 05:31  Phos  7.1     05-01  Mg     1.8     05-01                    New ECG(s): Personally reviewed    Echo:  < from: TTE with Doppler (w/Cont) (04.25.19 @ 10:08) >  DIMENSIONS:  Dimensions:     Normal Values:  LA:     4.1 cm    2.0 - 4.0 cm  Ao:     3.2 cm    2.0 - 3.8 cm  SEPTUM: 1.1 cm    0.6 - 1.2 cm  PWT:    1.2 cm    0.6 - 1.1 cm  LVIDd:  5.1 cm    3.0 - 5.6 cm  LVIDs:  4.6 cm    1.8 - 4.0 cm  Derived Variables:  LVMI: 152 g/m2  RWT: 0.47  Fractional short: 10 %  Ejection Fraction (Teicholtz): 21 %  ------------------------------------------------------------------------    CONCLUSIONS:  1. Moderate-severe mitral regurgitation.  2. Moderate left ventricular enlargement.  3. Severe global left ventricular systolic dysfunction.  Endocardial visualization enhanced with intravenous  injection of echo contrast (Definity). No left ventricular  thrombus.  4. Mild diastolic dysfunction (Stage I).  5. Normal right ventricular size and function.  *** Compared with echocardiogram of 12/15/2018, LV function  has deteriorated significantly.    < end of copied text >      Interpretation of Telemetry: paced

## 2019-05-02 LAB
ALBUMIN SERPL ELPH-MCNC: 3.4 G/DL — SIGNIFICANT CHANGE UP (ref 3.3–5)
ALP SERPL-CCNC: 63 U/L — SIGNIFICANT CHANGE UP (ref 40–120)
ALT FLD-CCNC: 21 U/L — SIGNIFICANT CHANGE UP (ref 4–33)
ANION GAP SERPL CALC-SCNC: 22 MMO/L — HIGH (ref 7–14)
AST SERPL-CCNC: 25 U/L — SIGNIFICANT CHANGE UP (ref 4–32)
BILIRUB SERPL-MCNC: 0.2 MG/DL — SIGNIFICANT CHANGE UP (ref 0.2–1.2)
BUN SERPL-MCNC: 78 MG/DL — HIGH (ref 7–23)
CALCIUM SERPL-MCNC: 10.4 MG/DL — SIGNIFICANT CHANGE UP (ref 8.4–10.5)
CHLORIDE SERPL-SCNC: 91 MMOL/L — LOW (ref 98–107)
CO2 SERPL-SCNC: 21 MMOL/L — LOW (ref 22–31)
CREAT SERPL-MCNC: 10.01 MG/DL — HIGH (ref 0.5–1.3)
GLUCOSE BLDC GLUCOMTR-MCNC: 219 MG/DL — HIGH (ref 70–99)
GLUCOSE BLDC GLUCOMTR-MCNC: 226 MG/DL — HIGH (ref 70–99)
GLUCOSE BLDC GLUCOMTR-MCNC: 232 MG/DL — HIGH (ref 70–99)
GLUCOSE BLDC GLUCOMTR-MCNC: 331 MG/DL — HIGH (ref 70–99)
GLUCOSE SERPL-MCNC: 216 MG/DL — HIGH (ref 70–99)
HCT VFR BLD CALC: 35.6 % — SIGNIFICANT CHANGE UP (ref 34.5–45)
HGB BLD-MCNC: 11 G/DL — LOW (ref 11.5–15.5)
MAGNESIUM SERPL-MCNC: 1.9 MG/DL — SIGNIFICANT CHANGE UP (ref 1.6–2.6)
MCHC RBC-ENTMCNC: 26.1 PG — LOW (ref 27–34)
MCHC RBC-ENTMCNC: 30.9 % — LOW (ref 32–36)
MCV RBC AUTO: 84.6 FL — SIGNIFICANT CHANGE UP (ref 80–100)
NRBC # FLD: 0 K/UL — SIGNIFICANT CHANGE UP (ref 0–0)
PHOSPHATE SERPL-MCNC: 7.2 MG/DL — HIGH (ref 2.5–4.5)
PLATELET # BLD AUTO: 169 K/UL — SIGNIFICANT CHANGE UP (ref 150–400)
PMV BLD: 10 FL — SIGNIFICANT CHANGE UP (ref 7–13)
POTASSIUM SERPL-MCNC: 4.9 MMOL/L — SIGNIFICANT CHANGE UP (ref 3.5–5.3)
POTASSIUM SERPL-SCNC: 4.9 MMOL/L — SIGNIFICANT CHANGE UP (ref 3.5–5.3)
PROT SERPL-MCNC: 6.5 G/DL — SIGNIFICANT CHANGE UP (ref 6–8.3)
RBC # BLD: 4.21 M/UL — SIGNIFICANT CHANGE UP (ref 3.8–5.2)
RBC # FLD: 17.2 % — HIGH (ref 10.3–14.5)
SODIUM SERPL-SCNC: 134 MMOL/L — LOW (ref 135–145)
WBC # BLD: 4.85 K/UL — SIGNIFICANT CHANGE UP (ref 3.8–10.5)
WBC # FLD AUTO: 4.85 K/UL — SIGNIFICANT CHANGE UP (ref 3.8–10.5)

## 2019-05-02 PROCEDURE — 99232 SBSQ HOSP IP/OBS MODERATE 35: CPT

## 2019-05-02 RX ADMIN — CHLORHEXIDINE GLUCONATE 1 APPLICATION(S): 213 SOLUTION TOPICAL at 05:49

## 2019-05-02 RX ADMIN — Medication 2: at 08:17

## 2019-05-02 RX ADMIN — Medication 4: at 17:27

## 2019-05-02 RX ADMIN — CLOPIDOGREL BISULFATE 75 MILLIGRAM(S): 75 TABLET, FILM COATED ORAL at 12:46

## 2019-05-02 RX ADMIN — Medication 2: at 12:28

## 2019-05-02 RX ADMIN — Medication 1 TABLET(S): at 12:47

## 2019-05-02 RX ADMIN — SEVELAMER CARBONATE 2400 MILLIGRAM(S): 2400 POWDER, FOR SUSPENSION ORAL at 17:28

## 2019-05-02 RX ADMIN — ATORVASTATIN CALCIUM 40 MILLIGRAM(S): 80 TABLET, FILM COATED ORAL at 22:12

## 2019-05-02 RX ADMIN — CHLORHEXIDINE GLUCONATE 1 APPLICATION(S): 213 SOLUTION TOPICAL at 17:29

## 2019-05-02 RX ADMIN — Medication 2 UNIT(S): at 17:27

## 2019-05-02 RX ADMIN — Medication 2 UNIT(S): at 12:28

## 2019-05-02 RX ADMIN — Medication 81 MILLIGRAM(S): at 12:45

## 2019-05-02 RX ADMIN — INSULIN GLARGINE 5 UNIT(S): 100 INJECTION, SOLUTION SUBCUTANEOUS at 22:12

## 2019-05-02 RX ADMIN — Medication 300 MILLIGRAM(S): at 12:46

## 2019-05-02 RX ADMIN — SEVELAMER CARBONATE 2400 MILLIGRAM(S): 2400 POWDER, FOR SUSPENSION ORAL at 12:47

## 2019-05-02 RX ADMIN — Medication 50 MICROGRAM(S): at 05:50

## 2019-05-02 NOTE — PROGRESS NOTE ADULT - SUBJECTIVE AND OBJECTIVE BOX
Patient seen and examined at bedside.    Overnight Events: [facility] yesterday. She has SOB with exertion. HD planned for this morning.       REVIEW OF SYSTEMS:  Constitutional:     No fevers, chills, weight loss, weight gain  HEENT:                  No dry eyes, nasal congestion, postnasal drip  CV:                         No chest pain, palpitations, orthopnea, PND  Resp:                     see above  GI:                          No nausea, vomiting, abdominal pain, diarrhea, constipation  :                        No dysuria, nocturia, hematuria, increased urinary frequency  Musculoskeletal: No back pain, myalgias, arthralgias   Skin:                       No rash, pruritus, ecchymoses  Neurological:        No headache, dizziness, syncope, weakness, numbness  Psychiatric:           No anxiety, depression   Endocrine:            No hot/cold intolerance, polydipsia  Heme/Lymph:      No bleeding, easy bruising  Allergic/Immune: No itchy eyes, rhinorrhea, hives angioedema      Current Meds:  acetaminophen   Tablet .. 650 milliGRAM(s) Oral every 6 hours PRN  allopurinol 300 milliGRAM(s) Oral daily  aspirin enteric coated 81 milliGRAM(s) Oral daily  atorvastatin 40 milliGRAM(s) Oral at bedtime  benzonatate 100 milliGRAM(s) Oral once  chlorhexidine 4% Liquid 1 Application(s) Topical two times a day  clopidogrel Tablet 75 milliGRAM(s) Oral daily  dextrose 40% Gel 15 Gram(s) Oral once PRN  dextrose 5%. 1000 milliLiter(s) IV Continuous <Continuous>  dextrose 50% Injectable 12.5 Gram(s) IV Push once  dextrose 50% Injectable 25 Gram(s) IV Push once  dextrose 50% Injectable 25 Gram(s) IV Push once  docusate sodium 100 milliGRAM(s) Oral three times a day  epoetin rocky Injectable 71064 Unit(s) SubCutaneous <User Schedule>  gentamicin 0.1% Cream 1 Application(s) Topical <User Schedule> PRN  glucagon  Injectable 1 milliGRAM(s) IntraMuscular once PRN  hydrALAZINE 10 milliGRAM(s) Oral every 12 hours  insulin glargine Injectable (LANTUS) 5 Unit(s) SubCutaneous at bedtime  insulin lispro (HumaLOG) corrective regimen sliding scale   SubCutaneous three times a day before meals  insulin lispro Injectable (HumaLOG) 2 Unit(s) SubCutaneous three times a day before meals  labetalol 100 milliGRAM(s) Oral daily  levothyroxine 50 MICROGram(s) Oral daily  loperamide 2 milliGRAM(s) Oral four times a day PRN  multivitamin 1 Tablet(s) Oral daily  sevelamer carbonate 2400 milliGRAM(s) Oral three times a day with meals      PAST MEDICAL & SURGICAL HISTORY:  ESRD on peritoneal dialysis  Acid reflux  Hypertension  Dyslipidemia  Diabetes mellitus  CAD (coronary artery disease)  S/P CABG (coronary artery bypass graft): in 2012  H/O: hysterectomy  History of total knee replacement b/l  After cataract, bilateral      Vitals:  T(F): 98.4 (05-02), Max: 98.5 (05-01)  HR: 96 (05-02) (82 - 96)  BP: 99/67 (05-02) (92/62 - 107/73)  RR: 17 (05-02)  SpO2: 98% (05-02)  I&O's Summary    01 May 2019 07:01  -  02 May 2019 07:00  --------------------------------------------------------  IN: 4315 mL / OUT: 2100 mL / NET: 2215 mL    02 May 2019 07:01  -  02 May 2019 11:03  --------------------------------------------------------  IN: 2000 mL / OUT: 2500 mL / NET: -500 mL        Physical Exam:  Appearance: No acute distress; well appearing  Eyes: PERRL, EOMI, pink conjunctiva  HENT: Normal oral mucosa  Cardiovascular: RRR, S1, S2, no murmurs, rubs, or gallops; no edema; no JVD  Respiratory: Clear to auscultation bilaterally  Gastrointestinal: soft, non-tender, non-distended with normal bowel sounds  Musculoskeletal: No clubbing; no joint deformity   Neurologic: Non-focal  Lymphatic: No lymphadenopathy  Psychiatry: AAOx3, mood & affect appropriate  Skin: No rashes, ecchymoses, or cyanosis                          11.0   4.85  )-----------( 169      ( 02 May 2019 05:24 )             35.6     05-02    134<L>  |  91<L>  |  78<H>  ----------------------------<  216<H>  4.9   |  21<L>  |  10.01<H>    Ca    10.4      02 May 2019 05:24  Phos  7.2     05-02  Mg     1.9     05-02    TPro  6.5  /  Alb  3.4  /  TBili  0.2  /  DBili  x   /  AST  25  /  ALT  21  /  AlkPhos  63  05-02      Echo:  < from: TTE with Doppler (w/Cont) (04.25.19 @ 10:08) >  Dimensions:     Normal Values:  LA:     4.1 cm    2.0 - 4.0 cm  Ao:     3.2 cm    2.0 - 3.8 cm  SEPTUM: 1.1 cm    0.6 - 1.2 cm  PWT:    1.2 cm    0.6 - 1.1 cm  LVIDd:  5.1 cm    3.0 - 5.6 cm  LVIDs:  4.6 cm    1.8 - 4.0 cm  Derived Variables:  LVMI: 152 g/m2  RWT: 0.47  Fractional short: 10 %  Ejection Fraction (Teicholtz): 21 %  ------------------------------------------------------------------------  CONCLUSIONS:  1. Moderate-severe mitral regurgitation.  2. Moderate left ventricular enlargement.  3. Severe global left ventricular systolic dysfunction.  Endocardial visualization enhanced with intravenous  injection of echo contrast (Definity). No left ventricular  thrombus.  4. Mild diastolic dysfunction (Stage I).  5. Normal right ventricular size and function.  *** Compared with echocardiogram of 12/15/2018, LV function  has deteriorated significantly.    < end of copied text >      Cath:  < from: Cardiac Cath Lab - Adult (05.01.19 @ 10:20) >  CORONARY VESSELS: The coronary circulation is right dominant.  LM:   --  Distal left main: There was adiscrete 99 % stenosis at the site  of a prior stent. The lesion was complex, eccentric, and heavily  calcified. severe restnosis since stent of November 2018  LAD:   --  Ostial LAD: There was a 100 % stenosis. LIMA to mid LAD patent  and fills the RCA  CX:   --  Proximal circumflex: There was a 90 % stenosis. restenosis from  nov 2018  --  Mid circumflex: There was a 60 % stenosis.  RCA:   --  Proximal RCA: There was a 100 % stenosis. There was a  moderate-sized vascular territory distal to the lesion and good collateral  blood supply to the distal myocardium. This lesion progressed since  November 2018  COMPLICATIONS: There were no complications.  DIAGNOSTIC IMPRESSIONS: Patient has normal to low filling pressures but low  CO and CI with severe restenosis of left main and proximal LCX complex and  calcified and progressive diseae of RCA.  DIAGNOSTIC RECOMMENDATIONS: Viability study anf if viable muscle in lateral  wall, consider high risk PTCA of left main and proximal LCX and aggressive  medical therapy for LV dysfunction,    < end of copied text >      Interpretation of Telemetry: SR 70s, paced

## 2019-05-02 NOTE — PROGRESS NOTE ADULT - SUBJECTIVE AND OBJECTIVE BOX
[facility] – [place] [facility]ACT[facility]   MD ROSITA APODACA MD ANGELA WONG, PA    TEL:  OFFICE: [phone]  DR SCOTT CELL: [phone]  DR. JOHNSON CELL: [phone]  KESHA HOUSTON CELL: [phone]        Patient is a 74y old  Female who presents with a chief complaint of shortness of breath (02 May 2019 11:02)      Patient seen and examined at bedside. No chest pain/sob    VITALS:  T(F): 98.4 (05-02-19 @ 10:51), Max: 98.5 (05-01-19 @ 18:15)  HR: 99 (05-02-19 @ 12:03)  BP: 82/58 (05-02-19 @ 12:03)  RR: 17 (05-02-19 @ 10:51)  SpO2: 98% (05-02-19 @ 05:26)  Wt(kg): --    05-01 @ 07:01  -  05-02 @ 07:00  --------------------------------------------------------  IN: 4315 mL / OUT: 2100 mL / NET: 2215 mL    05-02 @ 07:01  -  05-02 @ 12:06  --------------------------------------------------------  IN: 4000 mL / OUT: 5000 mL / NET: -1000 mL          PHYSICAL EXAM:  Constitutional: NAD  Neck: No JVD  Respiratory: CTAB, no wheezes, rales or rhonchi  Cardiovascular: S1, S2, RRR  Gastrointestinal: BS+, soft, NT/ND  Extremities: No peripheral edema    Hospital Medications:   MEDICATIONS  (STANDING):  allopurinol 300 milliGRAM(s) Oral daily  aspirin enteric coated 81 milliGRAM(s) Oral daily  atorvastatin 40 milliGRAM(s) Oral at bedtime  benzonatate 100 milliGRAM(s) Oral once  chlorhexidine 4% Liquid 1 Application(s) Topical two times a day  clopidogrel Tablet 75 milliGRAM(s) Oral daily  dextrose 5%. 1000 milliLiter(s) (50 mL/Hr) IV Continuous <Continuous>  dextrose 50% Injectable 12.5 Gram(s) IV Push once  dextrose 50% Injectable 25 Gram(s) IV Push once  dextrose 50% Injectable 25 Gram(s) IV Push once  docusate sodium 100 milliGRAM(s) Oral three times a day  epoetin rocky Injectable 71918 Unit(s) SubCutaneous <User Schedule>  hydrALAZINE 10 milliGRAM(s) Oral every 12 hours  insulin glargine Injectable (LANTUS) 5 Unit(s) SubCutaneous at bedtime  insulin lispro (HumaLOG) corrective regimen sliding scale   SubCutaneous three times a day before meals  insulin lispro Injectable (HumaLOG) 2 Unit(s) SubCutaneous three times a day before meals  labetalol 100 milliGRAM(s) Oral daily  levothyroxine 50 MICROGram(s) Oral daily  multivitamin 1 Tablet(s) Oral daily  sevelamer carbonate 2400 milliGRAM(s) Oral three times a day with meals      LABS:  05-02    134<L>  |  91<L>  |  78<H>  ----------------------------<  216<H>  4.9   |  21<L>  |  10.01<H>    Ca    10.4      02 May 2019 05:24  Phos  7.2     05-02  Mg     1.9     05-02    TPro  6.5  /  Alb  3.4  /  TBili  0.2  /  DBili      /  AST  25  /  ALT  21  /  AlkPhos  63  05-02    Creatinine Trend: 10.01 <--, 9.14 <--, 9.35 <--, 9.31 <--, 9.48 <--, 9.04 <--, 8.65 <--, 8.72 <--, 9.06 <--, 10.13 <--    Phosphorus Level, Serum: 7.2 mg/dL (05-02 @ 05:24)  Albumin, Serum: 3.4 g/dL (05-02 @ 05:24)                              11.0   4.85  )-----------( 169      ( 02 May 2019 05:24 )             35.6     Urine Studies:      .8 (Ca --)      [04-24-19 @ 05:17]   --  .4 (Ca --)      [01-13-19 @ 05:32]   --  .8 (Ca --)      [08-12-18 @ 06:00]   --  Vitamin D (25OH) 24.6      [04-24-19 @ 05:17]  HbA1c 9.8      [04-24-19 @ 05:17]  TSH 8.74      [04-24-19 @ 05:17]  Lipid: chol 167, , HDL 45, LDL 99      [04-24-19 @ 05:17]    HBsAb 23.8      [04-25-19 @ 14:53]  HBsAg NEGATIVE      [04-25-19 @ 14:53]  HBcAb Nonreactive      [04-25-19 @ 14:53]  HCV 0.04, Nonreactive Hepatitis C AB  S/CO Ratio                        Interpretation  < 1.00                                   Non-Reactive  1.00 - 4.99                         Weakly-Reactive  > 5.00                                Reactive  Non-Reactive: A person with a non-reactive HCV antibody  result is considered uninfected.  No further action is  needed unless recent infection is suspected.  In these  cases, consider repeat testing later to detect  seroconversion..  Weakly-Reactive: HCV antibody test is abnormal, HCV RNA  Qualitative test will follow.  Reactive: HCV antibody test is abnormal, HCV RNA  Qualitative test will follow.  Note: HCV antibody testing is performed on the [employer]   system.      [04-25-19 @ 14:53]      RADIOLOGY & ADDITIONAL STUDIES:

## 2019-05-02 NOTE — PROGRESS NOTE ADULT - ASSESSMENT
74F with ESRD on PD, HTN, CAD s/p CABG and PCI (last of which was a year ago), BiV [employer] CRT device w/o ICD, and DM2 who presented with worsening SOB. She was admitted with ADHFrEF. Echo revealed a worsening EF now 21% from 46%. NST was performed and showed mild to moderate salvatore-infarct ischemia in the anterior wall extending to the apex. EP was called to eval PPM and to see if pt would benefit from upgrade to ICD device. Interrogation revealed undersensing of the atria and lower than expected BiV CRT pacing. Optivol also revealed worsening heart failure decompensation at the time the atrial sensing and pacing percentage decreased from 99% to 88%. This may have been due to the undersensing.     #ADHFrEF w/ new reduced EF w/ BiV PPM and no ICD.   - Pts decompensation may have been partially related to inadequate CRT pacing therapy. She was not adequately being BiV paced because her atrial lead was undersensing  - Atrial lead made more sensitive on 4/30/19 -  0.3 mV to 0.15 mV  - Her device seems to be pacing more appropriately now  - At the same time the pts percentage of paced beats dropped, her decompensation started based on Optivol reads  - [facility] 5/1 with left main and proximal LCx disease, viability study recommended  - Will hold off on ICD for now, she will need a follow-up echocardiogram in 3 months    WILFREDO Vazquez  [id number]  New EP consults: [id number]

## 2019-05-02 NOTE — PROGRESS NOTE ADULT - ASSESSMENT
_________________________________________________________________________________________  ========>>  M E D I C A L   A T T E N D I N G    F O L L O W  U P  N O T E  <<=========  -----------------------------------------------------------------------------------------------------    - Patient seen and examined by me earlier today.   - In summary,  KAPIL CAMPOVERDE is a 74y year old woman who originally presented with SOB  - Patient today overall doing ok, comfortable, eating ok , CP free     ==================>> REVIEW OF SYSTEM <<=================    GEN: no fever, no chills, no pain  RESP: no SOB at rest , no cough, no sputum  CVS: no chest pain, no palpitations, no edema  GI: no abdominal pain, no nausea, no diarrhea today  : no dysuria, no frequency, no hematuria  Neuro: no headache, no dizziness  Derm : no itching, no rash    ==================>> PHYSICAL EXAM <<=================    GEN: A&O X 3 , NAD , comfortable, getting PD  HEENT: NCAT, PERRL, MMM, hearing intact, on nasal canula   Neck: supple , no JVD  CVS: S1S2 , regular , No M/R/G appreciated  PULM: CTA B/L,  no W/R/R appreciated  ABD.: soft. non tender, non distended,  bowel sounds present  Extrem: intact pulses , no edema   PSYCH : normal mood,  not anxious        ==================>> MEDICATIONS <<====================    allopurinol 300 milliGRAM(s) Oral daily  aspirin enteric coated 81 milliGRAM(s) Oral daily  atorvastatin 40 milliGRAM(s) Oral at bedtime  benzonatate 100 milliGRAM(s) Oral once  chlorhexidine 4% Liquid 1 Application(s) Topical two times a day  clopidogrel Tablet 75 milliGRAM(s) Oral daily  dextrose 5%. 1000 milliLiter(s) IV Continuous <Continuous>  dextrose 50% Injectable 12.5 Gram(s) IV Push once  dextrose 50% Injectable 25 Gram(s) IV Push once  dextrose 50% Injectable 25 Gram(s) IV Push once  docusate sodium 100 milliGRAM(s) Oral three times a day  epoetin rocky Injectable 07720 Unit(s) SubCutaneous <User Schedule>  hydrALAZINE 10 milliGRAM(s) Oral every 12 hours  insulin glargine Injectable (LANTUS) 5 Unit(s) SubCutaneous at bedtime  insulin lispro (HumaLOG) corrective regimen sliding scale   SubCutaneous three times a day before meals  insulin lispro Injectable (HumaLOG) 2 Unit(s) SubCutaneous three times a day before meals  labetalol 100 milliGRAM(s) Oral daily  levothyroxine 50 MICROGram(s) Oral daily  multivitamin 1 Tablet(s) Oral daily  sevelamer carbonate 2400 milliGRAM(s) Oral three times a day with meals    MEDICATIONS  (PRN):  acetaminophen   Tablet .. 650 milliGRAM(s) Oral every 6 hours PRN Temp greater or equal to 38C (100.4F), Mild Pain (1 - 3)  dextrose 40% Gel 15 Gram(s) Oral once PRN Blood Glucose LESS THAN 70 milliGRAM(s)/deciliter  gentamicin 0.1% Cream 1 Application(s) Topical <User Schedule> PRN Pd catheter dressing change  glucagon  Injectable 1 milliGRAM(s) IntraMuscular once PRN Glucose LESS THAN 70 milligrams/deciliter  loperamide 2 milliGRAM(s) Oral four times a day PRN Diarrhea    ==================>> VITAL SIGNS <<==================    Vital Signs Last 24 Hrs  T(C): 36.4 (05-02-19 @ 14:24)  T(F): 97.5 (05-02-19 @ 14:24), Max: 98.5 (05-01-19 @ 18:15)  HR: 101 (05-02-19 @ 14:24) (82 - 101)  BP: 103/65 (05-02-19 @ 14:24)  RR: 18 (05-02-19 @ 14:24) (17 - 20)  SpO2: 100% (05-02-19 @ 12:03) (98% - 100%)      POCT Blood Glucose.: 219 mg/dL (02 May 2019 11:55)  POCT Blood Glucose.: 226 mg/dL (02 May 2019 08:00)  POCT Blood Glucose.: 298 mg/dL (01 May 2019 23:17)  POCT Blood Glucose.: 313 mg/dL (01 May 2019 21:38)  POCT Blood Glucose.: 171 mg/dL (01 May 2019 17:30)     ==================>> LAB AND IMAGING <<==================                        11.0   4.85  )-----------( 169      ( 02 May 2019 05:24 )             35.6        05-02    134<L>  |  91<L>  |  78<H>  ----------------------------<  216<H>  4.9   |  21<L>  |  10.01<H>    Ca    10.4      02 May 2019 05:24  Phos  7.2     05-02  Mg     1.9     05-02    TPro  6.5  /  Alb  3.4  /  TBili  0.2  /  DBili  x   /  AST  25  /  ALT  21  /  AlkPhos  63  05-02    WBC count:   4.85 <<== ,  6.52 <<== ,  5.17 <<== ,  8.12 <<== ,  7.12 <<==   Hemoglobin:   11.0 <<==,  10.6 <<==,  9.6 <<==,  9.9 <<==,  9.5 <<==  platelets:  169 <==, 179 <==, 163 <==, 188 <==, 182 <==, 210 <==    Creatinine:  10.01  <<==, 9.14  <<==, 9.35  <<==, 9.31  <<==, 9.48  <<==, 9.04  <<==  Sodium:   134  <==, 133  <==, 134  <==, 130  <==, 125  <==, 127  <==, 129  <==       AST:          25 <== , 27 <== , 18 <==      ALT:        21  <== , 25  <== , 19  <==      AP:        63  <=, 71  <=, 59  <=     Bili:        0.2  <=, 0.3  <=, < 0.2  <=    < from: Cardiac Cath Lab - Adult (05.01.19 @ 10:20) >  CORONARY VESSELS: The coronary circulation is right dominant.  LM:   --  Distal left main: There was adiscrete 99 % stenosis at the site  of a prior stent. The lesion was complex, eccentric, and heavily  calcified. severe restnosis since stent of November 2018  LAD:   --  Ostial LAD: There was a 100 % stenosis. LIMA to mid LAD patent and fills the RCA  CX:   --  Proximal circumflex: There was a 90 % stenosis. restenosis from nov 2018  --  Mid circumflex: There was a 60 % stenosis.  RCA:   --  Proximal RCA: There was a 100 % stenosis. There was a  moderate-sized vascular territory distal to the lesion and good collateral  blood supply to the distal myocardium. This lesion progressed since November 2018  COMPLICATIONS: There were no complications.  DIAGNOSTIC IMPRESSIONS: Patient has normal to low filling pressures but low  CO and CI with severe restenosis of left main and proximal LCX complex and  calcified and progressive diseae of RCA.  DIAGNOSTIC RECOMMENDATIONS: Viability study anf if viable muscle in lateral  wall, consider high risk PTCA of left main and proximal LCX and aggressive  medical therapy for LV dysfunction,  < end of copied text >    pending viability study  ___________________________________________________________________________________  ===============>>  A S S E S S M E N T   A N D   P L A N <<===============  ------------------------------------------------------------------------------------------    · Assessment		  Problem/Plan - 1:  ·  Problem: Shortness of breath ( due to fluid overload, due to ESRD) , overall improved with dialysis   will continue to monitor   post Lt/Rt heart cath >> showing significant and worsening of CAD   fluid mgmt via dialysis      Problem/Plan - 2:  ·  Problem: ESRD on peritoneal dialysis.    nephro follow up and mgmtt appreciated   dialysis   monitor and treat hyponatremia per renal      Problem/Plan - 3:  ·  Problem: acute hypoxemic respiratory failure due to pulmonary edema: resolved     pt was apparently off O2 and doing well but seen again on O2    monitor and wean off o2 as able      Problem/Plan - 4:  ·  Problem: Chronic systolic congestive heart failure, with demand ischemia given elevated troponin in this pt with ESRD with significant worsening of CAD and LV dysfunction   continue medical mgmt and optimization   pt off ACEI / ARB due to recorded allergy   viability test  discussed with cardio and EP appreciated ( holding AICD until above)      Problem/Plan - 5:  ·  Problem: Dyslipidemia.    Continue home statin.      Problem/Plan - 6:  Problem: Type 2 diabetes mellitus   Insulin sliding scale    -GI/DVT Prophylaxis.    --------------------------------------------  Case discussed with pt, cardio, PA  Education given on findings and plan of care  ___________________________  H. MARISOL Abreu.  Pager: [phone]  `

## 2019-05-02 NOTE — PROGRESS NOTE ADULT - ASSESSMENT
74F w/ ESRD on PD at home, HTN, DM, CAD s/p CABG and stents, on AC, p/w SOB x1 day.    ESRD on PD  Continue PD with UF as tolerated  Monitor BMP and BP    SOB  multiple admission for fluid overload  chest xray is clear no edema  Cardiology follow up     Hypercalcemia  Elevated PTH, phos level  Hold calcitriol   Increase renagel 2400 tid with meal  Monitor serum calcium and phos    Anemia  epo 78718 sq tiw  monitor Hb    HTN  on labetalol 100 daily  hydralazine 10 tid started  intermittent hypotensive improved   UF as tolerated.   monitor bp  hold bp meds per perimeter     Hyponatremia   likely sec to increase fluid status in ESRD pt  UF with PD as tolerated  free water restriction <1L/day    Hyperkalemia  sec to renal failure  low k diet  PD as ordered  monitor

## 2019-05-03 LAB
ALBUMIN SERPL ELPH-MCNC: 3.5 G/DL — SIGNIFICANT CHANGE UP (ref 3.3–5)
ALP SERPL-CCNC: 63 U/L — SIGNIFICANT CHANGE UP (ref 40–120)
ALT FLD-CCNC: 21 U/L — SIGNIFICANT CHANGE UP (ref 4–33)
ANION GAP SERPL CALC-SCNC: 24 MMO/L — HIGH (ref 7–14)
ANION GAP SERPL CALC-SCNC: 24 MMO/L — HIGH (ref 7–14)
APTT BLD: 29.8 SEC — SIGNIFICANT CHANGE UP (ref 27.5–36.3)
AST SERPL-CCNC: 24 U/L — SIGNIFICANT CHANGE UP (ref 4–32)
BASE EXCESS BLDV CALC-SCNC: -1.6 MMOL/L — SIGNIFICANT CHANGE UP
BASE EXCESS BLDV CALC-SCNC: -4.7 MMOL/L — SIGNIFICANT CHANGE UP
BASOPHILS # BLD AUTO: 0.03 K/UL — SIGNIFICANT CHANGE UP (ref 0–0.2)
BASOPHILS NFR BLD AUTO: 0.6 % — SIGNIFICANT CHANGE UP (ref 0–2)
BILIRUB SERPL-MCNC: 0.2 MG/DL — SIGNIFICANT CHANGE UP (ref 0.2–1.2)
BLOOD GAS VENOUS - CREATININE: 9.25 MG/DL — HIGH (ref 0.5–1.3)
BLOOD GAS VENOUS - FIO2: 28 — SIGNIFICANT CHANGE UP
BUN SERPL-MCNC: 69 MG/DL — HIGH (ref 7–23)
BUN SERPL-MCNC: 72 MG/DL — HIGH (ref 7–23)
CALCIUM SERPL-MCNC: 10.6 MG/DL — HIGH (ref 8.4–10.5)
CALCIUM SERPL-MCNC: 10.7 MG/DL — HIGH (ref 8.4–10.5)
CHLORIDE BLDV-SCNC: 97 MMOL/L — SIGNIFICANT CHANGE UP (ref 96–108)
CHLORIDE SERPL-SCNC: 89 MMOL/L — LOW (ref 98–107)
CHLORIDE SERPL-SCNC: 89 MMOL/L — LOW (ref 98–107)
CO2 SERPL-SCNC: 20 MMOL/L — LOW (ref 22–31)
CO2 SERPL-SCNC: 22 MMOL/L — SIGNIFICANT CHANGE UP (ref 22–31)
CREAT SERPL-MCNC: 10.05 MG/DL — HIGH (ref 0.5–1.3)
CREAT SERPL-MCNC: 9.83 MG/DL — HIGH (ref 0.5–1.3)
EOSINOPHIL # BLD AUTO: 0.36 K/UL — SIGNIFICANT CHANGE UP (ref 0–0.5)
EOSINOPHIL NFR BLD AUTO: 7.6 % — HIGH (ref 0–6)
GAS PNL BLDV: 129 MMOL/L — LOW (ref 136–146)
GLUCOSE BLDC GLUCOMTR-MCNC: 154 MG/DL — HIGH (ref 70–99)
GLUCOSE BLDC GLUCOMTR-MCNC: 169 MG/DL — HIGH (ref 70–99)
GLUCOSE BLDC GLUCOMTR-MCNC: 181 MG/DL — HIGH (ref 70–99)
GLUCOSE BLDC GLUCOMTR-MCNC: 224 MG/DL — HIGH (ref 70–99)
GLUCOSE BLDC GLUCOMTR-MCNC: 243 MG/DL — HIGH (ref 70–99)
GLUCOSE BLDV-MCNC: 258 MG/DL — HIGH (ref 70–99)
GLUCOSE SERPL-MCNC: 180 MG/DL — HIGH (ref 70–99)
GLUCOSE SERPL-MCNC: 226 MG/DL — HIGH (ref 70–99)
HCO3 BLDV-SCNC: 20 MMOL/L — SIGNIFICANT CHANGE UP (ref 20–27)
HCO3 BLDV-SCNC: 22 MMOL/L — SIGNIFICANT CHANGE UP (ref 20–27)
HCT VFR BLD CALC: 33.4 % — LOW (ref 34.5–45)
HCT VFR BLD CALC: 36.5 % — SIGNIFICANT CHANGE UP (ref 34.5–45)
HCT VFR BLDV CALC: 29.5 % — LOW (ref 34.5–45)
HGB BLD-MCNC: 10.4 G/DL — LOW (ref 11.5–15.5)
HGB BLD-MCNC: 11.1 G/DL — LOW (ref 11.5–15.5)
HGB BLDV-MCNC: 9.5 G/DL — LOW (ref 11.5–15.5)
IMM GRANULOCYTES NFR BLD AUTO: 0.6 % — SIGNIFICANT CHANGE UP (ref 0–1.5)
INR BLD: 1.1 — SIGNIFICANT CHANGE UP (ref 0.88–1.17)
LACTATE BLDV-MCNC: 4.6 MMOL/L — CRITICAL HIGH (ref 0.5–2)
LACTATE SERPL-SCNC: 5.6 MMOL/L — CRITICAL HIGH (ref 0.5–2)
LYMPHOCYTES # BLD AUTO: 1.48 K/UL — SIGNIFICANT CHANGE UP (ref 1–3.3)
LYMPHOCYTES # BLD AUTO: 31.2 % — SIGNIFICANT CHANGE UP (ref 13–44)
MAGNESIUM SERPL-MCNC: 1.9 MG/DL — SIGNIFICANT CHANGE UP (ref 1.6–2.6)
MCHC RBC-ENTMCNC: 26.1 PG — LOW (ref 27–34)
MCHC RBC-ENTMCNC: 26.5 PG — LOW (ref 27–34)
MCHC RBC-ENTMCNC: 30.4 % — LOW (ref 32–36)
MCHC RBC-ENTMCNC: 31.1 % — LOW (ref 32–36)
MCV RBC AUTO: 85 FL — SIGNIFICANT CHANGE UP (ref 80–100)
MCV RBC AUTO: 85.9 FL — SIGNIFICANT CHANGE UP (ref 80–100)
MONOCYTES # BLD AUTO: 0.79 K/UL — SIGNIFICANT CHANGE UP (ref 0–0.9)
MONOCYTES NFR BLD AUTO: 16.7 % — HIGH (ref 2–14)
NEUTROPHILS # BLD AUTO: 2.05 K/UL — SIGNIFICANT CHANGE UP (ref 1.8–7.4)
NEUTROPHILS NFR BLD AUTO: 43.3 % — SIGNIFICANT CHANGE UP (ref 43–77)
NRBC # FLD: 0 K/UL — SIGNIFICANT CHANGE UP (ref 0–0)
NRBC # FLD: 0.02 K/UL — SIGNIFICANT CHANGE UP (ref 0–0)
PCO2 BLDV: 42 MMHG — SIGNIFICANT CHANGE UP (ref 41–51)
PCO2 BLDV: 45 MMHG — SIGNIFICANT CHANGE UP (ref 41–51)
PH BLDV: 7.31 PH — LOW (ref 7.32–7.43)
PH BLDV: 7.34 PH — SIGNIFICANT CHANGE UP (ref 7.32–7.43)
PHOSPHATE SERPL-MCNC: 7 MG/DL — HIGH (ref 2.5–4.5)
PLATELET # BLD AUTO: 157 K/UL — SIGNIFICANT CHANGE UP (ref 150–400)
PLATELET # BLD AUTO: 157 K/UL — SIGNIFICANT CHANGE UP (ref 150–400)
PMV BLD: 10 FL — SIGNIFICANT CHANGE UP (ref 7–13)
PMV BLD: 10.6 FL — SIGNIFICANT CHANGE UP (ref 7–13)
PO2 BLDV: 30 MMHG — LOW (ref 35–40)
PO2 BLDV: 36 MMHG — SIGNIFICANT CHANGE UP (ref 35–40)
POTASSIUM BLDV-SCNC: 4.1 MMOL/L — SIGNIFICANT CHANGE UP (ref 3.4–4.5)
POTASSIUM SERPL-MCNC: 4.1 MMOL/L — SIGNIFICANT CHANGE UP (ref 3.5–5.3)
POTASSIUM SERPL-MCNC: 4.4 MMOL/L — SIGNIFICANT CHANGE UP (ref 3.5–5.3)
POTASSIUM SERPL-SCNC: 4.1 MMOL/L — SIGNIFICANT CHANGE UP (ref 3.5–5.3)
POTASSIUM SERPL-SCNC: 4.4 MMOL/L — SIGNIFICANT CHANGE UP (ref 3.5–5.3)
PROT SERPL-MCNC: 6.6 G/DL — SIGNIFICANT CHANGE UP (ref 6–8.3)
PROTHROM AB SERPL-ACNC: 12.2 SEC — SIGNIFICANT CHANGE UP (ref 9.8–13.1)
RBC # BLD: 3.93 M/UL — SIGNIFICANT CHANGE UP (ref 3.8–5.2)
RBC # BLD: 4.25 M/UL — SIGNIFICANT CHANGE UP (ref 3.8–5.2)
RBC # FLD: 17.2 % — HIGH (ref 10.3–14.5)
RBC # FLD: 17.3 % — HIGH (ref 10.3–14.5)
SAO2 % BLDV: 37.8 % — LOW (ref 60–85)
SAO2 % BLDV: 53.1 % — LOW (ref 60–85)
SODIUM SERPL-SCNC: 133 MMOL/L — LOW (ref 135–145)
SODIUM SERPL-SCNC: 135 MMOL/L — SIGNIFICANT CHANGE UP (ref 135–145)
WBC # BLD: 4.74 K/UL — SIGNIFICANT CHANGE UP (ref 3.8–10.5)
WBC # BLD: 7.17 K/UL — SIGNIFICANT CHANGE UP (ref 3.8–10.5)
WBC # FLD AUTO: 4.74 K/UL — SIGNIFICANT CHANGE UP (ref 3.8–10.5)
WBC # FLD AUTO: 7.17 K/UL — SIGNIFICANT CHANGE UP (ref 3.8–10.5)

## 2019-05-03 PROCEDURE — 71045 X-RAY EXAM CHEST 1 VIEW: CPT | Mod: 26

## 2019-05-03 PROCEDURE — 99232 SBSQ HOSP IP/OBS MODERATE 35: CPT

## 2019-05-03 RX ORDER — SODIUM CHLORIDE 9 MG/ML
500 INJECTION INTRAMUSCULAR; INTRAVENOUS; SUBCUTANEOUS
Qty: 0 | Refills: 0 | Status: DISCONTINUED | OUTPATIENT
Start: 2019-05-03 | End: 2019-05-06

## 2019-05-03 RX ORDER — THIAMINE MONONITRATE (VIT B1) 100 MG
500 TABLET ORAL DAILY
Qty: 0 | Refills: 0 | Status: DISCONTINUED | OUTPATIENT
Start: 2019-05-03 | End: 2019-05-03

## 2019-05-03 RX ORDER — SODIUM CHLORIDE 9 MG/ML
250 INJECTION INTRAMUSCULAR; INTRAVENOUS; SUBCUTANEOUS
Qty: 0 | Refills: 0 | Status: DISCONTINUED | OUTPATIENT
Start: 2019-05-03 | End: 2019-05-03

## 2019-05-03 RX ORDER — DOBUTAMINE HCL 250MG/20ML
2.5 VIAL (ML) INTRAVENOUS
Qty: 500 | Refills: 0 | Status: DISCONTINUED | OUTPATIENT
Start: 2019-05-03 | End: 2019-05-03

## 2019-05-03 RX ORDER — ONDANSETRON 8 MG/1
4 TABLET, FILM COATED ORAL ONCE
Qty: 0 | Refills: 0 | Status: COMPLETED | OUTPATIENT
Start: 2019-05-03 | End: 2019-05-03

## 2019-05-03 RX ORDER — HEPARIN SODIUM 5000 [USP'U]/ML
5000 INJECTION INTRAVENOUS; SUBCUTANEOUS EVERY 8 HOURS
Qty: 0 | Refills: 0 | Status: DISCONTINUED | OUTPATIENT
Start: 2019-05-03 | End: 2019-05-09

## 2019-05-03 RX ADMIN — Medication 4.54 MICROGRAM(S)/KG/MIN: at 10:04

## 2019-05-03 RX ADMIN — Medication 10 MILLIGRAM(S): at 05:33

## 2019-05-03 RX ADMIN — Medication 2 UNIT(S): at 12:37

## 2019-05-03 RX ADMIN — SODIUM CHLORIDE 75 MILLILITER(S): 9 INJECTION INTRAMUSCULAR; INTRAVENOUS; SUBCUTANEOUS at 08:32

## 2019-05-03 RX ADMIN — INSULIN GLARGINE 5 UNIT(S): 100 INJECTION, SOLUTION SUBCUTANEOUS at 23:46

## 2019-05-03 RX ADMIN — Medication 50 MICROGRAM(S): at 05:33

## 2019-05-03 RX ADMIN — Medication 2 UNIT(S): at 08:32

## 2019-05-03 RX ADMIN — SEVELAMER CARBONATE 2400 MILLIGRAM(S): 2400 POWDER, FOR SUSPENSION ORAL at 08:33

## 2019-05-03 RX ADMIN — HEPARIN SODIUM 5000 UNIT(S): 5000 INJECTION INTRAVENOUS; SUBCUTANEOUS at 23:01

## 2019-05-03 RX ADMIN — Medication 650 MILLIGRAM(S): at 05:35

## 2019-05-03 RX ADMIN — CHLORHEXIDINE GLUCONATE 1 APPLICATION(S): 213 SOLUTION TOPICAL at 05:36

## 2019-05-03 RX ADMIN — Medication 81 MILLIGRAM(S): at 18:45

## 2019-05-03 RX ADMIN — SODIUM CHLORIDE 100 MILLILITER(S): 9 INJECTION INTRAMUSCULAR; INTRAVENOUS; SUBCUTANEOUS at 18:42

## 2019-05-03 RX ADMIN — SEVELAMER CARBONATE 2400 MILLIGRAM(S): 2400 POWDER, FOR SUSPENSION ORAL at 12:36

## 2019-05-03 RX ADMIN — Medication 1: at 12:37

## 2019-05-03 RX ADMIN — ONDANSETRON 4 MILLIGRAM(S): 8 TABLET, FILM COATED ORAL at 19:12

## 2019-05-03 RX ADMIN — Medication 2: at 08:33

## 2019-05-03 RX ADMIN — SODIUM CHLORIDE 75 MILLILITER(S): 9 INJECTION INTRAMUSCULAR; INTRAVENOUS; SUBCUTANEOUS at 15:09

## 2019-05-03 RX ADMIN — CLOPIDOGREL BISULFATE 75 MILLIGRAM(S): 75 TABLET, FILM COATED ORAL at 18:45

## 2019-05-03 RX ADMIN — Medication 650 MILLIGRAM(S): at 06:06

## 2019-05-03 NOTE — PROGRESS NOTE ADULT - ASSESSMENT
74F with ESRD on PD, HTN, CAD s/p CABG and PCI (last of which was a year ago), BiV [employer] CRT device w/o ICD, and DM2 who presented with worsening SOB. She was admitted with ADHFrEF. Echo revealed a worsening EF now 21% from 46%. EP was called to eval PPM and to see if pt would benefit from upgrade to ICD device. Interrogation revealed undersensing of the atria and lower than expected BiV CRT pacing. Optivol also revealed worsening heart failure decompensation at the time the atrial sensing and pacing percentage decreased from 99% to 88%. LHC on 5/1 showed LM 99%, prox LCx 90%, % with patent LIMA to mLAD. Cardiac viability study on 5/2 showed large, moderate to severe defects that were predominantly reversible (see above). Primary team planning on transfer to [facility] for high risk PCI.     5/3: hypotensive (SBP 60s) associated with lightheadedness. HD postponed. She was given 250 cc IVF bolus and started on dobutamine 2.5 mcg/kg/min    #ADHFrEF w/ new reduced EF w/ BiV PPM and no ICD.   - Pts decompensation may have been partially related to inadequate CRT pacing therapy and worsening CAD  - Atrial lead made more sensitive on 4/30/19 -  0.3 mV to 0.15 mV. Her device seems to be pacing more appropriately now  - Will hold off on ICD for now, she will need a follow-up echocardiogram in 3 months  - Concerned for cardiogenic shock. Consider checking vLA, LFTs, repeating RHC    WILFREDO Vazquez  [id number]  New EP consults: [id number]

## 2019-05-03 NOTE — CHART NOTE - NSCHARTNOTEFT_GEN_A_CORE
CCU to take patient after RHC. Case was between EP and CCU. Dr Abreu and Dr Jennings aware and in agreement with plan.

## 2019-05-03 NOTE — PROGRESS NOTE ADULT - SUBJECTIVE AND OBJECTIVE BOX
[facility]   MD ROSITA APODACA MD ANGELA WONG, PA    TEL:  OFFICE: [phone]  DR SCOTT CELL: [phone]  DR. JOHNSON CELL: [phone]  KESHA HOUSTON CELL: [phone]        Patient is a 74y old  Female who presents with a chief complaint of shortness of breath (02 May 2019 16:51)      Patient seen and examined at bedside. No chest pain/sob. slightly dizzy    VITALS:  T(F): 97.7 (05-03-19 @ 07:00), Max: 98.2 (05-02-19 @ 12:03)  HR: 99 (05-03-19 @ 10:12)  BP: 90/60 (05-03-19 @ 10:12)  RR: 16 (05-03-19 @ 07:00)  SpO2: 98% (05-03-19 @ 04:45)  Wt(kg): --    05-02 @ 07:01  -  05-03 @ 07:00  --------------------------------------------------------  IN: 8840 mL / OUT: 9650 mL / NET: -810 mL          PHYSICAL EXAM:  Constitutional: NAD  Neck: No JVD  Respiratory: CTAB, no wheezes, rales or rhonchi  Cardiovascular: S1, S2, RRR  Gastrointestinal: BS+, soft, NT/ND  Extremities: No peripheral edema    Hospital Medications:   MEDICATIONS  (STANDING):  allopurinol 300 milliGRAM(s) Oral daily  aspirin enteric coated 81 milliGRAM(s) Oral daily  atorvastatin 40 milliGRAM(s) Oral at bedtime  benzonatate 100 milliGRAM(s) Oral once  chlorhexidine 4% Liquid 1 Application(s) Topical two times a day  clopidogrel Tablet 75 milliGRAM(s) Oral daily  dextrose 5%. 1000 milliLiter(s) (50 mL/Hr) IV Continuous <Continuous>  dextrose 50% Injectable 12.5 Gram(s) IV Push once  dextrose 50% Injectable 25 Gram(s) IV Push once  dextrose 50% Injectable 25 Gram(s) IV Push once  DOBUTamine Infusion 2.5 MICROgram(s)/kG/Min (4.537 mL/Hr) IV Continuous <Continuous>  docusate sodium 100 milliGRAM(s) Oral three times a day  epoetin rocky Injectable 91670 Unit(s) SubCutaneous <User Schedule>  hydrALAZINE 10 milliGRAM(s) Oral every 12 hours  insulin glargine Injectable (LANTUS) 5 Unit(s) SubCutaneous at bedtime  insulin lispro (HumaLOG) corrective regimen sliding scale   SubCutaneous three times a day before meals  insulin lispro Injectable (HumaLOG) 2 Unit(s) SubCutaneous three times a day before meals  labetalol 100 milliGRAM(s) Oral daily  levothyroxine 50 MICROGram(s) Oral daily  multivitamin 1 Tablet(s) Oral daily  sevelamer carbonate 2400 milliGRAM(s) Oral three times a day with meals  sodium chloride 0.9%. 250 milliLiter(s) (75 mL/Hr) IV Continuous <Continuous>      LABS:  05-03    133<L>  |  89<L>  |  69<H>  ----------------------------<  226<H>  4.4   |  20<L>  |  9.83<H>    Ca    10.7<H>      03 May 2019 06:11  Phos  7.2     05-02  Mg     1.9     05-02    TPro  6.5  /  Alb  3.4  /  TBili  0.2  /  DBili      /  AST  25  /  ALT  21  /  AlkPhos  63  05-02    Creatinine Trend: 9.83 <--, 10.01 <--, 9.14 <--, 9.35 <--, 9.31 <--, 9.48 <--, 9.04 <--, 8.65 <--, 8.72 <--, 9.06 <--                                11.1   7.17  )-----------( 157      ( 03 May 2019 06:11 )             36.5     Urine Studies:      .8 (Ca --)      [04-24-19 @ 05:17]   --  .4 (Ca --)      [01-13-19 @ 05:32]   --  .8 (Ca --)      [08-12-18 @ 06:00]   --  Vitamin D (25OH) 24.6      [04-24-19 @ 05:17]  HbA1c 9.8      [04-24-19 @ 05:17]  TSH 8.74      [04-24-19 @ 05:17]  Lipid: chol 167, , HDL 45, LDL 99      [04-24-19 @ 05:17]    HBsAb 23.8      [04-25-19 @ 14:53]  HBsAg NEGATIVE      [04-25-19 @ 14:53]  HBcAb Nonreactive      [04-25-19 @ 14:53]  HCV 0.04, Nonreactive Hepatitis C AB  S/CO Ratio                        Interpretation  < 1.00                                   Non-Reactive  1.00 - 4.99                         Weakly-Reactive  > 5.00                                Reactive  Non-Reactive: A person with a non-reactive HCV antibody  result is considered uninfected.  No further action is  needed unless recent infection is suspected.  In these  cases, consider repeat testing later to detect  seroconversion..  Weakly-Reactive: HCV antibody test is abnormal, HCV RNA  Qualitative test will follow.  Reactive: HCV antibody test is abnormal, HCV RNA  Qualitative test will follow.  Note: HCV antibody testing is performed on the [employer]   system.      [04-25-19 @ 14:53]      RADIOLOGY & ADDITIONAL STUDIES:

## 2019-05-03 NOTE — CHART NOTE - NSCHARTNOTEFT_GEN_A_CORE
HISTORY OF PRESENT ILLNESS:    Patient is a 74y old  Female who presents with a chief complaint of shortness of breath (03 May 2019 15:17)    HPI: 73 year old lady with extensive medical history which includes. ESRD on peritoneal dialysis for the last 2 years, CHF (EF 46%, w/PAH), CAD (2 stents?), PPM, HTN, HLD, T2DM (not on insulin), and hypothyroidism. The patient was brought in by ambulance w/c of shortness of breath.   The patient states that she was overall doing ok but over the past day or so she has been having progressing shortness of breath , not being able to lay flat.. decided to come to ED   in ED pt was noted to be fluid overloaded, nephrology consulted and pt now getting another session of peritoneal dialysis     Allergies  Cipro (Unknown)  Diovan (Unknown)    Social History:   Denies smoking, drinking or drug use. Lives at home with her  who has parkinson's disease and dementia and her son who has polio    FAMILY HISTORY:  Family history of early CAD: mother had cad  Family history of diabetes mellitus: mother had dm    PAST MEDICAL & SURGICAL HISTORY:  ESRD on peritoneal dialysis  Acid reflux  Hypertension  Dyslipidemia  Diabetes mellitus  CAD (coronary artery disease)  S/P CABG (coronary artery bypass graft): in 2012  H/O: hysterectomy  History of total knee replacement b/l  After cataract, bilateral    MEDICATIONS  aspirin enteric coated 81 milliGRAM(s) Oral daily  clopidogrel Tablet 75 milliGRAM(s) Oral daily  hydrALAZINE 10 milliGRAM(s) Oral every 12 hours  labetalol 100 milliGRAM(s) Oral daily  benzonatate 100 milliGRAM(s) Oral once  acetaminophen   Tablet .. 650 milliGRAM(s) Oral every 6 hours PRN  docusate sodium 100 milliGRAM(s) Oral three times a day  loperamide 2 milliGRAM(s) Oral four times a day PRN  allopurinol 300 milliGRAM(s) Oral daily  atorvastatin 40 milliGRAM(s) Oral at bedtime  dextrose 40% Gel 15 Gram(s) Oral once PRN  dextrose 50% Injectable 12.5 Gram(s) IV Push once  dextrose 50% Injectable 25 Gram(s) IV Push once  dextrose 50% Injectable 25 Gram(s) IV Push once  glucagon  Injectable 1 milliGRAM(s) IntraMuscular once PRN  insulin glargine Injectable (LANTUS) 5 Unit(s) SubCutaneous at bedtime  insulin lispro (HumaLOG) corrective regimen sliding scale   SubCutaneous three times a day before meals  insulin lispro Injectable (HumaLOG) 2 Unit(s) SubCutaneous three times a day before meals  levothyroxine 50 MICROGram(s) Oral daily  chlorhexidine 4% Liquid 1 Application(s) Topical two times a day  dextrose 5%. 1000 milliLiter(s) IV Continuous <Continuous>  epoetin rocky Injectable 23488 Unit(s) SubCutaneous <User Schedule>  gentamicin 0.1% Cream 1 Application(s) Topical <User Schedule> PRN  multivitamin 1 Tablet(s) Oral daily  sodium chloride 0.9%. 500 milliLiter(s) IV Continuous <Continuous>    REVIEW OF SYSTEMS:  CONSTITUTIONAL: No fevers, No chills, No fatigue, No weight gain  EYES: No vision changes   ENT: No congestion, No ear pain, No sore throat.  NECK: No pain, No stiffness  RESPIRATORY: No shortness of breath, No cough, No wheezing, No hemoptysis  CARDIOVASCULAR: No chest pain. No palpitations, No ROQUE, No orthopnea, No paroxysmal nocturnal dyspnea, No pleuritic pain  GASTROINTESTINAL: No abdominal pain, No nausea, No vomiting, No hematemesis, No diarrhea No constipation. No melena  GENITOURINARY: No dysuria, No frequency, No incontinence, No hematuria  NEUROLOGICAL: No dizziness, No lightheadedness, No syncope, No LOC, No headache, No numbness or weakness  EXTREMITIES: No Edema, No joint pain, No joint swelling.  PSYCHIATRIC: No anxiety, No depression  SKIN: No diaphoresis. No itching, No rashes, No pressure ulcers    All other review of systems is negative unless indicated above.    ICU Vital Signs Last 24 Hrs  T(C): 36.6 (03 May 2019 20:50), Max: 37 (03 May 2019 14:45)  T(F): 97.9 (03 May 2019 20:50), Max: 98.6 (03 May 2019 14:45)  HR: 83 (03 May 2019 21:00) (62 - 105)  BP: 101/48 (03 May 2019 20:50) (76/48 - 127/101)  BP(mean): 58 (03 May 2019 20:50) (58 - 67)  ABP: --  ABP(mean): --  RR: 27 (03 May 2019 21:00) (15 - 27)  SpO2: 99% (03 May 2019 21:00) (97% - 100%)      PHYSICAL EXAM:  Appearance: NAD, no distress  HEENT:   Normal oral mucosa, PERRL, EOMI  Cardiovascular: Regular rate and rhythm, Normal S1 S2, No JVD, No murmurs, No edema  Respiratory: Lungs clear to auscultation. No rales, No rhonchi, No wheezing. No tenderness to palpation  Gastrointestinal:  Soft, Non-tender, + BS  Neurologic: Non-focal, A&Ox3  Skin: Warm and dry, No rashes, No ecchymoses, No cyanosis  Extremities: No clubbing, cyanosis or edema  Vascular: Peripheral pulses palpable 2+ bilaterally  Psychiatry: Mood & affect appropriate    I&O's Summary    02 May 2019 07:01  -  03 May 2019 07:00  --------------------------------------------------------  IN: 8840 mL / OUT: 9650 mL / NET: -810 mL    03 May 2019 07:01  -  03 May 2019 21:18  --------------------------------------------------------  IN: 4810 mL / OUT: 4420 mL / NET: 390 mL    LABORATORY VALUES	 	                    10.4   4.74  )-----------( 157      ( 03 May 2019 13:42 )             33.4   05-03    135  |  89<L>  |  72<H>  ----------------------------<  180<H>  4.1   |  22  |  10.05<H>  05-03    133<L>  |  89<L>  |  69<H>  ----------------------------<  226<H>  4.4   |  20<L>  |  9.83<H>    Ca    10.6<H>      03 May 2019 13:42  Ca    10.7<H>      03 May 2019 06:11  Phos  7.0     05-03  Phos  7.2     05-02  Mg     1.9     05-03  Mg     1.9     05-02    TPro  6.6  /  Alb  3.5  /  TBili  0.2  /  DBili  x   /  AST  24  /  ALT  21  /  AlkPhos  63  05-03  TPro  6.5  /  Alb  3.4  /  TBili  0.2  /  DBili  x   /  AST  25  /  ALT  21  /  AlkPhos  63  05-02    LIVER FUNCTIONS - ( 03 May 2019 13:42 )  Alb: 3.5 g/dL / Pro: 6.6 g/dL / ALK PHOS: 63 u/L / ALT: 21 u/L / AST: 24 u/L / GGT: x         Prothrombin Time, Plasma: 12.2 SEC (05-03 @ 13:42)    CARDIAC MARKERS:  Serum Pro-Brain Natriuretic Peptide: > 56071 pg/mL (04-23 @ 12:12)    Lactate, Blood: 5.6 mmol/L (05-03 @ 13:42)    04-24 @ 05:17  Cholesterol, Serum - 167  Direct LDL- 99  HDL Cholesterol, Serum- 45  Triglycerides, Serum- 191    Hemoglobin A1C, Whole Blood: 9.8 % (04-24 @ 05:17)  Thyroid Stimulating Hormone, Serum: 8.74 uIU/mL (04-24 @ 05:17)  POCT Blood Glucose.: 154 mg/dL (03 May 2019 18:53)      TELEMETRY: 	    ECG:  	  RADIOLOGY:  OTHER: 	    ASSESSMENT AND PLAN      PLAN          ##[id number] HISTORY OF PRESENT ILLNESS:    Patient is a 74y old  Female who presents with a chief complaint of shortness of breath (03 May 2019 15:17)    HPI: 73F PMH ESRD on peritoneal dialysis for the last 2 years, CHF (EF 46%, w/PAH), CAD (2 stents?), PPM, HTN, HLD, T2DM (not on insulin), and hypothyroidism p/w progressive shortness of breath ans orthopnea, noted to be fluid overloaded, nephrology consulted peritoneal dialysis initiated urgently. Patient was admitted to telemetry. Echo revealed a worsening EF now 21% from 46%. Interrogation revealed undersensing of the atria and lower than expected BiV CRT pacing. Optivol also revealed worsening heart failure decompensation at the time the atrial sensing and pacing percentage decreased from 99% to 88%. LHC on 5/1 showed LM 99%, prox LCx 90%, % with patent LIMA to mLAD. Cardiac viability study on 5/2 showed large, moderate to severe defects that were predominantly reversible. Planning on transfer to [facility] for high risk PCI. EP was called to eval PPM/benefit from upgrade to ICD device, plan for f/u in 3 months.   Patient was hypotensive (SBP 60s) associated with lightheadedness on 5/3. HD postponed. She was given 250 cc IVF bolus and started on dobutamine 2.5 mcg/kg/min. RRT called for persistent hypotension with SBP to the 60- 70s after 250 mL bolus of NS and on dobutamine 2.5 mcg/kg/min, additional 250 mL bolus given and d/c dobutamine gtt.  Concern for cardiogenic shock. Patient was taken to cathlab for RHC and patient transferred to CCU after RHC.   5/3 RHC RA 2, RV 20/12, PA 20/6, PCW 6, PAsat 49%, CO 3.11, CI 1.98; swan ganx cathter in place RIJ    Allergies  Cipro (Unknown)  Diovan (Unknown)    Social History:   Denies smoking, drinking or drug use. Lives at home with her  who has parkinson's disease and dementia and her son who has polio    FAMILY HISTORY:  Family history of early CAD: mother had cad  Family history of diabetes mellitus: mother had dm    PAST MEDICAL & SURGICAL HISTORY:  ESRD on peritoneal dialysis  Acid reflux  Hypertension  Dyslipidemia  Diabetes mellitus  CAD (coronary artery disease)  S/P CABG (coronary artery bypass graft): in 2012  H/O: hysterectomy  History of total knee replacement b/l  After cataract, bilateral    MEDICATIONS  aspirin enteric coated 81 milliGRAM(s) Oral daily  clopidogrel Tablet 75 milliGRAM(s) Oral daily  hydrALAZINE 10 milliGRAM(s) Oral every 12 hours  labetalol 100 milliGRAM(s) Oral daily  benzonatate 100 milliGRAM(s) Oral once  acetaminophen   Tablet .. 650 milliGRAM(s) Oral every 6 hours PRN  docusate sodium 100 milliGRAM(s) Oral three times a day  loperamide 2 milliGRAM(s) Oral four times a day PRN  allopurinol 300 milliGRAM(s) Oral daily  atorvastatin 40 milliGRAM(s) Oral at bedtime  dextrose 40% Gel 15 Gram(s) Oral once PRN  dextrose 50% Injectable 12.5 Gram(s) IV Push once  dextrose 50% Injectable 25 Gram(s) IV Push once  dextrose 50% Injectable 25 Gram(s) IV Push once  glucagon  Injectable 1 milliGRAM(s) IntraMuscular once PRN  insulin glargine Injectable (LANTUS) 5 Unit(s) SubCutaneous at bedtime  insulin lispro (HumaLOG) corrective regimen sliding scale   SubCutaneous three times a day before meals  insulin lispro Injectable (HumaLOG) 2 Unit(s) SubCutaneous three times a day before meals  levothyroxine 50 MICROGram(s) Oral daily  chlorhexidine 4% Liquid 1 Application(s) Topical two times a day  dextrose 5%. 1000 milliLiter(s) IV Continuous <Continuous>  epoetin rocky Injectable 11182 Unit(s) SubCutaneous <User Schedule>  gentamicin 0.1% Cream 1 Application(s) Topical <User Schedule> PRN  multivitamin 1 Tablet(s) Oral daily  sodium chloride 0.9%. 500 milliLiter(s) IV Continuous <Continuous>    REVIEW OF SYSTEMS:  CONSTITUTIONAL: No fevers, No chills, No fatigue, No weight gain  EYES: No vision changes   ENT: No congestion, No ear pain, No sore throat.  NECK: No pain, No stiffness  RESPIRATORY: No shortness of breath, No cough, No wheezing, No hemoptysis  CARDIOVASCULAR: No chest pain. No palpitations, No ROQUE, No orthopnea, No paroxysmal nocturnal dyspnea, No pleuritic pain  GASTROINTESTINAL: No abdominal pain, No nausea, No vomiting, No hematemesis, No diarrhea No constipation. No melena  GENITOURINARY: No dysuria, No frequency, No incontinence, No hematuria  NEUROLOGICAL: No dizziness, No lightheadedness, No syncope, No LOC, No headache, No numbness or weakness  EXTREMITIES: No Edema, No joint pain, No joint swelling.  PSYCHIATRIC: No anxiety, No depression  SKIN: No diaphoresis. No itching, No rashes, No pressure ulcers    All other review of systems is negative unless indicated above.    ICU Vital Signs Last 24 Hrs  T(C): 36.6 (03 May 2019 20:50), Max: 37 (03 May 2019 14:45)  T(F): 97.9 (03 May 2019 20:50), Max: 98.6 (03 May 2019 14:45)  HR: 83 (03 May 2019 21:00) (62 - 105)  BP: 101/48 (03 May 2019 20:50) (76/48 - 127/101)  BP(mean): 58 (03 May 2019 20:50) (58 - 67)  ABP: --  ABP(mean): --  RR: 27 (03 May 2019 21:00) (15 - 27)  SpO2: 99% (03 May 2019 21:00) (97% - 100%)      PHYSICAL EXAM:  Appearance: NAD, no distress  HEENT:   Normal oral mucosa, PERRL, EOMI  Cardiovascular: Regular rate and rhythm, Normal S1 S2, No JVD, No murmurs, No edema  Respiratory: Lungs clear to auscultation. No rales, No rhonchi, No wheezing. No tenderness to palpation  Gastrointestinal:  Soft, Non-tender, + BS  Neurologic: Non-focal, A&Ox3  Skin: Warm and dry, No rashes, No ecchymoses, No cyanosis  Extremities: No clubbing, cyanosis or edema  Vascular: Peripheral pulses palpable 2+ bilaterally  Psychiatry: Mood & affect appropriate    I&O's Summary    02 May 2019 07:01  -  03 May 2019 07:00  --------------------------------------------------------  IN: 8840 mL / OUT: 9650 mL / NET: -810 mL    03 May 2019 07:01  -  03 May 2019 21:18  --------------------------------------------------------  IN: 4810 mL / OUT: 4420 mL / NET: 390 mL    LABORATORY VALUES	 	                    10.4   4.74  )-----------( 157      ( 03 May 2019 13:42 )             33.4   05-03    135  |  89<L>  |  72<H>  ----------------------------<  180<H>  4.1   |  22  |  10.05<H>  05-03    133<L>  |  89<L>  |  69<H>  ----------------------------<  226<H>  4.4   |  20<L>  |  9.83<H>    Ca    10.6<H>      03 May 2019 13:42  Ca    10.7<H>      03 May 2019 06:11  Phos  7.0     05-03  Phos  7.2     05-02  Mg     1.9     05-03  Mg     1.9     05-02    TPro  6.6  /  Alb  3.5  /  TBili  0.2  /  DBili  x   /  AST  24  /  ALT  21  /  AlkPhos  63  05-03  TPro  6.5  /  Alb  3.4  /  TBili  0.2  /  DBili  x   /  AST  25  /  ALT  21  /  AlkPhos  63  05-02    LIVER FUNCTIONS - ( 03 May 2019 13:42 )  Alb: 3.5 g/dL / Pro: 6.6 g/dL / ALK PHOS: 63 u/L / ALT: 21 u/L / AST: 24 u/L / GGT: x         Prothrombin Time, Plasma: 12.2 SEC (05-03 @ 13:42)    CARDIAC MARKERS:  Serum Pro-Brain Natriuretic Peptide: > 24843 pg/mL (04-23 @ 12:12)    Lactate, Blood: 5.6 mmol/L (05-03 @ 13:42)    04-24 @ 05:17  Cholesterol, Serum - 167  Direct LDL- 99  HDL Cholesterol, Serum- 45  Triglycerides, Serum- 191    Hemoglobin A1C, Whole Blood: 9.8 % (04-24 @ 05:17)  Thyroid Stimulating Hormone, Serum: 8.74 uIU/mL (04-24 @ 05:17)  POCT Blood Glucose.: 154 mg/dL (03 May 2019 18:53)      TELEMETRY: 	    ECG:  	  RADIOLOGY:  OTHER: 	    ASSESSMENT AND PLAN      PLAN          ##[id number] HISTORY OF PRESENT ILLNESS:    Patient is a 74y old  Female who presents with a chief complaint of shortness of breath (03 May 2019 15:17)    HPI: 73F PMH ESRD on peritoneal dialysis for the last 2 years, CHF (EF 46%, w/PAH), CAD (2 stents?), PPM, HTN, HLD, T2DM (not on insulin), and hypothyroidism p/w progressive shortness of breath ans orthopnea, noted to be fluid overloaded, nephrology consulted peritoneal dialysis initiated urgently. Patient was admitted to telemetry. Echo revealed a worsening EF now 21% from 46%. Interrogation revealed undersensing of the atria and lower than expected BiV CRT pacing. Optivol also revealed worsening heart failure decompensation at the time the atrial sensing and pacing percentage decreased from 99% to 88%. LHC on 5/1 showed LM 99%, prox LCx 90%, % with patent LIMA to mLAD. Cardiac viability study on 5/2 showed large, moderate to severe defects that were predominantly reversible. Planning on transfer to [facility] for high risk PCI. EP was called to eval PPM/benefit from upgrade to ICD device, plan for f/u in 3 months.   Patient was hypotensive (SBP 60s) associated with lightheadedness on 5/3. HD postponed. She was given 250 cc IVF bolus and started on dobutamine 2.5 mcg/kg/min. RRT called for persistent hypotension with SBP to the 60- 70s after 250 mL bolus of NS and on dobutamine 2.5 mcg/kg/min, additional 250 mL bolus given and d/c dobutamine gtt.  Concern for cardiogenic shock. Patient was taken to cathlab for RHC and patient transferred to CCU after RHC.   5/3 RHC RA 2, RV 20/12, PA 20/6, PCW 6, PAsat 49%, CO 3.11, CI 1.98; swan ganx cathter in place RIJ    Allergies  Cipro (Unknown)  Diovan (Unknown)    Social History:   Denies smoking, drinking or drug use. Lives at home with her  who has parkinson's disease and dementia and her son who has polio    FAMILY HISTORY:  Family history of early CAD: mother had cad  Family history of diabetes mellitus: mother had dm    PAST MEDICAL & SURGICAL HISTORY:  ESRD on peritoneal dialysis  Acid reflux  Hypertension  Dyslipidemia  Diabetes mellitus  CAD (coronary artery disease)  S/P CABG (coronary artery bypass graft): in 2012  H/O: hysterectomy  History of total knee replacement b/l  After cataract, bilateral    MEDICATIONS  aspirin enteric coated 81 milliGRAM(s) Oral daily  clopidogrel Tablet 75 milliGRAM(s) Oral daily  hydrALAZINE 10 milliGRAM(s) Oral every 12 hours  labetalol 100 milliGRAM(s) Oral daily  benzonatate 100 milliGRAM(s) Oral once  acetaminophen   Tablet .. 650 milliGRAM(s) Oral every 6 hours PRN  docusate sodium 100 milliGRAM(s) Oral three times a day  loperamide 2 milliGRAM(s) Oral four times a day PRN  allopurinol 300 milliGRAM(s) Oral daily  atorvastatin 40 milliGRAM(s) Oral at bedtime  dextrose 40% Gel 15 Gram(s) Oral once PRN  dextrose 50% Injectable 12.5 Gram(s) IV Push once  dextrose 50% Injectable 25 Gram(s) IV Push once  dextrose 50% Injectable 25 Gram(s) IV Push once  glucagon  Injectable 1 milliGRAM(s) IntraMuscular once PRN  insulin glargine Injectable (LANTUS) 5 Unit(s) SubCutaneous at bedtime  insulin lispro (HumaLOG) corrective regimen sliding scale   SubCutaneous three times a day before meals  insulin lispro Injectable (HumaLOG) 2 Unit(s) SubCutaneous three times a day before meals  levothyroxine 50 MICROGram(s) Oral daily  chlorhexidine 4% Liquid 1 Application(s) Topical two times a day  dextrose 5%. 1000 milliLiter(s) IV Continuous <Continuous>  epoetin rocky Injectable 87652 Unit(s) SubCutaneous <User Schedule>  gentamicin 0.1% Cream 1 Application(s) Topical <User Schedule> PRN  multivitamin 1 Tablet(s) Oral daily  sodium chloride 0.9%. 500 milliLiter(s) IV Continuous <Continuous>    REVIEW OF SYSTEMS:  CONSTITUTIONAL: No fevers, No chills, No fatigue, No weight gain  EYES: No vision changes   ENT: No congestion, No ear pain, No sore throat.  NECK: No pain, No stiffness  RESPIRATORY: No shortness of breath, No cough, No wheezing, No hemoptysis  CARDIOVASCULAR: No chest pain. No palpitations, No ROQUE, No orthopnea, No paroxysmal nocturnal dyspnea, No pleuritic pain  GASTROINTESTINAL: No abdominal pain, No nausea, No vomiting, No hematemesis, No diarrhea No constipation. No melena  GENITOURINARY: No dysuria, No frequency, No incontinence, No hematuria  NEUROLOGICAL: No dizziness, No lightheadedness, No syncope, No LOC, No headache, No numbness or weakness  EXTREMITIES: No Edema, No joint pain, No joint swelling.  PSYCHIATRIC: No anxiety, No depression  SKIN: No diaphoresis. No itching, No rashes, No pressure ulcers    All other review of systems is negative unless indicated above.    ICU Vital Signs Last 24 Hrs  T(C): 36.6 (03 May 2019 20:50), Max: 37 (03 May 2019 14:45)  T(F): 97.9 (03 May 2019 20:50), Max: 98.6 (03 May 2019 14:45)  HR: 83 (03 May 2019 21:00) (62 - 105)  BP: 101/48 (03 May 2019 20:50) (76/48 - 127/101)  BP(mean): 58 (03 May 2019 20:50) (58 - 67)  ABP: --  ABP(mean): --  RR: 27 (03 May 2019 21:00) (15 - 27)  SpO2: 99% (03 May 2019 21:00) (97% - 100%)      PHYSICAL EXAM:  Appearance: NAD, no distress  HEENT:   Normal oral mucosa, PERRL, EOMI  Cardiovascular: Regular rate and rhythm, Normal S1 S2, No JVD, No murmurs, No edema  Respiratory: Lungs clear to auscultation. No rales, No rhonchi, No wheezing. No tenderness to palpation  Gastrointestinal:  Soft, Non-tender, + BS, + PD catheter  Neurologic: Non-focal, A&Ox3  Skin: Warm and dry, No rashes, No ecchymoses, No cyanosis  Extremities: No clubbing, cyanosis or edema  Vascular: Peripheral pulses palpable 2+ bilaterally  Psychiatry: Mood & affect appropriate    I&O's Summary    02 May 2019 07:01  -  03 May 2019 07:00  --------------------------------------------------------  IN: 8840 mL / OUT: 9650 mL / NET: -810 mL    03 May 2019 07:01  -  03 May 2019 21:18  --------------------------------------------------------  IN: 4810 mL / OUT: 4420 mL / NET: 390 mL    LABORATORY VALUES	 	                    10.4   4.74  )-----------( 157      ( 03 May 2019 13:42 )             33.4   05-03    135  |  89<L>  |  72<H>  ----------------------------<  180<H>  4.1   |  22  |  10.05<H>  05-03    133<L>  |  89<L>  |  69<H>  ----------------------------<  226<H>  4.4   |  20<L>  |  9.83<H>    Ca    10.6<H>      03 May 2019 13:42  Ca    10.7<H>      03 May 2019 06:11  Phos  7.0     05-03  Phos  7.2     05-02  Mg     1.9     05-03  Mg     1.9     05-02    TPro  6.6  /  Alb  3.5  /  TBili  0.2  /  DBili  x   /  AST  24  /  ALT  21  /  AlkPhos  63  05-03  TPro  6.5  /  Alb  3.4  /  TBili  0.2  /  DBili  x   /  AST  25  /  ALT  21  /  AlkPhos  63  05-02    LIVER FUNCTIONS - ( 03 May 2019 13:42 )  Alb: 3.5 g/dL / Pro: 6.6 g/dL / ALK PHOS: 63 u/L / ALT: 21 u/L / AST: 24 u/L / GGT: x         Prothrombin Time, Plasma: 12.2 SEC (05-03 @ 13:42)    CARDIAC MARKERS:  Serum Pro-Brain Natriuretic Peptide: > 31080 pg/mL (04-23 @ 12:12)    Lactate, Blood: 5.6 mmol/L (05-03 @ 13:42)    04-24 @ 05:17  Cholesterol, Serum - 167  Direct LDL- 99  HDL Cholesterol, Serum- 45  Triglycerides, Serum- 191    Hemoglobin A1C, Whole Blood: 9.8 % (04-24 @ 05:17)  Thyroid Stimulating Hormone, Serum: 8.74 uIU/mL (04-24 @ 05:17)  POCT Blood Glucose.: 154 mg/dL (03 May 2019 18:53)      TELEMETRY: 	    ECG:  	  RADIOLOGY:  OTHER: 	    ASSESSMENT AND PLAN  73F PMH ESRD on peritoneal dialysis for the last 2 years, CHF (EF 46%, w/PAH), CAD (2 stents?), PPM, HTN, HLD, T2DM (not on insulin), and hypothyroidism p/w fluid overload, s/p urgent peritoneal dialysis & was admitted to telemetry. Echo revealed worsening EF now 21% from 46%. LHC on 5/1 showed LM 99%, prox LCx 90%, % with patent LIMA to mLAD. Cardiac viability study on 5/2 showed large, moderate to severe defects that were predominantly reversible. Planning on transfer to [facility] for high risk PCI. Course c/b RRT 2/2 hypotension, s/p IVF bolus, taken to cathlab for RHC which showed low wedge and patient transferred to CCU after RHC with RIJ swan in place.   5/3 RHC RA 2, RV 20/12, PA 20/6, PCW 6, PAsat 49%, CO 3.11, CI 1.98; swan ganx cathter in place RIJ    PLAN    Admit to CCU.   Continuous cardiac monitoring to r/o arrhythmias.   No need for inotropic support at present  Trend BMP 2/2 diuresis and replete as needed  Daily weight monitoring  fluid mgmt via dialysis. Goal euvolemic. Will trend swan numbers and CVP.   Strict I/O  Low sodium DASH diet  Continue current medications.   nephro follow up and mgmt for dialysis and hyponatremia  pt off ACEI / ARB due to recorded allergy                         ##[id number] HISTORY OF PRESENT ILLNESS:    Patient is a 74y old  Female who presents with a chief complaint of shortness of breath (03 May 2019 15:17)    HPI: 73F PMH ESRD on peritoneal dialysis for the last 2 years, CHF (EF 46%, w/PAH), CAD (2 stents?), PPM, HTN, HLD, T2DM (not on insulin), and hypothyroidism p/w progressive shortness of breath ans orthopnea, noted to be fluid overloaded, nephrology consulted peritoneal dialysis initiated urgently. Patient was admitted to telemetry. Echo revealed a worsening EF now 21% from 46%. Interrogation revealed undersensing of the atria and lower than expected BiV CRT pacing. Optivol also revealed worsening heart failure decompensation at the time the atrial sensing and pacing percentage decreased from 99% to 88%. LHC on 5/1 showed LM 99%, prox LCx 90%, % with patent LIMA to mLAD. Cardiac viability study on 5/2 showed large, moderate to severe defects that were predominantly reversible. Planning on transfer to [facility] for high risk PCI. EP was called to eval PPM/benefit from upgrade to ICD device, plan for f/u in 3 months.   Patient was hypotensive (SBP 60s) associated with lightheadedness on 5/3. HD postponed. She was given 250 cc IVF bolus and started on dobutamine 2.5 mcg/kg/min. RRT called for persistent hypotension with SBP to the 60- 70s after 250 mL bolus of NS and on dobutamine 2.5 mcg/kg/min, additional 250 mL bolus given and d/c dobutamine gtt.  Concern for cardiogenic shock. Patient was taken to cathlab for RHC and patient transferred to CCU after RHC.   5/3 RHC RA 2, RV 20/12, PA 20/6, PCW 6, PAsat 49%, CO 3.11, CI 1.98; swan ganx cathter in place RIJ    Allergies  Cipro (Unknown)  Diovan (Unknown)    Social History:   Denies smoking, drinking or drug use. Lives at home with her  who has parkinson's disease and dementia and her son who has polio    FAMILY HISTORY:  Family history of early CAD: mother had cad  Family history of diabetes mellitus: mother had dm    PAST MEDICAL & SURGICAL HISTORY:  ESRD on peritoneal dialysis  Acid reflux  Hypertension  Dyslipidemia  Diabetes mellitus  CAD (coronary artery disease)  S/P CABG (coronary artery bypass graft): in 2012  H/O: hysterectomy  History of total knee replacement b/l  After cataract, bilateral    MEDICATIONS  aspirin enteric coated 81 milliGRAM(s) Oral daily  clopidogrel Tablet 75 milliGRAM(s) Oral daily  hydrALAZINE 10 milliGRAM(s) Oral every 12 hours  labetalol 100 milliGRAM(s) Oral daily  benzonatate 100 milliGRAM(s) Oral once  acetaminophen   Tablet .. 650 milliGRAM(s) Oral every 6 hours PRN  docusate sodium 100 milliGRAM(s) Oral three times a day  loperamide 2 milliGRAM(s) Oral four times a day PRN  allopurinol 300 milliGRAM(s) Oral daily  atorvastatin 40 milliGRAM(s) Oral at bedtime  dextrose 40% Gel 15 Gram(s) Oral once PRN  dextrose 50% Injectable 12.5 Gram(s) IV Push once  dextrose 50% Injectable 25 Gram(s) IV Push once  dextrose 50% Injectable 25 Gram(s) IV Push once  glucagon  Injectable 1 milliGRAM(s) IntraMuscular once PRN  insulin glargine Injectable (LANTUS) 5 Unit(s) SubCutaneous at bedtime  insulin lispro (HumaLOG) corrective regimen sliding scale   SubCutaneous three times a day before meals  insulin lispro Injectable (HumaLOG) 2 Unit(s) SubCutaneous three times a day before meals  levothyroxine 50 MICROGram(s) Oral daily  chlorhexidine 4% Liquid 1 Application(s) Topical two times a day  dextrose 5%. 1000 milliLiter(s) IV Continuous <Continuous>  epoetin rocky Injectable 06235 Unit(s) SubCutaneous <User Schedule>  gentamicin 0.1% Cream 1 Application(s) Topical <User Schedule> PRN  multivitamin 1 Tablet(s) Oral daily  sodium chloride 0.9%. 500 milliLiter(s) IV Continuous <Continuous>    REVIEW OF SYSTEMS:  CONSTITUTIONAL: No fevers, No chills, No fatigue, No weight gain  EYES: No vision changes   ENT: No congestion, No ear pain, No sore throat.  NECK: No pain, No stiffness  RESPIRATORY: No shortness of breath, No cough, No wheezing, No hemoptysis  CARDIOVASCULAR: No chest pain. No palpitations, No ROQUE, No orthopnea, No paroxysmal nocturnal dyspnea, No pleuritic pain  GASTROINTESTINAL: No abdominal pain, No nausea, No vomiting, No hematemesis, No diarrhea No constipation. No melena  GENITOURINARY: No dysuria, No frequency, No incontinence, No hematuria  NEUROLOGICAL: No dizziness, No lightheadedness, No syncope, No LOC, No headache, No numbness or weakness  EXTREMITIES: No Edema, No joint pain, No joint swelling.  PSYCHIATRIC: No anxiety, No depression  SKIN: No diaphoresis. No itching, No rashes, No pressure ulcers    All other review of systems is negative unless indicated above.    ICU Vital Signs Last 24 Hrs  T(C): 36.6 (03 May 2019 20:50), Max: 37 (03 May 2019 14:45)  T(F): 97.9 (03 May 2019 20:50), Max: 98.6 (03 May 2019 14:45)  HR: 83 (03 May 2019 21:00) (62 - 105)  BP: 101/48 (03 May 2019 20:50) (76/48 - 127/101)  BP(mean): 58 (03 May 2019 20:50) (58 - 67)  ABP: --  ABP(mean): --  RR: 27 (03 May 2019 21:00) (15 - 27)  SpO2: 99% (03 May 2019 21:00) (97% - 100%)      PHYSICAL EXAM:  Appearance: NAD, no distress  HEENT:   Normal oral mucosa, PERRL, EOMI  Cardiovascular: Regular rate and rhythm, Normal S1 S2, No JVD, No murmurs, No edema  Respiratory: Lungs clear to auscultation. No rales, No rhonchi, No wheezing. No tenderness to palpation  Gastrointestinal:  Soft, Non-tender, + BS, + PD catheter  Neurologic: Non-focal, A&Ox3  Skin: Warm and dry, No rashes, No ecchymoses, No cyanosis  Extremities: No clubbing, cyanosis or edema  Vascular: Peripheral pulses palpable 2+ bilaterally  Psychiatry: Mood & affect appropriate    I&O's Summary    02 May 2019 07:01  -  03 May 2019 07:00  --------------------------------------------------------  IN: 8840 mL / OUT: 9650 mL / NET: -810 mL    03 May 2019 07:01  -  03 May 2019 21:18  --------------------------------------------------------  IN: 4810 mL / OUT: 4420 mL / NET: 390 mL    LABORATORY VALUES	 	                    10.4   4.74  )-----------( 157      ( 03 May 2019 13:42 )             33.4   05-03    135  |  89<L>  |  72<H>  ----------------------------<  180<H>  4.1   |  22  |  10.05<H>  05-03    133<L>  |  89<L>  |  69<H>  ----------------------------<  226<H>  4.4   |  20<L>  |  9.83<H>    Ca    10.6<H>      03 May 2019 13:42  Ca    10.7<H>      03 May 2019 06:11  Phos  7.0     05-03  Phos  7.2     05-02  Mg     1.9     05-03  Mg     1.9     05-02    TPro  6.6  /  Alb  3.5  /  TBili  0.2  /  DBili  x   /  AST  24  /  ALT  21  /  AlkPhos  63  05-03  TPro  6.5  /  Alb  3.4  /  TBili  0.2  /  DBili  x   /  AST  25  /  ALT  21  /  AlkPhos  63  05-02    LIVER FUNCTIONS - ( 03 May 2019 13:42 )  Alb: 3.5 g/dL / Pro: 6.6 g/dL / ALK PHOS: 63 u/L / ALT: 21 u/L / AST: 24 u/L / GGT: x         Prothrombin Time, Plasma: 12.2 SEC (05-03 @ 13:42)    CARDIAC MARKERS:  Serum Pro-Brain Natriuretic Peptide: > 72943 pg/mL (04-23 @ 12:12)    Lactate, Blood: 5.6 mmol/L (05-03 @ 13:42)    04-24 @ 05:17  Cholesterol, Serum - 167  Direct LDL- 99  HDL Cholesterol, Serum- 45  Triglycerides, Serum- 191    Hemoglobin A1C, Whole Blood: 9.8 % (04-24 @ 05:17)  Thyroid Stimulating Hormone, Serum: 8.74 uIU/mL (04-24 @ 05:17)  POCT Blood Glucose.: 154 mg/dL (03 May 2019 18:53)      TELEMETRY: 	    ECG:  	  RADIOLOGY:  OTHER: 	    ASSESSMENT AND PLAN  73F PMH ESRD on peritoneal dialysis for the last 2 years, CHF (EF 46%, w/PAH), CAD (2 stents?), PPM, HTN, HLD, T2DM (not on insulin), and hypothyroidism p/w fluid overload, s/p urgent peritoneal dialysis & was admitted to telemetry. Echo revealed worsening EF now 21% from 46%. LHC on 5/1 showed LM 99%, prox LCx 90%, % with patent LIMA to mLAD. Cardiac viability study on 5/2 showed large, moderate to severe defects that were predominantly reversible. Planning on transfer to [facility] for high risk PCI. Course c/b RRT 2/2 hypotension, s/p IVF bolus, taken to cathlab for RHC which showed low wedge and patient transferred to CCU after RHC with RIJ swan in place.   5/3 RHC RA 2, RV 20/12, PA 20/6, PCW 6, PAsat 49%, CO 3.11, CI 1.98; swan ganx cathter in place RIJ    PLAN    Admit to CCU.   Continuous cardiac monitoring to r/o arrhythmias.   No need for inotropic support at present   ml @ 100 ml/hr  Daily weight monitoring  fluid mgmt via dialysis. Goal euvolemic. Will trend swan numbers and CVP.   Strict I/O  Low sodium DASH diet  Continue current medications.   nephro follow up and mgmt for dialysis and hyponatremia  pt off ACEI / ARB due to recorded allergy                         ##[id number]

## 2019-05-03 NOTE — PROGRESS NOTE ADULT - ASSESSMENT
74F w/ ESRD on PD at home, HTN, DM, CAD s/p CABG and stents, on AC, p/w SOB x1 day.    ESRD on PD  Continue PD with UF as tolerated  Monitor BMP and BP    SOB  multiple admission for fluid overload  chest xray is clear no edema  Cardiology follow up     Hypercalcemia  Elevated PTH, phos level  Hold calcitriol   Increase renagel 2400 tid with meal  Monitor serum calcium and phos    Anemia  epo 13586 sq tiw  monitor Hb    HTN  on labetalol 100 daily  hydralazine 10 tid started  intermittent hypotensive improved   UF as tolerated.   monitor bp  hold bp meds per perimeter     Hyponatremia   likely sec to increase fluid status in ESRD pt  UF with PD as tolerated  free water restriction <1L/day    Hyperkalemia  sec to renal failure  low k diet  PD as ordered  monitor

## 2019-05-03 NOTE — PROGRESS NOTE ADULT - SUBJECTIVE AND OBJECTIVE BOX
Patient seen and examined at bedside.    Overnight Events: States that she is not feeling well this morning, complaining of dizziness/lightheadedness. HD was postponed due to hypotension (SBP 60s). She denies chest pain, SOB, LE edema.       REVIEW OF SYSTEMS:  Constitutional:     No fevers, chills, weight loss, weight gain  HEENT:                  No dry eyes, nasal congestion, postnasal drip  CV:                         No chest pain, palpitations, orthopnea, PND  Resp:                     No cough, SOB, dyspnea, wheezing, sputum  GI:                          No nausea, vomiting, abdominal pain, diarrhea, constipation  :                        No dysuria, nocturia, hematuria, increased urinary frequency  Musculoskeletal: No back pain, myalgias, arthralgias   Skin:                       No rash, pruritus, ecchymoses  Neurological:        see above  Psychiatric:           No anxiety, depression   Endocrine:            No hot/cold intolerance, polydipsia  Heme/Lymph:      No bleeding, easy bruising  Allergic/Immune: No itchy eyes, rhinorrhea, hives angioedema      Current Meds:  acetaminophen   Tablet .. 650 milliGRAM(s) Oral every 6 hours PRN  allopurinol 300 milliGRAM(s) Oral daily  aspirin enteric coated 81 milliGRAM(s) Oral daily  atorvastatin 40 milliGRAM(s) Oral at bedtime  benzonatate 100 milliGRAM(s) Oral once  chlorhexidine 4% Liquid 1 Application(s) Topical two times a day  clopidogrel Tablet 75 milliGRAM(s) Oral daily  dextrose 40% Gel 15 Gram(s) Oral once PRN  dextrose 5%. 1000 milliLiter(s) IV Continuous <Continuous>  dextrose 50% Injectable 12.5 Gram(s) IV Push once  dextrose 50% Injectable 25 Gram(s) IV Push once  dextrose 50% Injectable 25 Gram(s) IV Push once  DOBUTamine Infusion 2.5 MICROgram(s)/kG/Min IV Continuous <Continuous>  docusate sodium 100 milliGRAM(s) Oral three times a day  epoetin rocky Injectable 49337 Unit(s) SubCutaneous <User Schedule>  gentamicin 0.1% Cream 1 Application(s) Topical <User Schedule> PRN  glucagon  Injectable 1 milliGRAM(s) IntraMuscular once PRN  hydrALAZINE 10 milliGRAM(s) Oral every 12 hours  insulin glargine Injectable (LANTUS) 5 Unit(s) SubCutaneous at bedtime  insulin lispro (HumaLOG) corrective regimen sliding scale   SubCutaneous three times a day before meals  insulin lispro Injectable (HumaLOG) 2 Unit(s) SubCutaneous three times a day before meals  labetalol 100 milliGRAM(s) Oral daily  levothyroxine 50 MICROGram(s) Oral daily  loperamide 2 milliGRAM(s) Oral four times a day PRN  multivitamin 1 Tablet(s) Oral daily  sevelamer carbonate 2400 milliGRAM(s) Oral three times a day with meals  sodium chloride 0.9%. 250 milliLiter(s) IV Continuous <Continuous>      PAST MEDICAL & SURGICAL HISTORY:  ESRD on peritoneal dialysis  Acid reflux  Hypertension  Dyslipidemia  Diabetes mellitus  CAD (coronary artery disease)  S/P CABG (coronary artery bypass graft): in   H/O: hysterectomy  History of total knee replacement b/l  After cataract, bilateral      Vitals:  T(F): 97.8 (), Max: 98.2 ()  HR: 96 () (86 - 101)  BP: 103/79 () (79/51 - 112/79)  RR: 16 ()  SpO2: 98% ()  I&O's Summary    02 May 2019 07:  -  03 May 2019 07:00  --------------------------------------------------------  IN: 8840 mL / OUT: 9650 mL / NET: -810 mL    03 May 2019 07:  -  03 May 2019 11:36  --------------------------------------------------------  IN: 2000 mL / OUT: 2100 mL / NET: -100 mL        Physical Exam:  Appearance: No acute distress; normal mentation  Eyes: PERRL, EOMI, pink conjunctiva  HENT: Normal oral mucosa  Cardiovascular: tachycardic, S1, S2, no murmurs, rubs, or gallops; minimal LE edema; +JVD  Respiratory: diminished in bases  Gastrointestinal: soft, non-tender, non-distended with normal bowel sounds  Musculoskeletal: No clubbing; no joint deformity   Neurologic: Non-focal  Lymphatic: No lymphadenopathy  Psychiatry: AAOx3, mood & affect appropriate  Skin: No rashes, ecchymoses, or cyanosis                          11.1   7.17  )-----------( 157      ( 03 May 2019 06:11 )             36.5     05-03    133<L>  |  89<L>  |  69<H>  ----------------------------<  226<H>  4.4   |  20<L>  |  9.83<H>    Ca    10.7<H>      03 May 2019 06:11  Phos  7.2     05-  Mg     1.9     05-02    TPro  6.5  /  Alb  3.4  /  TBili  0.2  /  DBili  x   /  AST  25  /  ALT  21  /  AlkPhos  63  05-02        Echo:  < from: TTE with Doppler (w/Cont) (19 @ 10:08) >  Dimensions:     Normal Values:  LA:     4.1 cm    2.0 - 4.0 cm  Ao:     3.2 cm    2.0 - 3.8 cm  SEPTUM: 1.1 cm    0.6 - 1.2 cm  PWT:    1.2 cm    0.6 - 1.1 cm  LVIDd:  5.1 cm    3.0 - 5.6 cm  LVIDs:  4.6 cm    1.8 - 4.0 cm  Derived Variables:  LVMI: 152 g/m2  RWT: 0.47  Fractional short: 10 %  Ejection Fraction (Teicholtz): 21 %  ------------------------------------------------------------------------  CONCLUSIONS:  1. Moderate-severe mitral regurgitation.  2. Moderate left ventricular enlargement.  3. Severe global left ventricular systolic dysfunction.  Endocardial visualization enhanced with intravenous  injection of echo contrast (Definity). No left ventricular  thrombus.  4. Mild diastolic dysfunction (Stage I).  5. Normal right ventricular size and function.  *** Compared with echocardiogram of 12/15/2018, LV function  has deteriorated significantly.    < end of copied text >      Cath:  < from: Cardiac Cath Lab - Adult (19 @ 10:20) >  CORONARY VESSELS: The coronary circulation is right dominant.  LM:   --  Distal left main: There was adiscrete 99 % stenosis at the site  of a prior stent. The lesion was complex, eccentric, and heavily  calcified. severe restnosis since stent of 2018  LAD:   --  Ostial LAD: There was a 100 % stenosis. LIMA to mid LAD patent  and fills the RCA  CX:   --  Proximal circumflex: There was a 90 % stenosis. restenosis from  2018  --  Mid circumflex: There was a 60 % stenosis.  RCA:   --  Proximal RCA: There was a 100 % stenosis. There was a  moderate-sized vascular territory distal to the lesion and good collateral  blood supply to the distal myocardium. This lesion progressed since  2018  COMPLICATIONS: There were no complications.  DIAGNOSTIC IMPRESSIONS: Patient has normal to low filling pressures but low  CO and CI with severe restenosis of left main and proximal LCX complex and  calcified and progressive diseae of RCA.  DIAGNOSTIC RECOMMENDATIONS: Viability study anf if viable muscle in lateral  wall, consider high risk PTCA of left main and proximal LCX and aggressive  medical therapy for LV dysfunction,    < end of copied text >    < from: Cardiac Cath Lab - Adult (19 @ 10:20) >  Pressures:  -- Aortic Pressure (S/D/M): 105/55/75  Pressures:  -- Left Ventricle (s/edp): 96/16/--  Pressures:  -- Pulmonary Artery (S/D/M): 17/7/11  Pressures:  -- Pulmonary Capillary Wedge: 7/9/3  Pressures:  -- Right Atrium (a/v/M): 11/14/2  Pressures:  -- Right Ventricle (s/edp): 21/2/--  O2 Sats:  NO PHASE  O2 Sats:  - HR: 88  O2 Sats:  - Rhythm:  O2 Sats:  -- AO: 9.4/97.9/12.52  O2 Sats:  -- PA: 6.9/54.4/5.1  Outputs:  NO PHASE  Outputs:  -- CALCULATIONS[age]  Outputs:  -- CALCULATIONS: Body Surface Area: 1.57  Outputs:  -- CALCULATIONS: Height in cm: 152.00  Outputs:  -- CALCULATIONS: Sex: Female  Outputs:  -- CALCULATIONS: Weight in k.50  Outputs:  -- OUTPUTS: CO by Rowena: 2.65  Outputs:  -- OUTPUTS: Rowena cardiac index: 1.69    < end of copied text >      < from: Cardiac Viability (19 @ 17:50) >  IMPRESSIONS:Abnormal Study  * Myocardial Perfusion SPECT results are abnormal.  * Review of raw data shows: The study is of good technical  quality.  * The left ventricle was normal in size. There are large,  moderate to severe defects in anterior and anteroseptal,  anterolateral, apical, inferior and inferolateral, lateral  walls that are predominantly reversible, indicating  significant myocardial viability  * Post-stress gated wall motion analysis was performed  (LVEF = 13 %;LVEDV = 137 ml.), revealing severe overall  hypokinesis.  ADDENDUM 2019: include tracer dose and protocol. No  change was made to study findings    < end of copied text >    Interpretation of Telemetry: SR 100s, BiV paced. PVCs, couplets

## 2019-05-03 NOTE — PROGRESS NOTE ADULT - ASSESSMENT
_________________________________________________________________________________________  ========>>  M E D I C A L   A T T E N D I N G    F O L L O W  U P  N O T E  <<=========  -----------------------------------------------------------------------------------------------------    - Patient seen and examined by me earlier today.   - In summary,  KAPIL CAMPOVERDE is a 74y year old woman who originally presented with SOB  - Patient today overall doing ok, comfortable, eating ok , CP free     ==================>> REVIEW OF SYSTEM <<=================    GEN: no fever, no chills, no pain  RESP: no SOB at rest , no cough, no sputum  CVS: no chest pain, no palpitations, no edema  GI: no abdominal pain, no nausea, no diarrhea today  : no dysuria, no frequency, no hematuria  Neuro: no headache, no dizziness  Derm : no itching, no rash    ==================>> PHYSICAL EXAM <<=================    GEN: A&O X 3 , NAD , comfortable, getting PD  HEENT: NCAT, PERRL, MMM, hearing intact, on nasal canula   Neck: supple , no JVD  CVS: S1S2 , regular , No M/R/G appreciated  PULM: CTA B/L,  no W/R/R appreciated  ABD.: soft. non tender, non distended,  bowel sounds present  Extrem: intact pulses , no edema   PSYCH : normal mood,  not anxious        ==================>> MEDICATIONS <<====================    allopurinol 300 milliGRAM(s) Oral daily  aspirin enteric coated 81 milliGRAM(s) Oral daily  atorvastatin 40 milliGRAM(s) Oral at bedtime  benzonatate 100 milliGRAM(s) Oral once  chlorhexidine 4% Liquid 1 Application(s) Topical two times a day  clopidogrel Tablet 75 milliGRAM(s) Oral daily  dextrose 5%. 1000 milliLiter(s) IV Continuous <Continuous>  dextrose 50% Injectable 12.5 Gram(s) IV Push once  dextrose 50% Injectable 25 Gram(s) IV Push once  dextrose 50% Injectable 25 Gram(s) IV Push once  docusate sodium 100 milliGRAM(s) Oral three times a day  epoetin rocky Injectable 41173 Unit(s) SubCutaneous <User Schedule>  hydrALAZINE 10 milliGRAM(s) Oral every 12 hours  insulin glargine Injectable (LANTUS) 5 Unit(s) SubCutaneous at bedtime  insulin lispro (HumaLOG) corrective regimen sliding scale   SubCutaneous three times a day before meals  insulin lispro Injectable (HumaLOG) 2 Unit(s) SubCutaneous three times a day before meals  labetalol 100 milliGRAM(s) Oral daily  levothyroxine 50 MICROGram(s) Oral daily  multivitamin 1 Tablet(s) Oral daily  sevelamer carbonate 2400 milliGRAM(s) Oral three times a day with meals  sodium chloride 0.9%. 250 milliLiter(s) IV Continuous <Continuous>    MEDICATIONS  (PRN):  acetaminophen   Tablet .. 650 milliGRAM(s) Oral every 6 hours PRN Temp greater or equal to 38C (100.4F), Mild Pain (1 - 3)  dextrose 40% Gel 15 Gram(s) Oral once PRN Blood Glucose LESS THAN 70 milliGRAM(s)/deciliter  gentamicin 0.1% Cream 1 Application(s) Topical <User Schedule> PRN Pd catheter dressing change  glucagon  Injectable 1 milliGRAM(s) IntraMuscular once PRN Glucose LESS THAN 70 milligrams/deciliter  loperamide 2 milliGRAM(s) Oral four times a day PRN Diarrhea    ==================>> VITAL SIGNS <<==================    Vital Signs Last 24 Hrs  T(C): 37 (05-03-19 @ 14:45)  T(F): 98.6 (05-03-19 @ 14:45), Max: 98.6 (05-03-19 @ 14:45)  HR: 62 (05-03-19 @ 14:45) (62 - 102)  BP: 127/101 (05-03-19 @ 14:45)  RR: 16 (05-03-19 @ 14:45) (16 - 18)  SpO2: 97% (05-03-19 @ 13:05) (97% - 98%)      POCT Blood Glucose.: 181 mg/dL (03 May 2019 13:22)  POCT Blood Glucose.: 169 mg/dL (03 May 2019 12:17)  POCT Blood Glucose.: 243 mg/dL (03 May 2019 07:55)  POCT Blood Glucose.: 232 mg/dL (02 May 2019 21:45)  POCT Blood Glucose.: 331 mg/dL (02 May 2019 17:08)     ==================>> LAB AND IMAGING <<==================                        10.4   4.74  )-----------( 157      ( 03 May 2019 13:42 )             33.4        135  |  89<L>  |  72<H>  ----------------------------<  180<H>  4.1   |  22  |  10.05<H>    Sodium:   135  <==, 133  <==, 134  <==, 133  <==, 134  <==, 130  <==, 125  <==    Ca    10.6<H>      03 May 2019 13:42  Phos  7.0     05-03  Mg     1.9     05-03    TPro  6.6  /  Alb  3.5  /  TBili  0.2  /  DBili  x   /  AST  24  /  ALT  21  /  AlkPhos  63  05-03    < from: Cardiac Cath Lab - Adult (05.01.19 @ 10:20) >  CORONARY VESSELS: The coronary circulation is right dominant.  LM:   --  Distal left main: There was adiscrete 99 % stenosis at the site  of a prior stent. The lesion was complex, eccentric, and heavily  calcified. severe restnosis since stent of November 2018  LAD:   --  Ostial LAD: There was a 100 % stenosis. LIMA to mid LAD patent and fills the RCA  CX:   --  Proximal circumflex: There was a 90 % stenosis. restenosis from nov 2018  --  Mid circumflex: There was a 60 % stenosis.  RCA:   --  Proximal RCA: There was a 100 % stenosis. There was a  moderate-sized vascular territory distal to the lesion and good collateral  blood supply to the distal myocardium. This lesion progressed since November 2018  COMPLICATIONS: There were no complications.  DIAGNOSTIC IMPRESSIONS: Patient has normal to low filling pressures but low  CO and CI with severe restenosis of left main and proximal LCX complex and  calcified and progressive diseae of RCA.  DIAGNOSTIC RECOMMENDATIONS: Viability study anf if viable muscle in lateral  wall, consider high risk PTCA of left main and proximal LCX and aggressive  medical therapy for LV dysfunction,  < end of copied text >    < from: Cardiac Viability (05.02.19 @ 17:50) >  IMPRESSIONS:Abnormal Study  * Myocardial Perfusion SPECT results are abnormal.  * Review of raw data shows: The study is of good technical quality.  * The left ventricle was normal in size. There are large,  moderate to severe defects in anterior and anteroseptal,  anterolateral, apical, inferior and inferolateral, lateral  walls that are predominantly reversible, indicating  significant myocardial viability  * Post-stress gated wall motion analysis was performed  (LVEF = 13 %;LVEDV = 137 ml.), revealing severe overall hypokinesis.  ADDENDUM 5/2/2019: include tracer dose and protocol. No  change was made to study findings  ---------------------------------------------------  < end of copied text >    ___________________________________________________________________________________  ===============>>  A S S E S S M E N T   A N D   P L A N <<===============  ------------------------------------------------------------------------------------------    · Assessment		  Problem/Plan - 1:  ·  Problem: Shortness of breath ( due to fluid overload, due to ESRD) , overall improved with dialysis   will continue to monitor   post Lt/Rt heart cath >> showing significant and worsening of CAD   fluid mgmt via dialysis      Problem/Plan - 2:  ·  Problem: ESRD on peritoneal dialysis.    nephro follow up and mgmtt appreciated   dialysis   monitor and treat hyponatremia per renal      Problem/Plan - 3:  ·  Problem: acute hypoxemic respiratory failure due to pulmonary edema: resolved     pt was apparently off O2 and doing well but seen again on O2    monitor and wean off o2 as able      Problem/Plan - 4:  ·  Problem: Chronic systolic congestive heart failure, with worsening LV function   pt now on Dobutamine drip  pt will need likely staged PCI ( to be likely transferred to [place] Monday)   continue medical mgmt otherwise   pt off ACEI / ARB due to recorded allergy   discussed with cardio      Problem/Plan - 5:  ·  Problem: Dyslipidemia.    Continue home statin.      Problem/Plan - 6:  Problem: Type 2 diabetes mellitus   Insulin sliding scale    -GI/DVT Prophylaxis.    --------------------------------------------  Case discussed with pt, family at bedside, cardio  Education given on findings and plan of care  ___________________________  H. MARISOL Abreu.  Pager: [phone]  `

## 2019-05-03 NOTE — CHART NOTE - NSCHARTNOTEFT_GEN_A_CORE
74F with ESRD on PD, HTN, CAD s/p CABG and PCI (last of which was a year ago), BiV [employer] CRT device w/o ICD, and DM2 who presented with worsening SOB. She was admitted with ADHFrEF. Echo revealed a worsening EF now 21% from 46%. EP was called to eval PPM and to see if pt would benefit from upgrade to ICD device. Interrogation revealed undersensing of the atria and lower than expected BiV CRT pacing. Optivol also revealed worsening heart failure decompensation at the time the atrial sensing and pacing percentage decreased from 99% to 88%. LHC on 5/1 showed LM 99%, prox LCx 90%, % with patent LIMA to mLAD. Cardiac viability study on 5/2 showed large, moderate to severe defects that were predominantly reversible (see above). Primary team planning on transfer to [facility] for high risk PCI.     RRT called for hypotension with SBP to the 70s. On arrival the patient was awake and answering all questions. Patient had just completed a 250 mL bolus of NS and was on dobutamine 2.5 mcg/kg/min. On repeat BP check the patient's MAP was 59. Cardiology was at the bedside and recommended to given the patient an additional 250 mL bolus and to stop dobutamine gtt. On repeat BP the patient had a MAP of 60.     Plan:  - 250 mL NS bolus   - d/c dobutamine gtt  - Pt was dwelling for PD, needed fluid removal prior to cath   - per cardiology, reassess after bolus, if remains hypotensive would repeat RHC today      Dread Navas MD PGY3  [facility] Pager #: [phone]  [facility] Pager #: [phone]

## 2019-05-03 NOTE — CHART NOTE - NSCHARTNOTEFT_GEN_A_CORE
Received page from RN in dialysis that patient's BP prior to initiating HD was 79/51. Patient was returned to unit, where BP was 80/58 manually. Patient is symptomatic, endorsing dizziness and weakness. Patient received Hydralazine prior to going to HD suite, likely cause of hypotension. Spoke to Dr. Abreu who recommended giving patient 250cc of fluid. Will attempt to dialyze patient at a later time today and will contact Dr. Jennings for next step given patient's viability study results.

## 2019-05-03 NOTE — CHART NOTE - NSCHARTNOTEFT_GEN_A_CORE
RRT called for hypotension with SBP in the 70s. Patient expressing dizziness and weakness, though she was awake, alert and oriented x 3, and answering all questions appropriately. Patient was on dobutamine gtt at the time of hypotensive episode. Cardiology at bedside, and recommended 250mL bolus and discontinuing dobutamine. Labs were drawn. Recommendations to complete PD session and urgent right heart cath.   BP after completion of PD 88/61.   CDW Dr Abreu and Dr. Jennings. RRT called for hypotension with SBP in the 70s. Patient expressing dizziness and weakness, though she was awake, alert and oriented x 3, and answering all questions appropriately. Patient was on dobutamine gtt at the time of hypotensive episode. Cardiology at bedside, and recommended 250mL bolus and discontinuing dobutamine. Labs were drawn. Recommendations to complete PD session and urgent right heart cath.   BP after completion of PD 88/61. As per EP, patient to be transferred to CCU after RHC.   CDW Dr Abreu and Dr. Jennings.

## 2019-05-04 DIAGNOSIS — I10 ESSENTIAL (PRIMARY) HYPERTENSION: ICD-10-CM

## 2019-05-04 DIAGNOSIS — E11.9 TYPE 2 DIABETES MELLITUS WITHOUT COMPLICATIONS: ICD-10-CM

## 2019-05-04 DIAGNOSIS — I25.10 ATHEROSCLEROTIC HEART DISEASE OF NATIVE CORONARY ARTERY WITHOUT ANGINA PECTORIS: ICD-10-CM

## 2019-05-04 DIAGNOSIS — N18.6 END STAGE RENAL DISEASE: ICD-10-CM

## 2019-05-04 DIAGNOSIS — E03.9 HYPOTHYROIDISM, UNSPECIFIED: ICD-10-CM

## 2019-05-04 DIAGNOSIS — K21.9 GASTRO-ESOPHAGEAL REFLUX DISEASE WITHOUT ESOPHAGITIS: ICD-10-CM

## 2019-05-04 DIAGNOSIS — E78.5 HYPERLIPIDEMIA, UNSPECIFIED: ICD-10-CM

## 2019-05-04 DIAGNOSIS — I25.5 ISCHEMIC CARDIOMYOPATHY: ICD-10-CM

## 2019-05-04 LAB
ALBUMIN SERPL ELPH-MCNC: 3.3 G/DL — SIGNIFICANT CHANGE UP (ref 3.3–5)
ALP SERPL-CCNC: 59 U/L — SIGNIFICANT CHANGE UP (ref 40–120)
ALT FLD-CCNC: 21 U/L — SIGNIFICANT CHANGE UP (ref 4–33)
ANION GAP SERPL CALC-SCNC: 24 MMO/L — HIGH (ref 7–14)
AST SERPL-CCNC: 32 U/L — SIGNIFICANT CHANGE UP (ref 4–32)
BASE EXCESS BLDV CALC-SCNC: -2.5 MMOL/L — SIGNIFICANT CHANGE UP
BASOPHILS # BLD AUTO: 0.03 K/UL — SIGNIFICANT CHANGE UP (ref 0–0.2)
BASOPHILS NFR BLD AUTO: 0.5 % — SIGNIFICANT CHANGE UP (ref 0–2)
BILIRUB SERPL-MCNC: 0.2 MG/DL — SIGNIFICANT CHANGE UP (ref 0.2–1.2)
BLOOD GAS VENOUS - CREATININE: 10.3 MG/DL — HIGH (ref 0.5–1.3)
BUN SERPL-MCNC: 67 MG/DL — HIGH (ref 7–23)
CALCIUM SERPL-MCNC: 9.9 MG/DL — SIGNIFICANT CHANGE UP (ref 8.4–10.5)
CHLORIDE BLDV-SCNC: 95 MMOL/L — LOW (ref 96–108)
CHLORIDE SERPL-SCNC: 89 MMOL/L — LOW (ref 98–107)
CO2 SERPL-SCNC: 20 MMOL/L — LOW (ref 22–31)
CREAT SERPL-MCNC: 9.3 MG/DL — HIGH (ref 0.5–1.3)
EOSINOPHIL # BLD AUTO: 0.33 K/UL — SIGNIFICANT CHANGE UP (ref 0–0.5)
EOSINOPHIL NFR BLD AUTO: 5.9 % — SIGNIFICANT CHANGE UP (ref 0–6)
GAS PNL BLDV: 129 MMOL/L — LOW (ref 136–146)
GLUCOSE BLDC GLUCOMTR-MCNC: 146 MG/DL — HIGH (ref 70–99)
GLUCOSE BLDC GLUCOMTR-MCNC: 152 MG/DL — HIGH (ref 70–99)
GLUCOSE BLDC GLUCOMTR-MCNC: 160 MG/DL — HIGH (ref 70–99)
GLUCOSE BLDC GLUCOMTR-MCNC: 229 MG/DL — HIGH (ref 70–99)
GLUCOSE BLDV-MCNC: 263 MG/DL — HIGH (ref 70–99)
GLUCOSE SERPL-MCNC: 198 MG/DL — HIGH (ref 70–99)
HCO3 BLDV-SCNC: 22 MMOL/L — SIGNIFICANT CHANGE UP (ref 20–27)
HCT VFR BLD CALC: 30.7 % — LOW (ref 34.5–45)
HCT VFR BLDV CALC: 27.5 % — LOW (ref 34.5–45)
HGB BLD-MCNC: 9.5 G/DL — LOW (ref 11.5–15.5)
HGB BLDV-MCNC: 8.8 G/DL — LOW (ref 11.5–15.5)
IMM GRANULOCYTES NFR BLD AUTO: 0.5 % — SIGNIFICANT CHANGE UP (ref 0–1.5)
LACTATE BLDV-MCNC: 2.7 MMOL/L — HIGH (ref 0.5–2)
LYMPHOCYTES # BLD AUTO: 1.31 K/UL — SIGNIFICANT CHANGE UP (ref 1–3.3)
LYMPHOCYTES # BLD AUTO: 23.4 % — SIGNIFICANT CHANGE UP (ref 13–44)
MAGNESIUM SERPL-MCNC: 1.7 MG/DL — SIGNIFICANT CHANGE UP (ref 1.6–2.6)
MCHC RBC-ENTMCNC: 26.5 PG — LOW (ref 27–34)
MCHC RBC-ENTMCNC: 30.9 % — LOW (ref 32–36)
MCV RBC AUTO: 85.5 FL — SIGNIFICANT CHANGE UP (ref 80–100)
MONOCYTES # BLD AUTO: 0.87 K/UL — SIGNIFICANT CHANGE UP (ref 0–0.9)
MONOCYTES NFR BLD AUTO: 15.6 % — HIGH (ref 2–14)
NEUTROPHILS # BLD AUTO: 3.02 K/UL — SIGNIFICANT CHANGE UP (ref 1.8–7.4)
NEUTROPHILS NFR BLD AUTO: 54.1 % — SIGNIFICANT CHANGE UP (ref 43–77)
NRBC # FLD: 0 K/UL — SIGNIFICANT CHANGE UP (ref 0–0)
PCO2 BLDV: 41 MMHG — SIGNIFICANT CHANGE UP (ref 41–51)
PH BLDV: 7.35 PH — SIGNIFICANT CHANGE UP (ref 7.32–7.43)
PHOSPHATE SERPL-MCNC: 7 MG/DL — HIGH (ref 2.5–4.5)
PLATELET # BLD AUTO: 139 K/UL — LOW (ref 150–400)
PMV BLD: 9.9 FL — SIGNIFICANT CHANGE UP (ref 7–13)
PO2 BLDV: 31 MMHG — LOW (ref 35–40)
POTASSIUM BLDV-SCNC: 4 MMOL/L — SIGNIFICANT CHANGE UP (ref 3.4–4.5)
POTASSIUM SERPL-MCNC: 4.3 MMOL/L — SIGNIFICANT CHANGE UP (ref 3.5–5.3)
POTASSIUM SERPL-SCNC: 4.3 MMOL/L — SIGNIFICANT CHANGE UP (ref 3.5–5.3)
PROT SERPL-MCNC: 6.4 G/DL — SIGNIFICANT CHANGE UP (ref 6–8.3)
RBC # BLD: 3.59 M/UL — LOW (ref 3.8–5.2)
RBC # FLD: 17.2 % — HIGH (ref 10.3–14.5)
SAO2 % BLDV: 44.6 % — LOW (ref 60–85)
SODIUM SERPL-SCNC: 133 MMOL/L — LOW (ref 135–145)
WBC # BLD: 5.59 K/UL — SIGNIFICANT CHANGE UP (ref 3.8–10.5)
WBC # FLD AUTO: 5.59 K/UL — SIGNIFICANT CHANGE UP (ref 3.8–10.5)

## 2019-05-04 PROCEDURE — 71045 X-RAY EXAM CHEST 1 VIEW: CPT | Mod: 26,76

## 2019-05-04 PROCEDURE — 71045 X-RAY EXAM CHEST 1 VIEW: CPT | Mod: 26,77

## 2019-05-04 RX ORDER — MAGNESIUM SULFATE 500 MG/ML
1 VIAL (ML) INJECTION ONCE
Qty: 0 | Refills: 0 | Status: COMPLETED | OUTPATIENT
Start: 2019-05-04 | End: 2019-05-04

## 2019-05-04 RX ADMIN — Medication 1 APPLICATION(S): at 11:12

## 2019-05-04 RX ADMIN — Medication 100 GRAM(S): at 06:12

## 2019-05-04 RX ADMIN — ERYTHROPOIETIN 10000 UNIT(S): 10000 INJECTION, SOLUTION INTRAVENOUS; SUBCUTANEOUS at 00:10

## 2019-05-04 RX ADMIN — SEVELAMER CARBONATE 2400 MILLIGRAM(S): 2400 POWDER, FOR SUSPENSION ORAL at 12:15

## 2019-05-04 RX ADMIN — Medication 2 UNIT(S): at 08:00

## 2019-05-04 RX ADMIN — Medication 1: at 12:04

## 2019-05-04 RX ADMIN — CLOPIDOGREL BISULFATE 75 MILLIGRAM(S): 75 TABLET, FILM COATED ORAL at 12:07

## 2019-05-04 RX ADMIN — CHLORHEXIDINE GLUCONATE 1 APPLICATION(S): 213 SOLUTION TOPICAL at 12:16

## 2019-05-04 RX ADMIN — CHLORHEXIDINE GLUCONATE 1 APPLICATION(S): 213 SOLUTION TOPICAL at 21:32

## 2019-05-04 RX ADMIN — Medication 1: at 12:05

## 2019-05-04 RX ADMIN — ATORVASTATIN CALCIUM 40 MILLIGRAM(S): 80 TABLET, FILM COATED ORAL at 21:32

## 2019-05-04 RX ADMIN — Medication 1 TABLET(S): at 12:07

## 2019-05-04 RX ADMIN — Medication 50 MICROGRAM(S): at 05:31

## 2019-05-04 RX ADMIN — HEPARIN SODIUM 5000 UNIT(S): 5000 INJECTION INTRAVENOUS; SUBCUTANEOUS at 05:33

## 2019-05-04 RX ADMIN — Medication 1: at 17:06

## 2019-05-04 RX ADMIN — Medication 100 MILLIGRAM(S): at 14:28

## 2019-05-04 RX ADMIN — HEPARIN SODIUM 5000 UNIT(S): 5000 INJECTION INTRAVENOUS; SUBCUTANEOUS at 14:28

## 2019-05-04 RX ADMIN — Medication 2 UNIT(S): at 17:07

## 2019-05-04 RX ADMIN — SEVELAMER CARBONATE 2400 MILLIGRAM(S): 2400 POWDER, FOR SUSPENSION ORAL at 17:07

## 2019-05-04 RX ADMIN — Medication 2 UNIT(S): at 12:12

## 2019-05-04 RX ADMIN — INSULIN GLARGINE 5 UNIT(S): 100 INJECTION, SOLUTION SUBCUTANEOUS at 21:35

## 2019-05-04 RX ADMIN — SEVELAMER CARBONATE 2400 MILLIGRAM(S): 2400 POWDER, FOR SUSPENSION ORAL at 12:06

## 2019-05-04 RX ADMIN — Medication 81 MILLIGRAM(S): at 12:08

## 2019-05-04 RX ADMIN — HEPARIN SODIUM 5000 UNIT(S): 5000 INJECTION INTRAVENOUS; SUBCUTANEOUS at 21:32

## 2019-05-04 NOTE — PROGRESS NOTE ADULT - ASSESSMENT
HPI: 73F PMH ESRD on peritoneal dialysis for the last 2 years, CHF (EF 46%, w/PAH), CAD (2 stents?), PPM, HTN, HLD, T2DM (not on insulin), and hypothyroidism p/w progressive shortness of breath ans orthopnea, noted to be fluid overloaded, nephrology consulted peritoneal dialysis initiated urgently. Patient was admitted to telemetry. Echo revealed a worsening EF now 21% from 46%. Interrogation revealed undersensing of the atria and lower than expected BiV CRT pacing. Optivol also revealed worsening heart failure decompensation at the time the atrial sensing and pacing percentage decreased from 99% to 88%. LHC on 5/1 showed LM 99%, prox LCx 90%, % with patent LIMA to mLAD. Cardiac viability study on 5/2 showed large, moderate to severe defects that were predominantly reversible. Planning on transfer to [facility] for high risk PCI.

## 2019-05-04 NOTE — PROGRESS NOTE ADULT - SUBJECTIVE AND OBJECTIVE BOX
[facility]   MD ROSITA APODACA MD ANGELA WONG, PA    TEL:  OFFICE: [phone]  DR SCOTT CELL: [phone]  DR. JOHNSON CELL: [phone]  KESHA HOUSTON CELL: [phone]        Patient is a 74y old  Female who presents with a chief complaint of shortness of breath (04 May 2019 13:17)      Patient seen and examined at bedside. No chest pain/sob    VITALS:  T(F): 97.7 (05-04-19 @ 11:10), Max: 98.6 (05-03-19 @ 14:45)  HR: 93 (05-04-19 @ 14:00)  BP: 98/81 (05-04-19 @ 14:00)  RR: 20 (05-04-19 @ 14:00)  SpO2: 100% (05-04-19 @ 14:00)  Wt(kg): --    05-03 @ 07:01  -  05-04 @ 07:00  --------------------------------------------------------  IN: 9110 mL / OUT: 8520 mL / NET: 590 mL    05-04 @ 07:01  -  05-04 @ 14:20  --------------------------------------------------------  IN: 2200 mL / OUT: 2300 mL / NET: -100 mL          PHYSICAL EXAM:  Constitutional: NAD  Neck: No JVD  Respiratory: CTAB, no wheezes, rales or rhonchi  Cardiovascular: S1, S2, RRR  Gastrointestinal: BS+, soft, NT/ND  Extremities: No peripheral edema    Hospital Medications:   MEDICATIONS  (STANDING):  aspirin enteric coated 81 milliGRAM(s) Oral daily  atorvastatin 40 milliGRAM(s) Oral at bedtime  benzonatate 100 milliGRAM(s) Oral once  chlorhexidine 4% Liquid 1 Application(s) Topical two times a day  clopidogrel Tablet 75 milliGRAM(s) Oral daily  dextrose 5%. 1000 milliLiter(s) (50 mL/Hr) IV Continuous <Continuous>  dextrose 50% Injectable 12.5 Gram(s) IV Push once  dextrose 50% Injectable 25 Gram(s) IV Push once  dextrose 50% Injectable 25 Gram(s) IV Push once  docusate sodium 100 milliGRAM(s) Oral three times a day  epoetin rocky Injectable 51388 Unit(s) SubCutaneous <User Schedule>  heparin  Injectable 5000 Unit(s) SubCutaneous every 8 hours  hydrALAZINE 10 milliGRAM(s) Oral every 12 hours  insulin glargine Injectable (LANTUS) 5 Unit(s) SubCutaneous at bedtime  insulin lispro (HumaLOG) corrective regimen sliding scale   SubCutaneous three times a day before meals  insulin lispro Injectable (HumaLOG) 2 Unit(s) SubCutaneous three times a day before meals  labetalol 100 milliGRAM(s) Oral daily  levothyroxine 50 MICROGram(s) Oral daily  multivitamin 1 Tablet(s) Oral daily  sevelamer carbonate 2400 milliGRAM(s) Oral three times a day with meals  sodium chloride 0.9%. 500 milliLiter(s) (100 mL/Hr) IV Continuous <Continuous>      LABS:  05-04    133<L>  |  89<L>  |  67<H>  ----------------------------<  198<H>  4.3   |  20<L>  |  9.30<H>    Ca    9.9      04 May 2019 04:35  Phos  7.0     05-04  Mg     1.7     05-04    TPro  6.4  /  Alb  3.3  /  TBili  0.2  /  DBili      /  AST  32  /  ALT  21  /  AlkPhos  59  05-04    Creatinine Trend: 9.30 <--, 10.05 <--, 9.83 <--, 10.01 <--, 9.14 <--, 9.35 <--, 9.31 <--, 9.48 <--, 9.04 <--, 8.65 <--, 8.72 <--    Phosphorus Level, Serum: 7.0 mg/dL (05-04 @ 04:35)  Albumin, Serum: 3.3 g/dL (05-04 @ 04:35)                              9.5    5.59  )-----------( 139      ( 04 May 2019 04:35 )             30.7     Urine Studies:      .8 (Ca --)      [04-24-19 @ 05:17]   --  .4 (Ca --)      [01-13-19 @ 05:32]   --  .8 (Ca --)      [08-12-18 @ 06:00]   --  Vitamin D (25OH) 24.6      [04-24-19 @ 05:17]  HbA1c 9.8      [04-24-19 @ 05:17]  TSH 8.74      [04-24-19 @ 05:17]  Lipid: chol 167, , HDL 45, LDL 99      [04-24-19 @ 05:17]    HBsAb 23.8      [04-25-19 @ 14:53]  HBsAg NEGATIVE      [04-25-19 @ 14:53]  HBcAb Nonreactive      [04-25-19 @ 14:53]  HCV 0.04, Nonreactive Hepatitis C AB  S/CO Ratio                        Interpretation  < 1.00                                   Non-Reactive  1.00 - 4.99                         Weakly-Reactive  > 5.00                                Reactive  Non-Reactive: A person with a non-reactive HCV antibody  result is considered uninfected.  No further action is  needed unless recent infection is suspected.  In these  cases, consider repeat testing later to detect  seroconversion..  Weakly-Reactive: HCV antibody test is abnormal, HCV RNA  Qualitative test will follow.  Reactive: HCV antibody test is abnormal, HCV RNA  Qualitative test will follow.  Note: HCV antibody testing is performed on the [employer]   system.      [04-25-19 @ 14:53]      RADIOLOGY & ADDITIONAL STUDIES:

## 2019-05-04 NOTE — PROGRESS NOTE ADULT - ASSESSMENT
1. severe ischemic cardiomyopathy  2. Chronic renal insufficiency on perito  3. Low cardiac output  4. Hypotension exacerbated by dehydration low filling pressures and dobutamine  5. No obvious ischemia  6. Severe restenosis of the left main and left circ    Recommendati  IV fluids to maintain wedge/pulmonary artery diastolic at approximately 15 mmHg  May need to hold hydralazine  I am not certain that the patient  will benefit  from revascularization of a high risk left main and circumflex  Will observe over the weekend and consider transfer to Health system for high risk PTCI  All of the above explained to patient and to family  Overall prognosis guarded

## 2019-05-04 NOTE — PROGRESS NOTE ADULT - SUBJECTIVE AND OBJECTIVE BOX
patient seen yesterday at 8 AM and again at 5 PM  Events of yesterday noted. Patient felt weak and relatively hypotensive, dobutamine started and dialysis fluid placed in the abdomen for peritoneal dialysis  Patient became  increasingly hypotensive, weak  IV fluids started  Patient brought to the cardiac Cath lab for right heart catheterization-results noted PA sat  69% RA pressure t2, wedge 6, PA 20/6 compatible wth fluid depletion  Patient does have severe LV dysfunction viability on nuclear stress test diffusely with severe LV dysfunction, low cardiac outpu severe restenosis of the left main and proximal circumflex and total occlusion of the right c with collaterals from a patent ROONEY to LAD    MEDICATIONS:  MEDICATIONS  (STANDING):  allopurinol 300 milliGRAM(s) Oral daily  aspirin enteric coated 81 milliGRAM(s) Oral daily  atorvastatin 40 milliGRAM(s) Oral at bedtime  benzonatate 100 milliGRAM(s) Oral once  chlorhexidine 4% Liquid 1 Application(s) Topical two times a day  clopidogrel Tablet 75 milliGRAM(s) Oral daily  dextrose 5%. 1000 milliLiter(s) (50 mL/Hr) IV Continuous <Continuous>  dextrose 50% Injectable 12.5 Gram(s) IV Push once  dextrose 50% Injectable 25 Gram(s) IV Push once  dextrose 50% Injectable 25 Gram(s) IV Push once  docusate sodium 100 milliGRAM(s) Oral three times a day  epoetin rocky Injectable 38425 Unit(s) SubCutaneous <User Schedule>  heparin  Injectable 5000 Unit(s) SubCutaneous every 8 hours  hydrALAZINE 10 milliGRAM(s) Oral every 12 hours  insulin glargine Injectable (LANTUS) 5 Unit(s) SubCutaneous at bedtime  insulin lispro (HumaLOG) corrective regimen sliding scale   SubCutaneous three times a day before meals  insulin lispro Injectable (HumaLOG) 2 Unit(s) SubCutaneous three times a day before meals  labetalol 100 milliGRAM(s) Oral daily  levothyroxine 50 MICROGram(s) Oral daily  multivitamin 1 Tablet(s) Oral daily  sevelamer carbonate 2400 milliGRAM(s) Oral three times a day with meals  sodium chloride 0.9%. 500 milliLiter(s) (100 mL/Hr) IV Continuous <Continuous>      PHYSICAL EXAM:  T(C): 36.6 (05-04-19 @ 06:26), Max: 37 (05-03-19 @ 14:45)  HR: 83 (05-04-19 @ 06:26) (62 - 110)  BP: 93/53 (05-04-19 @ 06:26) (76/48 - 127/101)  RR: 19 (05-04-19 @ 06:26) (15 - 27)  SpO2: 100% (05-04-19 @ 06:26) (97% - 100%)  Wt(kg): --  I&O's Summary    03 May 2019 07:01  -  04 May 2019 07:00  --------------------------------------------------------  IN: 9110 mL / OUT: 8520 mL / NET: 590 mL          Appearance: Normal chronically iappearing but comfortable able to lie flat	  HEENT:   Normal oral mucosa, PERRL, EOMI	  Cardiovascular: Normal S1 S2, No JVD, No murmurs ,  Respiratory: Lungs clear to auscultation, normal effort 	  Gastrointestinal:  Soft, Non-tender, + BS	  Skin: No rashes, No ecchymoses, No cyanosis, warm to touch  Musculoskeletal: Normal range of motion, normal strength  Psychiatry:  Mood & affect appropriate  Ext: No edema        LABS:    CARDIAC MARKERS:                                9.5    5.59  )-----------( 139      ( 04 May 2019 04:35 )             30.7     05-04    133<L>  |  89<L>  |  67<H>  ----------------------------<  198<H>  4.3   |  20<L>  |  9.30<H>    Ca    9.9      04 May 2019 04:35  Phos  7.0     05-04  Mg     1.7     05-04    TPro  6.4  /  Alb  3.3  /  TBili  0.2  /  DBili  x   /  AST  32  /  ALT  21  /  AlkPhos  59  05-04    proBNP:   Lipid Profile:   HgA1c:   TSH:

## 2019-05-04 NOTE — PROGRESS NOTE ADULT - PROBLEM SELECTOR PLAN 1
-Patient is s/p [facility] with known severe CAD, s/p a cardiac viability study, plan is for a high risk PCI at [facility] on Monday  -SWAN is revealing wedge of 9 and PA pressures are 22/10. CVP of 6 at this time.  -will continue to monitor

## 2019-05-04 NOTE — PROGRESS NOTE ADULT - ASSESSMENT
74F w/ ESRD on PD at home, HTN, DM, CAD s/p CABG and stents, on AC, p/w SOB x1 day.    ESRD on PD  Continue PD with 1.5% dex, minimum UF   Monitor BMP and BP    SOB  multiple admission for fluid overload  chest xray is clear no edema  Cardiology follow up     Hypercalcemia  Elevated PTH, phos level  Hold calcitriol   Increase renagel 2400 tid with meal  Monitor serum calcium and phos    Anemia  epo 90428 sq tiw  monitor Hb    HTN  on labetalol 100 daily  hydralazine 10 tid started  intermittent hypotensive   UF as tolerated.   monitor bp  hold bp meds per perimeter   f/u cardio    Hyponatremia   likely sec to increase fluid status in ESRD pt  UF with PD as tolerated  free water restriction <1L/day    Hyperkalemia  sec to renal failure  low k diet  PD as ordered  monitor

## 2019-05-04 NOTE — PROGRESS NOTE ADULT - ASSESSMENT
_________________________________________________________________________________________  ========>>  M E D I C A L   A T T E N D I N G    F O L L O W  U P  N O T E  <<=========  -----------------------------------------------------------------------------------------------------    - Patient seen and examined by me earlier today.   - In summary,  KAPIL CAMPOVERDE is a 74y year old woman who originally presented with SOB      noted events and discussed with PA yesterday, pt now in CCU, post RHC and [place] placement   - Patient today overall doing ok, comfortable, eating ok , CP free     ==================>> REVIEW OF SYSTEM <<=================    GEN: no fever, no chills, no pain  RESP: no SOB at rest , no cough, no sputum  CVS: no chest pain, no palpitations, no edema  GI: no abdominal pain, no nausea, no diarrhea today  : no dysuria, no frequency, no hematuria  Neuro: no headache, no dizziness today reported   Derm : no itching, no rash    ==================>> PHYSICAL EXAM <<=================    GEN: A&O X 3 , NAD , comfortable, sitting at bedside   HEENT: NCAT, PERRL, MMM, hearing intact, on nasal canula   Neck: supple , no JVD  CVS: S1S2 , regular , No M/R/G appreciated  PULM: CTA B/L,  no W/R/R appreciated  ABD.: soft. non tender, non distended,  bowel sounds present  Extrem: intact pulses , no edema   PSYCH : normal mood,  not anxious         ==================>> MEDICATIONS <<====================    aspirin enteric coated 81 milliGRAM(s) Oral daily  atorvastatin 40 milliGRAM(s) Oral at bedtime  benzonatate 100 milliGRAM(s) Oral once  chlorhexidine 4% Liquid 1 Application(s) Topical two times a day  clopidogrel Tablet 75 milliGRAM(s) Oral daily  dextrose 5%. 1000 milliLiter(s) IV Continuous <Continuous>  dextrose 50% Injectable 12.5 Gram(s) IV Push once  dextrose 50% Injectable 25 Gram(s) IV Push once  dextrose 50% Injectable 25 Gram(s) IV Push once  docusate sodium 100 milliGRAM(s) Oral three times a day  epoetin rocky Injectable 61505 Unit(s) SubCutaneous <User Schedule>  heparin  Injectable 5000 Unit(s) SubCutaneous every 8 hours  hydrALAZINE 10 milliGRAM(s) Oral every 12 hours  insulin glargine Injectable (LANTUS) 5 Unit(s) SubCutaneous at bedtime  insulin lispro (HumaLOG) corrective regimen sliding scale   SubCutaneous three times a day before meals  insulin lispro Injectable (HumaLOG) 2 Unit(s) SubCutaneous three times a day before meals  labetalol 100 milliGRAM(s) Oral daily  levothyroxine 50 MICROGram(s) Oral daily  multivitamin 1 Tablet(s) Oral daily  sevelamer carbonate 2400 milliGRAM(s) Oral three times a day with meals  sodium chloride 0.9%. 500 milliLiter(s) IV Continuous <Continuous>    MEDICATIONS  (PRN):  acetaminophen   Tablet .. 650 milliGRAM(s) Oral every 6 hours PRN Temp greater or equal to 38C (100.4F), Mild Pain (1 - 3)  dextrose 40% Gel 15 Gram(s) Oral once PRN Blood Glucose LESS THAN 70 milliGRAM(s)/deciliter  gentamicin 0.1% Cream 1 Application(s) Topical <User Schedule> PRN Pd catheter dressing change  glucagon  Injectable 1 milliGRAM(s) IntraMuscular once PRN Glucose LESS THAN 70 milligrams/deciliter  loperamide 2 milliGRAM(s) Oral four times a day PRN Diarrhea    ==================>> VITAL SIGNS <<==================    Vital Signs Last 24 Hrs  T(C): 36.5 (05-04-19 @ 11:10)  T(F): 97.7 (05-04-19 @ 11:10), Max: 98.6 (05-03-19 @ 14:45)  HR: 72 (05-04-19 @ 12:00) (62 - 110)  BP: 80/56 (05-04-19 @ 12:00)  BP(mean): 61 (05-04-19 @ 12:00) (58 - 93)  RR: 21 (05-04-19 @ 12:00) (12 - 27)  SpO2: 100% (05-04-19 @ 08:00) (99% - 100%)      POCT Blood Glucose.: 152 mg/dL (04 May 2019 11:59)  POCT Blood Glucose.: 229 mg/dL (04 May 2019 07:36)  POCT Blood Glucose.: 224 mg/dL (03 May 2019 23:44)  POCT Blood Glucose.: 154 mg/dL (03 May 2019 18:53)     ==================>> LAB AND IMAGING <<==================                        9.5    5.59  )-----------( 139      ( 04 May 2019 04:35 )             30.7     133<L>  |  89<L>  |  67<H>  ----------------------------<  198<H>  4.3   |  20<L>  |  9.30<H>    Ca    9.9      04 May 2019 04:35  Phos  7.0     05-04  Mg     1.7     05-04    TPro  6.4  /  Alb  3.3  /  TBili  0.2  /  DBili  x   /  AST  32  /  ALT  21  /  AlkPhos  59  05-04    WBC count:   5.59 <<== ,  4.74 <<== ,  7.17 <<== ,  4.85 <<== ,  6.52 <<== ,  5.17 <<==   Hemoglobin:   9.5 <<==,  10.4 <<==,  11.1 <<==,  11.0 <<==,  10.6 <<==,  9.6 <<==  platelets:  139 <==, 157 <==, 157 <==, 169 <==, 179 <==, 163 <==, 188 <==    Creatinine:  9.30  <<==, 10.05  <<==, 9.83  <<==, 10.01  <<==, 9.14  <<==, 9.35  <<==  Sodium:   133  <==, 135  <==, 133  <==, 134  <==, 133  <==, 134  <==, 130  <==       AST:          32 <== , 24 <== , 25 <==      ALT:        21  <== , 21  <== , 21  <==      AP:        59  <=, 63  <=, 63  <=     Bili:        0.2  <=, 0.2  <=, 0.2  <=    < from: Cardiac Cath Lab - Adult (05.01.19 @ 10:20) >  CORONARY VESSELS: The coronary circulation is right dominant.  LM:   --  Distal left main: There was adiscrete 99 % stenosis at the site  of a prior stent. The lesion was complex, eccentric, and heavily  calcified. severe restnosis since stent of November 2018  LAD:   --  Ostial LAD: There was a 100 % stenosis. LIMA to mid LAD patent and fills the RCA  CX:   --  Proximal circumflex: There was a 90 % stenosis. restenosis from nov 2018  --  Mid circumflex: There was a 60 % stenosis.  RCA:   --  Proximal RCA: There was a 100 % stenosis. There was a  moderate-sized vascular territory distal to the lesion and good collateral  blood supply to the distal myocardium. This lesion progressed since November 2018  COMPLICATIONS: There were no complications.  DIAGNOSTIC IMPRESSIONS: Patient has normal to low filling pressures but low  CO and CI with severe restenosis of left main and proximal LCX complex and  calcified and progressive diseae of RCA.  DIAGNOSTIC RECOMMENDATIONS: Viability study anf if viable muscle in lateral  wall, consider high risk PTCA of left main and proximal LCX and aggressive  medical therapy for LV dysfunction,  < end of copied text >    < from: Cardiac Viability (05.02.19 @ 17:50) >  IMPRESSIONS:Abnormal Study  * Myocardial Perfusion SPECT results are abnormal.  * Review of raw data shows: The study is of good technical quality.  * The left ventricle was normal in size. There are large,  moderate to severe defects in anterior and anteroseptal,  anterolateral, apical, inferior and inferolateral, lateral  walls that are predominantly reversible, indicating  significant myocardial viability  * Post-stress gated wall motion analysis was performed  (LVEF = 13 %;LVEDV = 137 ml.), revealing severe overall hypokinesis.  ADDENDUM 5/2/2019: include tracer dose and protocol. No  change was made to study findings  ---------------------------------------------------  < end of copied text >    ___________________________________________________________________________________  ===============>>  A S S E S S M E N T   A N D   P L A N <<===============  ------------------------------------------------------------------------------------------    · Assessment		  Problem/Plan - 1:  ·  Problem: Shortness of breath ( due to fluid overload, due to ESRD) , improved with dialysis >> pt now with fluid contraction given low Rt sided pressures on RHC>> post gentle Hydration in CCU   monitor  closely  CCU and nephrology follow up and mgmt appreciated   post Lt/Rt heart cath >> showing significant and worsening of CAD and low EF  fluid mgmt via dialysis      Problem/Plan - 2:  ·  Problem: ESRD on peritoneal dialysis.    nephro follow up and mgmtt appreciated   dialysis as above   monitor and treat hyponatremia per renal      Problem/Plan - 3:  ·  Problem: acute hypoxemic respiratory failure due to pulmonary edema: resolved     pt was apparently off O2 and doing well but seen again on O2    monitor and wean off o2 as able      Problem/Plan - 4:  ·  Problem: Chronic systolic congestive heart failure, with worsening LV function   pt now off Dobutamine drip due to hypovolemia and hypotension   plan for likely staged PCI ( to be likely transferred to [place] Monday)   continue medical mgmt otherwise   pt off ACEI / ARB due to recorded allergy   discussed with cardio      Problem/Plan - 5:  ·  Problem: Dyslipidemia.    Continue home statin.      Problem/Plan - 6:  Problem: Type 2 diabetes mellitus   Insulin sliding scale    -GI/DVT Prophylaxis.    --------------------------------------------  Case discussed with pt  Education given on findings and plan of care  ___________________________  PRICILLA Abreu D.O.  Pager: [phone]  `

## 2019-05-04 NOTE — PROGRESS NOTE ADULT - SUBJECTIVE AND OBJECTIVE BOX
Date of Admission:  19  24 hour events:  transferred to CCU late yesterday s/p RRT on the floor for hypotension s/p RHC with SWAN revealing hypovolemia after peritoneal dialysis  Vital Signs Last 24 Hrs  T(C): 36.5 (04 May 2019 11:10), Max: 37 (03 May 2019 14:45)  T(F): 97.7 (04 May 2019 11:10), Max: 98.6 (03 May 2019 14:45)  HR: 70 (04 May 2019 11:10) (62 - 110)  BP: 101/81 (04 May 2019 11:10) (76/48 - 127/101)  BP(mean): 68 (04 May 2019 08:00) (58 - 93)  RR: 20 (04 May 2019 11:10) (12 - 27)  SpO2: 100% (04 May 2019 08:00) (97% - 100%)  I&O's Summary    03 May 2019 07:  -  04 May 2019 07:00  --------------------------------------------------------  IN: 9110 mL / OUT: 8520 mL / NET: 590 mL    04 May 2019 07:01  -  04 May 2019 11:22  --------------------------------------------------------  IN: 200 mL / OUT: 0 mL / NET: 200 mL        MEDICATIONS:  aspirin enteric coated 81 milliGRAM(s) Oral daily  clopidogrel Tablet 75 milliGRAM(s) Oral daily  heparin  Injectable 5000 Unit(s) SubCutaneous every 8 hours  hydrALAZINE 10 milliGRAM(s) Oral every 12 hours  labetalol 100 milliGRAM(s) Oral daily      benzonatate 100 milliGRAM(s) Oral once    acetaminophen   Tablet .. 650 milliGRAM(s) Oral every 6 hours PRN    docusate sodium 100 milliGRAM(s) Oral three times a day  loperamide 2 milliGRAM(s) Oral four times a day PRN    allopurinol 300 milliGRAM(s) Oral daily  atorvastatin 40 milliGRAM(s) Oral at bedtime  dextrose 40% Gel 15 Gram(s) Oral once PRN  dextrose 50% Injectable 12.5 Gram(s) IV Push once  dextrose 50% Injectable 25 Gram(s) IV Push once  dextrose 50% Injectable 25 Gram(s) IV Push once  glucagon  Injectable 1 milliGRAM(s) IntraMuscular once PRN  insulin glargine Injectable (LANTUS) 5 Unit(s) SubCutaneous at bedtime  insulin lispro (HumaLOG) corrective regimen sliding scale   SubCutaneous three times a day before meals  insulin lispro Injectable (HumaLOG) 2 Unit(s) SubCutaneous three times a day before meals  levothyroxine 50 MICROGram(s) Oral daily    chlorhexidine 4% Liquid 1 Application(s) Topical two times a day  dextrose 5%. 1000 milliLiter(s) IV Continuous <Continuous>  epoetin rocky Injectable 65112 Unit(s) SubCutaneous <User Schedule>  gentamicin 0.1% Cream 1 Application(s) Topical <User Schedule> PRN  multivitamin 1 Tablet(s) Oral daily  sodium chloride 0.9%. 500 milliLiter(s) IV Continuous <Continuous>    REVIEW OF SYSTEMS:    CONSTITUTIONAL: No weakness, fevers or chills  EYES/ENT: No visual changes;  No vertigo or throat pain   NECK: No pain or stiffness  RESPIRATORY: No cough, wheezing, hemoptysis; No shortness of breath  CARDIOVASCULAR: No chest pain or palpitations  GASTROINTESTINAL: No abdominal or epigastric pain. No nausea, vomiting, or hematemesis; No diarrhea or constipation. No melena or hematochezia.  GENITOURINARY: No dysuria, frequency or hematuria  NEUROLOGICAL: No numbness or weakness  SKIN: No itching, rashes    PHYSICAL EXAM:  General: NAD  Cardiovascular: Normal S1 S2, No JVD, No murmurs, No edema  Respiratory: Lungs clear to auscultation	  Gastrointestinal:  Soft, Non-tender, + BS	  Skin: warm and dry, No rashes, No ecchymoses, No cyanosis	  Extremities: Normal range of motion, No clubbing, cyanosis or edema  Vascular: Peripheral pulses palpable 2+ bilaterally    LABS:	 	    CBC Full  -  ( 04 May 2019 04:35 )  WBC Count : 5.59 K/uL  Hemoglobin : 9.5 g/dL  Hematocrit : 30.7 %  Platelet Count - Automated : 139 K/uL  Mean Cell Volume : 85.5 fL  Mean Cell Hemoglobin : 26.5 pg  Mean Cell Hemoglobin Concentration : 30.9 %  Auto Neutrophil # : 3.02 K/uL  Auto Lymphocyte # : 1.31 K/uL  Auto Monocyte # : 0.87 K/uL  Auto Eosinophil # : 0.33 K/uL  Auto Basophil # : 0.03 K/uL  Auto Neutrophil % : 54.1 %  Auto Lymphocyte % : 23.4 %  Auto Monocyte % : 15.6 %  Auto Eosinophil % : 5.9 %  Auto Basophil % : 0.5 %    05-04    133<L>  |  89<L>  |  67<H>  ----------------------------<  198<H>  4.3   |  20<L>  |  9.30<H>  05-03    135  |  89<L>  |  72<H>  ----------------------------<  180<H>  4.1   |  22  |  10.05<H>    Ca    9.9      04 May 2019 04:35  Ca    10.6<H>      03 May 2019 13:42  Phos  7.0     05-04  Phos  7.0     05-03  Mg     1.7     05-04  Mg     1.9     05-03    TPro  6.4  /  Alb  3.3  /  TBili  0.2  /  DBili  x   /  AST  32  /  ALT  21  /  AlkPhos  59  05-04  TPro  6.6  /  Alb  3.5  /  TBili  0.2  /  DBili  x   /  AST  24  /  ALT  21  /  AlkPhos  63  05-03    < from: TTE with Doppler (w/Cont) (19 @ 10:08) >  effusion.  ------------------------------------------------------------------------  CONCLUSIONS:  1. Moderate-severe mitral regurgitation.  2. Moderate left ventricular enlargement.  3. Severe global left ventricular systolic dysfunction.  Endocardial visualization enhanced with intravenous  injection of echo contrast (Definity). No left ventricular  thrombus.  4. Mild diastolic dysfunction (Stage I).    < end of copied text >  < from: Cardiac Cath Lab - Adult (19 @ 16:57) >  DIAGNOSTIC RECOMMENDATIONS: IV fluids, dialyze peritoneal as per filling  pressures. Ovverall prognosis is guarded.  Prepared and signed by  Edin Jennings M.D.  Signed 2019 17:44:58  HEMODYNAMIC TABLES  Pressures:  Baseline  Pressures:  - HR: 107  Pressures:  - Rhythm:  Pressures:  -- Pulmonary Artery (S/D/M): 16  Pressures:  -- Pulmonary Capillary Wedge:   Pressures:  -- Right Ventricle (s/edp): 18//--  O2 Sats:  Baseline  O2 Sats:  - HR: 107  O2 Sats:  - Rhythm:  O2 Sats:  -- AO: 9.1/100/12.38  O2 Sats:  -- PA: 9.1/49.1/6.08  Outputs:  Baseline  Outputs:  -- CALCULATIONS[age]  Outputs:  -- CALCULATIONS: Body Surface Area: 1.57  Outputs:  -- CALCULATIONS: Height in cm: 152.00  Outputs:  -- CALCULATIONS: Sex: Female  Outputs:  -- CALCULATIONS: Weight in k.50  Outputs:  -- OUTPUTS: CO by Rowena: 3.11    < end of copied text >

## 2019-05-05 ENCOUNTER — TRANSCRIPTION ENCOUNTER (OUTPATIENT)
Age: 74
End: 2019-05-05

## 2019-05-05 LAB
ANION GAP SERPL CALC-SCNC: 24 MMO/L — HIGH (ref 7–14)
BASE EXCESS BLDV CALC-SCNC: 0.4 MMOL/L — SIGNIFICANT CHANGE UP
BASOPHILS # BLD AUTO: 0.02 K/UL — SIGNIFICANT CHANGE UP (ref 0–0.2)
BASOPHILS NFR BLD AUTO: 0.4 % — SIGNIFICANT CHANGE UP (ref 0–2)
BLOOD GAS VENOUS - CREATININE: 10.2 MG/DL — HIGH (ref 0.5–1.3)
BUN SERPL-MCNC: 63 MG/DL — HIGH (ref 7–23)
CALCIUM SERPL-MCNC: 9.6 MG/DL — SIGNIFICANT CHANGE UP (ref 8.4–10.5)
CHLORIDE BLDV-SCNC: 96 MMOL/L — SIGNIFICANT CHANGE UP (ref 96–108)
CHLORIDE SERPL-SCNC: 89 MMOL/L — LOW (ref 98–107)
CO2 SERPL-SCNC: 20 MMOL/L — LOW (ref 22–31)
CREAT SERPL-MCNC: 9.27 MG/DL — HIGH (ref 0.5–1.3)
EOSINOPHIL # BLD AUTO: 0.37 K/UL — SIGNIFICANT CHANGE UP (ref 0–0.5)
EOSINOPHIL NFR BLD AUTO: 6.7 % — HIGH (ref 0–6)
GAS PNL BLDV: 128 MMOL/L — LOW (ref 136–146)
GLUCOSE BLDC GLUCOMTR-MCNC: 128 MG/DL — HIGH (ref 70–99)
GLUCOSE BLDC GLUCOMTR-MCNC: 143 MG/DL — HIGH (ref 70–99)
GLUCOSE BLDC GLUCOMTR-MCNC: 160 MG/DL — HIGH (ref 70–99)
GLUCOSE BLDC GLUCOMTR-MCNC: 216 MG/DL — HIGH (ref 70–99)
GLUCOSE BLDV-MCNC: 101 MG/DL — HIGH (ref 70–99)
GLUCOSE SERPL-MCNC: 101 MG/DL — HIGH (ref 70–99)
HCO3 BLDV-SCNC: 24 MMOL/L — SIGNIFICANT CHANGE UP (ref 20–27)
HCT VFR BLD CALC: 28.3 % — LOW (ref 34.5–45)
HCT VFR BLDV CALC: 27.7 % — LOW (ref 34.5–45)
HGB BLD-MCNC: 8.5 G/DL — LOW (ref 11.5–15.5)
HGB BLDV-MCNC: 8.9 G/DL — LOW (ref 11.5–15.5)
IMM GRANULOCYTES NFR BLD AUTO: 0.9 % — SIGNIFICANT CHANGE UP (ref 0–1.5)
LACTATE BLDV-MCNC: 1.6 MMOL/L — SIGNIFICANT CHANGE UP (ref 0.5–2)
LYMPHOCYTES # BLD AUTO: 1.57 K/UL — SIGNIFICANT CHANGE UP (ref 1–3.3)
LYMPHOCYTES # BLD AUTO: 28.5 % — SIGNIFICANT CHANGE UP (ref 13–44)
MAGNESIUM SERPL-MCNC: 2 MG/DL — SIGNIFICANT CHANGE UP (ref 1.6–2.6)
MCHC RBC-ENTMCNC: 25.8 PG — LOW (ref 27–34)
MCHC RBC-ENTMCNC: 30 % — LOW (ref 32–36)
MCV RBC AUTO: 85.8 FL — SIGNIFICANT CHANGE UP (ref 80–100)
MONOCYTES # BLD AUTO: 0.88 K/UL — SIGNIFICANT CHANGE UP (ref 0–0.9)
MONOCYTES NFR BLD AUTO: 16 % — HIGH (ref 2–14)
NEUTROPHILS # BLD AUTO: 2.62 K/UL — SIGNIFICANT CHANGE UP (ref 1.8–7.4)
NEUTROPHILS NFR BLD AUTO: 47.5 % — SIGNIFICANT CHANGE UP (ref 43–77)
NRBC # FLD: 0.03 K/UL — SIGNIFICANT CHANGE UP (ref 0–0)
PCO2 BLDV: 45 MMHG — SIGNIFICANT CHANGE UP (ref 41–51)
PH BLDV: 7.37 PH — SIGNIFICANT CHANGE UP (ref 7.32–7.43)
PLATELET # BLD AUTO: 115 K/UL — LOW (ref 150–400)
PMV BLD: 10.4 FL — SIGNIFICANT CHANGE UP (ref 7–13)
PO2 BLDV: 30 MMHG — LOW (ref 35–40)
POTASSIUM BLDV-SCNC: 3.6 MMOL/L — SIGNIFICANT CHANGE UP (ref 3.4–4.5)
POTASSIUM SERPL-MCNC: 3.9 MMOL/L — SIGNIFICANT CHANGE UP (ref 3.5–5.3)
POTASSIUM SERPL-SCNC: 3.9 MMOL/L — SIGNIFICANT CHANGE UP (ref 3.5–5.3)
RBC # BLD: 3.3 M/UL — LOW (ref 3.8–5.2)
RBC # FLD: 17.2 % — HIGH (ref 10.3–14.5)
SAO2 % BLDV: 43.3 % — LOW (ref 60–85)
SODIUM SERPL-SCNC: 133 MMOL/L — LOW (ref 135–145)
WBC # BLD: 5.51 K/UL — SIGNIFICANT CHANGE UP (ref 3.8–10.5)
WBC # FLD AUTO: 5.51 K/UL — SIGNIFICANT CHANGE UP (ref 3.8–10.5)

## 2019-05-05 RX ORDER — INSULIN LISPRO 100/ML
VIAL (ML) SUBCUTANEOUS AT BEDTIME
Qty: 0 | Refills: 0 | Status: DISCONTINUED | OUTPATIENT
Start: 2019-05-05 | End: 2019-05-09

## 2019-05-05 RX ORDER — ALLOPURINOL 300 MG
300 TABLET ORAL DAILY
Qty: 0 | Refills: 0 | Status: DISCONTINUED | OUTPATIENT
Start: 2019-05-05 | End: 2019-05-09

## 2019-05-05 RX ADMIN — CHLORHEXIDINE GLUCONATE 1 APPLICATION(S): 213 SOLUTION TOPICAL at 12:05

## 2019-05-05 RX ADMIN — HEPARIN SODIUM 5000 UNIT(S): 5000 INJECTION INTRAVENOUS; SUBCUTANEOUS at 21:55

## 2019-05-05 RX ADMIN — SEVELAMER CARBONATE 2400 MILLIGRAM(S): 2400 POWDER, FOR SUSPENSION ORAL at 12:01

## 2019-05-05 RX ADMIN — Medication 2 UNIT(S): at 17:52

## 2019-05-05 RX ADMIN — HEPARIN SODIUM 5000 UNIT(S): 5000 INJECTION INTRAVENOUS; SUBCUTANEOUS at 15:00

## 2019-05-05 RX ADMIN — Medication 10 MILLIGRAM(S): at 17:54

## 2019-05-05 RX ADMIN — Medication 50 MICROGRAM(S): at 05:27

## 2019-05-05 RX ADMIN — SEVELAMER CARBONATE 2400 MILLIGRAM(S): 2400 POWDER, FOR SUSPENSION ORAL at 08:18

## 2019-05-05 RX ADMIN — CLOPIDOGREL BISULFATE 75 MILLIGRAM(S): 75 TABLET, FILM COATED ORAL at 12:02

## 2019-05-05 RX ADMIN — Medication 2 UNIT(S): at 08:22

## 2019-05-05 RX ADMIN — Medication 2: at 17:52

## 2019-05-05 RX ADMIN — HEPARIN SODIUM 5000 UNIT(S): 5000 INJECTION INTRAVENOUS; SUBCUTANEOUS at 05:27

## 2019-05-05 RX ADMIN — ATORVASTATIN CALCIUM 40 MILLIGRAM(S): 80 TABLET, FILM COATED ORAL at 21:55

## 2019-05-05 RX ADMIN — Medication 1 TABLET(S): at 12:01

## 2019-05-05 RX ADMIN — INSULIN GLARGINE 5 UNIT(S): 100 INJECTION, SOLUTION SUBCUTANEOUS at 21:55

## 2019-05-05 RX ADMIN — Medication 81 MILLIGRAM(S): at 12:04

## 2019-05-05 RX ADMIN — Medication 2 UNIT(S): at 12:00

## 2019-05-05 NOTE — PROGRESS NOTE ADULT - PROBLEM SELECTOR PLAN 3
-continue labetolol 100 daily and hydral 10 tid HOLD Diuretic therapy -continue labetolol 200 tid and hold hydralazine for now.

## 2019-05-05 NOTE — DISCHARGE NOTE PROVIDER - CARE PROVIDER_API CALL
Edin Jennings (MD)  Cardiovascular Disease; Internal Medicine; Interventional Cardiology  [address], [address]  [place] [address]  Phone: [phone]  Fax: [phone]  Follow Up Time: Edin Jennings (MD)  Cardiovascular Disease; Internal Medicine; Interventional Cardiology  [address], [address]  [place] [address]  Phone: [phone]  Fax: [phone]  Follow Up Time:     Joel Hinkle (MD)  Internal Medicine; Nephrology  [address]  [place] [address]  Phone: [phone]  Fax: [phone]  Follow Up Time:     Talat Abreu ()  Internal Medicine  [address]  [place] [address]  Phone: [phone]  Fax: [phone]  Follow Up Time: Edin Jennings (MD)  Cardiovascular Disease; Internal Medicine; Interventional Cardiology  [address], [address]  [place] [address]  Phone: [phone]  Fax: [phone]  Follow Up Time:     Joel Hinkle)  Internal Medicine; Nephrology  [address]  [place] [address]  Phone: [phone]  Fax: [phone]  Follow Up Time:

## 2019-05-05 NOTE — PROGRESS NOTE ADULT - ASSESSMENT
HPI: 73F PMH ESRD on peritoneal dialysis for the last 2 years, CHF (EF 46%, w/PAH), CAD (2 stents?), PPM, HTN, HLD, T2DM (not on insulin), and hypothyroidism p/w progressive shortness of breath ans orthopnea, noted to be fluid overloaded, nephrology consulted peritoneal dialysis initiated urgently. Patient was admitted to telemetry. Echo revealed a worsening EF now 21% from 46%. Interrogation revealed undersensing of the atria and lower than expected BiV CRT pacing. Optivol also revealed worsening heart failure decompensation at the time the atrial sensing and pacing percentage decreased from 99% to 88%. LHC on 5/1 showed LM 99%, prox LCx 90%, % with patent LIMA to mLAD. Cardiac viability study on 5/2 showed large, moderate to severe defects that were predominantly reversible. Planning on transfer to [facility] for high risk PCI.

## 2019-05-05 NOTE — PROGRESS NOTE ADULT - PROBLEM SELECTOR PLAN 1
-Plan is for patient to transfer to [facility] for high risk PCI on Monday  -SWAN reveals a CVP  and PA  and wedge of -Plan is for patient to transfer to [facility] for high risk PCI on Monday  -SWAN reveals a CVP 12 and PA 23/12    -patient approaching euvolemia, IVF held, no diuretics, encouraging PO intake PA pressure 23-12, CVP is 13-15, wedge is 12-13  -Plan is for patient to transfer to [facility] for high risk PCI on Monday  -SWAN reveals a CVP 12 and PA 23/12    -patient approaching euvolemia, IVF held, no diuretics, encouraging PO intake

## 2019-05-05 NOTE — DISCHARGE NOTE PROVIDER - NSDCCAREPROVSEEN_GEN_ALL_CORE_FT
Dick, Talat Hong Ryan Arias  Armando Wong Weeks  Osvaldo Sevillaorbmalik Delshadfar  Jeri Jennings  [facility]  Tammi Prattsim  Nito Pozoi  Alyssa Gr  Kailyn Cliffpo  Courtney Avina Esmeral  Jean-Paul Kendrick

## 2019-05-05 NOTE — DISCHARGE NOTE PROVIDER - CARE PROVIDERS DIRECT ADDRESSES
,DirectAddress_Unknown ,DirectAddress_Unknown,DirectAddress_Unknown,DirectAddress_Unknown ,DirectAddress_Unknown,DirectAddress_Unknown

## 2019-05-05 NOTE — PROGRESS NOTE ADULT - PROBLEM SELECTOR PLAN 1
-Patient is s/p [facility] with known severe CAD, s/p a cardiac viability study, plan is for a high risk PCI at [facility] on Monday  -SWAN is revealing wedge of 9 and PA pressures are 22/10. CVP of 6 at this time.  -will continue to monitor

## 2019-05-05 NOTE — DISCHARGE NOTE PROVIDER - PROVIDER TOKENS
PROVIDER:[TOKEN:[[id number]]] PROVIDER:[TOKEN:[[id number]]],PROVIDER:[TOKEN:[[id number]]],PROVIDER:[TOKEN:[[id number]]] PROVIDER:[TOKEN:[[id number]]],PROVIDER:[TOKEN:[[id number]]]

## 2019-05-05 NOTE — DISCHARGE NOTE PROVIDER - NSDCCPCAREPLAN_GEN_ALL_CORE_FT
PRINCIPAL DISCHARGE DIAGNOSIS  Diagnosis: Ischemic cardiomyopathy  Assessment and Plan of Treatment: Patient has known severe CAD which will require revasularization, high risk PCI. Patient is to be intervened on at [facility]. PRINCIPAL DISCHARGE DIAGNOSIS  Diagnosis: Ischemic cardiomyopathy  Assessment and Plan of Treatment: Patient has known severe CAD which will require revasularization, high risk PCI. Patient is to be intervened on at [facility].      SECONDARY DISCHARGE DIAGNOSES  Diagnosis: Anemia  Assessment and Plan of Treatment: Follow with your PMD as outpatient. Monitor your H&H levels,    Diagnosis: ESRD on peritoneal dialysis  Assessment and Plan of Treatment: ESRD on peritoneal dialysis  Patient continues on PD dialysis  Follow with your PMD    Diagnosis: Diabetes mellitus  Assessment and Plan of Treatment: Diabetes mellitus  HGBA1c is 9.8, continue fingersticks and follow DASH diet, low sugar and constant carbohydrates    Diagnosis: Hypertension  Assessment and Plan of Treatment: Low sodium and fat diet, continue anti-hypertensive medications, and follow up with primary care physician.      Diagnosis: Dyslipidemia  Assessment and Plan of Treatment: Low fat diet, exercise daily and continue current medications. Follow up with primary care physician and cardiologist for management.  continue Lipitor      Diagnosis: Diabetes mellitus  Assessment and Plan of Treatment: Diabetes mellitus  HGBA1c is 9.8, continue fingersticks and follow DASH diet, low sugar and constant carbohydrates PRINCIPAL DISCHARGE DIAGNOSIS  Diagnosis: Ischemic cardiomyopathy  Assessment and Plan of Treatment: Patient has known severe CAD which will require revasularization, high risk PCI. Patient is to be intervened on at [facility].      SECONDARY DISCHARGE DIAGNOSES  Diagnosis: Anemia  Assessment and Plan of Treatment: Follow with your PMD as outpatient. Monitor your H&H levels,    Diagnosis: ESRD on peritoneal dialysis  Assessment and Plan of Treatment: ESRD on peritoneal dialysis  Patient continues on PD dialysis  Follow with your PMD    Diagnosis: Diabetes mellitus  Assessment and Plan of Treatment: Diabetes mellitus  HGBA1c is 9.8, continue fingersticks and follow DASH diet, low sugar and constant carbohydrates    Diagnosis: Hypertension  Assessment and Plan of Treatment: Low sodium and fat diet, continue anti-hypertensive medications, and follow up with primary care physician.      Diagnosis: Dyslipidemia  Assessment and Plan of Treatment: Low fat diet, exercise daily and continue current medications. Follow up with primary care physician and cardiologist for management.  continue Lipitor      Diagnosis: Chronic systolic congestive heart failure  Assessment and Plan of Treatment: Low salt diet, fluid restriction to 1500 ml daily, monitor your fluid intake and weight daily, exercise as tolerated 30 minutes daily, and follow up with your physician within 1 to 2 weeks.

## 2019-05-05 NOTE — PROGRESS NOTE ADULT - SUBJECTIVE AND OBJECTIVE BOX
Date of Admission:  4/23/19  24 hour events:  No overnight events  Vital Signs Last 24 Hrs  T(C): 36.9 (05 May 2019 04:00), Max: 37.1 (05 May 2019 00:00)  T(F): 98.4 (05 May 2019 04:00), Max: 98.8 (05 May 2019 00:00)  HR: 81 (05 May 2019 06:00) (70 - 99)  BP: 102/65 (05 May 2019 06:00) (80/56 - 120/69)  BP(mean): 74 (05 May 2019 06:00) (61 - 88)  RR: 23 (05 May 2019 06:00) (11 - 25)  SpO2: 100% (05 May 2019 06:00) (98% - 100%)  I&O's Summary    04 May 2019 07:01  -  05 May 2019 07:00  --------------------------------------------------------  IN: 6200 mL / OUT: 9100 mL / NET: -2900 mL    MEDICATIONS:  aspirin enteric coated 81 milliGRAM(s) Oral daily  clopidogrel Tablet 75 milliGRAM(s) Oral daily  heparin  Injectable 5000 Unit(s) SubCutaneous every 8 hours  hydrALAZINE 10 milliGRAM(s) Oral every 12 hours  labetalol 100 milliGRAM(s) Oral daily  benzonatate 100 milliGRAM(s) Oral once  acetaminophen   Tablet .. 650 milliGRAM(s) Oral every 6 hours PRN  docusate sodium 100 milliGRAM(s) Oral three times a day  loperamide 2 milliGRAM(s) Oral four times a day PRN  atorvastatin 40 milliGRAM(s) Oral at bedtime  dextrose 40% Gel 15 Gram(s) Oral once PRN  dextrose 50% Injectable 12.5 Gram(s) IV Push once  dextrose 50% Injectable 25 Gram(s) IV Push once  dextrose 50% Injectable 25 Gram(s) IV Push once  glucagon  Injectable 1 milliGRAM(s) IntraMuscular once PRN  insulin glargine Injectable (LANTUS) 5 Unit(s) SubCutaneous at bedtime  insulin lispro (HumaLOG) corrective regimen sliding scale   SubCutaneous three times a day before meals  insulin lispro Injectable (HumaLOG) 2 Unit(s) SubCutaneous three times a day before meals  levothyroxine 50 MICROGram(s) Oral daily  chlorhexidine 4% Liquid 1 Application(s) Topical two times a day  dextrose 5%. 1000 milliLiter(s) IV Continuous <Continuous>  epoetin rocky Injectable 43875 Unit(s) SubCutaneous <User Schedule>  gentamicin 0.1% Cream 1 Application(s) Topical <User Schedule> PRN  multivitamin 1 Tablet(s) Oral daily  sodium chloride 0.9%. 500 milliLiter(s) IV Continuous <Continuous>      REVIEW OF SYSTEMS:    CONSTITUTIONAL: No weakness, fevers or chills  EYES/ENT: No visual changes;  No vertigo or throat pain   NECK: No pain or stiffness  RESPIRATORY: endorses shortness of breath  CARDIOVASCULAR: No chest pain or palpitations  GASTROINTESTINAL: No abdominal or epigastric pain. No nausea, vomiting, or hematemesis; No diarrhea or constipation. No melena or hematochezia.  GENITOURINARY: No dysuria, frequency or hematuria  NEUROLOGICAL: No numbness or weakness  SKIN: No itching, rashes      PHYSICAL EXAM:  General: NAD  Cardiovascular: Normal S1 S2, No JVD, No murmurs, No edema  Respiratory: Lungs clear to auscultation	  Gastrointestinal:  Soft, Non-tender, + BS	  Skin: warm and dry, No rashes, No ecchymoses, No cyanosis	  Extremities: Normal range of motion, No clubbing, cyanosis or edema  Vascular: Peripheral pulses palpable 2+ bilaterally    LABS:	 	    CBC Full  -  ( 05 May 2019 04:01 )  WBC Count : 5.51 K/uL  Hemoglobin : 8.5 g/dL  Hematocrit : 28.3 %  Platelet Count - Automated : 115 K/uL  Mean Cell Volume : 85.8 fL  Mean Cell Hemoglobin : 25.8 pg  Mean Cell Hemoglobin Concentration : 30.0 %  Auto Neutrophil # : 2.62 K/uL  Auto Lymphocyte # : 1.57 K/uL  Auto Monocyte # : 0.88 K/uL  Auto Eosinophil # : 0.37 K/uL  Auto Basophil # : 0.02 K/uL  Auto Neutrophil % : 47.5 %  Auto Lymphocyte % : 28.5 %  Auto Monocyte % : 16.0 %  Auto Eosinophil % : 6.7 %  Auto Basophil % : 0.4 %    05-05    133<L>  |  89<L>  |  63<H>  ----------------------------<  101<H>  3.9   |  20<L>  |  9.27<H>  05-04    133<L>  |  89<L>  |  67<H>  ----------------------------<  198<H>  4.3   |  20<L>  |  9.30<H>    Ca    9.6      05 May 2019 04:01  Ca    9.9      04 May 2019 04:35  Phos  7.0     05-04  Phos  7.0     05-03  Mg     2.0     05-05  Mg     1.7     05-04    TPro  6.4  /  Alb  3.3  /  TBili  0.2  /  DBili  x   /  AST  32  /  ALT  21  /  AlkPhos  59  05-04  TPro  6.6  /  Alb  3.5  /  TBili  0.2  /  DBili  x   /  AST  24  /  ALT  21  /  AlkPhos  63  05-03

## 2019-05-05 NOTE — DISCHARGE NOTE PROVIDER - HOSPITAL COURSE
HPI: 73F PMH ESRD on peritoneal dialysis for the last 2 years, CHF (EF 46%, w/PAH), CAD (2 stents?), PPM, HTN, HLD, T2DM (not on insulin), and hypothyroidism p/w progressive shortness of breath ans orthopnea, noted to be fluid overloaded, nephrology consulted peritoneal dialysis initiated urgently. Patient was admitted to telemetry. Echo revealed a worsening EF now 21% from 46%. Interrogation revealed undersensing of the atria and lower than expected BiV CRT pacing. Optivol also revealed worsening heart failure decompensation at the time the atrial sensing and pacing percentage decreased from 99% to 88%. LHC on 5/1 showed LM 99%, prox LCx 90%, % with patent LIMA to mLAD. Cardiac viability study on 5/2 showed large, moderate to severe defects that were predominantly reversible. Planning on transfer to [facility] for high risk PCI. HPI: 73F PMH ESRD on peritoneal dialysis for the last 2 years, CHF (EF 46%, w/PAH), CAD (2 stents?), PPM, HTN, HLD, T2DM (not on insulin), and hypothyroidism p/w progressive shortness of breath ans orthopnea, noted to be fluid overloaded, nephrology consulted peritoneal dialysis initiated urgently. Patient was admitted to telemetry. Echo revealed a worsening EF now 21% from 46%. Interrogation revealed undersensing of the atria and lower than expected BiV CRT pacing. Optivol also revealed worsening heart failure decompensation at the time the atrial sensing and pacing percentage decreased from 99% to 88%. LHC on 5/1 showed LM 99%, prox LCx 90%, % with patent LIMA to mLAD. Cardiac viability study on 5/2 showed large, moderate to severe defects that were predominantly reversible. Planning on transfer to [facility] for high risk PCI.        · Assessment	    Problem/Plan - 1:    ·  Problem: Shortness of breath , improved      pt seems to be dependant on O2 via NC :: likely will need O2 to go home, if not going to rehab     nephrology follow up and mgmt appreciated     cardio appreciated     fluid mgmt via dialysis     anemia better post PRBC     PT / OOB as able          Problem/Plan - 2:    ·  Problem: ESRD on peritoneal dialysis.      nephro follow up and management appreciated     dialysis as above     monitor and treat hyponatremia per renal     procrit per renal for anemia likely of chronic disease          Problem/Plan - 3:    ·  Problem: Chronic systolic congestive heart failure, with worsening LV function     medical  / conservative mgmt as BP allows     continue medical mgmt otherwise          Problem/Plan - 4:    ·  Problem: Dyslipidemia.      Continue home statin.          Problem/Plan - 5:    Problem: Type 2 diabetes mellitus     Insulin sliding scale        --------------------------------------------        Tests:    5/7: CVP 7-11, PD today. Planning against PCI. D/C'd R IJ lenin catheter. Started toprol 25 XL & lisinopril 5, D/C'd labetalol & hydralazine. PT consult. Sent anemia work-up.        RESULTS:        4/25 echo Ef 21% Moderate-severe mitral regurgitation.    2. Moderate left ventricular enlargement.3. Severe global left ventricular systolic dysfunction.Endocardial visualization enhanced with intravenousinjection of echo contrast (Definity). No left ventricularthrombus.4. Mild diastolic dysfunction (Stage I).    5. Normal right ventricular size and function        4/26  Nuclear Stress Test-Pharmacologic -    * There are large, moderate to severe defects in anterior and anteroseptal, anterolateral, apical, inferior and inferolateral, lateral walls that are fixed, suggestive of infarct. There is a mild to moderate amount of salvatore-infarct ischemia in the anterior wall, extending into    the anteroapex. * Post-stress gated wall motion analysis was performed (LVEF = 20 %;LVEDV = 146 ml.)        5/1/19 Cardiac cath : LHC dLM 99%, LIMA LAD patent with collaterals, LCx 85%. RHC PA sat 54%, Wedge 8-9. EDP 12. RFA/V access. Needs viability study        5/2 Cardiac Viability study: Abnormal Study. Myocardial Perfusion SPECT results are abnormal. Review of raw data shows: The study is of good technical    quality.  The left ventricle was normal in size. There are large, moderate to severe defects in anterior and anteroseptal, anterolateral, apical, inferior and inferolateral, lateral walls that are predominantly reversible, indicating significant myocardial viability * Post-stress gated wall motion analysis was performed LVEF = 13 %;LVEDV = 137 ml.), revealing severe overall hypokinesis.        5/3 RHC RA 2, RV 20/12, PA 20/6, PCW 6, PAsat 49%, CO 3.11, CI 1.98; swan ganx cathter in place [facility] HPI: 73F PMH ESRD on peritoneal dialysis for the last 2 years, CHF (EF 46%, w/PAH), CAD (2 stents?), PPM, HTN, HLD, T2DM (not on insulin), and hypothyroidism p/w progressive shortness of breath ans orthopnea, noted to be fluid overloaded, nephrology consulted peritoneal dialysis initiated urgently. Patient was admitted to telemetry. Echo revealed a worsening EF now 21% from 46%. Interrogation revealed undersensing of the atria and lower than expected BiV CRT pacing. Optivol also revealed worsening heart failure decompensation at the time the atrial sensing and pacing percentage decreased from 99% to 88%. LHC on 5/1 showed LM 99%, prox LCx 90%, % with patent LIMA to mLAD. Cardiac viability study on 5/2 showed large, moderate to severe defects that were predominantly reversible. Planning on transfer to [facility] for high risk PCI.        · Assessment	    Problem/Plan - 1:    ·  Problem: Shortness of breath , improved      pt seems to be dependant on O2 via NC :: likely will need O2 to go home, if not going to rehab     nephrology follow up and mgmt appreciated     cardio appreciated     fluid mgmt via dialysis     anemia better post PRBC     PT / OOB as able          Problem/Plan - 2:    ·  Problem: ESRD on peritoneal dialysis.      nephro follow up and management appreciated     dialysis as above     monitor and treat hyponatremia per renal     procrit per renal for anemia likely of chronic disease          Problem/Plan - 3:    ·  Problem: Chronic systolic congestive heart failure, with worsening LV function     medical  / conservative mgmt as BP allows     continue medical mgmt otherwise          Problem/Plan - 4:    ·  Problem: Dyslipidemia.      Continue home statin.          Problem/Plan - 5:    Problem: Type 2 diabetes mellitus     Insulin sliding scale        --------------------------------------------        Tests:    5/7: CVP 7-11, PD today. Planning against PCI. D/C'd R IJ lenin catheter. Started toprol 25 XL & lisinopril 5, D/C'd labetalol & hydralazine. PT consult. Sent anemia work-up.        RESULTS:        4/25 echo Ef 21% Moderate-severe mitral regurgitation.    2. Moderate left ventricular enlargement.3. Severe global left ventricular systolic dysfunction.Endocardial visualization enhanced with intravenousinjection of echo contrast (Definity). No left ventricularthrombus.4. Mild diastolic dysfunction (Stage I).    5. Normal right ventricular size and function        4/26  Nuclear Stress Test-Pharmacologic -    * There are large, moderate to severe defects in anterior and anteroseptal, anterolateral, apical, inferior and inferolateral, lateral walls that are fixed, suggestive of infarct. There is a mild to moderate amount of salvatore-infarct ischemia in the anterior wall, extending into    the anteroapex. * Post-stress gated wall motion analysis was performed (LVEF = 20 %;LVEDV = 146 ml.)        5/1/19 Cardiac cath : LHC dLM 99%, LIMA LAD patent with collaterals, LCx 85%. RHC PA sat 54%, Wedge 8-9. EDP 12. RFA/V access. Needs viability study        5/2 Cardiac Viability study: Abnormal Study. Myocardial Perfusion SPECT results are abnormal. Review of raw data shows: The study is of good technical    quality.  The left ventricle was normal in size. There are large, moderate to severe defects in anterior and anteroseptal, anterolateral, apical, inferior and inferolateral, lateral walls that are predominantly reversible, indicating significant myocardial viability * Post-stress gated wall motion analysis was performed LVEF = 13 %;LVEDV = 137 ml.), revealing severe overall hypokinesis.        5/3 RHC RA 2, RV 20/12, PA 20/6, PCW 6, PAsat 49%, CO 3.11, CI 1.98; swan ganx cathter in place [facility]

## 2019-05-05 NOTE — PROGRESS NOTE ADULT - ASSESSMENT
_________________________________________________________________________________________  ========>>  M E D I C A L   A T T E N D I N G    F O L L O W  U P  N O T E  <<=========  -----------------------------------------------------------------------------------------------------    - Patient seen and examined by me earlier today.   - In summary,  KAPIL CAMPOVERDE is a 74y year old woman who originally presented with SOB  - Patient today overall doing ok, comfortable, eating ok , CP free     ==================>> REVIEW OF SYSTEM <<=================    GEN: no fever, no chills, no pain  RESP: no SOB at rest , no cough, no sputum  CVS: no chest pain, no palpitations, no edema  GI: no abdominal pain, no nausea, no diarrhea today  : no dysuria, no frequency, no hematuria  Neuro: no headache, no dizziness today reported   Derm : no itching, no rash    ==================>> PHYSICAL EXAM <<=================    GEN: A&O X 3 , NAD , comfortable, sitting at bedside   HEENT: NCAT, PERRL, MMM, hearing intact, on nasal canula   Neck: supple , no JVD  CVS: S1S2 , regular , No M/R/G appreciated  PULM: CTA B/L,  no W/R/R appreciated  ABD.: soft. non tender, non distended,  bowel sounds present  Extrem: intact pulses , no edema   PSYCH : normal mood,  not anxious       ==================>> MEDICATIONS <<====================    allopurinol 300 milliGRAM(s) Oral daily  aspirin enteric coated 81 milliGRAM(s) Oral daily  atorvastatin 40 milliGRAM(s) Oral at bedtime  benzonatate 100 milliGRAM(s) Oral once  chlorhexidine 4% Liquid 1 Application(s) Topical <User Schedule>  clopidogrel Tablet 75 milliGRAM(s) Oral daily  dextrose 5%. 1000 milliLiter(s) IV Continuous <Continuous>  dextrose 50% Injectable 12.5 Gram(s) IV Push once  dextrose 50% Injectable 25 Gram(s) IV Push once  dextrose 50% Injectable 25 Gram(s) IV Push once  docusate sodium 100 milliGRAM(s) Oral three times a day  epoetin rocky Injectable 98046 Unit(s) SubCutaneous <User Schedule>  heparin  Injectable 5000 Unit(s) SubCutaneous every 8 hours  hydrALAZINE 10 milliGRAM(s) Oral every 12 hours  insulin glargine Injectable (LANTUS) 5 Unit(s) SubCutaneous at bedtime  insulin lispro (HumaLOG) corrective regimen sliding scale   SubCutaneous three times a day before meals  insulin lispro Injectable (HumaLOG) 2 Unit(s) SubCutaneous three times a day before meals  labetalol 100 milliGRAM(s) Oral daily  levothyroxine 50 MICROGram(s) Oral daily  multivitamin 1 Tablet(s) Oral daily  sevelamer carbonate 2400 milliGRAM(s) Oral three times a day with meals  sodium chloride 0.9%. 500 milliLiter(s) IV Continuous <Continuous>    MEDICATIONS  (PRN):  acetaminophen   Tablet .. 650 milliGRAM(s) Oral every 6 hours PRN Temp greater or equal to 38C (100.4F), Mild Pain (1 - 3)  dextrose 40% Gel 15 Gram(s) Oral once PRN Blood Glucose LESS THAN 70 milliGRAM(s)/deciliter  gentamicin 0.1% Cream 1 Application(s) Topical <User Schedule> PRN Pd catheter dressing change  glucagon  Injectable 1 milliGRAM(s) IntraMuscular once PRN Glucose LESS THAN 70 milligrams/deciliter  loperamide 2 milliGRAM(s) Oral four times a day PRN Diarrhea    ==================>> VITAL SIGNS <<==================    Vital Signs Last 24 Hrs  T(C): 36.9 (05-05-19 @ 04:00)  T(F): 98.4 (05-05-19 @ 04:00), Max: 98.8 (05-05-19 @ 00:00)  HR: 86 (05-05-19 @ 14:00) (58 - 105)  BP: 127/109 (05-05-19 @ 14:00)  BP(mean): 114 (05-05-19 @ 14:00) (61 - 114)  RR: 26 (05-05-19 @ 14:00) (11 - 26)  SpO2: 100% (05-05-19 @ 14:00) (98% - 100%)      POCT Blood Glucose.: 143 mg/dL (05 May 2019 11:49)  POCT Blood Glucose.: 128 mg/dL (05 May 2019 08:03)  POCT Blood Glucose.: 146 mg/dL (04 May 2019 21:34)  POCT Blood Glucose.: 160 mg/dL (04 May 2019 17:03)     ==================>> LAB AND IMAGING <<==================                        8.5    5.51  )-----------( 115      ( 05 May 2019 04:01 )             28.3     133<L>  |  89<L>  |  63<H>  ----------------------------<  101<H>  3.9   |  20<L>  |  9.27<H>    Ca    9.6      05 May 2019 04:01  Phos  7.0     05-04  Mg     2.0     05-05    TPro  6.4  /  Alb  3.3  /  TBili  0.2  /  DBili  x   /  AST  32  /  ALT  21  /  AlkPhos  59  05-04    WBC count:   5.51 <<== ,  5.59 <<== ,  4.74 <<== ,  7.17 <<== ,  4.85 <<== ,  6.52 <<==   Hemoglobin:   8.5 <<==,  9.5 <<==,  10.4 <<==,  11.1 <<==,  11.0 <<==,  10.6 <<==  platelets:  115 <==, 139 <==, 157 <==, 157 <==, 169 <==, 179 <==, 163 <==    Creatinine:  9.27  <<==, 9.30  <<==, 10.05  <<==, 9.83  <<==, 10.01  <<==, 9.14  <<==  Sodium:   133  <==, 133  <==, 135  <==, 133  <==, 134  <==, 133  <==, 134  <==       AST:          32 <== , 24 <== , 25 <==      ALT:        21  <== , 21  <== , 21  <==      AP:        59  <=, 63  <=, 63  <=     Bili:        0.2  <=, 0.2  <=, 0.2  <=    < from: Cardiac Cath Lab - Adult (05.01.19 @ 10:20) >  CORONARY VESSELS: The coronary circulation is right dominant.  LM:   --  Distal left main: There was adiscrete 99 % stenosis at the site  of a prior stent. The lesion was complex, eccentric, and heavily  calcified. severe restnosis since stent of November 2018  LAD:   --  Ostial LAD: There was a 100 % stenosis. LIMA to mid LAD patent and fills the RCA  CX:   --  Proximal circumflex: There was a 90 % stenosis. restenosis from nov 2018  --  Mid circumflex: There was a 60 % stenosis.  RCA:   --  Proximal RCA: There was a 100 % stenosis. There was a  moderate-sized vascular territory distal to the lesion and good collateral  blood supply to the distal myocardium. This lesion progressed since November 2018  COMPLICATIONS: There were no complications.  DIAGNOSTIC IMPRESSIONS: Patient has normal to low filling pressures but low  CO and CI with severe restenosis of left main and proximal LCX complex and  calcified and progressive diseae of RCA.  DIAGNOSTIC RECOMMENDATIONS: Viability study anf if viable muscle in lateral  wall, consider high risk PTCA of left main and proximal LCX and aggressive  medical therapy for LV dysfunction,  < end of copied text >    < from: Cardiac Viability (05.02.19 @ 17:50) >  IMPRESSIONS:Abnormal Study  * Myocardial Perfusion SPECT results are abnormal.  * Review of raw data shows: The study is of good technical quality.  * The left ventricle was normal in size. There are large,  moderate to severe defects in anterior and anteroseptal,  anterolateral, apical, inferior and inferolateral, lateral  walls that are predominantly reversible, indicating  significant myocardial viability  * Post-stress gated wall motion analysis was performed  (LVEF = 13 %;LVEDV = 137 ml.), revealing severe overall hypokinesis.  ADDENDUM 5/2/2019: include tracer dose and protocol. No  change was made to study findings  ---------------------------------------------------  < end of copied text >    ___________________________________________________________________________________  ===============>>  A S S E S S M E N T   A N D   P L A N <<===============  ------------------------------------------------------------------------------------------    · Assessment		  Problem/Plan - 1:  ·  Problem: Shortness of breath ( due to fluid overload, due to ESRD) , improved with dialysis >> pt now with fluid contraction given low Rt sided pressures on RHC>> post gentle Hydration in CCU   monitor  closely  CCU and nephrology follow up and mgmt appreciated   post Lt/Rt heart cath >> showing significant and worsening of CAD and low EF  fluid mgmt via dialysis      Problem/Plan - 2:  ·  Problem: ESRD on peritoneal dialysis.    nephro follow up and mgmtt appreciated   dialysis as above   monitor and treat hyponatremia per renal      Problem/Plan - 3:  ·  Problem: acute hypoxemic respiratory failure due to pulmonary edema: resolved     pt was apparently off O2 and doing well but seen again on O2    monitor and wean off o2 as able      Problem/Plan - 4:  ·  Problem: Chronic systolic congestive heart failure, with worsening LV function   pt now off Dobutamine drip due to hypovolemia and hypotension   plan was for high risk PCI but pt and cardio seem to be opting for medical  / conservative mgmt : will follow   continue medical mgmt otherwise   pt off ACEI / ARB due to recorded allergy      Problem/Plan - 5:  ·  Problem: Dyslipidemia.    Continue home statin.      Problem/Plan - 6:  Problem: Type 2 diabetes mellitus   Insulin sliding scale    -GI/DVT Prophylaxis.    --------------------------------------------  Case discussed with pt  Education given on findings and plan of care  ___________________________  H. MARISOL Abreu.  Pager: [phone]  `

## 2019-05-05 NOTE — PROGRESS NOTE ADULT - SUBJECTIVE AND OBJECTIVE BOX
Patient seen and examined at bedside. No chest pain/sob    VITALS:  T(F): 98.4 (05-05-19 @ 04:00), Max: 98.8 (05-05-19 @ 00:00)  HR: 76 (05-05-19 @ 10:00)  BP: 108/60 (05-05-19 @ 10:00)  RR: 23 (05-05-19 @ 10:00)  SpO2: 100% (05-05-19 @ 10:00)  Wt(kg): --    05-04 @ 07:01  -  05-05 @ 07:00  --------------------------------------------------------  IN: 6200 mL / OUT: 9100 mL / NET: -2900 mL    05-05 @ 07:01  -  05-05 @ 12:03  --------------------------------------------------------  IN: 200 mL / OUT: 0 mL / NET: 200 mL          PHYSICAL EXAM:  Constitutional: NAD  Neck: No JVD  Respiratory: CTAB, no wheezes, rales or rhonchi  Cardiovascular: S1, S2, RRR  Gastrointestinal: BS+, soft, NT/ND  Extremities: No peripheral edema    Hospital Medications:   MEDICATIONS  (STANDING):  allopurinol 300 milliGRAM(s) Oral daily  aspirin enteric coated 81 milliGRAM(s) Oral daily  atorvastatin 40 milliGRAM(s) Oral at bedtime  benzonatate 100 milliGRAM(s) Oral once  chlorhexidine 4% Liquid 1 Application(s) Topical two times a day  clopidogrel Tablet 75 milliGRAM(s) Oral daily  dextrose 5%. 1000 milliLiter(s) (50 mL/Hr) IV Continuous <Continuous>  dextrose 50% Injectable 12.5 Gram(s) IV Push once  dextrose 50% Injectable 25 Gram(s) IV Push once  dextrose 50% Injectable 25 Gram(s) IV Push once  docusate sodium 100 milliGRAM(s) Oral three times a day  epoetin rocky Injectable 46126 Unit(s) SubCutaneous <User Schedule>  heparin  Injectable 5000 Unit(s) SubCutaneous every 8 hours  hydrALAZINE 10 milliGRAM(s) Oral every 12 hours  insulin glargine Injectable (LANTUS) 5 Unit(s) SubCutaneous at bedtime  insulin lispro (HumaLOG) corrective regimen sliding scale   SubCutaneous three times a day before meals  insulin lispro Injectable (HumaLOG) 2 Unit(s) SubCutaneous three times a day before meals  labetalol 100 milliGRAM(s) Oral daily  levothyroxine 50 MICROGram(s) Oral daily  multivitamin 1 Tablet(s) Oral daily  sevelamer carbonate 2400 milliGRAM(s) Oral three times a day with meals  sodium chloride 0.9%. 500 milliLiter(s) (100 mL/Hr) IV Continuous <Continuous>      LABS:  05-05    133<L>  |  89<L>  |  63<H>  ----------------------------<  101<H>  3.9   |  20<L>  |  9.27<H>    Ca    9.6      05 May 2019 04:01  Phos  7.0     05-04  Mg     2.0     05-05    TPro  6.4  /  Alb  3.3  /  TBili  0.2  /  DBili      /  AST  32  /  ALT  21  /  AlkPhos  59  05-04    Creatinine Trend: 9.27 <--, 9.30 <--, 10.05 <--, 9.83 <--, 10.01 <--, 9.14 <--, 9.35 <--, 9.31 <--, 9.48 <--, 9.04 <--                              8.5    5.51  )-----------( 115      ( 05 May 2019 04:01 )             28.3     Urine Studies:          .8 (Ca --)      [04-24-19 @ 05:17]   --  .4 (Ca --)      [01-13-19 @ 05:32]   --  .8 (Ca --)      [08-12-18 @ 06:00]   --  Vitamin D (25OH) 24.6      [04-24-19 @ 05:17]  HbA1c 9.8      [04-24-19 @ 05:17]  TSH 8.74      [04-24-19 @ 05:17]  Lipid: chol 167, , HDL 45, LDL 99      [04-24-19 @ 05:17]    HBsAb 23.8      [04-25-19 @ 14:53]  HBsAg NEGATIVE      [04-25-19 @ 14:53]  HBcAb Nonreactive      [04-25-19 @ 14:53]  HCV 0.04, Nonreactive Hepatitis C AB  S/CO Ratio                        Interpretation  < 1.00                                   Non-Reactive  1.00 - 4.99                         Weakly-Reactive  > 5.00                                Reactive  Non-Reactive: A person with a non-reactive HCV antibody  result is considered uninfected.  No further action is  needed unless recent infection is suspected.  In these  cases, consider repeat testing later to detect  seroconversion..  Weakly-Reactive: HCV antibody test is abnormal, HCV RNA  Qualitative test will follow.  Reactive: HCV antibody test is abnormal, HCV RNA  Qualitative test will follow.  Note: HCV antibody testing is performed on the [employer]   system.      [04-25-19 @ 14:53]      RADIOLOGY & ADDITIONAL STUDIES:

## 2019-05-05 NOTE — DISCHARGE NOTE PROVIDER - NSDCCPTREATMENT_GEN_ALL_CORE_FT
PRINCIPAL PROCEDURE  Procedure: Imaging supervision for cardiac cath for angio heart or great vessel  Findings and Treatment: 5/1/19- card cath- [facility] dLM 99%  LIMA LAD patent with collaterals, LCx 85%  5/3/19- card cath RHC RA 2, RV 20/12, PA 20/6, PCW 6, PAsat 49%, CO 3.11, CI 1.98 PRINCIPAL PROCEDURE  Procedure: Imaging supervision for cardiac cath for angio heart or great vessel  Findings and Treatment: 5/1/19- card cath- [facility] dLM 99%  LIMA LAD patent with collaterals, LCx 85%  5/3/19- card cath RHC RA 2, RV 20/12, PA 20/6, PCW 6, PAsat 49%, CO 3.11, CI 1.98

## 2019-05-05 NOTE — PROGRESS NOTE ADULT - PROBLEM SELECTOR PLAN 5
Patient remains with soft BP. Continue current labetolol 100 daily. Patient remains with soft BP. Continue labetolol 200 tid

## 2019-05-05 NOTE — PROGRESS NOTE ADULT - ASSESSMENT
1. severe ischemic cardiomyopathy  2. Chronic renal insufficiency on perito  3. Low cardiac output low filling pressures  4. Hypotension exacerbated by dehydration low filling pressures and dobutamine  5. No obvious ischemia  6. Severe restenosis of the left main and left circ    Recommendati  IV fluids to maintain wedge/pulmonary artery diastolic at approximately 15 mmHg  May need to hold hydralazine  I am not certain that the patient  will benefit  from revascularization of a high risk left main and circumflex  I discussed risk s benefits with pateitn of attempt at high risk PTCI- presently pateitn would like conservative therapy  treatment options difficult in light of soft YAKELIN  would keep PAD 15 and readd hydralazine as BP increases   change labetolol to toprol XL 25 and  increase to 50 as tolerated   dialysis as per renal- do not remove fluid

## 2019-05-05 NOTE — PROGRESS NOTE ADULT - SUBJECTIVE AND OBJECTIVE BOX
Date of Admission:  4/23/19  24 hour events:  No overnight events    Vital Signs Last 24 Hrs:  T(C): 36.9 (05 May 2019 04:00), Max: 37.1 (05 May 2019 00:00)  T(F): 98.4 (05 May 2019 04:00), Max: 98.8 (05 May 2019 00:00)  HR: 81 (05 May 2019 06:00) (70 - 99)  BP: 102/65 (05 May 2019 06:00) (80/56 - 120/69)  BP(mean): 74 (05 May 2019 06:00) (61 - 88)  RR: 23 (05 May 2019 06:00) (11 - 25)  SpO2: 100% (05 May 2019 06:00) (98% - 100%)  I&O's Summary    04 May 2019 07:01  -  05 May 2019 07:00  --------------------------------------------------------  IN: 6200 mL / OUT: 9100 mL / NET: -2900 mL        MEDICATIONS:  aspirin enteric coated 81 milliGRAM(s) Oral daily  clopidogrel Tablet 75 milliGRAM(s) Oral daily  heparin  Injectable 5000 Unit(s) SubCutaneous every 8 hours  hydrALAZINE 10 milliGRAM(s) Oral every 12 hours  labetalol 100 milliGRAM(s) Oral daily  benzonatate 100 milliGRAM(s) Oral once  acetaminophen   Tablet .. 650 milliGRAM(s) Oral every 6 hours PRN  docusate sodium 100 milliGRAM(s) Oral three times a day  loperamide 2 milliGRAM(s) Oral four times a day PRN  atorvastatin 40 milliGRAM(s) Oral at bedtime  dextrose 40% Gel 15 Gram(s) Oral once PRN  dextrose 50% Injectable 12.5 Gram(s) IV Push once  dextrose 50% Injectable 25 Gram(s) IV Push once  dextrose 50% Injectable 25 Gram(s) IV Push once  glucagon  Injectable 1 milliGRAM(s) IntraMuscular once PRN  insulin glargine Injectable (LANTUS) 5 Unit(s) SubCutaneous at bedtime  insulin lispro (HumaLOG) corrective regimen sliding scale   SubCutaneous three times a day before meals  insulin lispro Injectable (HumaLOG) 2 Unit(s) SubCutaneous three times a day before meals  levothyroxine 50 MICROGram(s) Oral daily    chlorhexidine 4% Liquid 1 Application(s) Topical two times a day  dextrose 5%. 1000 milliLiter(s) IV Continuous <Continuous>  epoetin rocky Injectable 83753 Unit(s) SubCutaneous <User Schedule>  gentamicin 0.1% Cream 1 Application(s) Topical <User Schedule> PRN  multivitamin 1 Tablet(s) Oral daily  sodium chloride 0.9%. 500 milliLiter(s) IV Continuous <Continuous>      REVIEW OF SYSTEMS:  Complete 10point ROS negative.    PHYSICAL EXAM:  General: NAD  Cardiovascular: Normal S1 S2, No JVD, No murmurs, No edema  Respiratory: Lungs clear to auscultation	  Gastrointestinal:  Soft, Non-tender, + BS	  Skin: warm and dry, No rashes, No ecchymoses, No cyanosis	  Extremities: Normal range of motion, No clubbing, cyanosis or edema  Vascular: Peripheral pulses palpable 2+ bilaterally    LABS:	 	    CBC Full  -  ( 05 May 2019 04:01 )  WBC Count : 5.51 K/uL  Hemoglobin : 8.5 g/dL  Hematocrit : 28.3 %  Platelet Count - Automated : 115 K/uL  Mean Cell Volume : 85.8 fL  Mean Cell Hemoglobin : 25.8 pg  Mean Cell Hemoglobin Concentration : 30.0 %  Auto Neutrophil # : 2.62 K/uL  Auto Lymphocyte # : 1.57 K/uL  Auto Monocyte # : 0.88 K/uL  Auto Eosinophil # : 0.37 K/uL  Auto Basophil # : 0.02 K/uL  Auto Neutrophil % : 47.5 %  Auto Lymphocyte % : 28.5 %  Auto Monocyte % : 16.0 %  Auto Eosinophil % : 6.7 %  Auto Basophil % : 0.4 %    05-05    133<L>  |  89<L>  |  63<H>  ----------------------------<  101<H>  3.9   |  20<L>  |  9.27<H>  05-04    133<L>  |  89<L>  |  67<H>  ----------------------------<  198<H>  4.3   |  20<L>  |  9.30<H>    Ca    9.6      05 May 2019 04:01  Ca    9.9      04 May 2019 04:35  Phos  7.0     05-04  Phos  7.0     05-03  Mg     2.0     05-05  Mg     1.7     05-04    TPro  6.4  /  Alb  3.3  /  TBili  0.2  /  DBili  x   /  AST  32  /  ALT  21  /  AlkPhos  59  05-04  TPro  6.6  /  Alb  3.5  /  TBili  0.2  /  DBili  x   /  AST  24  /  ALT  21  /  AlkPhos  63  05-03

## 2019-05-05 NOTE — PROGRESS NOTE ADULT - ASSESSMENT
74F w/ ESRD on PD at home, HTN, DM, CAD s/p CABG and stents, on AC, p/w SOB x1 day.    ESRD on PD  Continue PD with 1.5% dex, minimum UF   Monitor BMP and BP    SOB  multiple admission for fluid overload  chest xray is clear no edema  Cardiology follow up     Hypercalcemia  Elevated PTH, phos level  Hold calcitriol   On renagel 2400 tid with meal  Monitor serum calcium and phos    Anemia  epo [id number] sq tiw  monitor Hb    HTN: Optimal  on labetalol 100 daily  hydralazine 10 BID  intermittent hypotensive   UF as tolerated.   monitor bp  hold bp meds per perimeter   f/u cardio    Hyponatremia   likely sec to increase fluid status in ESRD pt  UF with PD as tolerated  free water restriction <1L/day    Hyperkalemia  sec to renal failure  low k diet  PD as ordered  monitor 74F w/ ESRD on PD at home, HTN, DM, CAD s/p CABG and stents, on AC, p/w SOB x1 day.    ESRD on PD  Continue PD with 1.5% dex, as ordered  Monitor BMP and BP    SOB  multiple admission for fluid overload  chest xray is clear no edema  Cardiology follow up     Hypercalcemia  Elevated PTH, phos level  Hold calcitriol   On renagel 2400 tid with meal  Monitor serum calcium and phos    Anemia  epo [id number] sq tiw  monitor Hb    HTN: Optimal  on labetalol 100 daily  hydralazine 10 BID  intermittent hypotensive   UF as tolerated.   monitor bp  hold bp meds per perimeter   f/u cardio    Hyponatremia   likely sec to increase fluid status in ESRD pt  UF with PD as tolerated  free water restriction <1L/day    Hyperkalemia  sec to renal failure  low k diet  PD as ordered  monitor

## 2019-05-05 NOTE — PROGRESS NOTE ADULT - SUBJECTIVE AND OBJECTIVE BOX
Subjective: Patient seen and examined. No new events except as noted.   Feels weak and has some sob  no cp  SG readings continue to have low filling pressures PAD this AM 9 BP 92/70  MEDICATIONS:  MEDICATIONS  (STANDING):  aspirin enteric coated 81 milliGRAM(s) Oral daily  atorvastatin 40 milliGRAM(s) Oral at bedtime  benzonatate 100 milliGRAM(s) Oral once  chlorhexidine 4% Liquid 1 Application(s) Topical two times a day  clopidogrel Tablet 75 milliGRAM(s) Oral daily  dextrose 5%. 1000 milliLiter(s) (50 mL/Hr) IV Continuous <Continuous>  dextrose 50% Injectable 12.5 Gram(s) IV Push once  dextrose 50% Injectable 25 Gram(s) IV Push once  dextrose 50% Injectable 25 Gram(s) IV Push once  docusate sodium 100 milliGRAM(s) Oral three times a day  epoetin rocky Injectable 83166 Unit(s) SubCutaneous <User Schedule>  heparin  Injectable 5000 Unit(s) SubCutaneous every 8 hours  hydrALAZINE 10 milliGRAM(s) Oral every 12 hours  insulin glargine Injectable (LANTUS) 5 Unit(s) SubCutaneous at bedtime  insulin lispro (HumaLOG) corrective regimen sliding scale   SubCutaneous three times a day before meals  insulin lispro Injectable (HumaLOG) 2 Unit(s) SubCutaneous three times a day before meals  labetalol 100 milliGRAM(s) Oral daily  levothyroxine 50 MICROGram(s) Oral daily  multivitamin 1 Tablet(s) Oral daily  sevelamer carbonate 2400 milliGRAM(s) Oral three times a day with meals  sodium chloride 0.9%. 500 milliLiter(s) (100 mL/Hr) IV Continuous <Continuous>      PHYSICAL EXAM:  T(C): 36.9 (05-05-19 @ 04:00), Max: 37.1 (05-05-19 @ 00:00)  HR: 81 (05-05-19 @ 06:00) (70 - 99)  BP: 102/65 (05-05-19 @ 06:00) (80/56 - 120/69)  RR: 23 (05-05-19 @ 06:00) (11 - 25)  SpO2: 100% (05-05-19 @ 06:00) (98% - 100%)  Wt(kg): --  I&O's Summary    04 May 2019 07:01  -  05 May 2019 07:00  --------------------------------------------------------  IN: 6200 mL / OUT: 9100 mL / NET: -2900 mL          Appearance: Normal weak midly dyspneic	  HEENT:   Normal oral mucosa, PERRL, EOMI	  Cardiovascular: Normal S1 S2, No JVD, No murmurs ,no s3  Respiratory: Lungs clear to auscultation, normal effort 	  Gastrointestinal:  Soft, Non-tender, + BS	  Skin: No rashes, No ecchymoses, No cyanosis, warm to touch  Ext: No edema  Peripheral pulses palpable 2+ bilaterally      LABS:    CARDIAC MARKERS:                                8.5    5.51  )-----------( 115      ( 05 May 2019 04:01 )             28.3     05-05    133<L>  |  89<L>  |  63<H>  ----------------------------<  101<H>  3.9   |  20<L>  |  9.27<H>    Ca    9.6      05 May 2019 04:01  Phos  7.0     05-04  Mg     2.0     05-05    TPro  6.4  /  Alb  3.3  /  TBili  0.2  /  DBili  x   /  AST  32  /  ALT  21  /  AlkPhos  59  05-04    proBNP:   Lipid Profile:   HgA1c:   TSH:           TELEMETRY: 	    ECG: NSR

## 2019-05-06 DIAGNOSIS — D64.9 ANEMIA, UNSPECIFIED: ICD-10-CM

## 2019-05-06 LAB
ALBUMIN SERPL ELPH-MCNC: 2.9 G/DL — LOW (ref 3.3–5)
ALP SERPL-CCNC: 88 U/L — SIGNIFICANT CHANGE UP (ref 40–120)
ALT FLD-CCNC: 30 U/L — SIGNIFICANT CHANGE UP (ref 4–33)
ANION GAP SERPL CALC-SCNC: 23 MMO/L — HIGH (ref 7–14)
AST SERPL-CCNC: 44 U/L — HIGH (ref 4–32)
BASOPHILS # BLD AUTO: 0.02 K/UL — SIGNIFICANT CHANGE UP (ref 0–0.2)
BASOPHILS NFR BLD AUTO: 0.4 % — SIGNIFICANT CHANGE UP (ref 0–2)
BILIRUB SERPL-MCNC: 0.2 MG/DL — SIGNIFICANT CHANGE UP (ref 0.2–1.2)
BUN SERPL-MCNC: 72 MG/DL — HIGH (ref 7–23)
CALCIUM SERPL-MCNC: 9.1 MG/DL — SIGNIFICANT CHANGE UP (ref 8.4–10.5)
CHLORIDE SERPL-SCNC: 98 MMOL/L — SIGNIFICANT CHANGE UP (ref 98–107)
CO2 SERPL-SCNC: 17 MMOL/L — LOW (ref 22–31)
CREAT SERPL-MCNC: 10.17 MG/DL — HIGH (ref 0.5–1.3)
EOSINOPHIL # BLD AUTO: 0.22 K/UL — SIGNIFICANT CHANGE UP (ref 0–0.5)
EOSINOPHIL NFR BLD AUTO: 3.9 % — SIGNIFICANT CHANGE UP (ref 0–6)
GLUCOSE BLDC GLUCOMTR-MCNC: 108 MG/DL — HIGH (ref 70–99)
GLUCOSE BLDC GLUCOMTR-MCNC: 135 MG/DL — HIGH (ref 70–99)
GLUCOSE BLDC GLUCOMTR-MCNC: 180 MG/DL — HIGH (ref 70–99)
GLUCOSE BLDC GLUCOMTR-MCNC: 194 MG/DL — HIGH (ref 70–99)
GLUCOSE SERPL-MCNC: 91 MG/DL — SIGNIFICANT CHANGE UP (ref 70–99)
HCT VFR BLD CALC: 25.6 % — LOW (ref 34.5–45)
HGB BLD-MCNC: 7.9 G/DL — LOW (ref 11.5–15.5)
IMM GRANULOCYTES NFR BLD AUTO: 0.5 % — SIGNIFICANT CHANGE UP (ref 0–1.5)
LYMPHOCYTES # BLD AUTO: 1.43 K/UL — SIGNIFICANT CHANGE UP (ref 1–3.3)
LYMPHOCYTES # BLD AUTO: 25.1 % — SIGNIFICANT CHANGE UP (ref 13–44)
MAGNESIUM SERPL-MCNC: 2 MG/DL — SIGNIFICANT CHANGE UP (ref 1.6–2.6)
MCHC RBC-ENTMCNC: 26.2 PG — LOW (ref 27–34)
MCHC RBC-ENTMCNC: 30.9 % — LOW (ref 32–36)
MCV RBC AUTO: 85 FL — SIGNIFICANT CHANGE UP (ref 80–100)
MONOCYTES # BLD AUTO: 0.72 K/UL — SIGNIFICANT CHANGE UP (ref 0–0.9)
MONOCYTES NFR BLD AUTO: 12.6 % — SIGNIFICANT CHANGE UP (ref 2–14)
NEUTROPHILS # BLD AUTO: 3.28 K/UL — SIGNIFICANT CHANGE UP (ref 1.8–7.4)
NEUTROPHILS NFR BLD AUTO: 57.5 % — SIGNIFICANT CHANGE UP (ref 43–77)
NRBC # FLD: 0.15 K/UL — SIGNIFICANT CHANGE UP (ref 0–0)
NRBC FLD-RTO: 2.6 — SIGNIFICANT CHANGE UP
PHOSPHATE SERPL-MCNC: 7.2 MG/DL — HIGH (ref 2.5–4.5)
PLATELET # BLD AUTO: 98 K/UL — LOW (ref 150–400)
PMV BLD: 10.8 FL — SIGNIFICANT CHANGE UP (ref 7–13)
POTASSIUM SERPL-MCNC: 4.5 MMOL/L — SIGNIFICANT CHANGE UP (ref 3.5–5.3)
POTASSIUM SERPL-SCNC: 4.5 MMOL/L — SIGNIFICANT CHANGE UP (ref 3.5–5.3)
PROT SERPL-MCNC: 5.4 G/DL — LOW (ref 6–8.3)
RBC # BLD: 3.01 M/UL — LOW (ref 3.8–5.2)
RBC # FLD: 17.1 % — HIGH (ref 10.3–14.5)
SODIUM SERPL-SCNC: 138 MMOL/L — SIGNIFICANT CHANGE UP (ref 135–145)
WBC # BLD: 5.7 K/UL — SIGNIFICANT CHANGE UP (ref 3.8–10.5)
WBC # FLD AUTO: 5.7 K/UL — SIGNIFICANT CHANGE UP (ref 3.8–10.5)

## 2019-05-06 RX ORDER — CALCIUM ACETATE 667 MG
667 TABLET ORAL
Qty: 0 | Refills: 0 | Status: DISCONTINUED | OUTPATIENT
Start: 2019-05-06 | End: 2019-05-09

## 2019-05-06 RX ORDER — METOPROLOL TARTRATE 50 MG
25 TABLET ORAL DAILY
Qty: 0 | Refills: 0 | Status: DISCONTINUED | OUTPATIENT
Start: 2019-05-06 | End: 2019-05-09

## 2019-05-06 RX ORDER — LANOLIN ALCOHOL/MO/W.PET/CERES
3 CREAM (GRAM) TOPICAL ONCE
Qty: 0 | Refills: 0 | Status: COMPLETED | OUTPATIENT
Start: 2019-05-06 | End: 2019-05-06

## 2019-05-06 RX ORDER — ACETAMINOPHEN 500 MG
650 TABLET ORAL ONCE
Qty: 0 | Refills: 0 | Status: COMPLETED | OUTPATIENT
Start: 2019-05-06 | End: 2019-05-06

## 2019-05-06 RX ORDER — SEVELAMER CARBONATE 2400 MG/1
800 POWDER, FOR SUSPENSION ORAL
Qty: 0 | Refills: 0 | Status: DISCONTINUED | OUTPATIENT
Start: 2019-05-06 | End: 2019-05-09

## 2019-05-06 RX ORDER — LISINOPRIL 2.5 MG/1
5 TABLET ORAL DAILY
Qty: 0 | Refills: 0 | Status: DISCONTINUED | OUTPATIENT
Start: 2019-05-06 | End: 2019-05-09

## 2019-05-06 RX ORDER — LISINOPRIL 2.5 MG/1
5 TABLET ORAL DAILY
Qty: 0 | Refills: 0 | Status: DISCONTINUED | OUTPATIENT
Start: 2019-05-06 | End: 2019-05-06

## 2019-05-06 RX ORDER — ACETAMINOPHEN 500 MG
650 TABLET ORAL EVERY 6 HOURS
Qty: 0 | Refills: 0 | Status: DISCONTINUED | OUTPATIENT
Start: 2019-05-06 | End: 2019-05-09

## 2019-05-06 RX ORDER — CALCITRIOL 0.5 UG/1
0.25 CAPSULE ORAL
Qty: 0 | Refills: 0 | Status: DISCONTINUED | OUTPATIENT
Start: 2019-05-06 | End: 2019-05-06

## 2019-05-06 RX ADMIN — Medication 25 MILLIGRAM(S): at 13:21

## 2019-05-06 RX ADMIN — Medication 50 MICROGRAM(S): at 06:03

## 2019-05-06 RX ADMIN — Medication 667 MILLIGRAM(S): at 16:57

## 2019-05-06 RX ADMIN — SEVELAMER CARBONATE 800 MILLIGRAM(S): 2400 POWDER, FOR SUSPENSION ORAL at 16:57

## 2019-05-06 RX ADMIN — Medication 1: at 16:57

## 2019-05-06 RX ADMIN — Medication 1 TABLET(S): at 16:57

## 2019-05-06 RX ADMIN — Medication 650 MILLIGRAM(S): at 12:04

## 2019-05-06 RX ADMIN — Medication 100 MILLIGRAM(S): at 06:03

## 2019-05-06 RX ADMIN — Medication 100 MILLIGRAM(S): at 14:34

## 2019-05-06 RX ADMIN — HEPARIN SODIUM 5000 UNIT(S): 5000 INJECTION INTRAVENOUS; SUBCUTANEOUS at 14:34

## 2019-05-06 RX ADMIN — Medication 2 UNIT(S): at 08:25

## 2019-05-06 RX ADMIN — Medication 650 MILLIGRAM(S): at 13:00

## 2019-05-06 RX ADMIN — SEVELAMER CARBONATE 2400 MILLIGRAM(S): 2400 POWDER, FOR SUSPENSION ORAL at 11:58

## 2019-05-06 RX ADMIN — Medication 2 UNIT(S): at 11:59

## 2019-05-06 RX ADMIN — Medication 3 MILLIGRAM(S): at 01:37

## 2019-05-06 RX ADMIN — Medication 650 MILLIGRAM(S): at 02:15

## 2019-05-06 RX ADMIN — HEPARIN SODIUM 5000 UNIT(S): 5000 INJECTION INTRAVENOUS; SUBCUTANEOUS at 06:03

## 2019-05-06 RX ADMIN — Medication 81 MILLIGRAM(S): at 13:21

## 2019-05-06 RX ADMIN — ERYTHROPOIETIN 10000 UNIT(S): 10000 INJECTION, SOLUTION INTRAVENOUS; SUBCUTANEOUS at 18:29

## 2019-05-06 RX ADMIN — Medication 2 UNIT(S): at 16:58

## 2019-05-06 RX ADMIN — SEVELAMER CARBONATE 2400 MILLIGRAM(S): 2400 POWDER, FOR SUSPENSION ORAL at 08:08

## 2019-05-06 RX ADMIN — Medication 650 MILLIGRAM(S): at 01:45

## 2019-05-06 RX ADMIN — Medication 100 MILLIGRAM(S): at 21:31

## 2019-05-06 RX ADMIN — INSULIN GLARGINE 5 UNIT(S): 100 INJECTION, SOLUTION SUBCUTANEOUS at 23:29

## 2019-05-06 RX ADMIN — CLOPIDOGREL BISULFATE 75 MILLIGRAM(S): 75 TABLET, FILM COATED ORAL at 13:21

## 2019-05-06 RX ADMIN — LISINOPRIL 5 MILLIGRAM(S): 2.5 TABLET ORAL at 15:39

## 2019-05-06 RX ADMIN — ATORVASTATIN CALCIUM 40 MILLIGRAM(S): 80 TABLET, FILM COATED ORAL at 21:30

## 2019-05-06 RX ADMIN — HEPARIN SODIUM 5000 UNIT(S): 5000 INJECTION INTRAVENOUS; SUBCUTANEOUS at 21:31

## 2019-05-06 NOTE — PROGRESS NOTE ADULT - PROBLEM SELECTOR PLAN 2
-continue with ASA and plavix and lipitor 40, awaiting revascularization and NS -continue with ASA and plavix and lipitor 40  -no longer planning for revascularization at NS as above

## 2019-05-06 NOTE — PROGRESS NOTE ADULT - SUBJECTIVE AND OBJECTIVE BOX
[facility]   MD ROSITA APODACA MD ANGELA WONG, PA    TEL:  OFFICE: [phone]  DR SCOTT CELL: [phone]  DR. JOHNSON CELL: [phone]  KESHA HOUSTON CELL: [phone]        Patient is a 74y old  Female who presents with a chief complaint of shortness of breath (06 May 2019 11:31)      Patient seen and examined at bedside. No chest pain/sob    VITALS:  T(F): 97.8 (05-06-19 @ 11:02), Max: 98.6 (05-05-19 @ 18:00)  HR: 68 (05-06-19 @ 11:02)  BP: 107/72 (05-06-19 @ 11:02)  RR: 19 (05-06-19 @ 11:02)  SpO2: 100% (05-06-19 @ 11:02)  Wt(kg): --    05-05 @ 07:01  -  05-06 @ 07:00  --------------------------------------------------------  IN: 560 mL / OUT: 3 mL / NET: 557 mL    05-06 @ 07:01  -  05-06 @ 11:55  --------------------------------------------------------  IN: 4120 mL / OUT: 2000 mL / NET: 2120 mL          PHYSICAL EXAM:  Constitutional: NAD  Neck: No JVD  Respiratory: CTAB, no wheezes, rales or rhonchi  Cardiovascular: S1, S2, RRR  Gastrointestinal: BS+, soft, NT/ND  Extremities: No peripheral edema    Hospital Medications:   MEDICATIONS  (STANDING):  allopurinol 300 milliGRAM(s) Oral daily  aspirin enteric coated 81 milliGRAM(s) Oral daily  atorvastatin 40 milliGRAM(s) Oral at bedtime  benzonatate 100 milliGRAM(s) Oral once  chlorhexidine 4% Liquid 1 Application(s) Topical <User Schedule>  clopidogrel Tablet 75 milliGRAM(s) Oral daily  dextrose 5%. 1000 milliLiter(s) (50 mL/Hr) IV Continuous <Continuous>  dextrose 50% Injectable 12.5 Gram(s) IV Push once  dextrose 50% Injectable 25 Gram(s) IV Push once  dextrose 50% Injectable 25 Gram(s) IV Push once  docusate sodium 100 milliGRAM(s) Oral three times a day  epoetin rocky Injectable 36964 Unit(s) SubCutaneous <User Schedule>  heparin  Injectable 5000 Unit(s) SubCutaneous every 8 hours  hydrALAZINE 10 milliGRAM(s) Oral every 12 hours  insulin glargine Injectable (LANTUS) 5 Unit(s) SubCutaneous at bedtime  insulin lispro (HumaLOG) corrective regimen sliding scale   SubCutaneous at bedtime  insulin lispro (HumaLOG) corrective regimen sliding scale   SubCutaneous three times a day before meals  insulin lispro Injectable (HumaLOG) 2 Unit(s) SubCutaneous three times a day before meals  levothyroxine 50 MICROGram(s) Oral daily  metoprolol succinate ER 25 milliGRAM(s) Oral daily  multivitamin 1 Tablet(s) Oral daily  sevelamer carbonate 2400 milliGRAM(s) Oral three times a day with meals      LABS:  05-06    138  |  98  |  72<H>  ----------------------------<  91  4.5   |  17<L>  |  10.17<H>    Ca    9.1      06 May 2019 05:45  Phos  7.2     05-06  Mg     2.0     05-06    TPro  5.4<L>  /  Alb  2.9<L>  /  TBili  0.2  /  DBili      /  AST  44<H>  /  ALT  30  /  AlkPhos  88  05-06    Creatinine Trend: 10.17 <--, 9.27 <--, 9.30 <--, 10.05 <--, 9.83 <--, 10.01 <--, 9.14 <--, 9.35 <--, 9.31 <--    Phosphorus Level, Serum: 7.2 mg/dL (05-06 @ 05:45)  Albumin, Serum: 2.9 g/dL (05-06 @ 05:45)                              7.9    5.70  )-----------( 98       ( 06 May 2019 05:40 )             25.6     Urine Studies:      .8 (Ca --)      [04-24-19 @ 05:17]   --  .4 (Ca --)      [01-13-19 @ 05:32]   --  .8 (Ca --)      [08-12-18 @ 06:00]   --  Vitamin D (25OH) 24.6      [04-24-19 @ 05:17]  HbA1c 9.8      [04-24-19 @ 05:17]  TSH 8.74      [04-24-19 @ 05:17]  Lipid: chol 167, , HDL 45, LDL 99      [04-24-19 @ 05:17]    HBsAb 23.8      [04-25-19 @ 14:53]  HBsAg NEGATIVE      [04-25-19 @ 14:53]  HBcAb Nonreactive      [04-25-19 @ 14:53]  HCV 0.04, Nonreactive Hepatitis C AB  S/CO Ratio                        Interpretation  < 1.00                                   Non-Reactive  1.00 - 4.99                         Weakly-Reactive  > 5.00                                Reactive  Non-Reactive: A person with a non-reactive HCV antibody  result is considered uninfected.  No further action is  needed unless recent infection is suspected.  In these  cases, consider repeat testing later to detect  seroconversion..  Weakly-Reactive: HCV antibody test is abnormal, HCV RNA  Qualitative test will follow.  Reactive: HCV antibody test is abnormal, HCV RNA  Qualitative test will follow.  Note: HCV antibody testing is performed on the [employer]   system.      [04-25-19 @ 14:53]      RADIOLOGY & ADDITIONAL STUDIES:

## 2019-05-06 NOTE — PROGRESS NOTE ADULT - PROBLEM SELECTOR PLAN 6
Continue fingersticks  qac and qhs. continue lantus as ordered.

## 2019-05-06 NOTE — PROGRESS NOTE ADULT - SUBJECTIVE AND OBJECTIVE BOX
CHIEF COMPLAINT: Patient is a 74y old  Female who presents with a chief complaint of shortness of breath (05 May 2019 16:45)      INTERVAL HISTORY / SUBJECTIVE:  ***    MEDICATIONS:  MEDICATIONS  (STANDING):  allopurinol 300 milliGRAM(s) Oral daily  aspirin enteric coated 81 milliGRAM(s) Oral daily  atorvastatin 40 milliGRAM(s) Oral at bedtime  benzonatate 100 milliGRAM(s) Oral once  chlorhexidine 4% Liquid 1 Application(s) Topical <User Schedule>  clopidogrel Tablet 75 milliGRAM(s) Oral daily  dextrose 5%. 1000 milliLiter(s) (50 mL/Hr) IV Continuous <Continuous>  dextrose 50% Injectable 12.5 Gram(s) IV Push once  dextrose 50% Injectable 25 Gram(s) IV Push once  dextrose 50% Injectable 25 Gram(s) IV Push once  docusate sodium 100 milliGRAM(s) Oral three times a day  epoetin rocky Injectable 15340 Unit(s) SubCutaneous <User Schedule>  heparin  Injectable 5000 Unit(s) SubCutaneous every 8 hours  hydrALAZINE 10 milliGRAM(s) Oral every 12 hours  insulin glargine Injectable (LANTUS) 5 Unit(s) SubCutaneous at bedtime  insulin lispro (HumaLOG) corrective regimen sliding scale   SubCutaneous at bedtime  insulin lispro (HumaLOG) corrective regimen sliding scale   SubCutaneous three times a day before meals  insulin lispro Injectable (HumaLOG) 2 Unit(s) SubCutaneous three times a day before meals  levothyroxine 50 MICROGram(s) Oral daily  metoprolol succinate ER 25 milliGRAM(s) Oral daily  multivitamin 1 Tablet(s) Oral daily  sevelamer carbonate 2400 milliGRAM(s) Oral three times a day with meals  sodium chloride 0.9%. 500 milliLiter(s) (100 mL/Hr) IV Continuous <Continuous>    MEDICATIONS  (PRN):  acetaminophen   Tablet .. 650 milliGRAM(s) Oral every 6 hours PRN Moderate Pain (4 - 6)  dextrose 40% Gel 15 Gram(s) Oral once PRN Blood Glucose LESS THAN 70 milliGRAM(s)/deciliter  gentamicin 0.1% Cream 1 Application(s) Topical <User Schedule> PRN Pd catheter dressing change  glucagon  Injectable 1 milliGRAM(s) IntraMuscular once PRN Glucose LESS THAN 70 milligrams/deciliter  loperamide 2 milliGRAM(s) Oral four times a day PRN Diarrhea      OBJECTIVE:  ICU Vital Signs Last 24 Hrs  T(C): 36.2 (06 May 2019 08:00), Max: 37 (05 May 2019 18:00)  T(F): 97.2 (06 May 2019 08:00), Max: 98.6 (05 May 2019 18:00)  HR: 63 (06 May 2019 08:00) (58 - 105)  BP: 116/80 (06 May 2019 08:00) (86/62 - 126/70)  BP(mean): 89 (06 May 2019 08:00) (60 - 92)  ABP: --  ABP(mean): --  RR: 17 (06 May 2019 08:00) (11 - 26)  SpO2: 100% (06 May 2019 08:00) (99% - 100%)      CAPILLARY BLOOD GLUCOSE    POCT Blood Glucose.: 108 mg/dL (06 May 2019 08:02)  POCT Blood Glucose.: 160 mg/dL (05 May 2019 21:49)  POCT Blood Glucose.: 216 mg/dL (05 May 2019 17:49)  POCT Blood Glucose.: 143 mg/dL (05 May 2019 11:49)      I&O's Summary    05 May 2019 07:01  -  06 May 2019 07:00  --------------------------------------------------------  IN: 560 mL / OUT: 3 mL / NET: 557 mL    06 May 2019 07:01  -  06 May 2019 08:24  --------------------------------------------------------  IN: 2000 mL / OUT: 0 mL / NET: 2000 mL      PHYSICAL EXAM:  General: NAD  Cardiovascular: Normal S1 S2, No JVD, No murmurs, No edema  Respiratory: Lungs clear to auscultation	  Gastrointestinal:  Soft, Non-tender, + BS	  Skin: warm and dry, No rashes, No ecchymoses, No cyanosis	  Extremities: Normal range of motion, No clubbing, cyanosis or edema  Vascular: Peripheral pulses palpable 2+ bilaterally      LABS:                          7.9    5.70  )-----------( 98       ( 06 May 2019 05:40 )             25.6     05-06    138  |  98  |  72<H>  ----------------------------<  91  4.5   |  17<L>  |  10.17<H>    Ca    9.1      06 May 2019 05:45  Phos  7.2     05-06  Mg     2.0     05-06    TPro  5.4<L>  /  Alb  2.9<L>  /  TBili  0.2  /  DBili  x   /  AST  44<H>  /  ALT  30  /  AlkPhos  88  05-06    LIVER FUNCTIONS - ( 06 May 2019 05:45 )  Alb: 2.9 g/dL / Pro: 5.4 g/dL / ALK PHOS: 88 u/L / ALT: 30 u/L / AST: 44 u/L / GGT: x CHIEF COMPLAINT: Patient is a 74y old  Female who presents with a chief complaint of shortness of breath (05 May 2019 16:45)      INTERVAL HISTORY / SUBJECTIVE:    No acute events overnight. Pt seen and examined at bedside this am. Pt reports continued dyspnea and orthopnea. Denies chest pain, nausea, palpitations.      MEDICATIONS:  MEDICATIONS  (STANDING):  allopurinol 300 milliGRAM(s) Oral daily  aspirin enteric coated 81 milliGRAM(s) Oral daily  atorvastatin 40 milliGRAM(s) Oral at bedtime  benzonatate 100 milliGRAM(s) Oral once  chlorhexidine 4% Liquid 1 Application(s) Topical <User Schedule>  clopidogrel Tablet 75 milliGRAM(s) Oral daily  dextrose 5%. 1000 milliLiter(s) (50 mL/Hr) IV Continuous <Continuous>  dextrose 50% Injectable 12.5 Gram(s) IV Push once  dextrose 50% Injectable 25 Gram(s) IV Push once  dextrose 50% Injectable 25 Gram(s) IV Push once  docusate sodium 100 milliGRAM(s) Oral three times a day  epoetin rocky Injectable 56727 Unit(s) SubCutaneous <User Schedule>  heparin  Injectable 5000 Unit(s) SubCutaneous every 8 hours  hydrALAZINE 10 milliGRAM(s) Oral every 12 hours  insulin glargine Injectable (LANTUS) 5 Unit(s) SubCutaneous at bedtime  insulin lispro (HumaLOG) corrective regimen sliding scale   SubCutaneous at bedtime  insulin lispro (HumaLOG) corrective regimen sliding scale   SubCutaneous three times a day before meals  insulin lispro Injectable (HumaLOG) 2 Unit(s) SubCutaneous three times a day before meals  levothyroxine 50 MICROGram(s) Oral daily  metoprolol succinate ER 25 milliGRAM(s) Oral daily  multivitamin 1 Tablet(s) Oral daily  sevelamer carbonate 2400 milliGRAM(s) Oral three times a day with meals  sodium chloride 0.9%. 500 milliLiter(s) (100 mL/Hr) IV Continuous <Continuous>    MEDICATIONS  (PRN):  acetaminophen   Tablet .. 650 milliGRAM(s) Oral every 6 hours PRN Moderate Pain (4 - 6)  dextrose 40% Gel 15 Gram(s) Oral once PRN Blood Glucose LESS THAN 70 milliGRAM(s)/deciliter  gentamicin 0.1% Cream 1 Application(s) Topical <User Schedule> PRN Pd catheter dressing change  glucagon  Injectable 1 milliGRAM(s) IntraMuscular once PRN Glucose LESS THAN 70 milligrams/deciliter  loperamide 2 milliGRAM(s) Oral four times a day PRN Diarrhea      OBJECTIVE:  ICU Vital Signs Last 24 Hrs  T(C): 36.2 (06 May 2019 08:00), Max: 37 (05 May 2019 18:00)  T(F): 97.2 (06 May 2019 08:00), Max: 98.6 (05 May 2019 18:00)  HR: 63 (06 May 2019 08:00) (58 - 105)  BP: 116/80 (06 May 2019 08:00) (86/62 - 126/70)  BP(mean): 89 (06 May 2019 08:00) (60 - 92)  ABP: --  ABP(mean): --  RR: 17 (06 May 2019 08:00) (11 - 26)  SpO2: 100% (06 May 2019 08:00) (99% - 100%)      CAPILLARY BLOOD GLUCOSE    POCT Blood Glucose.: 108 mg/dL (06 May 2019 08:02)  POCT Blood Glucose.: 160 mg/dL (05 May 2019 21:49)  POCT Blood Glucose.: 216 mg/dL (05 May 2019 17:49)  POCT Blood Glucose.: 143 mg/dL (05 May 2019 11:49)      I&O's Summary    05 May 2019 07:01  -  06 May 2019 07:00  --------------------------------------------------------  IN: 560 mL / OUT: 3 mL / NET: 557 mL    06 May 2019 07:01  -  06 May 2019 08:24  --------------------------------------------------------  IN: 2000 mL / OUT: 0 mL / NET: 2000 mL      PHYSICAL EXAM:  General: NAD  Cardiovascular: Normal S1 S2, No JVD, No murmurs, No edema  Respiratory: Lungs clear to auscultation	  Gastrointestinal:  Soft, Non-tender, + BS	  Skin: warm and dry, No rashes, No ecchymoses, No cyanosis	  Extremities: Normal range of motion, No clubbing, cyanosis or edema  Vascular: Peripheral pulses palpable 2+ bilaterally      LABS:                          7.9    5.70  )-----------( 98       ( 06 May 2019 05:40 )             25.6     05-06    138  |  98  |  72<H>  ----------------------------<  91  4.5   |  17<L>  |  10.17<H>    Ca    9.1      06 May 2019 05:45  Phos  7.2     05-06  Mg     2.0     05-06    TPro  5.4<L>  /  Alb  2.9<L>  /  TBili  0.2  /  DBili  x   /  AST  44<H>  /  ALT  30  /  AlkPhos  88  05-06    LIVER FUNCTIONS - ( 06 May 2019 05:45 )  Alb: 2.9 g/dL / Pro: 5.4 g/dL / ALK PHOS: 88 u/L / ALT: 30 u/L / AST: 44 u/L / GGT: x             RADIOLOGY:    12/15/18 TTE:    Limited study to evaluate for pericardial effusion.  No  obvious pericardial effusion seen in limited views.

## 2019-05-06 NOTE — PROGRESS NOTE ADULT - PROBLEM SELECTOR PLAN 1
PA pressure 23-12, CVP is 13-15, wedge is 12-13  -Plan is for patient to transfer to [facility] for high risk PCI on Monday  -SWAN reveals a CVP 12 and PA 23/12    -patient approaching euvolemia, IVF held, no diuretics, encouraging PO intake PA pressure 23-12, CVP is 13-15, wedge is 12-13. SWAN revealed a CVP 12 and PA 23/12 . Planned for patient to transfer to [facility] for high risk PCI on Monday, however per Dr Jennings would recommend against and pt opting for medical management  -patient approaching euvolemia, IVF held, no diuretics, encouraging PO intake

## 2019-05-06 NOTE — PROGRESS NOTE ADULT - PROBLEM SELECTOR PLAN 5
Patient remains with soft BP. Continue labetolol 200 tid Patient remains with soft BP. Continue labetolol 100 qD

## 2019-05-06 NOTE — PROGRESS NOTE ADULT - PROBLEM SELECTOR PLAN 3
-continue labetolol 200 tid and hold hydralazine for now. -continue labetolol 200 tid and hydralazine 10 q12h  - start metoprolol 25 mg qD

## 2019-05-06 NOTE — PROGRESS NOTE ADULT - ASSESSMENT
1. severe ischemic cardiomyopathy  2. Chronic renal insufficiency on perito  3. Low cardiac output low filling pressures  4. Hypotension exacerbated by dehydration low filling pressures and dobutamine  5. No obvious ischemia  6. 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

## 2019-05-06 NOTE — PROGRESS NOTE ADULT - ASSESSMENT
74F w/ ESRD on PD at home, HTN, DM, CAD s/p CABG and stents, on AC, p/w SOB x1 day.    ESRD on PD  Continue PD with 1.5% dex, as ordered  Monitor BMP and BP    SOB  Cardiology follow up     Hypercalcemia  Elevated PTH, phos level  calcitriol on hold sec to hypercalcemia. ca now improved. will start calcitriol 0.25mcg tiw   On renagel 2400 tid with meal  Monitor serum calcium and phos    Anemia  epo 25937 sq tiw  monitor Hb    HTN: Optimal  f/u cardio  monitor bp  hold bp meds per perimeter     Hyponatremia   likely sec to increase fluid status in ESRD pt  better now  free water restriction <1L/day    Hyperkalemia  sec to renal failure  low k diet  PD as ordered  monitor 74F w/ ESRD on PD at home, HTN, DM, CAD s/p CABG and stents, on AC, p/w SOB x1 day.    ESRD on PD  Continue PD with 1.5% dex, as ordered  Monitor BMP and BP    SOB  Cardiology follow up     Hypercalcemia  Elevated PTH, phos level  calcitriol on hold sec to hypercalcemia.   On renagel 2400 tid with meal (patient has alot of nausea with renagel. will decrease to 800 tid with meal, calcium better now will start phoslo 667mg tid. monitor phos and ca)  Monitor serum calcium and phos    Anemia  epo 52331 sq tiw  monitor Hb    HTN: Optimal  f/u cardio  monitor bp  hold bp meds per perimeter     Hyponatremia   likely sec to increase fluid status in ESRD pt  better now  free water restriction <1L/day    Hyperkalemia  sec to renal failure  low k diet  PD as ordered  monitor 74F w/ ESRD on PD at home, HTN, DM, CAD s/p CABG and stents, on AC, p/w SOB x1 day.    ESRD on PD  Continue PD with 1.5% dex, as ordered  Monitor BMP and BP    SOB  Cardiology follow up     Hypercalcemia  Elevated PTH, phos level  calcitriol on hold sec to hypercalcemia.   On renagel 2400 tid with meal (patient has alot of nausea with renagel. will decrease to 800 tid with meal, calcium better now will start phoslo 667mg tid. monitor phos and ca)  Monitor serum calcium and phos    Anemia  epo 31560 sq tiw  monitor Hb    HTN: Optimal  f/u cardio  monitor bp  hold bp meds per perimeter  There is no contraindication to use ACEI if her BP tolerates     Hyponatremia   likely sec to increase fluid status in ESRD pt  better now  free water restriction <1L/day    Hyperkalemia  sec to renal failure  low k diet  PD as ordered  monitor

## 2019-05-06 NOTE — PROGRESS NOTE ADULT - ASSESSMENT
_________________________________________________________________________________________  ========>>  M E D I C A L   A T T E N D I N G    F O L L O W  U P  N O T E  <<=========  -----------------------------------------------------------------------------------------------------    - Patient seen and examined by me earlier today.   - In summary,  KAPIL CAMPOVERDE is a 74y year old woman who originally presented with SOB  - Patient today overall doing ok, comfortable, eating ok , CP free     ==================>> REVIEW OF SYSTEM <<=================    GEN: no fever, no chills, no pain  RESP: no SOB at rest , no cough, no sputum  CVS: no chest pain, no palpitations, no edema  GI: no abdominal pain, no nausea, no diarrhea today  : no dysuria, no frequency, no hematuria  Neuro: no headache, no dizziness today reported   Derm : no itching, no rash    ==================>> PHYSICAL EXAM <<=================    GEN: A&O X 3 , NAD , comfortable, sitting at bedside   HEENT: NCAT, PERRL, MMM, hearing intact, on nasal canula   Neck: supple , no JVD  CVS: S1S2 , regular , No M/R/G appreciated  PULM: CTA B/L,  no W/R/R appreciated  ABD.: soft. non tender, non distended,  bowel sounds present  Extrem: intact pulses , no edema   PSYCH : normal mood,  not anxious       ==================>> MEDICATIONS <<====================    allopurinol 300 milliGRAM(s) Oral daily  aspirin enteric coated 81 milliGRAM(s) Oral daily  atorvastatin 40 milliGRAM(s) Oral at bedtime  benzonatate 100 milliGRAM(s) Oral once  chlorhexidine 4% Liquid 1 Application(s) Topical <User Schedule>  clopidogrel Tablet 75 milliGRAM(s) Oral daily  dextrose 5%. 1000 milliLiter(s) IV Continuous <Continuous>  dextrose 50% Injectable 12.5 Gram(s) IV Push once  dextrose 50% Injectable 25 Gram(s) IV Push once  dextrose 50% Injectable 25 Gram(s) IV Push once  docusate sodium 100 milliGRAM(s) Oral three times a day  epoetin rocky Injectable 72864 Unit(s) SubCutaneous <User Schedule>  heparin  Injectable 5000 Unit(s) SubCutaneous every 8 hours  hydrALAZINE 10 milliGRAM(s) Oral every 12 hours  insulin glargine Injectable (LANTUS) 5 Unit(s) SubCutaneous at bedtime  insulin lispro (HumaLOG) corrective regimen sliding scale   SubCutaneous at bedtime  insulin lispro (HumaLOG) corrective regimen sliding scale   SubCutaneous three times a day before meals  insulin lispro Injectable (HumaLOG) 2 Unit(s) SubCutaneous three times a day before meals  levothyroxine 50 MICROGram(s) Oral daily  metoprolol succinate ER 25 milliGRAM(s) Oral daily  multivitamin 1 Tablet(s) Oral daily  sevelamer carbonate 2400 milliGRAM(s) Oral three times a day with meals    MEDICATIONS  (PRN):  acetaminophen   Tablet .. 650 milliGRAM(s) Oral every 6 hours PRN Moderate Pain (4 - 6)  dextrose 40% Gel 15 Gram(s) Oral once PRN Blood Glucose LESS THAN 70 milliGRAM(s)/deciliter  gentamicin 0.1% Cream 1 Application(s) Topical <User Schedule> PRN Pd catheter dressing change  glucagon  Injectable 1 milliGRAM(s) IntraMuscular once PRN Glucose LESS THAN 70 milligrams/deciliter  loperamide 2 milliGRAM(s) Oral four times a day PRN Diarrhea    ==================>> VITAL SIGNS <<==================    Vital Signs Last 24 Hrs  T(C): 36.6 (05-06-19 @ 11:02)  T(F): 97.8 (05-06-19 @ 11:02), Max: 98.6 (05-05-19 @ 18:00)  HR: 68 (05-06-19 @ 11:02) (58 - 92)  BP: 107/72 (05-06-19 @ 11:02)  BP(mean): 79 (05-06-19 @ 11:00) (60 - 93)  RR: 19 (05-06-19 @ 11:02) (12 - 26)  SpO2: 100% (05-06-19 @ 11:02) (99% - 100%)      POCT Blood Glucose.: 108 mg/dL (06 May 2019 08:02)  POCT Blood Glucose.: 160 mg/dL (05 May 2019 21:49)  POCT Blood Glucose.: 216 mg/dL (05 May 2019 17:49)  POCT Blood Glucose.: 143 mg/dL (05 May 2019 11:49)     ==================>> LAB AND IMAGING <<==================                        7.9    5.70  )-----------( 98       ( 06 May 2019 05:40 )             25.6        05-06    138  |  98  |  72<H>  ----------------------------<  91  4.5   |  17<L>  |  10.17<H>    Ca    9.1      06 May 2019 05:45  Phos  7.2     05-06  Mg     2.0     05-06    TPro  5.4<L>  /  Alb  2.9<L>  /  TBili  0.2  /  DBili  x   /  AST  44<H>  /  ALT  30  /  AlkPhos  88  05-06    WBC count:   5.70 <<== ,  5.51 <<== ,  5.59 <<== ,  4.74 <<== ,  7.17 <<== ,  4.85 <<==   Hemoglobin:   7.9 <<==,  8.5 <<==,  9.5 <<==,  10.4 <<==,  11.1 <<==,  11.0 <<==  platelets:  98 <==, 115 <==, 139 <==, 157 <==, 157 <==, 169 <==, 179 <==    Creatinine:  10.17  <<==, 9.27  <<==, 9.30  <<==, 10.05  <<==, 9.83  <<==, 10.01  <<==  Sodium:   138  <==, 133  <==, 133  <==, 135  <==, 133  <==, 134  <==, 133  <==       AST:          44 <== , 32 <== , 24 <== , 25 <==      ALT:        30  <== , 21  <== , 21  <== , 21  <==      AP:        88  <=, 59  <=, 63  <=, 63  <=     Bili:        0.2  <=, 0.2  <=, 0.2  <=, 0.2  <=    < from: Cardiac Cath Lab - Adult (05.01.19 @ 10:20) >  CORONARY VESSELS: The coronary circulation is right dominant.  LM:   --  Distal left main: There was adiscrete 99 % stenosis at the site  of a prior stent. The lesion was complex, eccentric, and heavily  calcified. severe restnosis since stent of November 2018  LAD:   --  Ostial LAD: There was a 100 % stenosis. LIMA to mid LAD patent and fills the RCA  CX:   --  Proximal circumflex: There was a 90 % stenosis. restenosis from nov 2018  --  Mid circumflex: There was a 60 % stenosis.  RCA:   --  Proximal RCA: There was a 100 % stenosis. There was a  moderate-sized vascular territory distal to the lesion and good collateral  blood supply to the distal myocardium. This lesion progressed since November 2018  COMPLICATIONS: There were no complications.  DIAGNOSTIC IMPRESSIONS: Patient has normal to low filling pressures but low  CO and CI with severe restenosis of left main and proximal LCX complex and  calcified and progressive diseae of RCA.  DIAGNOSTIC RECOMMENDATIONS: Viability study anf if viable muscle in lateral  wall, consider high risk PTCA of left main and proximal LCX and aggressive  medical therapy for LV dysfunction,  < end of copied text >    < from: Cardiac Viability (05.02.19 @ 17:50) >  IMPRESSIONS:Abnormal Study  * Myocardial Perfusion SPECT results are abnormal.  * Review of raw data shows: The study is of good technical quality.  * The left ventricle was normal in size. There are large,  moderate to severe defects in anterior and anteroseptal,  anterolateral, apical, inferior and inferolateral, lateral  walls that are predominantly reversible, indicating  significant myocardial viability  * Post-stress gated wall motion analysis was performed  (LVEF = 13 %;LVEDV = 137 ml.), revealing severe overall hypokinesis.  ADDENDUM 5/2/2019: include tracer dose and protocol. No  change was made to study findings  ---------------------------------------------------  < end of copied text >    ___________________________________________________________________________________  ===============>>  A S S E S S M E N T   A N D   P L A N <<===============  ------------------------------------------------------------------------------------------    · Assessment		  Problem/Plan - 1:  ·  Problem: Shortness of breath ( due to fluid overload, due to ESRD) , improved with dialysis >> pt now with fluid contraction given low Rt sided pressures on RHC>> post gentle Hydration in CCU   monitor  closely  CCU and nephrology follow up and mgmt appreciated   post Lt/Rt heart cath >> showing significant and worsening of CAD and low EF  fluid mgmt via dialysis   Consider PRBC X1     Problem/Plan - 2:  ·  Problem: ESRD on peritoneal dialysis.    nephro follow up and mgmtt appreciated   dialysis as above   monitor and treat hyponatremia per renal   procrit and PRBC per renal for anemia likely of chronic disease      Problem/Plan - 3:  ·  Problem: acute hypoxemic respiratory failure due to pulmonary edema: resolved     pt was apparently off O2 and doing well but seen again on O2    monitor and wean off o2 as able      Problem/Plan - 4:  ·  Problem: Chronic systolic congestive heart failure, with worsening LV function   pt now off Dobutamine drip due to hypovolemia and hypotension   plan was for high risk PCI but pt and cardio seem to be opting for medical  / conservative mgmt : will follow   continue medical mgmt otherwise   pt off ACEI / ARB due to recorded allergy      Problem/Plan - 5:  ·  Problem: Dyslipidemia.    Continue home statin.      Problem/Plan - 6:  Problem: Type 2 diabetes mellitus   Insulin sliding scale    -GI/DVT Prophylaxis.    --------------------------------------------  Case discussed with pt  Education given on findings and plan of care  ___________________________  PRICILLA Abreu D.O.  Pager: [phone]  `

## 2019-05-06 NOTE — PROGRESS NOTE ADULT - SUBJECTIVE AND OBJECTIVE BOX
Subjective: Patient seen and examined. No new events except as noted.   Fillibng pressures by SGG cathater remain low  feels weak no real sob able to lie flat  no cp  no arryhmia  MEDICATIONS:  MEDICATIONS  (STANDING):  allopurinol 300 milliGRAM(s) Oral daily  aspirin enteric coated 81 milliGRAM(s) Oral daily  atorvastatin 40 milliGRAM(s) Oral at bedtime  benzonatate 100 milliGRAM(s) Oral once  chlorhexidine 4% Liquid 1 Application(s) Topical <User Schedule>  clopidogrel Tablet 75 milliGRAM(s) Oral daily  dextrose 5%. 1000 milliLiter(s) (50 mL/Hr) IV Continuous <Continuous>  dextrose 50% Injectable 12.5 Gram(s) IV Push once  dextrose 50% Injectable 25 Gram(s) IV Push once  dextrose 50% Injectable 25 Gram(s) IV Push once  docusate sodium 100 milliGRAM(s) Oral three times a day  epoetin rocky Injectable 46331 Unit(s) SubCutaneous <User Schedule>  heparin  Injectable 5000 Unit(s) SubCutaneous every 8 hours  hydrALAZINE 10 milliGRAM(s) Oral every 12 hours  insulin glargine Injectable (LANTUS) 5 Unit(s) SubCutaneous at bedtime  insulin lispro (HumaLOG) corrective regimen sliding scale   SubCutaneous at bedtime  insulin lispro (HumaLOG) corrective regimen sliding scale   SubCutaneous three times a day before meals  insulin lispro Injectable (HumaLOG) 2 Unit(s) SubCutaneous three times a day before meals  levothyroxine 50 MICROGram(s) Oral daily  metoprolol succinate ER 25 milliGRAM(s) Oral daily  multivitamin 1 Tablet(s) Oral daily  sevelamer carbonate 2400 milliGRAM(s) Oral three times a day with meals  sodium chloride 0.9%. 500 milliLiter(s) (100 mL/Hr) IV Continuous <Continuous>      PHYSICAL EXAM:  T(C): 36.2 (05-06-19 @ 08:00), Max: 37 (05-05-19 @ 18:00)  HR: 63 (05-06-19 @ 08:00) (58 - 105)  BP: 116/80 (05-06-19 @ 08:00) (86/62 - 126/70)  RR: 17 (05-06-19 @ 08:00) (11 - 26)  SpO2: 100% (05-06-19 @ 08:00) (99% - 100%)  Wt(kg): --  I&O's Summary    05 May 2019 07:01  -  06 May 2019 07:00  --------------------------------------------------------  IN: 560 mL / OUT: 3 mL / NET: 557 mL    06 May 2019 07:01  -  06 May 2019 08:56  --------------------------------------------------------  IN: 2000 mL / OUT: 0 mL / NET: 2000 mL          Appearance: Normal chronically ill appearing	  HEENT:   Normal oral mucosa, PERRL, EOMI	  Cardiovascular: Normal S1 S2, No JVD, No murmurs ,no s3  Respiratory: Lungs clear to auscultation, normal effort 	  Gastrointestinal:  Soft, Non-tender, + BS	  Skin: No rashes, No ecchymoses, No cyanosis, warm to touch  Musculoskeletal: Normal range of motion, normal strength  Psychiatry:  Mood & affect appropriate  Ext: No edema  Peripheral pulses palpable 2+ bilaterally      LABS:    CARDIAC MARKERS:                                7.9    5.70  )-----------( 98       ( 06 May 2019 05:40 )             25.6     05-06    138  |  98  |  72<H>  ----------------------------<  91  4.5   |  17<L>  |  10.17<H>    Ca    9.1      06 May 2019 05:45  Phos  7.2     05-06  Mg     2.0     05-06    TPro  5.4<L>  /  Alb  2.9<L>  /  TBili  0.2  /  DBili  x   /  AST  44<H>  /  ALT  30  /  AlkPhos  88  05-06    proBNP:   Lipid Profile:   HgA1c:   TSH:         [ ] Stress Test:    OTHER:

## 2019-05-06 NOTE — PROGRESS NOTE ADULT - ASSESSMENT
HPI: 73F PMH ESRD on peritoneal dialysis for the last 2 years, CHF (EF 46%, w/PAH), CAD (2 stents?), PPM, HTN, HLD, T2DM (not on insulin), and hypothyroidism p/w progressive shortness of breath ans orthopnea, noted to be fluid overloaded, nephrology consulted peritoneal dialysis initiated urgently. Patient was admitted to telemetry. Echo revealed a worsening EF now 21% from 46%. Interrogation revealed undersensing of the atria and lower than expected BiV CRT pacing. Optivol also revealed worsening heart failure decompensation at the time the atrial sensing and pacing percentage decreased from 99% to 88%. LHC on 5/1 showed LM 99%, prox LCx 90%, % with patent LIMA to mLAD. Cardiac viability study on 5/2 showed large, moderate to severe defects that were predominantly reversible. Planning on transfer to [facility] for high risk PCI. 73F PMH ESRD on peritoneal dialysis for the last 2 years, CHF (EF 46%, w/PAH), CAD (2 stents?), PPM, HTN, HLD, T2DM (not on insulin), and hypothyroidism p/w progressive shortness of breath ans orthopnea, noted to be fluid overloaded, nephrology consulted peritoneal dialysis initiated urgently. Patient was admitted to telemetry. Echo revealed a worsening EF now 21% from 46%. Interrogation revealed undersensing of the atria and lower than expected BiV CRT pacing. Optivol also revealed worsening heart failure decompensation at the time the atrial sensing and pacing percentage decreased from 99% to 88%. LHC on 5/1 showed LM 99%, prox LCx 90%, % with patent LIMA to mLAD. Cardiac viability study on 5/2 showed large, moderate to severe defects that were predominantly reversible. Initially planned on transfer to [facility] for high risk PCI, now planning for medical management. 73F PMH ESRD on peritoneal dialysis for the last 2 years, CHF (EF 46%, w/PAH), CAD (2 stents?), PPM, HTN, HLD, T2DM (not on insulin), and hypothyroidism p/w progressive shortness of breath ans orthopnea, noted to be fluid overloaded, nephrology consulted peritoneal dialysis initiated urgently. Patient was admitted to telemetry. Echo revealed a worsening EF now 21% from 46%. Interrogation revealed undersensing of the atria and lower than expected BiV CRT pacing. Optivol also revealed worsening heart failure decompensation at the time the atrial sensing and pacing percentage decreased from 99% to 88%. LHC on 5/1 showed LM 99%, prox LCx 90%, % with patent LIMA to mLAD. Cardiac viability study on 5/2 showed large, moderate to severe defects that were predominantly reversible. Initially planned on transfer to [facility] for high risk PCI, now planning for medical management.    # Neuro  A&Ox3, no acute issues  - monitor mental status    # CV  CAD w ischemic cardiomyopathy -  PA pressure 23-12, CVP is 13-15, wedge is 12-13. SWAN revealed a CVP 12 and PA 23/12 . Planned for patient to transfer to [facility] for high risk PCI on Monday, however per Dr Jennings would recommend against and pt opting for medical management  -patient approaching euvolemia, IVF held, no diuretics, encouraging PO intake.   -continue with ASA and plavix and lipitor 40  -no longer planning for revascularization at NS    Chronic systolic congestive heart failure  -continue labetolol 200 tid and hydralazine 10 q12h  - start metoprolol 25 mg qD  HTN - Patient remains with soft BP  - continue labetolol 100 qD  HLD  - continue lipitor 40    # Pulm  Satting well on RA    # GI  - c/w renal diet    # Renal  ESRD on peritoneal dialysis  - c/w PD qD  - appreciate nephrology recs    # Endo  Diabetes mellitus  - continue fingersticks  qac and qhs  - continue lantus as ordered    Hypothyroid  - continue levothyroxine    #Heme  Anemia - Hg & plts downtrending, no evidence of bleeding  - send iron studies, B12, folate, retics  - monitor CBC 73F PMH ESRD on peritoneal dialysis for the last 2 years, CHF (EF 46%, w/PAH), CAD (2 stents?), PPM, HTN, HLD, T2DM (not on insulin), and hypothyroidism p/w progressive shortness of breath ans orthopnea, noted to be fluid overloaded, nephrology consulted peritoneal dialysis initiated urgently. Patient was admitted to telemetry. Echo revealed a worsening EF now 21% from 46%. Interrogation revealed undersensing of the atria and lower than expected BiV CRT pacing. Optivol also revealed worsening heart failure decompensation at the time the atrial sensing and pacing percentage decreased from 99% to 88%. LHC on 5/1 showed LM 99%, prox LCx 90%, % with patent LIMA to mLAD. Cardiac viability study on 5/2 showed large, moderate to severe defects that were predominantly reversible. Initially planned on transfer to [facility] for high risk PCI, now planning for medical management.    # Neuro  A&Ox3, no acute issues  - monitor mental status  - c/w tylenol 650 mg q6h prn for pain    # CV  CAD w ischemic cardiomyopathy -  PA pressure 23-12, CVP is 13-15, wedge is 12-13. SWAN revealed a CVP 12 and PA 23/12. Planned for patient to transfer to [facility] for high risk PCI of L main and circ on Monday, however per Dr Jennings would recommend against and pt opting for medical management. Tele w PVCs & NSVT.  -patient approaching euvolemia, IVF held, no diuretics, encouraging PO intake.   - c/w aspirin 81 mg qD  - c/w clopidogrel 75 mg qD  - c/w atorvastatin 40 mg qD  - no longer planning for revascularization at [facility]    Chronic systolic congestive heart failure  - start metoprolol succinate ER 25 mg qD  - c/w hydralazine 10 mg q12h  - will discuss w nephrology re starting ACEi  HTN - Patient remains with soft BP  - c/w metoprolol & hydralazine as above  HLD  - continue lipitor 40    # Pulm  Satting well on RA    # GI  - c/w renal diet  - c/w colace 100 mg tid  - c/w multivitamin & calcitriol 0.25 mcg    # Renal  ESRD on peritoneal dialysis  - c/w PD qD  - appreciate nephrology recs  Hyperphosphatemia  - c/w sevelamer carbonate 2400 mg tid  Hyponatremia - resolved  - monitor BMP    # Endo  Diabetes mellitus  - c/w lantus 5 U qD  - c/w humalog 2 U tid  - continue fingersticks qac and qhs    Hypothyroid  - c/w levothyroxine 50 mcg qD    # ID  Afebrile, no leukocytosis  - D/C R IJ catheter today    # Heme  Anemia - Hg & plts downtrending, no evidence of bleeding, likely partly i/s/o ESRD  - c/w epoetin 27307 U M/W/F  - send iron studies, B12, folate, retics  - monitor CBC    # Dispo  - PT pardeep Adame PGY-1  Internal Medicine HS  Pager [phone] 73F PMH ESRD on peritoneal dialysis for the last 2 years, CHF (EF 46%, w/PAH), CAD (2 stents?), PPM, HTN, HLD, T2DM (not on insulin), and hypothyroidism p/w progressive shortness of breath ans orthopnea, noted to be fluid overloaded, nephrology consulted peritoneal dialysis initiated urgently. Patient was admitted to telemetry. Echo revealed a worsening EF now 21% from 46%. Interrogation revealed undersensing of the atria and lower than expected BiV CRT pacing. Optivol also revealed worsening heart failure decompensation at the time the atrial sensing and pacing percentage decreased from 99% to 88%. LHC on 5/1 showed LM 99%, prox LCx 90%, % with patent LIMA to mLAD. Cardiac viability study on 5/2 showed large, moderate to severe defects that were predominantly reversible. Initially planned on transfer to [facility] for high risk PCI, now planning for medical management.    # Neuro  A&Ox3, no acute issues  - monitor mental status  - c/w tylenol 650 mg q6h prn for pain    # CV  CAD w ischemic cardiomyopathy -  PA pressure 23-12, CVP is 13-15, wedge is 12-13. SWAN revealed a CVP 12 and PA 23/12. Planned for patient to transfer to [facility] for high risk PCI of L main and circ on Monday, however per Dr Jennings would recommend against and pt opting for medical management. Tele w PVCs & NSVT.  -patient approaching euvolemia, IVF held, no diuretics, encouraging PO intake.   - c/w aspirin 81 mg qD  - c/w clopidogrel 75 mg qD  - c/w atorvastatin 40 mg qD  - no longer planning for revascularization at [facility]    Chronic systolic congestive heart failure  - start metoprolol succinate ER 25 mg qD  - c/w hydralazine 10 mg q12h  - will discuss w nephrology re starting ACEi  HTN - Patient remains with soft BP  - c/w metoprolol & hydralazine as above  HLD  - continue lipitor 40    # Pulm  Satting well on RA    # GI  - c/w renal diet  - c/w colace 100 mg tid  - c/w multivitamin & calcitriol 0.25 mcg    # Renal  ESRD on peritoneal dialysis  - c/w PD qD  Hyperphosphatemia - reports n/v associated w sevelamer  - decrease sevelamer carbonate from 2400 to 800 mg tid w meals  - start phoslo 667 mg tid w meals  Hyponatremia - resolved  - monitor BMP  - appreciate nephrology recs    # Endo  Diabetes mellitus  - c/w lantus 5 U qD  - c/w humalog 2 U tid  - continue fingersticks qac and qhs    Hypothyroid  - c/w levothyroxine 50 mcg qD    # ID  Afebrile, no leukocytosis  - D/C R IJ catheter today    # Heme  Anemia - Hg & plts downtrending, no evidence of bleeding, likely partly i/s/o ESRD  - c/w epoetin 87534 U M/W/F  - send iron studies, B12, folate, retics  - monitor CBC    # Dispo  - PT pardeep Adame PGY-1  Internal Medicine HS  Pager [phone] 73F PMH ESRD on peritoneal dialysis for the last 2 years, CHF (EF 46%, w/PAH), CAD (2 stents?), PPM, HTN, HLD, T2DM (not on insulin), and hypothyroidism p/w progressive shortness of breath ans orthopnea, noted to be fluid overloaded, nephrology consulted peritoneal dialysis initiated urgently. Patient was admitted to telemetry. Echo revealed a worsening EF now 21% from 46%. Interrogation revealed undersensing of the atria and lower than expected BiV CRT pacing. Optivol also revealed worsening heart failure decompensation at the time the atrial sensing and pacing percentage decreased from 99% to 88%. LHC on 5/1 showed LM 99%, prox LCx 90%, % with patent LIMA to mLAD. Cardiac viability study on 5/2 showed large, moderate to severe defects that were predominantly reversible. Initially planned on transfer to [facility] for high risk PCI, now planning for medical management.    # Neuro  A&Ox3, no acute issues  - monitor mental status  - c/w tylenol 650 mg q6h prn for pain    # CV  CAD w ischemic cardiomyopathy -  PA pressure 23-12, CVP is 13-15, wedge is 12-13. SWAN revealed a CVP 12 and PA 23/12. Planned for patient to transfer to [facility] for high risk PCI of L main and circ on Monday, however per Dr Jennings would recommend against and pt opting for medical management. Tele w PVCs & NSVT.  -patient approaching euvolemia, IVF held, no diuretics, encouraging PO intake.   - c/w aspirin 81 mg qD  - c/w clopidogrel 75 mg qD  - c/w atorvastatin 40 mg qD  - no longer planning for revascularization at [facility]    Chronic systolic congestive heart failure  - start metoprolol succinate ER 25 mg qD  - start lisinopril 5 mg qD  - D/C hydralazine  - D/C labetalol  HTN - Patient remains with soft BP  - metoprolol & lisinopril as above  HLD  - c/w atorvastatin 40 mg qD    # Pulm  Satting well on RA    # GI  - c/w renal diet  - c/w colace 100 mg tid  - c/w multivitamin & calcitriol 0.25 mcg    # Renal  ESRD on peritoneal dialysis  - c/w PD qD  Hyperphosphatemia - reports n/v associated w sevelamer  - decrease sevelamer carbonate from 2400 to 800 mg tid w meals  - start phoslo 667 mg tid w meals  Hyponatremia - resolved  - monitor BMP  - appreciate nephrology recs    # Endo  Diabetes mellitus - HgA1C 9.8%  - c/w lantus 5 U qD  - c/w humalog 2 U tid  - continue fingersticks qac and qhs  Hypothyroidism  - c/w levothyroxine 50 mcg qD    # ID  Afebrile, no leukocytosis  - D/C R IJ catheter today    # Heme  Anemia - Hg & plts downtrending, no evidence of bleeding, likely partly i/s/o ESRD  - c/w epoetin 98898 U M/W/F  - send iron studies, B12, folate, retics  - monitor CBC    # Dispo  - PT pardeep Adame PGY-1  Internal Medicine HS  Pager [phone]

## 2019-05-07 LAB
ALBUMIN SERPL ELPH-MCNC: 3.5 G/DL — SIGNIFICANT CHANGE UP (ref 3.3–5)
ALP SERPL-CCNC: 96 U/L — SIGNIFICANT CHANGE UP (ref 40–120)
ALT FLD-CCNC: 37 U/L — HIGH (ref 4–33)
ANION GAP SERPL CALC-SCNC: 24 MMO/L — HIGH (ref 7–14)
AST SERPL-CCNC: 43 U/L — HIGH (ref 4–32)
BILIRUB SERPL-MCNC: 0.2 MG/DL — SIGNIFICANT CHANGE UP (ref 0.2–1.2)
BLD GP AB SCN SERPL QL: NEGATIVE — SIGNIFICANT CHANGE UP
BUN SERPL-MCNC: 67 MG/DL — HIGH (ref 7–23)
CALCIUM SERPL-MCNC: 9.9 MG/DL — SIGNIFICANT CHANGE UP (ref 8.4–10.5)
CHLORIDE SERPL-SCNC: 88 MMOL/L — LOW (ref 98–107)
CO2 SERPL-SCNC: 18 MMOL/L — LOW (ref 22–31)
CREAT SERPL-MCNC: 9.64 MG/DL — HIGH (ref 0.5–1.3)
FERRITIN SERPL-MCNC: 1870 NG/ML — HIGH (ref 15–150)
FOLATE SERPL-MCNC: > 20 NG/ML — HIGH (ref 4.7–20)
GLUCOSE BLDC GLUCOMTR-MCNC: 146 MG/DL — HIGH (ref 70–99)
GLUCOSE BLDC GLUCOMTR-MCNC: 150 MG/DL — HIGH (ref 70–99)
GLUCOSE BLDC GLUCOMTR-MCNC: 151 MG/DL — HIGH (ref 70–99)
GLUCOSE BLDC GLUCOMTR-MCNC: 151 MG/DL — HIGH (ref 70–99)
GLUCOSE BLDC GLUCOMTR-MCNC: 284 MG/DL — HIGH (ref 70–99)
GLUCOSE SERPL-MCNC: 140 MG/DL — HIGH (ref 70–99)
HCT VFR BLD CALC: 28.4 % — LOW (ref 34.5–45)
HGB BLD-MCNC: 8.8 G/DL — LOW (ref 11.5–15.5)
IRON SATN MFR SERPL: 223 UG/DL — SIGNIFICANT CHANGE UP (ref 140–530)
IRON SATN MFR SERPL: 73 UG/DL — SIGNIFICANT CHANGE UP (ref 30–160)
MAGNESIUM SERPL-MCNC: 2 MG/DL — SIGNIFICANT CHANGE UP (ref 1.6–2.6)
MCHC RBC-ENTMCNC: 25.9 PG — LOW (ref 27–34)
MCHC RBC-ENTMCNC: 31 % — LOW (ref 32–36)
MCV RBC AUTO: 83.5 FL — SIGNIFICANT CHANGE UP (ref 80–100)
NRBC # FLD: 0.24 K/UL — SIGNIFICANT CHANGE UP (ref 0–0)
NRBC FLD-RTO: 3.4 — SIGNIFICANT CHANGE UP
PHOSPHATE SERPL-MCNC: 6.8 MG/DL — HIGH (ref 2.5–4.5)
PLATELET # BLD AUTO: 135 K/UL — LOW (ref 150–400)
PMV BLD: 11.4 FL — SIGNIFICANT CHANGE UP (ref 7–13)
POTASSIUM SERPL-MCNC: 4.1 MMOL/L — SIGNIFICANT CHANGE UP (ref 3.5–5.3)
POTASSIUM SERPL-SCNC: 4.1 MMOL/L — SIGNIFICANT CHANGE UP (ref 3.5–5.3)
PROT SERPL-MCNC: 6.7 G/DL — SIGNIFICANT CHANGE UP (ref 6–8.3)
RBC # BLD: 3.4 M/UL — LOW (ref 3.8–5.2)
RBC # FLD: 17.3 % — HIGH (ref 10.3–14.5)
RETICS #: 36 K/UL — SIGNIFICANT CHANGE UP (ref 25–125)
RETICS/RBC NFR: 1.1 % — SIGNIFICANT CHANGE UP (ref 0.5–2.5)
RH IG SCN BLD-IMP: POSITIVE — SIGNIFICANT CHANGE UP
SODIUM SERPL-SCNC: 130 MMOL/L — LOW (ref 135–145)
UIBC SERPL-MCNC: 150.3 UG/DL — SIGNIFICANT CHANGE UP (ref 110–370)
VIT B12 SERPL-MCNC: 940 PG/ML — HIGH (ref 200–900)
WBC # BLD: 6.96 K/UL — SIGNIFICANT CHANGE UP (ref 3.8–10.5)
WBC # FLD AUTO: 6.96 K/UL — SIGNIFICANT CHANGE UP (ref 3.8–10.5)

## 2019-05-07 RX ADMIN — HEPARIN SODIUM 5000 UNIT(S): 5000 INJECTION INTRAVENOUS; SUBCUTANEOUS at 05:56

## 2019-05-07 RX ADMIN — SEVELAMER CARBONATE 800 MILLIGRAM(S): 2400 POWDER, FOR SUSPENSION ORAL at 08:02

## 2019-05-07 RX ADMIN — Medication 2 UNIT(S): at 09:37

## 2019-05-07 RX ADMIN — HEPARIN SODIUM 5000 UNIT(S): 5000 INJECTION INTRAVENOUS; SUBCUTANEOUS at 13:12

## 2019-05-07 RX ADMIN — Medication 3: at 13:11

## 2019-05-07 RX ADMIN — Medication 100 MILLIGRAM(S): at 22:10

## 2019-05-07 RX ADMIN — SEVELAMER CARBONATE 800 MILLIGRAM(S): 2400 POWDER, FOR SUSPENSION ORAL at 12:24

## 2019-05-07 RX ADMIN — Medication 100 MILLIGRAM(S): at 05:55

## 2019-05-07 RX ADMIN — Medication 2 UNIT(S): at 13:11

## 2019-05-07 RX ADMIN — Medication 650 MILLIGRAM(S): at 23:00

## 2019-05-07 RX ADMIN — Medication 50 MICROGRAM(S): at 05:55

## 2019-05-07 RX ADMIN — ATORVASTATIN CALCIUM 40 MILLIGRAM(S): 80 TABLET, FILM COATED ORAL at 22:09

## 2019-05-07 RX ADMIN — Medication 667 MILLIGRAM(S): at 12:24

## 2019-05-07 RX ADMIN — Medication 650 MILLIGRAM(S): at 22:10

## 2019-05-07 RX ADMIN — Medication 667 MILLIGRAM(S): at 08:02

## 2019-05-07 RX ADMIN — INSULIN GLARGINE 5 UNIT(S): 100 INJECTION, SOLUTION SUBCUTANEOUS at 22:11

## 2019-05-07 RX ADMIN — Medication 1 TABLET(S): at 12:24

## 2019-05-07 RX ADMIN — CLOPIDOGREL BISULFATE 75 MILLIGRAM(S): 75 TABLET, FILM COATED ORAL at 12:24

## 2019-05-07 RX ADMIN — CHLORHEXIDINE GLUCONATE 1 APPLICATION(S): 213 SOLUTION TOPICAL at 11:35

## 2019-05-07 RX ADMIN — Medication 81 MILLIGRAM(S): at 12:24

## 2019-05-07 NOTE — PROGRESS NOTE ADULT - ASSESSMENT
1. severe ischemic cardiomyopathy  2. Chronic renal insufficiency on perito  3. Low cardiac output low filling pressures  4. Hypotension exacerbated by dehydration low filling pressures and dobutamine  5. No obvious ischemia  6. 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

## 2019-05-07 NOTE — PROGRESS NOTE ADULT - SUBJECTIVE AND OBJECTIVE BOX
[facility]   MD ROSITA APODACA MD ANGELA WONG, PA    TEL:  OFFICE: [phone]  DR SCOTT CELL: [phone]  DR. JOHNSON CELL: [phone]  KESHA HOUSTON CELL: [phone]        Patient is a 74y old  Female who presents with a chief complaint of shortness of breath (07 May 2019 14:46)      Patient seen and examined at bedside. +sob    VITALS:  T(F): 97.9 (05-07-19 @ 15:15), Max: 98.1 (05-07-19 @ 14:00)  HR: 60 (05-07-19 @ 15:15)  BP: 106/69 (05-07-19 @ 15:15)  RR: 18 (05-07-19 @ 15:15)  SpO2: 100% (05-07-19 @ 14:00)  Wt(kg): --    05-06 @ 07:01  -  05-07 @ 07:00  --------------------------------------------------------  IN: 78210 mL / OUT: 8400 mL / NET: 2200 mL    05-07 @ 07:01  -  05-07 @ 15:43  --------------------------------------------------------  IN: 4000 mL / OUT: 4100 mL / NET: -100 mL          PHYSICAL EXAM:  Constitutional: NAD  Neck: No JVD  Respiratory: CTAB, no wheezes, rales or rhonchi  Cardiovascular: S1, S2, RRR  Gastrointestinal: BS+, soft, NT/ND  Extremities: No peripheral edema    Hospital Medications:   MEDICATIONS  (STANDING):  allopurinol 300 milliGRAM(s) Oral daily  aspirin enteric coated 81 milliGRAM(s) Oral daily  atorvastatin 40 milliGRAM(s) Oral at bedtime  calcium acetate 667 milliGRAM(s) Oral three times a day with meals  chlorhexidine 4% Liquid 1 Application(s) Topical <User Schedule>  clopidogrel Tablet 75 milliGRAM(s) Oral daily  dextrose 5%. 1000 milliLiter(s) (50 mL/Hr) IV Continuous <Continuous>  dextrose 50% Injectable 12.5 Gram(s) IV Push once  dextrose 50% Injectable 25 Gram(s) IV Push once  dextrose 50% Injectable 25 Gram(s) IV Push once  docusate sodium 100 milliGRAM(s) Oral three times a day  epoetin rocky Injectable 67848 Unit(s) SubCutaneous <User Schedule>  heparin  Injectable 5000 Unit(s) SubCutaneous every 8 hours  insulin glargine Injectable (LANTUS) 5 Unit(s) SubCutaneous at bedtime  insulin lispro (HumaLOG) corrective regimen sliding scale   SubCutaneous at bedtime  insulin lispro (HumaLOG) corrective regimen sliding scale   SubCutaneous three times a day before meals  insulin lispro Injectable (HumaLOG) 2 Unit(s) SubCutaneous three times a day before meals  levothyroxine 50 MICROGram(s) Oral daily  lisinopril 5 milliGRAM(s) Oral daily  metoprolol succinate ER 25 milliGRAM(s) Oral daily  multivitamin 1 Tablet(s) Oral daily  sevelamer carbonate 800 milliGRAM(s) Oral three times a day with meals      LABS:  05-07    130<L>  |  88<L>  |  67<H>  ----------------------------<  140<H>  4.1   |  18<L>  |  9.64<H>    Ca    9.9      07 May 2019 06:19  Phos  6.8     05-07  Mg     2.0     05-07    TPro  6.7  /  Alb  3.5  /  TBili  0.2  /  DBili      /  AST  43<H>  /  ALT  37<H>  /  AlkPhos  96  05-07    Creatinine Trend: 9.64 <--, 10.17 <--, 9.27 <--, 9.30 <--, 10.05 <--, 9.83 <--, 10.01 <--, 9.14 <--    Iron Total, Serum: 73 ug/dL (05-07 @ 06:19)  Ferritin, Serum: 1870 ng/mL (05-07 @ 06:19)  Phosphorus Level, Serum: 6.8 mg/dL (05-07 @ 06:19)  Albumin, Serum: 3.5 g/dL (05-07 @ 06:19)                              8.8    6.96  )-----------( 135      ( 07 May 2019 06:19 )             28.4     Urine Studies:      Iron 73, TIBC 223, %sat --      [05-07-19 @ 06:19]  Ferritin 1870      [05-07-19 @ 06:19]  .8 (Ca --)      [04-24-19 @ 05:17]   --  .4 (Ca --)      [01-13-19 @ 05:32]   --  .8 (Ca --)      [08-12-18 @ 06:00]   --  Vitamin D (25OH) 24.6      [04-24-19 @ 05:17]  HbA1c 9.8      [04-24-19 @ 05:17]  TSH 8.74      [04-24-19 @ 05:17]  Lipid: chol 167, , HDL 45, LDL 99      [04-24-19 @ 05:17]    HBsAb 23.8      [04-25-19 @ 14:53]  HBsAg NEGATIVE      [04-25-19 @ 14:53]  HBcAb Nonreactive      [04-25-19 @ 14:53]  HCV 0.04, Nonreactive Hepatitis C AB  S/CO Ratio                        Interpretation  < 1.00                                   Non-Reactive  1.00 - 4.99                         Weakly-Reactive  > 5.00                                Reactive  Non-Reactive: A person with a non-reactive HCV antibody  result is considered uninfected.  No further action is  needed unless recent infection is suspected.  In these  cases, consider repeat testing later to detect  seroconversion..  Weakly-Reactive: HCV antibody test is abnormal, HCV RNA  Qualitative test will follow.  Reactive: HCV antibody test is abnormal, HCV RNA  Qualitative test will follow.  Note: HCV antibody testing is performed on the [employer] system.      [04-25-19 @ 14:53]      RADIOLOGY & ADDITIONAL STUDIES:

## 2019-05-07 NOTE — CHART NOTE - NSCHARTNOTEFT_GEN_A_CORE
called by RN to eval ? hematoma of abd at hep sq injection site. Pt seen and examined. One small area of ecchymosis noted on LUQ with walnut size hematoma and pea size hematoma of LLQ. Discussed with MD - will just monitor for now.

## 2019-05-07 NOTE — PROGRESS NOTE ADULT - ASSESSMENT
74F w/ ESRD on PD at home, HTN, DM, CAD s/p CABG and stents, on AC, p/w SOB x1 day.    ESRD on PD  Continue PD with 1.5% dex, as ordered  Monitor BMP and BP    SOB  Cardiology follow up     Hypercalcemia  Elevated PTH, phos level  calcitriol on hold sec to hypercalcemia.   On renagel 2400 tid with meal (patient has alot of nausea with renagel. will decrease to 800 tid with meal, calcium better now will start phoslo 667mg tid. monitor phos and ca)  Monitor serum calcium and phos    Anemia  epo 17031 sq tiw  monitor Hb    HTN: Optimal  f/u cardio  monitor bp  hold bp meds per perimeter  There is no contraindication to use ACEI if her BP tolerates     Hyponatremia   likely sec to increase fluid status in ESRD pt  free water restriction <1L/day    Hyperkalemia  sec to renal failure  low k diet  PD as ordered  monitor 74F w/ ESRD on PD at home, HTN, DM, CAD s/p CABG and stents, on AC, p/w SOB x1 day.    ESRD on PD  Continue PD with 1.5% dex, as ordered  Monitor BMP and BP    SOB  Cardiology follow up   UF with PD    Hypercalcemia  Elevated PTH, phos level  calcitriol on hold sec to hypercalcemia.   Pt unable to tolerate Renagel,  will start phoslo 667mg tid  Monitor serum calcium and phos    Anemia  epo 27539 sq tiw  monitor Hb    HTN: Optimal  f/u cardio  monitor bp  hold bp meds per perimeter  There is no contraindication to use ACEI if her BP tolerates     Hyponatremia   likely sec to increase fluid status in ESRD pt  free water restriction <1L/day    Hyperkalemia  sec to renal failure  low k diet  PD as ordered  monitor

## 2019-05-07 NOTE — PROGRESS NOTE ADULT - SUBJECTIVE AND OBJECTIVE BOX
Subjective: Patient seen and examined. No new events except as noted.   feels weak, tired and chronically short of breath on nasal O2  Caldwell-Lisandro catheter removed patient had continued low filling pressures    MEDICATIONS:  MEDICATIONS  (STANDING):  allopurinol 300 milliGRAM(s) Oral daily  aspirin enteric coated 81 milliGRAM(s) Oral daily  atorvastatin 40 milliGRAM(s) Oral at bedtime  calcium acetate 667 milliGRAM(s) Oral three times a day with meals  chlorhexidine 4% Liquid 1 Application(s) Topical <User Schedule>  clopidogrel Tablet 75 milliGRAM(s) Oral daily  dextrose 5%. 1000 milliLiter(s) (50 mL/Hr) IV Continuous <Continuous>  dextrose 50% Injectable 12.5 Gram(s) IV Push once  dextrose 50% Injectable 25 Gram(s) IV Push once  dextrose 50% Injectable 25 Gram(s) IV Push once  docusate sodium 100 milliGRAM(s) Oral three times a day  epoetin rocky Injectable 61160 Unit(s) SubCutaneous <User Schedule>  heparin  Injectable 5000 Unit(s) SubCutaneous every 8 hours  insulin glargine Injectable (LANTUS) 5 Unit(s) SubCutaneous at bedtime  insulin lispro (HumaLOG) corrective regimen sliding scale   SubCutaneous at bedtime  insulin lispro (HumaLOG) corrective regimen sliding scale   SubCutaneous three times a day before meals  insulin lispro Injectable (HumaLOG) 2 Unit(s) SubCutaneous three times a day before meals  levothyroxine 50 MICROGram(s) Oral daily  lisinopril 5 milliGRAM(s) Oral daily  metoprolol succinate ER 25 milliGRAM(s) Oral daily  multivitamin 1 Tablet(s) Oral daily  sevelamer carbonate 800 milliGRAM(s) Oral three times a day with meals      PHYSICAL EXAM:  T(C): 36.6 (05-07-19 @ 15:15), Max: 36.7 (05-07-19 @ 06:43)  HR: 60 (05-07-19 @ 15:15) (60 - 80)  BP: 106/69 (05-07-19 @ 15:15) (96/55 - 128/68)  RR: 18 (05-07-19 @ 15:15) (16 - 18)  SpO2: 100% (05-07-19 @ 14:00) (100% - 100%)  Wt(kg): --  I&O's Summary    06 May 2019 07:01  -  07 May 2019 07:00  --------------------------------------------------------  IN: 21333 mL / OUT: 8400 mL / NET: 2200 mL    07 May 2019 07:01  -  07 May 2019 19:13  --------------------------------------------------------  IN: 4000 mL / OUT: 4100 mL / NET: -100 mL          Appearance: NormalChronically ill-appearing  sitting upright on nasal O2 no acute distress	  HEENT:   Normal oral mucosa, PERRL, EOMI	  Cardiovascular: Normal S1 S2, No JVD, No murmurs ,  Respiratory: Lungs clear to auscultation, normal effort 	  Gastrointestinal:  Soft, Non-tender, + BS	  Skin: No rashes, No ecchymoses, No cyanosis, warm to touch  Musculoskeletal: Normal range of motion, normal strength  Psychiatry:  Mood & affect appropriate  Ext: No edema  Peripheral pulses palpable 2+ bilaterally      LABS:    CARDIAC MARKERS:                                8.8    6.96  )-----------( 135      ( 07 May 2019 06:19 )             28.4     05-07    130<L>  |  88<L>  |  67<H>  ----------------------------<  140<H>  4.1   |  18<L>  |  9.64<H>    Ca    9.9      07 May 2019 06:19  Phos  6.8     05-07  Mg     2.0     05-07    TPro  6.7  /  Alb  3.5  /  TBili  0.2  /  DBili  x   /  AST  43<H>  /  ALT  37<H>  /  AlkPhos  96  05-07    proBNP:   Lipid Profile:   HgA1c:   TSH:

## 2019-05-07 NOTE — PROGRESS NOTE ADULT - ASSESSMENT
_________________________________________________________________________________________  ========>>  M E D I C A L   A T T E N D I N G    F O L L O W  U P  N O T E  <<=========  -----------------------------------------------------------------------------------------------------    - Patient seen and examined by me earlier today.   - In summary,  KAPIL CAMPOVERDE is a 74y year old woman who originally presented with SOB  - Patient today overall doing ok, comfortable, eating ok , CP free but has SOB at times     ==================>> REVIEW OF SYSTEM <<=================    GEN: no fever, no chills, no pain  RESP: +  SOB at times , no cough, no sputum  CVS: no chest pain, no palpitations, no edema  GI: no abdominal pain, no nausea, no diarrhea today  : no dysuria, no frequency, no hematuria  Neuro: no headache, no dizziness today reported   Derm : no itching, no rash    ==================>> PHYSICAL EXAM <<=================    GEN: A&O X 3 , NAD , comfortable, sitting at bedside   HEENT: NCAT, PERRL, MMM, hearing intact, on nasal canula   Neck: supple , no JVD  CVS: S1S2 , regular , No M/R/G appreciated  PULM: CTA B/L,  no W/R/R appreciated  ABD.: soft. non tender, non distended,  bowel sounds present  Extrem: intact pulses , no edema   PSYCH : normal mood,  not anxious      ==================>> MEDICATIONS <<====================    allopurinol 300 milliGRAM(s) Oral daily  aspirin enteric coated 81 milliGRAM(s) Oral daily  atorvastatin 40 milliGRAM(s) Oral at bedtime  calcium acetate 667 milliGRAM(s) Oral three times a day with meals  chlorhexidine 4% Liquid 1 Application(s) Topical <User Schedule>  clopidogrel Tablet 75 milliGRAM(s) Oral daily  dextrose 5%. 1000 milliLiter(s) IV Continuous <Continuous>  dextrose 50% Injectable 12.5 Gram(s) IV Push once  dextrose 50% Injectable 25 Gram(s) IV Push once  dextrose 50% Injectable 25 Gram(s) IV Push once  docusate sodium 100 milliGRAM(s) Oral three times a day  epoetin rocky Injectable 32797 Unit(s) SubCutaneous <User Schedule>  heparin  Injectable 5000 Unit(s) SubCutaneous every 8 hours  insulin glargine Injectable (LANTUS) 5 Unit(s) SubCutaneous at bedtime  insulin lispro (HumaLOG) corrective regimen sliding scale   SubCutaneous at bedtime  insulin lispro (HumaLOG) corrective regimen sliding scale   SubCutaneous three times a day before meals  insulin lispro Injectable (HumaLOG) 2 Unit(s) SubCutaneous three times a day before meals  levothyroxine 50 MICROGram(s) Oral daily  lisinopril 5 milliGRAM(s) Oral daily  metoprolol succinate ER 25 milliGRAM(s) Oral daily  multivitamin 1 Tablet(s) Oral daily  sevelamer carbonate 800 milliGRAM(s) Oral three times a day with meals    MEDICATIONS  (PRN):  acetaminophen   Tablet .. 650 milliGRAM(s) Oral every 6 hours PRN Moderate Pain (4 - 6)  dextrose 40% Gel 15 Gram(s) Oral once PRN Blood Glucose LESS THAN 70 milliGRAM(s)/deciliter  gentamicin 0.1% Cream 1 Application(s) Topical <User Schedule> PRN Pd catheter dressing change  glucagon  Injectable 1 milliGRAM(s) IntraMuscular once PRN Glucose LESS THAN 70 milligrams/deciliter    ==================>> VITAL SIGNS <<==================    Vital Signs Last 24 Hrs  T(C): 36.7 (05-07-19 @ 14:00)  T(F): 98.1 (05-07-19 @ 14:00), Max: 98.1 (05-07-19 @ 14:00)  HR: 80 (05-07-19 @ 14:00) (60 - 85)  BP: 112/60 (05-07-19 @ 14:00)  BP(mean): 87 (05-06-19 @ 18:00) (72 - 90)  RR: 18 (05-07-19 @ 14:00) (11 - 25)  SpO2: 100% (05-07-19 @ 14:00) (99% - 100%)      POCT Blood Glucose.: 284 mg/dL (07 May 2019 12:34)  POCT Blood Glucose.: 150 mg/dL (07 May 2019 08:43)  POCT Blood Glucose.: 151 mg/dL (07 May 2019 05:54)  POCT Blood Glucose.: 194 mg/dL (06 May 2019 23:14)  POCT Blood Glucose.: 180 mg/dL (06 May 2019 16:48)     ==================>> LAB AND IMAGING <<==================                        8.8    6.96  )-----------( 135      ( 07 May 2019 06:19 )             28.4          130<L>  |  88<L>  |  67<H>  ----------------------------<  140<H>  4.1   |  18<L>  |  9.64<H>    Ca    9.9      07 May 2019 06:19  Phos  6.8     05-07  Mg     2.0     05-07    TPro  6.7  /  Alb  3.5  /  TBili  0.2  /  DBili  x   /  AST  43<H>  /  ALT  37<H>  /  AlkPhos  96  05-07    WBC count:   6.96 <<== ,  5.70 <<== ,  5.51 <<== ,  5.59 <<== ,  4.74 <<== ,  7.17 <<==   Hemoglobin:   8.8 <<==,  7.9 <<==,  8.5 <<==,  9.5 <<==,  10.4 <<==,  11.1 <<==  platelets:  135 <==, 98 <==, 115 <==, 139 <==, 157 <==, 157 <==, 169 <==    Creatinine:  9.64  <<==, 10.17  <<==, 9.27  <<==, 9.30  <<==, 10.05  <<==, 9.83  <<==  Sodium:   130  <==, 138  <==, 133  <==, 133  <==, 135  <==, 133  <==, 134  <==       AST:          43 <== , 44 <== , 32 <== , 24 <==      ALT:        37  <== , 30  <== , 21  <== , 21  <==      AP:        96  <=, 88  <=, 59  <=, 63  <=     Bili:        0.2  <=, 0.2  <=, 0.2  <=, 0.2  <=    < from: Cardiac Cath Lab - Adult (05.01.19 @ 10:20) >  CORONARY VESSELS: The coronary circulation is right dominant.  LM:   --  Distal left main: There was adiscrete 99 % stenosis at the site  of a prior stent. The lesion was complex, eccentric, and heavily  calcified. severe restnosis since stent of November 2018  LAD:   --  Ostial LAD: There was a 100 % stenosis. LIMA to mid LAD patent and fills the RCA  CX:   --  Proximal circumflex: There was a 90 % stenosis. restenosis from nov 2018  --  Mid circumflex: There was a 60 % stenosis.  RCA:   --  Proximal RCA: There was a 100 % stenosis. There was a  moderate-sized vascular territory distal to the lesion and good collateral  blood supply to the distal myocardium. This lesion progressed since November 2018  COMPLICATIONS: There were no complications.  DIAGNOSTIC IMPRESSIONS: Patient has normal to low filling pressures but low  CO and CI with severe restenosis of left main and proximal LCX complex and  calcified and progressive diseae of RCA.  DIAGNOSTIC RECOMMENDATIONS: Viability study anf if viable muscle in lateral  wall, consider high risk PTCA of left main and proximal LCX and aggressive  medical therapy for LV dysfunction,  < end of copied text >    < from: Cardiac Viability (05.02.19 @ 17:50) >  IMPRESSIONS:Abnormal Study  * Myocardial Perfusion SPECT results are abnormal.  * Review of raw data shows: The study is of good technical quality.  * The left ventricle was normal in size. There are large,  moderate to severe defects in anterior and anteroseptal,  anterolateral, apical, inferior and inferolateral, lateral  walls that are predominantly reversible, indicating  significant myocardial viability  * Post-stress gated wall motion analysis was performed  (LVEF = 13 %;LVEDV = 137 ml.), revealing severe overall hypokinesis.  ADDENDUM 5/2/2019: include tracer dose and protocol. No  change was made to study findings  ---------------------------------------------------  < end of copied text >    ___________________________________________________________________________________  ===============>>  A S S E S S M E N T   A N D   P L A N <<===============  ------------------------------------------------------------------------------------------    · Assessment		  Problem/Plan - 1:  ·  Problem: Shortness of breath ( due to fluid overload, due to ESRD) , improved with dialysis   monitor  closely, pt now out of CCU  nephrology follow up and mgmt appreciated   also post Lt/Rt heart cath >> showing significant and worsening of CAD and low EF  fluid mgmt via dialysis   anemia better post PRBC   PT / OOB as able      Problem/Plan - 2:  ·  Problem: ESRD on peritoneal dialysis.    nephro follow up and mgmtt appreciated   dialysis as above   monitor and treat hyponatremia per renal   procrit per renal for anemia likely of chronic disease      Problem/Plan - 3:  ·  Problem: Chronic systolic congestive heart failure, with worsening LV function   pt now off Dobutamine drip due to hypovolemia and hypotension   plan was for high risk PCI but pt and cardio seem to be opting for medical  / conservative mgmt   continue medical mgmt otherwise   pt off ACEI / ARB due to recorded allergy      Problem/Plan - 4:  ·  Problem: Dyslipidemia.    Continue home statin.      Problem/Plan - 5:  Problem: Type 2 diabetes mellitus   Insulin sliding scale    -GI/DVT Prophylaxis.    --------------------------------------------  Case discussed with pt, cardio, PA  Education given on findings and plan of care  ___________________________  HMariama Abreu D.O.  Pager: [phone]  `

## 2019-05-08 LAB
ALBUMIN SERPL ELPH-MCNC: 3.1 G/DL — LOW (ref 3.3–5)
ALP SERPL-CCNC: 77 U/L — SIGNIFICANT CHANGE UP (ref 40–120)
ALT FLD-CCNC: 32 U/L — SIGNIFICANT CHANGE UP (ref 4–33)
ANION GAP SERPL CALC-SCNC: 23 MMO/L — HIGH (ref 7–14)
AST SERPL-CCNC: 29 U/L — SIGNIFICANT CHANGE UP (ref 4–32)
BASOPHILS # BLD AUTO: 0.02 K/UL — SIGNIFICANT CHANGE UP (ref 0–0.2)
BASOPHILS NFR BLD AUTO: 0.3 % — SIGNIFICANT CHANGE UP (ref 0–2)
BILIRUB SERPL-MCNC: 0.2 MG/DL — SIGNIFICANT CHANGE UP (ref 0.2–1.2)
BUN SERPL-MCNC: 63 MG/DL — HIGH (ref 7–23)
CALCIUM SERPL-MCNC: 9.3 MG/DL — SIGNIFICANT CHANGE UP (ref 8.4–10.5)
CHLORIDE SERPL-SCNC: 87 MMOL/L — LOW (ref 98–107)
CK MB BLD-MCNC: 5.81 NG/ML — HIGH (ref 1–4.7)
CK SERPL-CCNC: 105 U/L — SIGNIFICANT CHANGE UP (ref 25–170)
CO2 SERPL-SCNC: 19 MMOL/L — LOW (ref 22–31)
CREAT SERPL-MCNC: 8.93 MG/DL — HIGH (ref 0.5–1.3)
EOSINOPHIL # BLD AUTO: 0.31 K/UL — SIGNIFICANT CHANGE UP (ref 0–0.5)
EOSINOPHIL NFR BLD AUTO: 4.2 % — SIGNIFICANT CHANGE UP (ref 0–6)
GLUCOSE BLDC GLUCOMTR-MCNC: 116 MG/DL — HIGH (ref 70–99)
GLUCOSE BLDC GLUCOMTR-MCNC: 165 MG/DL — HIGH (ref 70–99)
GLUCOSE BLDC GLUCOMTR-MCNC: 234 MG/DL — HIGH (ref 70–99)
GLUCOSE BLDC GLUCOMTR-MCNC: 257 MG/DL — HIGH (ref 70–99)
GLUCOSE SERPL-MCNC: 227 MG/DL — HIGH (ref 70–99)
HCT VFR BLD CALC: 26.8 % — LOW (ref 34.5–45)
HCT VFR BLD CALC: 26.8 % — LOW (ref 34.5–45)
HGB BLD-MCNC: 8.5 G/DL — LOW (ref 11.5–15.5)
HGB BLD-MCNC: 8.5 G/DL — LOW (ref 11.5–15.5)
IMM GRANULOCYTES NFR BLD AUTO: 1.2 % — SIGNIFICANT CHANGE UP (ref 0–1.5)
LYMPHOCYTES # BLD AUTO: 1.17 K/UL — SIGNIFICANT CHANGE UP (ref 1–3.3)
LYMPHOCYTES # BLD AUTO: 15.7 % — SIGNIFICANT CHANGE UP (ref 13–44)
MAGNESIUM SERPL-MCNC: 1.9 MG/DL — SIGNIFICANT CHANGE UP (ref 1.6–2.6)
MCHC RBC-ENTMCNC: 26.7 PG — LOW (ref 27–34)
MCHC RBC-ENTMCNC: 26.7 PG — LOW (ref 27–34)
MCHC RBC-ENTMCNC: 31.7 % — LOW (ref 32–36)
MCHC RBC-ENTMCNC: 31.7 % — LOW (ref 32–36)
MCV RBC AUTO: 84.3 FL — SIGNIFICANT CHANGE UP (ref 80–100)
MCV RBC AUTO: 84.3 FL — SIGNIFICANT CHANGE UP (ref 80–100)
MONOCYTES # BLD AUTO: 0.69 K/UL — SIGNIFICANT CHANGE UP (ref 0–0.9)
MONOCYTES NFR BLD AUTO: 9.3 % — SIGNIFICANT CHANGE UP (ref 2–14)
NEUTROPHILS # BLD AUTO: 5.16 K/UL — SIGNIFICANT CHANGE UP (ref 1.8–7.4)
NEUTROPHILS NFR BLD AUTO: 69.3 % — SIGNIFICANT CHANGE UP (ref 43–77)
NRBC # FLD: 0.18 K/UL — SIGNIFICANT CHANGE UP (ref 0–0)
NRBC # FLD: 0.18 K/UL — SIGNIFICANT CHANGE UP (ref 0–0)
NRBC FLD-RTO: 2.4 — SIGNIFICANT CHANGE UP
NRBC FLD-RTO: 2.4 — SIGNIFICANT CHANGE UP
PHOSPHATE SERPL-MCNC: 5.9 MG/DL — HIGH (ref 2.5–4.5)
PLATELET # BLD AUTO: 126 K/UL — LOW (ref 150–400)
PLATELET # BLD AUTO: 126 K/UL — LOW (ref 150–400)
PMV BLD: 10.7 FL — SIGNIFICANT CHANGE UP (ref 7–13)
PMV BLD: 10.7 FL — SIGNIFICANT CHANGE UP (ref 7–13)
POTASSIUM SERPL-MCNC: 3.6 MMOL/L — SIGNIFICANT CHANGE UP (ref 3.5–5.3)
POTASSIUM SERPL-SCNC: 3.6 MMOL/L — SIGNIFICANT CHANGE UP (ref 3.5–5.3)
PROT SERPL-MCNC: 5.8 G/DL — LOW (ref 6–8.3)
RBC # BLD: 3.18 M/UL — LOW (ref 3.8–5.2)
RBC # BLD: 3.18 M/UL — LOW (ref 3.8–5.2)
RBC # FLD: 17.2 % — HIGH (ref 10.3–14.5)
RBC # FLD: 17.2 % — HIGH (ref 10.3–14.5)
SODIUM SERPL-SCNC: 129 MMOL/L — LOW (ref 135–145)
TROPONIN T, HIGH SENSITIVITY: 1093 NG/L — CRITICAL HIGH (ref ?–14)
WBC # BLD: 7.44 K/UL — SIGNIFICANT CHANGE UP (ref 3.8–10.5)
WBC # BLD: 7.44 K/UL — SIGNIFICANT CHANGE UP (ref 3.8–10.5)
WBC # FLD AUTO: 7.44 K/UL — SIGNIFICANT CHANGE UP (ref 3.8–10.5)
WBC # FLD AUTO: 7.44 K/UL — SIGNIFICANT CHANGE UP (ref 3.8–10.5)

## 2019-05-08 PROCEDURE — 93010 ELECTROCARDIOGRAM REPORT: CPT

## 2019-05-08 RX ORDER — POLYETHYLENE GLYCOL 3350 17 G/17G
17 POWDER, FOR SOLUTION ORAL
Qty: 0 | Refills: 0 | Status: DISCONTINUED | OUTPATIENT
Start: 2019-05-08 | End: 2019-05-09

## 2019-05-08 RX ADMIN — Medication 100 MILLIGRAM(S): at 22:20

## 2019-05-08 RX ADMIN — Medication 650 MILLIGRAM(S): at 06:43

## 2019-05-08 RX ADMIN — Medication 3: at 09:06

## 2019-05-08 RX ADMIN — Medication 1 TABLET(S): at 11:54

## 2019-05-08 RX ADMIN — SEVELAMER CARBONATE 800 MILLIGRAM(S): 2400 POWDER, FOR SUSPENSION ORAL at 11:54

## 2019-05-08 RX ADMIN — ATORVASTATIN CALCIUM 40 MILLIGRAM(S): 80 TABLET, FILM COATED ORAL at 22:20

## 2019-05-08 RX ADMIN — INSULIN GLARGINE 5 UNIT(S): 100 INJECTION, SOLUTION SUBCUTANEOUS at 22:20

## 2019-05-08 RX ADMIN — Medication 50 MICROGRAM(S): at 05:47

## 2019-05-08 RX ADMIN — Medication 2 UNIT(S): at 13:10

## 2019-05-08 RX ADMIN — Medication 667 MILLIGRAM(S): at 17:23

## 2019-05-08 RX ADMIN — CLOPIDOGREL BISULFATE 75 MILLIGRAM(S): 75 TABLET, FILM COATED ORAL at 11:54

## 2019-05-08 RX ADMIN — Medication 667 MILLIGRAM(S): at 11:54

## 2019-05-08 RX ADMIN — POLYETHYLENE GLYCOL 3350 17 GRAM(S): 17 POWDER, FOR SOLUTION ORAL at 17:23

## 2019-05-08 RX ADMIN — Medication 1 APPLICATION(S): at 08:05

## 2019-05-08 RX ADMIN — Medication 650 MILLIGRAM(S): at 05:46

## 2019-05-08 RX ADMIN — Medication 1: at 13:10

## 2019-05-08 RX ADMIN — HEPARIN SODIUM 5000 UNIT(S): 5000 INJECTION INTRAVENOUS; SUBCUTANEOUS at 13:11

## 2019-05-08 RX ADMIN — Medication 2 UNIT(S): at 09:07

## 2019-05-08 RX ADMIN — HEPARIN SODIUM 5000 UNIT(S): 5000 INJECTION INTRAVENOUS; SUBCUTANEOUS at 05:47

## 2019-05-08 RX ADMIN — Medication 100 MILLIGRAM(S): at 05:47

## 2019-05-08 RX ADMIN — Medication 2 UNIT(S): at 18:19

## 2019-05-08 RX ADMIN — Medication 667 MILLIGRAM(S): at 09:06

## 2019-05-08 RX ADMIN — SEVELAMER CARBONATE 800 MILLIGRAM(S): 2400 POWDER, FOR SUSPENSION ORAL at 09:06

## 2019-05-08 RX ADMIN — Medication 100 MILLIGRAM(S): at 13:11

## 2019-05-08 RX ADMIN — HEPARIN SODIUM 5000 UNIT(S): 5000 INJECTION INTRAVENOUS; SUBCUTANEOUS at 22:20

## 2019-05-08 RX ADMIN — CHLORHEXIDINE GLUCONATE 1 APPLICATION(S): 213 SOLUTION TOPICAL at 10:25

## 2019-05-08 RX ADMIN — SEVELAMER CARBONATE 800 MILLIGRAM(S): 2400 POWDER, FOR SUSPENSION ORAL at 17:23

## 2019-05-08 RX ADMIN — Medication 81 MILLIGRAM(S): at 11:54

## 2019-05-08 NOTE — PROGRESS NOTE ADULT - ASSESSMENT
74F w/ ESRD on PD at home, HTN, DM, CAD s/p CABG and stents, on AC, p/w SOB x1 day.    ESRD on PD  Continue PD with 1.5% dex, as ordered  Monitor BMP and BP    SOB  Cardiology follow up   UF with PD    Hypercalcemia  Elevated PTH, phos level  calcitriol on hold sec to hypercalcemia.   Pt unable to tolerate Renagel,  restart on phoslo 667mg tid monitor calcium  Monitor serum calcium and phos    Anemia  epo 48712 sq tiw  monitor Hb    HTN: Optimal  f/u cardio  monitor bp  hold bp meds per perimeter    Hyponatremia   likely sec to increase fluid status in ESRD pt  free water restriction <1L/day  unable to remove much fluids via PD in light of low bp    Hyperkalemia  sec to renal failure  low k diet  PD as ordered  monitor 74F w/ ESRD on PD at home, HTN, DM, CAD s/p CABG and stents, on AC, p/w SOB x1 day.    ESRD on PD  Continue PD with 1.5% dex, as ordered  Monitor BMP and BP    SOB  Cardiology follow up   UF with PD    Hypercalcemia  Elevated PTH, phos level  calcitriol on hold sec to hypercalcemia.   Pt unable to tolerate Renagel,  restart on phoslo 667mg tid monitor calcium  Monitor serum calcium and phos    Anemia  epo 76272 sq tiw  monitor Hb    HTN: Optimal  f/u cardio  monitor bp  hold bp meds per perimeter    Hyponatremia   likely sec to increase fluid status in ESRD pt  free water restriction <1L/day  unable to remove much fluids via PD in light of low bp    Hyperkalemia  sec to renal failure  low k diet  PD as ordered  monitor

## 2019-05-08 NOTE — CHART NOTE - NSCHARTNOTEFT_GEN_A_CORE
Patient with complaints of left side chest pain radiating to left arm that she stated began this morning.  On evaluation the patient is in no acute distress.  Has no complaints of SOB or palpitations at this time.     T(C): 36.7 (05-08-19 @ 05:30), Max: 36.7 (05-07-19 @ 06:43)  HR: 59 (05-08-19 @ 05:30) (59 - 80)  BP: 103/52 (05-08-19 @ 05:30) (102/65 - 126/82)  RR: 18 (05-08-19 @ 05:30) (16 - 20)  SpO2: 100% (05-08-19 @ 05:30) (98% - 100%)  Wt(kg): --    EKG: Paced 60bpm    Cardiac S1 S2  Respiratory: Lungs clear to auscultation  Gastrointestinal:  Soft, + BS, tender at ecchymotic sites on abdomen  LE: no edema noted     A/P 73F PMH ESRD on peritoneal dialysis for the last 2 years, CHF (EF 46%, w/PAH), CAD, PPM, HTN, HLD, T2DM, and hypothyroidism complaining of left side chest pain    1.  Chest pain is reproducible likely musculoskeletal in nature, pain meds as ordered.  Cardiac enzymes with AM labs  2. Continue to monitor

## 2019-05-08 NOTE — PHYSICAL THERAPY INITIAL EVALUATION ADULT - GENERAL OBSERVATIONS, REHAB EVAL
Patient received seated out of bed in a chair, (+) tele monitor, (+) 3.5LO2 via nasal cannula , in no apparent distress
WDL

## 2019-05-08 NOTE — PROGRESS NOTE ADULT - SUBJECTIVE AND OBJECTIVE BOX
Subjective: Patient seen and examined. No new events except as noted.   patient feels weak and short of breath  Complaining of left arm pain with some chest discomfort reproducible  Oxygen saturation decreased to 84%  At the time of discontinuing the [place] catheter, filling pressures were still low  MEDICATIONS:  MEDICATIONS  (STANDING):  allopurinol 300 milliGRAM(s) Oral daily  aspirin enteric coated 81 milliGRAM(s) Oral daily  atorvastatin 40 milliGRAM(s) Oral at bedtime  calcium acetate 667 milliGRAM(s) Oral three times a day with meals  chlorhexidine 4% Liquid 1 Application(s) Topical <User Schedule>  clopidogrel Tablet 75 milliGRAM(s) Oral daily  dextrose 5%. 1000 milliLiter(s) (50 mL/Hr) IV Continuous <Continuous>  dextrose 50% Injectable 12.5 Gram(s) IV Push once  dextrose 50% Injectable 25 Gram(s) IV Push once  dextrose 50% Injectable 25 Gram(s) IV Push once  docusate sodium 100 milliGRAM(s) Oral three times a day  epoetin rocky Injectable 71968 Unit(s) SubCutaneous <User Schedule>  heparin  Injectable 5000 Unit(s) SubCutaneous every 8 hours  insulin glargine Injectable (LANTUS) 5 Unit(s) SubCutaneous at bedtime  insulin lispro (HumaLOG) corrective regimen sliding scale   SubCutaneous at bedtime  insulin lispro (HumaLOG) corrective regimen sliding scale   SubCutaneous three times a day before meals  insulin lispro Injectable (HumaLOG) 2 Unit(s) SubCutaneous three times a day before meals  levothyroxine 50 MICROGram(s) Oral daily  lisinopril 5 milliGRAM(s) Oral daily  metoprolol succinate ER 25 milliGRAM(s) Oral daily  multivitamin 1 Tablet(s) Oral daily  sevelamer carbonate 800 milliGRAM(s) Oral three times a day with meals      PHYSICAL EXAM:  T(C): 36.5 (05-08-19 @ 11:28), Max: 36.7 (05-08-19 @ 05:30)  HR: 70 (05-08-19 @ 11:28) (59 - 78)  BP: 110/70 (05-08-19 @ 11:28) (100/65 - 126/82)  RR: 16 (05-08-19 @ 11:28) (16 - 20)  SpO2: 96% (05-08-19 @ 11:28) (84% - 100%)  Wt(kg): --  I&O's Summary    07 May 2019 07:01  -  08 May 2019 07:00  --------------------------------------------------------  IN: 6360 mL / OUT: 7200 mL / NET: -840 mL    08 May 2019 07:01  -  08 May 2019 14:28  --------------------------------------------------------  IN: 4000 mL / OUT: 4600 mL / NET: -600 mL          Appearance: Normal anxious on nasal O2	  HEENT:   Normal oral mucosa, PERRL, EOMI	  Cardiovascular: Normal S1 S2, No JVD, No murmurs ,  Respiratory: Lungs clear to auscultation, normal effort 	  Gastrointestinal:  Soft, Non-tender, + BS	  Skin: No rashes, No ecchymoses, No cyanosis, warm to touch  Musculoskeletal: Normal range of motion, normal strength  Psychiatry:  Mood & affect appropriate  Ext: No edema  Peripheral pulses palpable 2+ bilaterally      LABS:    CARDIAC MARKERS:  CARDIAC MARKERS ( 08 May 2019 06:00 )  x     / x     / 105 u/L / 5.81 ng/mL / x                                    8.5    7.44  )-----------( 126      ( 08 May 2019 06:00 )             26.8     05-08    129<L>  |  87<L>  |  63<H>  ----------------------------<  227<H>  3.6   |  19<L>  |  8.93<H>    Ca    9.3      08 May 2019 06:00  Phos  5.9     05-08  Mg     1.9     05-08    TPro  5.8<L>  /  Alb  3.1<L>  /  TBili  0.2  /  DBili  x   /  AST  29  /  ALT  32  /  AlkPhos  77  05-08    proBNP:   Lipid Profile:   HgA1c:   TSH:           TELEMETRY: 	    ECG:  	  RADIOLOGY:   DIAGNOSTIC TESTING:  [ ] Echocardiogram:  [ ]  Catheterization:  [ ] Stress Test:    OTHER:

## 2019-05-08 NOTE — PROVIDER CONTACT NOTE (OTHER) - SITUATION
Pt describes being able to feel a hard large stool but not being able to get it out.
Pt complaining of sob with frequent nonprod cough. See VS.
Pt presented for first PD exchange with BP of 88/61  asymptomatic
medical team wanted patient to be drained as patient needs to go for catheterization
patients BP low 77/55 HR 84

## 2019-05-08 NOTE — CHART NOTE - NSCHARTNOTESELECT_GEN_ALL_CORE
Event Note
ADS NP/Event Note
Event Note
MAR RRT Note/Rapid Response
Nutrition Services/MALNUTRITION ALERT
Rapid Response
Tele/Event Note
Transfer Note/CCU

## 2019-05-08 NOTE — PROVIDER CONTACT NOTE (CRITICAL VALUE NOTIFICATION) - ASSESSMENT
pt complained of left sided chest pain radiating down left arm w/ tingling in the left hand. pt has PPM.

## 2019-05-08 NOTE — PROGRESS NOTE ADULT - ASSESSMENT
1. severe ischemic cardiomyopathy  2. Chronic renal insufficiency on peritoneal dialysis  3. Low cardiac output low filling pressures  4. week short of breath despite low filling pressures suggestive of low cardiac output  5. No obvious ischemia  6. Severe restenosis of the left main and left circ    Recommendati    I am not certain that the patient  will benefit  from revascularization of a high risk left main and circumflex  I discussed risk s benefits with pateitn of attempt at high risk PTCI- presently pateitn would like conservative therapy  treatment options difficult in light of soft BP  continue present regimen of atenolol and lisinopril  Physical therapy, measure O2 sat with ambulation  Discharge planning either to home or to rehabilitation  Overall prognosis poor

## 2019-05-08 NOTE — PROGRESS NOTE ADULT - SUBJECTIVE AND OBJECTIVE BOX
[facility]   MD ROSITA APODACA MD ANGELA WONG, PA    TEL:  OFFICE: [phone]  DR SCOTT CELL: [phone]  DR. JOHNSON CELL: [phone]  KESHA HOUSTON CELL: [phone]        Patient is a 74y old  Female who presents with a chief complaint of shortness of breath (08 May 2019 18:23)      Patient seen and examined at bedside. No chest pain/sob    VITALS:  T(F): 97.8 (05-08-19 @ 15:30), Max: 98 (05-08-19 @ 05:30)  HR: 75 (05-08-19 @ 15:30)  BP: 111/73 (05-08-19 @ 15:30)  RR: 16 (05-08-19 @ 15:30)  SpO2: 100% (05-08-19 @ 14:33)  Wt(kg): --    05-07 @ 07:01  -  05-08 @ 07:00  --------------------------------------------------------  IN: 6360 mL / OUT: 7200 mL / NET: -840 mL    05-08 @ 07:01  -  05-08 @ 18:53  --------------------------------------------------------  IN: 6000 mL / OUT: 6800 mL / NET: -800 mL          PHYSICAL EXAM:  Constitutional: NAD  Neck: No JVD  Respiratory: CTAB, no wheezes, rales or rhonchi  Cardiovascular: S1, S2, RRR  Gastrointestinal: BS+, soft, NT/ND  Extremities: No peripheral edema    Hospital Medications:   MEDICATIONS  (STANDING):  allopurinol 300 milliGRAM(s) Oral daily  aspirin enteric coated 81 milliGRAM(s) Oral daily  atorvastatin 40 milliGRAM(s) Oral at bedtime  calcium acetate 667 milliGRAM(s) Oral three times a day with meals  chlorhexidine 4% Liquid 1 Application(s) Topical <User Schedule>  clopidogrel Tablet 75 milliGRAM(s) Oral daily  dextrose 5%. 1000 milliLiter(s) (50 mL/Hr) IV Continuous <Continuous>  dextrose 50% Injectable 12.5 Gram(s) IV Push once  dextrose 50% Injectable 25 Gram(s) IV Push once  dextrose 50% Injectable 25 Gram(s) IV Push once  docusate sodium 100 milliGRAM(s) Oral three times a day  epoetin rocky Injectable 13112 Unit(s) SubCutaneous <User Schedule>  heparin  Injectable 5000 Unit(s) SubCutaneous every 8 hours  insulin glargine Injectable (LANTUS) 5 Unit(s) SubCutaneous at bedtime  insulin lispro (HumaLOG) corrective regimen sliding scale   SubCutaneous at bedtime  insulin lispro (HumaLOG) corrective regimen sliding scale   SubCutaneous three times a day before meals  insulin lispro Injectable (HumaLOG) 2 Unit(s) SubCutaneous three times a day before meals  levothyroxine 50 MICROGram(s) Oral daily  lisinopril 5 milliGRAM(s) Oral daily  metoprolol succinate ER 25 milliGRAM(s) Oral daily  multivitamin 1 Tablet(s) Oral daily  sevelamer carbonate 800 milliGRAM(s) Oral three times a day with meals      LABS:  05-08    129<L>  |  87<L>  |  63<H>  ----------------------------<  227<H>  3.6   |  19<L>  |  8.93<H>    Ca    9.3      08 May 2019 06:00  Phos  5.9     05-08  Mg     1.9     05-08    TPro  5.8<L>  /  Alb  3.1<L>  /  TBili  0.2  /  DBili      /  AST  29  /  ALT  32  /  AlkPhos  77  05-08    Creatinine Trend: 8.93 <--, 9.64 <--, 10.17 <--, 9.27 <--, 9.30 <--, 10.05 <--, 9.83 <--, 10.01 <--    Albumin, Serum: 3.1 g/dL (05-08 @ 06:00)  Phosphorus Level, Serum: 5.9 mg/dL (05-08 @ 06:00)                              8.5    7.44  )-----------( 126      ( 08 May 2019 06:00 )             26.8     Urine Studies:      Iron 73, TIBC 223, %sat --      [05-07-19 @ 06:19]  Ferritin 1870      [05-07-19 @ 06:19]  .8 (Ca --)      [04-24-19 @ 05:17]   --  .4 (Ca --)      [01-13-19 @ 05:32]   --  .8 (Ca --)      [08-12-18 @ 06:00]   --  Vitamin D (25OH) 24.6      [04-24-19 @ 05:17]  HbA1c 9.8      [04-24-19 @ 05:17]  TSH 8.74      [04-24-19 @ 05:17]  Lipid: chol 167, , HDL 45, LDL 99      [04-24-19 @ 05:17]    HBsAb 23.8      [04-25-19 @ 14:53]  HBsAg NEGATIVE      [04-25-19 @ 14:53]  HBcAb Nonreactive      [04-25-19 @ 14:53]  HCV 0.04, Nonreactive Hepatitis C AB  S/CO Ratio                        Interpretation  < 1.00                                   Non-Reactive  1.00 - 4.99                         Weakly-Reactive  > 5.00                                Reactive  Non-Reactive: A person with a non-reactive HCV antibody  result is considered uninfected.  No further action is  needed unless recent infection is suspected.  In these  cases, consider repeat testing later to detect  seroconversion..  Weakly-Reactive: HCV antibody test is abnormal, HCV RNA  Qualitative test will follow.  Reactive: HCV antibody test is abnormal, HCV RNA  Qualitative test will follow.  Note: HCV antibody testing is performed on the [employer]   system.      [04-25-19 @ 14:53]      RADIOLOGY & ADDITIONAL STUDIES:

## 2019-05-08 NOTE — PROGRESS NOTE ADULT - ASSESSMENT
_________________________________________________________________________________________  ========>>  M E D I C A L   A T T E N D I N G    F O L L O W  U P  N O T E  <<=========  -----------------------------------------------------------------------------------------------------    - Patient seen and examined by me earlier   - In summary,  KAPIL CAMPOVERDE is a 74y year old woman who originally presented with SOB  - Patient today overall doing ok, comfortable, eating ok , SOB at times     ==================>> REVIEW OF SYSTEM <<=================    GEN: no fever, no chills, no pain  RESP: +  SOB at times , no cough, no sputum  CVS: no chest pain, no palpitations, no edema  GI: no abdominal pain, no nausea, no diarrhea   : no dysuria, no frequency, no hematuria  Neuro: no headache, no dizziness    Derm : no itching, no rash    ==================>> PHYSICAL EXAM <<=================    GEN: A&O X 3 , NAD , comfortable, sitting at bedside   HEENT: NCAT, PERRL, MMM, hearing intact, on nasal canula   Neck: supple , no JVD  CVS: S1S2 , regular , No M/R/G appreciated  PULM: CTA B/L,  no W/R/R appreciated  ABD.: soft. non tender, non distended,  bowel sounds present  Extrem: intact pulses , no edema   PSYCH : normal mood,  not anxious        ==================>> MEDICATIONS <<====================    allopurinol 300 milliGRAM(s) Oral daily  aspirin enteric coated 81 milliGRAM(s) Oral daily  atorvastatin 40 milliGRAM(s) Oral at bedtime  calcium acetate 667 milliGRAM(s) Oral three times a day with meals  chlorhexidine 4% Liquid 1 Application(s) Topical <User Schedule>  clopidogrel Tablet 75 milliGRAM(s) Oral daily  dextrose 5%. 1000 milliLiter(s) IV Continuous <Continuous>  dextrose 50% Injectable 12.5 Gram(s) IV Push once  dextrose 50% Injectable 25 Gram(s) IV Push once  dextrose 50% Injectable 25 Gram(s) IV Push once  docusate sodium 100 milliGRAM(s) Oral three times a day  epoetin rocky Injectable 82479 Unit(s) SubCutaneous <User Schedule>  heparin  Injectable 5000 Unit(s) SubCutaneous every 8 hours  insulin glargine Injectable (LANTUS) 5 Unit(s) SubCutaneous at bedtime  insulin lispro (HumaLOG) corrective regimen sliding scale   SubCutaneous at bedtime  insulin lispro (HumaLOG) corrective regimen sliding scale   SubCutaneous three times a day before meals  insulin lispro Injectable (HumaLOG) 2 Unit(s) SubCutaneous three times a day before meals  levothyroxine 50 MICROGram(s) Oral daily  lisinopril 5 milliGRAM(s) Oral daily  metoprolol succinate ER 25 milliGRAM(s) Oral daily  multivitamin 1 Tablet(s) Oral daily  sevelamer carbonate 800 milliGRAM(s) Oral three times a day with meals    MEDICATIONS  (PRN):  acetaminophen   Tablet .. 650 milliGRAM(s) Oral every 6 hours PRN Moderate Pain (4 - 6)  dextrose 40% Gel 15 Gram(s) Oral once PRN Blood Glucose LESS THAN 70 milliGRAM(s)/deciliter  gentamicin 0.1% Cream 1 Application(s) Topical <User Schedule> PRN Pd catheter dressing change  glucagon  Injectable 1 milliGRAM(s) IntraMuscular once PRN Glucose LESS THAN 70 milligrams/deciliter  polyethylene glycol 3350 17 Gram(s) Oral two times a day PRN Constipation    ==================>> VITAL SIGNS <<==================    Vital Signs Last 24 Hrs  T(C): 36.6 (05-08-19 @ 15:30)  T(F): 97.8 (05-08-19 @ 15:30), Max: 98 (05-08-19 @ 05:30)  HR: 75 (05-08-19 @ 15:30) (59 - 80)  BP: 111/73 (05-08-19 @ 15:30)  RR: 16 (05-08-19 @ 15:30) (16 - 20)  SpO2: 100% (05-08-19 @ 14:33) (84% - 100%)      POCT Blood Glucose.: 116 mg/dL (08 May 2019 17:41)  POCT Blood Glucose.: 165 mg/dL (08 May 2019 12:46)  POCT Blood Glucose.: 257 mg/dL (08 May 2019 08:53)  POCT Blood Glucose.: 151 mg/dL (07 May 2019 21:40)     ==================>> LAB AND IMAGING <<==================                        8.5    7.44  )-----------( 126      ( 08 May 2019 06:00 )             26.8        129<L>  |  87<L>  |  63<H>  ----------------------------<  227<H>  3.6   |  19<L>  |  8.93<H>    Ca    9.3      08 May 2019 06:00  Phos  5.9     05-08  Mg     1.9     05-08    TPro  5.8<L>  /  Alb  3.1<L>  /  TBili  0.2  /  DBili  x   /  AST  29  /  ALT  32  /  AlkPhos  77  05-08    WBC count:   7.44 <<== ,  6.96 <<== ,  5.70 <<== ,  5.51 <<== ,  5.59 <<==   Hemoglobin:   8.5 <<==,  8.8 <<==,  7.9 <<==,  8.5 <<==,  9.5 <<==  platelets:  126 <==, 135 <==, 98 <==, 115 <==, 139 <==, 157 <==, 157 <==    Creatinine:  8.93  <<==, 9.64  <<==, 10.17  <<==, 9.27  <<==, 9.30  <<==, 10.05  <<==  Sodium:   129  <==, 130  <==, 138  <==, 133  <==, 133  <==, 135  <==, 133  <==       AST:          29 <== , 43 <== , 44 <== , 32 <==      ALT:        32  <== , 37  <== , 30  <== , 21  <==      AP:        77  <=, 96  <=, 88  <=, 59  <=     Bili:        0.2  <=, 0.2  <=, 0.2  <=, 0.2  <=    < from: Cardiac Cath Lab - Adult (05.01.19 @ 10:20) >  CORONARY VESSELS: The coronary circulation is right dominant.  LM:   --  Distal left main: There was adiscrete 99 % stenosis at the site  of a prior stent. The lesion was complex, eccentric, and heavily  calcified. severe restnosis since stent of November 2018  LAD:   --  Ostial LAD: There was a 100 % stenosis. LIMA to mid LAD patent and fills the RCA  CX:   --  Proximal circumflex: There was a 90 % stenosis. restenosis from nov 2018  --  Mid circumflex: There was a 60 % stenosis.  RCA:   --  Proximal RCA: There was a 100 % stenosis. There was a  moderate-sized vascular territory distal to the lesion and good collateral  blood supply to the distal myocardium. This lesion progressed since November 2018  COMPLICATIONS: There were no complications.  DIAGNOSTIC IMPRESSIONS: Patient has normal to low filling pressures but low  CO and CI with severe restenosis of left main and proximal LCX complex and  calcified and progressive diseae of RCA.  DIAGNOSTIC RECOMMENDATIONS: Viability study anf if viable muscle in lateral  wall, consider high risk PTCA of left main and proximal LCX and aggressive  medical therapy for LV dysfunction,  < end of copied text >    < from: Cardiac Viability (05.02.19 @ 17:50) >  IMPRESSIONS:Abnormal Study  * Myocardial Perfusion SPECT results are abnormal.  * Review of raw data shows: The study is of good technical quality.  * The left ventricle was normal in size. There are large,  moderate to severe defects in anterior and anteroseptal,  anterolateral, apical, inferior and inferolateral, lateral  walls that are predominantly reversible, indicating  significant myocardial viability  * Post-stress gated wall motion analysis was performed  (LVEF = 13 %;LVEDV = 137 ml.), revealing severe overall hypokinesis.  ADDENDUM 5/2/2019: include tracer dose and protocol. No  change was made to study findings  ---------------------------------------------------  < end of copied text >    ___________________________________________________________________________________  ===============>>  A S S E S S M E N T   A N D   P L A N <<===============  ------------------------------------------------------------------------------------------    · Assessment		  Problem/Plan - 1:  ·  Problem: Shortness of breath   pt seems to be dependant on O2 via NC :: likely will need O2 to go home, if not going to rehab   nephrology follow up and mgmt appreciated   cardio appreciated   fluid mgmt via dialysis   anemia better post PRBC   PT / OOB as able      Problem/Plan - 2:  ·  Problem: ESRD on peritoneal dialysis.    nephro follow up and mgmtt appreciated   dialysis as above   monitor and treat hyponatremia per renal   procrit per renal for anemia likely of chronic disease      Problem/Plan - 3:  ·  Problem: Chronic systolic congestive heart failure, with worsening LV function   pt now off Dobutamine drip due to hypovolemia and hypotension   medical  / conservative mgmt as BP allows   continue medical mgmt otherwise      Problem/Plan - 4:  ·  Problem: Dyslipidemia.    Continue home statin.      Problem/Plan - 5:  Problem: Type 2 diabetes mellitus   Insulin sliding scale    -GI/DVT Prophylaxis.    DISPO PLANING   --------------------------------------------  Case discussed with pt, cardio, RN  Education given on findings and plan of care  ___________________________  HMariama Abreu D.O.  Pager: [phone]  `

## 2019-05-08 NOTE — CHART NOTE - NSCHARTNOTEFT_GEN_A_CORE
SARYALVAROKAPIL  [id number] 74y    Patient's O2 saturation is 84% on room air at rest and will need continuous O2 @ 3LPM via nasal cannula for the diagnosis of CHF. Patient was tested in a chronic stable state.

## 2019-05-08 NOTE — PROVIDER CONTACT NOTE (OTHER) - ACTION/TREATMENT ORDERED:
Mirlax and Colace were given earlier in the day
Use 1.5% dextrose for first PD exchange  Notify provider if pt becomes symptomatic
Will come see pt
will change order.
will drain the patient for now and will reevaluate after cath

## 2019-05-09 ENCOUNTER — APPOINTMENT (OUTPATIENT)
Dept: ELECTROPHYSIOLOGY | Facility: CLINIC | Age: 74
End: 2019-05-09

## 2019-05-09 ENCOUNTER — APPOINTMENT (OUTPATIENT)
Dept: ELECTROPHYSIOLOGY | Facility: CLINIC | Age: 74
End: 2019-05-09
Payer: MEDICARE

## 2019-05-09 ENCOUNTER — TRANSCRIPTION ENCOUNTER (OUTPATIENT)
Age: 74
End: 2019-05-09

## 2019-05-09 VITALS
RESPIRATION RATE: 17 BRPM | TEMPERATURE: 99 F | OXYGEN SATURATION: 100 % | HEART RATE: 77 BPM | SYSTOLIC BLOOD PRESSURE: 102 MMHG | DIASTOLIC BLOOD PRESSURE: 66 MMHG

## 2019-05-09 DIAGNOSIS — E11.9 TYPE 2 DIABETES MELLITUS WITHOUT COMPLICATIONS: ICD-10-CM

## 2019-05-09 LAB
ALBUMIN SERPL ELPH-MCNC: 3.2 G/DL — LOW (ref 3.3–5)
ALP SERPL-CCNC: 86 U/L — SIGNIFICANT CHANGE UP (ref 40–120)
ALT FLD-CCNC: 30 U/L — SIGNIFICANT CHANGE UP (ref 4–33)
ANION GAP SERPL CALC-SCNC: 24 MMO/L — HIGH (ref 7–14)
AST SERPL-CCNC: 33 U/L — HIGH (ref 4–32)
BASOPHILS # BLD AUTO: 0.04 K/UL — SIGNIFICANT CHANGE UP (ref 0–0.2)
BASOPHILS NFR BLD AUTO: 0.5 % — SIGNIFICANT CHANGE UP (ref 0–2)
BILIRUB SERPL-MCNC: 0.2 MG/DL — SIGNIFICANT CHANGE UP (ref 0.2–1.2)
BUN SERPL-MCNC: 58 MG/DL — HIGH (ref 7–23)
CALCIUM SERPL-MCNC: 9.7 MG/DL — SIGNIFICANT CHANGE UP (ref 8.4–10.5)
CHLORIDE SERPL-SCNC: 88 MMOL/L — LOW (ref 98–107)
CO2 SERPL-SCNC: 18 MMOL/L — LOW (ref 22–31)
CREAT SERPL-MCNC: 8.35 MG/DL — HIGH (ref 0.5–1.3)
EOSINOPHIL # BLD AUTO: 0.24 K/UL — SIGNIFICANT CHANGE UP (ref 0–0.5)
EOSINOPHIL NFR BLD AUTO: 2.9 % — SIGNIFICANT CHANGE UP (ref 0–6)
GLUCOSE BLDC GLUCOMTR-MCNC: 129 MG/DL — HIGH (ref 70–99)
GLUCOSE BLDC GLUCOMTR-MCNC: 210 MG/DL — HIGH (ref 70–99)
GLUCOSE SERPL-MCNC: 127 MG/DL — HIGH (ref 70–99)
HCT VFR BLD CALC: 29.2 % — LOW (ref 34.5–45)
HCT VFR BLD CALC: 29.2 % — LOW (ref 34.5–45)
HGB BLD-MCNC: 9.1 G/DL — LOW (ref 11.5–15.5)
HGB BLD-MCNC: 9.1 G/DL — LOW (ref 11.5–15.5)
IMM GRANULOCYTES NFR BLD AUTO: 1.1 % — SIGNIFICANT CHANGE UP (ref 0–1.5)
LYMPHOCYTES # BLD AUTO: 1.39 K/UL — SIGNIFICANT CHANGE UP (ref 1–3.3)
LYMPHOCYTES # BLD AUTO: 16.8 % — SIGNIFICANT CHANGE UP (ref 13–44)
MAGNESIUM SERPL-MCNC: 2 MG/DL — SIGNIFICANT CHANGE UP (ref 1.6–2.6)
MCHC RBC-ENTMCNC: 26.3 PG — LOW (ref 27–34)
MCHC RBC-ENTMCNC: 26.3 PG — LOW (ref 27–34)
MCHC RBC-ENTMCNC: 31.2 % — LOW (ref 32–36)
MCHC RBC-ENTMCNC: 31.2 % — LOW (ref 32–36)
MCV RBC AUTO: 84.4 FL — SIGNIFICANT CHANGE UP (ref 80–100)
MCV RBC AUTO: 84.4 FL — SIGNIFICANT CHANGE UP (ref 80–100)
MONOCYTES # BLD AUTO: 0.69 K/UL — SIGNIFICANT CHANGE UP (ref 0–0.9)
MONOCYTES NFR BLD AUTO: 8.4 % — SIGNIFICANT CHANGE UP (ref 2–14)
NEUTROPHILS # BLD AUTO: 5.81 K/UL — SIGNIFICANT CHANGE UP (ref 1.8–7.4)
NEUTROPHILS NFR BLD AUTO: 70.3 % — SIGNIFICANT CHANGE UP (ref 43–77)
NRBC # FLD: 0.22 K/UL — SIGNIFICANT CHANGE UP (ref 0–0)
NRBC # FLD: 0.22 K/UL — SIGNIFICANT CHANGE UP (ref 0–0)
NRBC FLD-RTO: 2.7 — SIGNIFICANT CHANGE UP
NRBC FLD-RTO: 2.7 — SIGNIFICANT CHANGE UP
PHOSPHATE SERPL-MCNC: 4.9 MG/DL — HIGH (ref 2.5–4.5)
PLATELET # BLD AUTO: 148 K/UL — LOW (ref 150–400)
PLATELET # BLD AUTO: 148 K/UL — LOW (ref 150–400)
PMV BLD: 11.4 FL — SIGNIFICANT CHANGE UP (ref 7–13)
PMV BLD: 11.4 FL — SIGNIFICANT CHANGE UP (ref 7–13)
POTASSIUM SERPL-MCNC: 4.1 MMOL/L — SIGNIFICANT CHANGE UP (ref 3.5–5.3)
POTASSIUM SERPL-SCNC: 4.1 MMOL/L — SIGNIFICANT CHANGE UP (ref 3.5–5.3)
PROT SERPL-MCNC: 6.6 G/DL — SIGNIFICANT CHANGE UP (ref 6–8.3)
RBC # BLD: 3.46 M/UL — LOW (ref 3.8–5.2)
RBC # BLD: 3.46 M/UL — LOW (ref 3.8–5.2)
RBC # FLD: 17.5 % — HIGH (ref 10.3–14.5)
RBC # FLD: 17.5 % — HIGH (ref 10.3–14.5)
SODIUM SERPL-SCNC: 130 MMOL/L — LOW (ref 135–145)
WBC # BLD: 8.26 K/UL — SIGNIFICANT CHANGE UP (ref 3.8–10.5)
WBC # BLD: 8.26 K/UL — SIGNIFICANT CHANGE UP (ref 3.8–10.5)
WBC # FLD AUTO: 8.26 K/UL — SIGNIFICANT CHANGE UP (ref 3.8–10.5)
WBC # FLD AUTO: 8.26 K/UL — SIGNIFICANT CHANGE UP (ref 3.8–10.5)

## 2019-05-09 PROCEDURE — 93296 REM INTERROG EVL PM/IDS: CPT

## 2019-05-09 PROCEDURE — 93294 REM INTERROG EVL PM/LDLS PM: CPT

## 2019-05-09 RX ORDER — DOCUSATE SODIUM 100 MG
1 CAPSULE ORAL
Qty: 90 | Refills: 0
Start: 2019-05-09 | End: 2019-06-07

## 2019-05-09 RX ORDER — LEVOTHYROXINE SODIUM 125 MCG
1 TABLET ORAL
Qty: 30 | Refills: 0
Start: 2019-05-09 | End: 2019-06-07

## 2019-05-09 RX ORDER — ASPIRIN/CALCIUM CARB/MAGNESIUM 324 MG
1 TABLET ORAL
Qty: 30 | Refills: 0
Start: 2019-05-09 | End: 2019-06-07

## 2019-05-09 RX ORDER — LISINOPRIL 2.5 MG/1
1 TABLET ORAL
Qty: 30 | Refills: 0
Start: 2019-05-09 | End: 2019-06-07

## 2019-05-09 RX ORDER — SEVELAMER CARBONATE 2400 MG/1
2 POWDER, FOR SUSPENSION ORAL
Qty: 0 | Refills: 0 | DISCHARGE

## 2019-05-09 RX ORDER — DOCUSATE SODIUM 100 MG
1 CAPSULE ORAL
Qty: 0 | Refills: 0 | DISCHARGE
Start: 2019-05-09

## 2019-05-09 RX ORDER — METOPROLOL TARTRATE 50 MG
1 TABLET ORAL
Qty: 30 | Refills: 0
Start: 2019-05-09 | End: 2019-06-07

## 2019-05-09 RX ORDER — ALLOPURINOL 300 MG
1 TABLET ORAL
Qty: 30 | Refills: 0
Start: 2019-05-09 | End: 2019-06-07

## 2019-05-09 RX ORDER — GENTAMICIN SULFATE 0.1 %
1 OINTMENT (GRAM) TOPICAL
Qty: 1 | Refills: 0
Start: 2019-05-09 | End: 2019-06-07

## 2019-05-09 RX ORDER — ALLOPURINOL 300 MG
1 TABLET ORAL
Qty: 0 | Refills: 0 | DISCHARGE
Start: 2019-05-09

## 2019-05-09 RX ORDER — POLYETHYLENE GLYCOL 3350 17 G/17G
17 POWDER, FOR SOLUTION ORAL
Qty: 0 | Refills: 0 | DISCHARGE
Start: 2019-05-09

## 2019-05-09 RX ORDER — ATORVASTATIN CALCIUM 80 MG/1
1 TABLET, FILM COATED ORAL
Qty: 30 | Refills: 0
Start: 2019-05-09 | End: 2019-06-07

## 2019-05-09 RX ORDER — METOPROLOL TARTRATE 50 MG
1 TABLET ORAL
Qty: 0 | Refills: 0 | DISCHARGE
Start: 2019-05-09

## 2019-05-09 RX ORDER — SEVELAMER CARBONATE 2400 MG/1
2 POWDER, FOR SUSPENSION ORAL
Qty: 180 | Refills: 0
Start: 2019-05-09 | End: 2019-06-07

## 2019-05-09 RX ORDER — CLOPIDOGREL BISULFATE 75 MG/1
1 TABLET, FILM COATED ORAL
Qty: 30 | Refills: 0
Start: 2019-05-09 | End: 2019-06-07

## 2019-05-09 RX ORDER — POLYETHYLENE GLYCOL 3350 17 G/17G
17 POWDER, FOR SOLUTION ORAL
Qty: 30 | Refills: 0
Start: 2019-05-09 | End: 2019-06-07

## 2019-05-09 RX ORDER — GENTAMICIN SULFATE 0.1 %
1 OINTMENT (GRAM) TOPICAL
Qty: 0 | Refills: 0 | DISCHARGE
Start: 2019-05-09

## 2019-05-09 RX ORDER — LISINOPRIL 2.5 MG/1
1 TABLET ORAL
Qty: 0 | Refills: 0 | DISCHARGE
Start: 2019-05-09

## 2019-05-09 RX ORDER — ACETAMINOPHEN 500 MG
2 TABLET ORAL
Qty: 0 | Refills: 0 | DISCHARGE
Start: 2019-05-09

## 2019-05-09 RX ORDER — CALCITRIOL 0.5 UG/1
1 CAPSULE ORAL
Qty: 0 | Refills: 0 | DISCHARGE

## 2019-05-09 RX ADMIN — Medication 2: at 12:50

## 2019-05-09 RX ADMIN — Medication 667 MILLIGRAM(S): at 08:16

## 2019-05-09 RX ADMIN — Medication 667 MILLIGRAM(S): at 11:55

## 2019-05-09 RX ADMIN — Medication 50 MICROGRAM(S): at 05:55

## 2019-05-09 RX ADMIN — SEVELAMER CARBONATE 800 MILLIGRAM(S): 2400 POWDER, FOR SUSPENSION ORAL at 11:55

## 2019-05-09 RX ADMIN — Medication 81 MILLIGRAM(S): at 14:22

## 2019-05-09 RX ADMIN — Medication 25 MILLIGRAM(S): at 05:55

## 2019-05-09 RX ADMIN — Medication 100 MILLIGRAM(S): at 05:55

## 2019-05-09 RX ADMIN — Medication 2 UNIT(S): at 12:50

## 2019-05-09 RX ADMIN — SEVELAMER CARBONATE 800 MILLIGRAM(S): 2400 POWDER, FOR SUSPENSION ORAL at 08:16

## 2019-05-09 RX ADMIN — HEPARIN SODIUM 5000 UNIT(S): 5000 INJECTION INTRAVENOUS; SUBCUTANEOUS at 05:55

## 2019-05-09 RX ADMIN — LISINOPRIL 5 MILLIGRAM(S): 2.5 TABLET ORAL at 05:55

## 2019-05-09 RX ADMIN — Medication 2 UNIT(S): at 09:28

## 2019-05-09 RX ADMIN — CLOPIDOGREL BISULFATE 75 MILLIGRAM(S): 75 TABLET, FILM COATED ORAL at 14:22

## 2019-05-09 RX ADMIN — CHLORHEXIDINE GLUCONATE 1 APPLICATION(S): 213 SOLUTION TOPICAL at 12:01

## 2019-05-09 NOTE — DISCHARGE NOTE NURSING/CASE MANAGEMENT/SOCIAL WORK - NSDCDPATPORTLINK_GEN_ALL_CORE
You can access the [facility] Portal, offered by [facility], by registering with the following website: [email or web address]

## 2019-05-09 NOTE — PROGRESS NOTE ADULT - ASSESSMENT
_________________________________________________________________________________________  ========>>  M E D I C A L   A T T E N D I N G    F O L L O W  U P  N O T E  <<=========  -----------------------------------------------------------------------------------------------------    - Patient seen and examined by me approximately sixty minutes ago.   - In summary,  KAPIL CAMPOVERDE is a 74y year old woman who originally presented with SOB  - Patient today overall doing ok, comfortable, eating ok , SOB improved overall      ==================>> REVIEW OF SYSTEM <<=================    GEN: no fever, no chills, no pain  RESP:  SOB overall improved , + occasional cough, no sputum  CVS: no chest pain, no palpitations, no edema  GI: no abdominal pain, no nausea, no diarrhea   : no dysuria, no frequency, no hematuria  Neuro: no headache, no dizziness    Derm : no itching, no rash    ==================>> PHYSICAL EXAM <<=================    GEN: A&O X 3 , NAD , comfortable, sitting at bedside   HEENT: NCAT, PERRL, MMM, hearing intact, on nasal canula   Neck: supple , no JVD  CVS: S1S2 , regular , No M/R/G appreciated  PULM: CTA B/L,  no W/R/R appreciated  ABD.: soft. non tender, non distended,  bowel sounds present  Extrem: intact pulses , no edema   PSYCH : normal mood,  not anxious        ==================>> MEDICATIONS <<====================    allopurinol 300 milliGRAM(s) Oral daily  aspirin enteric coated 81 milliGRAM(s) Oral daily  atorvastatin 40 milliGRAM(s) Oral at bedtime  calcium acetate 667 milliGRAM(s) Oral three times a day with meals  chlorhexidine 4% Liquid 1 Application(s) Topical <User Schedule>  clopidogrel Tablet 75 milliGRAM(s) Oral daily  dextrose 5%. 1000 milliLiter(s) IV Continuous <Continuous>  dextrose 50% Injectable 12.5 Gram(s) IV Push once  dextrose 50% Injectable 25 Gram(s) IV Push once  dextrose 50% Injectable 25 Gram(s) IV Push once  docusate sodium 100 milliGRAM(s) Oral three times a day  epoetin rocky Injectable 25934 Unit(s) SubCutaneous <User Schedule>  heparin  Injectable 5000 Unit(s) SubCutaneous every 8 hours  insulin glargine Injectable (LANTUS) 5 Unit(s) SubCutaneous at bedtime  insulin lispro (HumaLOG) corrective regimen sliding scale   SubCutaneous at bedtime  insulin lispro (HumaLOG) corrective regimen sliding scale   SubCutaneous three times a day before meals  insulin lispro Injectable (HumaLOG) 2 Unit(s) SubCutaneous three times a day before meals  levothyroxine 50 MICROGram(s) Oral daily  lisinopril 5 milliGRAM(s) Oral daily  metoprolol succinate ER 25 milliGRAM(s) Oral daily  multivitamin 1 Tablet(s) Oral daily  sevelamer carbonate 800 milliGRAM(s) Oral three times a day with meals    MEDICATIONS  (PRN):  acetaminophen   Tablet .. 650 milliGRAM(s) Oral every 6 hours PRN Moderate Pain (4 - 6)  dextrose 40% Gel 15 Gram(s) Oral once PRN Blood Glucose LESS THAN 70 milliGRAM(s)/deciliter  gentamicin 0.1% Cream 1 Application(s) Topical <User Schedule> PRN Pd catheter dressing change  glucagon  Injectable 1 milliGRAM(s) IntraMuscular once PRN Glucose LESS THAN 70 milligrams/deciliter  polyethylene glycol 3350 17 Gram(s) Oral two times a day PRN Constipation    ==================>> VITAL SIGNS <<==================    Vital Signs Last 24 Hrs  T(C): 36.7 (05-09-19 @ 10:30)  T(F): 98 (05-09-19 @ 10:30), Max: 98.3 (05-09-19 @ 06:30)  HR: 76 (05-09-19 @ 10:30) (69 - 80)  BP: 101/65 (05-09-19 @ 10:30)  RR: 17 (05-09-19 @ 10:30) (16 - 18)  SpO2: 100% (05-09-19 @ 10:30) (100% - 100%)      POCT Blood Glucose.: 129 mg/dL (09 May 2019 08:36)  POCT Blood Glucose.: 234 mg/dL (08 May 2019 22:02)  POCT Blood Glucose.: 116 mg/dL (08 May 2019 17:41)  POCT Blood Glucose.: 165 mg/dL (08 May 2019 12:46)     ==================>> LAB AND IMAGING <<==================                        9.1    8.26  )-----------( 148      ( 09 May 2019 06:00 )             29.2        130<L>  |  88<L>  |  58<H>  ----------------------------<  127<H>  4.1   |  18<L>  |  8.35<H>    Ca    9.7      09 May 2019 06:00  Phos  4.9     05-09  Mg     2.0     05-09    TPro  6.6  /  Alb  3.2<L>  /  TBili  0.2  /  DBili  x   /  AST  33<H>  /  ALT  30  /  AlkPhos  86  05-09    WBC count:   8.26 <<== ,  7.44 <<== ,  6.96 <<== ,  5.70 <<== ,  5.51 <<==   Hemoglobin:   9.1 <<==,  8.5 <<==,  8.8 <<==,  7.9 <<==,  8.5 <<==  platelets:  148 <==, 126 <==, 135 <==, 98 <==, 115 <==, 139 <==, 157 <==    Creatinine:  8.35  <<==, 8.93  <<==, 9.64  <<==, 10.17  <<==, 9.27  <<==, 9.30  <<==  Sodium:   130  <==, 129  <==, 130  <==, 138  <==, 133  <==, 133  <==, 135  <==       AST:          33 <== , 29 <== , 43 <== , 44 <==      ALT:        30  <== , 32  <== , 37  <== , 30  <==      AP:        86  <=, 77  <=, 96  <=, 88  <=     Bili:        0.2  <=, 0.2  <=, 0.2  <=, 0.2  <=    ___________________________________________________________________________________  ===============>>  A S S E S S M E N T   A N D   P L A N <<===============  ------------------------------------------------------------------------------------------    · Assessment		  Problem/Plan - 1:  ·  Problem: Shortness of breath , improved    pt seems to be dependant on O2 via NC :: likely will need O2 to go home, if not going to rehab   nephrology follow up and mgmt appreciated   cardio appreciated   fluid mgmt via dialysis   anemia better post PRBC   PT / OOB as able      Problem/Plan - 2:  ·  Problem: ESRD on peritoneal dialysis.    nephro follow up and mgmtt appreciated   dialysis as above   monitor and treat hyponatremia per renal   procrit per renal for anemia likely of chronic disease      Problem/Plan - 3:  ·  Problem: Chronic systolic congestive heart failure, with worsening LV function   medical  / conservative mgmt as BP allows   continue medical mgmt otherwise      Problem/Plan - 4:  ·  Problem: Dyslipidemia.    Continue home statin.      Problem/Plan - 5:  Problem: Type 2 diabetes mellitus   Insulin sliding scale    -GI/DVT Prophylaxis.    DISPO PLANING   --------------------------------------------  Case discussed with pt, RN  Education given on findings and plan of care  ___________________________  HMariama Abreu D.O.  Pager: [phone]  `

## 2019-05-09 NOTE — PROGRESS NOTE ADULT - REASON FOR ADMISSION
Was the patient seen in the last year in this department? Yes     Does patient have an active prescription for medications requested? No     Received Request Via: Pharmacy     Per  last fill:   Ultram: 9-12-17 # 90  Xanax: 9-12-17 # 30  
shortness of breath
shortness of breath hypotension
shortness of breath

## 2019-05-09 NOTE — PROGRESS NOTE ADULT - ASSESSMENT
74F w/ ESRD on PD at home, HTN, DM, CAD s/p CABG and stents, on AC, p/w SOB x1 day.    ESRD on PD  Continue PD with 1.5% dex, as ordered  Monitor BMP and BP    SOB  Cardiology follow up   UF with PD    Hypercalcemia  Elevated PTH, phos level  calcitriol on hold sec to hypercalcemia.   Pt unable to tolerate Renagel. On  phoslo 667mg tid  Monitor serum calcium and phos    Anemia  epo 92408 sq tiw  monitor Hb    HTN: Optimal  f/u cardio  monitor bp  hold bp meds per perimeter    Hyponatremia   likely sec to increase fluid status in ESRD pt  free water restriction <1L/day  unable to remove much fluids via PD in light of low bp    Hyperkalemia  sec to renal failure  low k diet  PD as ordered  monitor

## 2019-05-09 NOTE — PROGRESS NOTE ADULT - SUBJECTIVE AND OBJECTIVE BOX
[facility]   MD ROSITA APODACA MD RUORU WONG, PA    TEL:  OFFICE: [phone]  DR SCOTT CELL: [phone]  TINY [place]ON CELL: [phone]  DR. JOHNSON CELL: [phone]    RENAL FOLLOW UP NOTE  --------------------------------------------------------------------------------  HPI:      Pt seen and examined at bedside.   Fermin SOB, chest pain     PAST HISTORY  --------------------------------------------------------------------------------  No significant changes to PMH, PSH, FHx, SHx, unless otherwise noted    ALLERGIES & MEDICATIONS  --------------------------------------------------------------------------------  Allergies    Cipro (Unknown)  Diovan (Unknown)    Intolerances      Standing Inpatient Medications  allopurinol 300 milliGRAM(s) Oral daily  aspirin enteric coated 81 milliGRAM(s) Oral daily  atorvastatin 40 milliGRAM(s) Oral at bedtime  calcium acetate 667 milliGRAM(s) Oral three times a day with meals  chlorhexidine 4% Liquid 1 Application(s) Topical <User Schedule>  clopidogrel Tablet 75 milliGRAM(s) Oral daily  dextrose 5%. 1000 milliLiter(s) IV Continuous <Continuous>  dextrose 50% Injectable 12.5 Gram(s) IV Push once  dextrose 50% Injectable 25 Gram(s) IV Push once  dextrose 50% Injectable 25 Gram(s) IV Push once  docusate sodium 100 milliGRAM(s) Oral three times a day  epoetin rocky Injectable 80396 Unit(s) SubCutaneous <User Schedule>  heparin  Injectable 5000 Unit(s) SubCutaneous every 8 hours  insulin glargine Injectable (LANTUS) 5 Unit(s) SubCutaneous at bedtime  insulin lispro (HumaLOG) corrective regimen sliding scale   SubCutaneous at bedtime  insulin lispro (HumaLOG) corrective regimen sliding scale   SubCutaneous three times a day before meals  insulin lispro Injectable (HumaLOG) 2 Unit(s) SubCutaneous three times a day before meals  levothyroxine 50 MICROGram(s) Oral daily  lisinopril 5 milliGRAM(s) Oral daily  metoprolol succinate ER 25 milliGRAM(s) Oral daily  multivitamin 1 Tablet(s) Oral daily  sevelamer carbonate 800 milliGRAM(s) Oral three times a day with meals    PRN Inpatient Medications  acetaminophen   Tablet .. 650 milliGRAM(s) Oral every 6 hours PRN  dextrose 40% Gel 15 Gram(s) Oral once PRN  gentamicin 0.1% Cream 1 Application(s) Topical <User Schedule> PRN  glucagon  Injectable 1 milliGRAM(s) IntraMuscular once PRN  polyethylene glycol 3350 17 Gram(s) Oral two times a day PRN      REVIEW OF SYSTEMS  --------------------------------------------------------------------------------  General: no fever  CVS: no chest pain  RESP: no sob, no cough  ABD: no abdominal pain      VITALS/PHYSICAL EXAM  --------------------------------------------------------------------------------  T(C): 36.7 (05-09-19 @ 10:30), Max: 36.8 (05-09-19 @ 06:30)  HR: 76 (05-09-19 @ 10:30) (69 - 80)  BP: 101/65 (05-09-19 @ 10:30) (100/67 - 132/61)  RR: 17 (05-09-19 @ 10:30) (16 - 18)  SpO2: 100% (05-09-19 @ 10:30) (100% - 100%)  Wt(kg): --        05-08-19 @ 07:01  -  05-09-19 @ 07:00  --------------------------------------------------------  IN: 8000 mL / OUT: 9000 mL / NET: -1000 mL      Physical Exam:  	Gen: NAD  	HEENT: MMM  	Pulm: CTA B/L  	CV: S1S2  	Abd: Soft, +BS  	Ext: No LE edema B/L                      Neuro: Awake   	Skin: Warm and Dry   	Vascular access: P Dcatheter     LABS/STUDIES  --------------------------------------------------------------------------------              9.1    8.26  >-----------<  148      [05-09-19 @ 06:00]              29.2     130  |  88  |  58  ----------------------------<  127      [05-09-19 @ 06:00]  4.1   |  18  |  8.35        Ca     9.7     [05-09-19 @ 06:00]      Mg     2.0     [05-09-19 @ 06:00]      Phos  4.9     [05-09-19 @ 06:00]    TPro  6.6  /  Alb  3.2  /  TBili  0.2  /  DBili  x   /  AST  33  /  ALT  30  /  AlkPhos  86  [05-09-19 @ 06:00]              [05-08-19 @ 06:00]    Creatinine Trend:  SCr 8.35 [05-09 @ 06:00]  SCr 8.93 [05-08 @ 06:00]  SCr 9.64 [05-07 @ 06:19]  SCr 10.17 [05-06 @ 05:45]  SCr 9.27 [05-05 @ 04:01]        Iron 73, TIBC 223, %sat --      [05-07-19 @ 06:19]  Ferritin 1870      [05-07-19 @ 06:19]  .8 (Ca --)      [04-24-19 @ 05:17]   --  .4 (Ca --)      [01-13-19 @ 05:32]   --  .8 (Ca --)      [08-12-18 @ 06:00]   --  Vitamin D (25OH) 24.6      [04-24-19 @ 05:17]  HbA1c 9.8      [04-24-19 @ 05:17]  TSH 8.74      [04-24-19 @ 05:17]  Lipid: chol 167, , HDL 45, LDL 99      [04-24-19 @ 05:17]    HBsAb 23.8      [04-25-19 @ 14:53]  HBsAg NEGATIVE      [04-25-19 @ 14:53]  HBcAb Nonreactive      [04-25-19 @ 14:53]  HCV 0.04, Nonreactive Hepatitis C AB  S/CO Ratio                        Interpretation  < 1.00                                   Non-Reactive  1.00 - 4.99                         Weakly-Reactive  > 5.00                                Reactive  Non-Reactive: A person with a non-reactive HCV antibody  result is considered uninfected.  No further action is  needed unless recent infection is suspected.  In these  cases, consider repeat testing later to detect  seroconversion..  Weakly-Reactive: HCV antibody test is abnormal, HCV RNA  Qualitative test will follow.  Reactive: HCV antibody test is abnormal, HCV RNA  Qualitative test will follow.  Note: HCV antibody testing is performed on the [employer] system.      [04-25-19 @ 14:53]

## 2019-05-09 NOTE — PROGRESS NOTE ADULT - ASSESSMENT
1. severe ischemic cardiomyopathy  2. Chronic renal insufficiency on peritoneal dialysis  3. Low cardiac output low filling pressures  4. week short of breath despite low filling pressures suggestive of low cardiac output  5. No obvious ischemia  6. Severe restenosis of the left main and left circ  7. sob fatigue somewhat better no evidence of acut ischemia    Recommendati  discharge to home on present medications  followup with Dr. Hinkle and me   dialysis as per nephrology

## 2019-05-09 NOTE — PROGRESS NOTE ADULT - SUBJECTIVE AND OBJECTIVE BOX
Subjective: Patient seen and examined. No new events except as noted.   Feels better today no left arm or chest pain less sob anxious to go home  O2 sat on ansal O2 100%  MEDICATIONS:  MEDICATIONS  (STANDING):  allopurinol 300 milliGRAM(s) Oral daily  aspirin enteric coated 81 milliGRAM(s) Oral daily  atorvastatin 40 milliGRAM(s) Oral at bedtime  calcium acetate 667 milliGRAM(s) Oral three times a day with meals  chlorhexidine 4% Liquid 1 Application(s) Topical <User Schedule>  clopidogrel Tablet 75 milliGRAM(s) Oral daily  dextrose 5%. 1000 milliLiter(s) (50 mL/Hr) IV Continuous <Continuous>  dextrose 50% Injectable 12.5 Gram(s) IV Push once  dextrose 50% Injectable 25 Gram(s) IV Push once  dextrose 50% Injectable 25 Gram(s) IV Push once  docusate sodium 100 milliGRAM(s) Oral three times a day  epoetin rocky Injectable 72662 Unit(s) SubCutaneous <User Schedule>  heparin  Injectable 5000 Unit(s) SubCutaneous every 8 hours  insulin glargine Injectable (LANTUS) 5 Unit(s) SubCutaneous at bedtime  insulin lispro (HumaLOG) corrective regimen sliding scale   SubCutaneous at bedtime  insulin lispro (HumaLOG) corrective regimen sliding scale   SubCutaneous three times a day before meals  insulin lispro Injectable (HumaLOG) 2 Unit(s) SubCutaneous three times a day before meals  levothyroxine 50 MICROGram(s) Oral daily  lisinopril 5 milliGRAM(s) Oral daily  metoprolol succinate ER 25 milliGRAM(s) Oral daily  multivitamin 1 Tablet(s) Oral daily  sevelamer carbonate 800 milliGRAM(s) Oral three times a day with meals      PHYSICAL EXAM:  T(C): 36.8 (05-09-19 @ 06:30), Max: 36.8 (05-09-19 @ 06:30)  HR: 80 (05-09-19 @ 06:30) (69 - 80)  BP: 100/67 (05-09-19 @ 06:30) (100/67 - 132/61)  RR: 18 (05-09-19 @ 06:30) (16 - 18)  SpO2: 100% (05-09-19 @ 06:30) (84% - 100%)  Wt(kg): --  I&O's Summary    08 May 2019 07:01  -  09 May 2019 07:00  --------------------------------------------------------  IN: 8000 mL / OUT: 9000 mL / NET: -1000 mL          Appearance: Normal chronically ill appearing	  HEENT:   Normal oral mucosa, PERRL, EOMI	  Cardiovascular: Normal S1 S2, No JVD, No murmurs ,n s3  Respiratory: Lungs clear to auscultation, normal effort 	  Gastrointestinal:  Soft, Non-tender, + BS	  Skin: No rashes, No ecchymoses, No cyanosis, warm to touch  Musculoskeletal: Normal range of motion, normal strength  Psychiatry:  Mood & affect appropriate  Ext: No edema  Peripheral pulses palpable 2+ bilaterally      LABS:    CARDIAC MARKERS:  CARDIAC MARKERS ( 08 May 2019 06:00 )  x     / x     / 105 u/L / 5.81 ng/mL / x                                    9.1    8.26  )-----------( 148      ( 09 May 2019 06:00 )             29.2     05-08    129<L>  |  87<L>  |  63<H>  ----------------------------<  227<H>  3.6   |  19<L>  |  8.93<H>    Ca    9.3      08 May 2019 06:00  Phos  5.9     05-08  Mg     1.9     05-08    TPro  5.8<L>  /  Alb  3.1<L>  /  TBili  0.2  /  DBili  x   /  AST  29  /  ALT  32  /  AlkPhos  77  05-08    proBNP:   Lipid Profile:   HgA1c:   TSH:           TELEMETRY: 	    ECG:  	  RADIOLOGY:   DIAGNOSTIC TESTING:  [ ] Echocardiogram:  [ ]  Catheterization:  [ ] Stress Test:    OTHER:

## 2019-05-10 LAB
ALBUMIN SERPL ELPH-MCNC: 4.12 G/DL — SIGNIFICANT CHANGE UP (ref 3.3–5)
ALP SERPL-CCNC: 58 U/L — SIGNIFICANT CHANGE UP (ref 40–120)
ALT FLD-CCNC: 11 U/L — SIGNIFICANT CHANGE UP (ref 4–33)
ANION GAP SERPL CALC-SCNC: 12 MMO/L — SIGNIFICANT CHANGE UP (ref 7–14)
ANION GAP SERPL CALC-SCNC: 12 MMO/L — SIGNIFICANT CHANGE UP (ref 7–14)
AST SERPL-CCNC: 20 U/L — SIGNIFICANT CHANGE UP (ref 4–32)
BILIRUB SERPL-MCNC: 1 MG/DL — SIGNIFICANT CHANGE UP (ref 0.2–1.2)
BUN SERPL-MCNC: 10 MG/DL — SIGNIFICANT CHANGE UP (ref 7–23)
BUN SERPL-MCNC: 10 MG/DL — SIGNIFICANT CHANGE UP (ref 7–23)
CALCIUM SERPL-MCNC: 9.3 MG/DL — SIGNIFICANT CHANGE UP (ref 8.4–10.5)
CALCIUM SERPL-MCNC: 9.3 MG/DL — SIGNIFICANT CHANGE UP (ref 8.4–10.5)
CHLORIDE SERPL-SCNC: 103 MMOL/L — SIGNIFICANT CHANGE UP (ref 98–107)
CHLORIDE SERPL-SCNC: 103 MMOL/L — SIGNIFICANT CHANGE UP (ref 98–107)
CO2 SERPL-SCNC: 20 MMOL/L — LOW (ref 22–31)
CO2 SERPL-SCNC: 20 MMOL/L — LOW (ref 22–31)
CREAT SERPL-MCNC: 0.63 MG/DL — SIGNIFICANT CHANGE UP (ref 0.5–1.3)
CREAT SERPL-MCNC: 0.63 MG/DL — SIGNIFICANT CHANGE UP (ref 0.5–1.3)
GLUCOSE SERPL-MCNC: 84 MG/DL — SIGNIFICANT CHANGE UP (ref 70–99)
GLUCOSE SERPL-MCNC: 84 MG/DL — SIGNIFICANT CHANGE UP (ref 70–99)
MAGNESIUM SERPL-MCNC: 1.9 MG/DL — SIGNIFICANT CHANGE UP (ref 1.6–2.6)
PHOSPHATE SERPL-MCNC: 2.9 MG/DL — SIGNIFICANT CHANGE UP (ref 2.5–4.5)
POTASSIUM SERPL-MCNC: 3.9 MMOL/L — SIGNIFICANT CHANGE UP (ref 3.5–5.3)
POTASSIUM SERPL-MCNC: 3.9 MMOL/L — SIGNIFICANT CHANGE UP (ref 3.5–5.3)
POTASSIUM SERPL-SCNC: 3.9 MMOL/L — SIGNIFICANT CHANGE UP (ref 3.5–5.3)
POTASSIUM SERPL-SCNC: 3.9 MMOL/L — SIGNIFICANT CHANGE UP (ref 3.5–5.3)
PROT SERPL-MCNC: 6.8 G/DL — SIGNIFICANT CHANGE UP (ref 6–8.3)
SODIUM SERPL-SCNC: 135 MMOL/L — SIGNIFICANT CHANGE UP (ref 135–145)
SODIUM SERPL-SCNC: 135 MMOL/L — SIGNIFICANT CHANGE UP (ref 135–145)

## 2019-07-07 ENCOUNTER — EMERGENCY (EMERGENCY)
Facility: HOSPITAL | Age: 74
LOS: 1 days | End: 2019-07-07
Attending: EMERGENCY MEDICINE | Admitting: EMERGENCY MEDICINE
Payer: MEDICARE

## 2019-07-07 VITALS
SYSTOLIC BLOOD PRESSURE: 99 MMHG | HEART RATE: 60 BPM | DIASTOLIC BLOOD PRESSURE: 69 MMHG | OXYGEN SATURATION: 100 % | RESPIRATION RATE: 35 BRPM

## 2019-07-07 VITALS
RESPIRATION RATE: 28 BRPM | TEMPERATURE: 101 F | DIASTOLIC BLOOD PRESSURE: 82 MMHG | SYSTOLIC BLOOD PRESSURE: 125 MMHG | OXYGEN SATURATION: 100 % | HEART RATE: 90 BPM

## 2019-07-07 DIAGNOSIS — Z95.1 PRESENCE OF AORTOCORONARY BYPASS GRAFT: Chronic | ICD-10-CM

## 2019-07-07 LAB
ALBUMIN SERPL ELPH-MCNC: 3.1 G/DL — LOW (ref 3.3–5)
ALP SERPL-CCNC: 231 U/L — HIGH (ref 40–120)
ALT FLD-CCNC: 226 U/L — HIGH (ref 4–33)
ANION GAP SERPL CALC-SCNC: 35 MMO/L — HIGH (ref 7–14)
APTT BLD: 27.4 SEC — LOW (ref 27.5–36.3)
AST SERPL-CCNC: 473 U/L — HIGH (ref 4–32)
BASE EXCESS BLDV CALC-SCNC: -9.3 MMOL/L — SIGNIFICANT CHANGE UP
BASOPHILS # BLD AUTO: 0.02 K/UL — SIGNIFICANT CHANGE UP (ref 0–0.2)
BASOPHILS NFR BLD AUTO: 0.1 % — SIGNIFICANT CHANGE UP (ref 0–2)
BILIRUB SERPL-MCNC: 1.1 MG/DL — SIGNIFICANT CHANGE UP (ref 0.2–1.2)
BLOOD GAS VENOUS - CREATININE: 11.6 MG/DL — HIGH (ref 0.5–1.3)
BLOOD GAS VENOUS - FIO2: 21 — SIGNIFICANT CHANGE UP
BUN SERPL-MCNC: 63 MG/DL — HIGH (ref 7–23)
CALCIUM SERPL-MCNC: 9.9 MG/DL — SIGNIFICANT CHANGE UP (ref 8.4–10.5)
CHLORIDE BLDV-SCNC: 98 MMOL/L — SIGNIFICANT CHANGE UP (ref 96–108)
CHLORIDE SERPL-SCNC: 84 MMOL/L — LOW (ref 98–107)
CO2 SERPL-SCNC: 13 MMOL/L — LOW (ref 22–31)
CREAT SERPL-MCNC: 10.31 MG/DL — HIGH (ref 0.5–1.3)
EOSINOPHIL # BLD AUTO: 0.02 K/UL — SIGNIFICANT CHANGE UP (ref 0–0.5)
EOSINOPHIL NFR BLD AUTO: 0.1 % — SIGNIFICANT CHANGE UP (ref 0–6)
GAS PNL BLDV: 123 MMOL/L — LOW (ref 136–146)
GLUCOSE BLDV-MCNC: 302 MG/DL — HIGH (ref 70–99)
GLUCOSE SERPL-MCNC: 341 MG/DL — HIGH (ref 70–99)
HCO3 BLDV-SCNC: 16 MMOL/L — LOW (ref 20–27)
HCT VFR BLD CALC: 37.9 % — SIGNIFICANT CHANGE UP (ref 34.5–45)
HCT VFR BLDV CALC: 37.8 % — SIGNIFICANT CHANGE UP (ref 34.5–45)
HGB BLD-MCNC: 11.5 G/DL — SIGNIFICANT CHANGE UP (ref 11.5–15.5)
HGB BLDV-MCNC: 12.3 G/DL — SIGNIFICANT CHANGE UP (ref 11.5–15.5)
IMM GRANULOCYTES NFR BLD AUTO: 0.8 % — SIGNIFICANT CHANGE UP (ref 0–1.5)
INR BLD: 1.26 — HIGH (ref 0.88–1.17)
LACTATE BLDV-MCNC: 8.5 MMOL/L — CRITICAL HIGH (ref 0.5–2)
LIDOCAIN IGE QN: 132.1 U/L — HIGH (ref 7–60)
LYMPHOCYTES # BLD AUTO: 0.62 K/UL — LOW (ref 1–3.3)
LYMPHOCYTES # BLD AUTO: 4 % — LOW (ref 13–44)
MCHC RBC-ENTMCNC: 28 PG — SIGNIFICANT CHANGE UP (ref 27–34)
MCHC RBC-ENTMCNC: 30.3 % — LOW (ref 32–36)
MCV RBC AUTO: 92.2 FL — SIGNIFICANT CHANGE UP (ref 80–100)
METHOD TYPE: SIGNIFICANT CHANGE UP
MONOCYTES # BLD AUTO: 0.49 K/UL — SIGNIFICANT CHANGE UP (ref 0–0.9)
MONOCYTES NFR BLD AUTO: 3.1 % — SIGNIFICANT CHANGE UP (ref 2–14)
NEUTROPHILS # BLD AUTO: 14.3 K/UL — HIGH (ref 1.8–7.4)
NEUTROPHILS NFR BLD AUTO: 91.9 % — HIGH (ref 43–77)
NRBC # FLD: 0.08 K/UL — SIGNIFICANT CHANGE UP (ref 0–0)
ORGANISM # SPEC MICROSCOPIC CNT: SIGNIFICANT CHANGE UP
ORGANISM # SPEC MICROSCOPIC CNT: SIGNIFICANT CHANGE UP
PCO2 BLDV: 34 MMHG — LOW (ref 41–51)
PH BLDV: 7.29 PH — LOW (ref 7.32–7.43)
PLATELET # BLD AUTO: 160 K/UL — SIGNIFICANT CHANGE UP (ref 150–400)
PMV BLD: 11.1 FL — SIGNIFICANT CHANGE UP (ref 7–13)
PO2 BLDV: 38 MMHG — SIGNIFICANT CHANGE UP (ref 35–40)
POTASSIUM BLDV-SCNC: 8.4 MMOL/L — CRITICAL HIGH (ref 3.4–4.5)
POTASSIUM SERPL-MCNC: 5.4 MMOL/L — HIGH (ref 3.5–5.3)
POTASSIUM SERPL-SCNC: 5.4 MMOL/L — HIGH (ref 3.5–5.3)
PROT SERPL-MCNC: 6 G/DL — SIGNIFICANT CHANGE UP (ref 6–8.3)
PROTHROM AB SERPL-ACNC: 14.5 SEC — HIGH (ref 9.8–13.1)
RBC # BLD: 4.11 M/UL — SIGNIFICANT CHANGE UP (ref 3.8–5.2)
RBC # FLD: 18.1 % — HIGH (ref 10.3–14.5)
SAO2 % BLDV: 52.5 % — LOW (ref 60–85)
SODIUM SERPL-SCNC: 132 MMOL/L — LOW (ref 135–145)
SPECIMEN SOURCE: SIGNIFICANT CHANGE UP
SPECIMEN SOURCE: SIGNIFICANT CHANGE UP
WBC # BLD: 15.57 K/UL — HIGH (ref 3.8–10.5)
WBC # FLD AUTO: 15.57 K/UL — HIGH (ref 3.8–10.5)

## 2019-07-07 PROCEDURE — 71045 X-RAY EXAM CHEST 1 VIEW: CPT | Mod: 26

## 2019-07-07 PROCEDURE — 99291 CRITICAL CARE FIRST HOUR: CPT | Mod: 25

## 2019-07-07 PROCEDURE — 92950 HEART/LUNG RESUSCITATION CPR: CPT

## 2019-07-07 PROCEDURE — 31500 INSERT EMERGENCY AIRWAY: CPT

## 2019-07-07 PROCEDURE — 93010 ELECTROCARDIOGRAM REPORT: CPT | Mod: XU

## 2019-07-07 RX ORDER — NOREPINEPHRINE BITARTRATE/D5W 8 MG/250ML
0.05 PLASTIC BAG, INJECTION (ML) INTRAVENOUS
Qty: 8 | Refills: 0 | Status: DISCONTINUED | OUTPATIENT
Start: 2019-07-07 | End: 2019-07-11

## 2019-07-07 RX ORDER — SODIUM CHLORIDE 9 MG/ML
1000 INJECTION INTRAMUSCULAR; INTRAVENOUS; SUBCUTANEOUS ONCE
Refills: 0 | Status: COMPLETED | OUTPATIENT
Start: 2019-07-07 | End: 2019-07-07

## 2019-07-07 RX ORDER — PIPERACILLIN AND TAZOBACTAM 4; .5 G/20ML; G/20ML
3.38 INJECTION, POWDER, LYOPHILIZED, FOR SOLUTION INTRAVENOUS ONCE
Refills: 0 | Status: COMPLETED | OUTPATIENT
Start: 2019-07-07 | End: 2019-07-07

## 2019-07-07 RX ORDER — VANCOMYCIN HCL 1 G
1000 VIAL (EA) INTRAVENOUS ONCE
Refills: 0 | Status: COMPLETED | OUTPATIENT
Start: 2019-07-07 | End: 2019-07-07

## 2019-07-07 RX ORDER — ACETAMINOPHEN 500 MG
650 TABLET ORAL ONCE
Refills: 0 | Status: COMPLETED | OUTPATIENT
Start: 2019-07-07 | End: 2019-07-07

## 2019-07-07 RX ADMIN — Medication 5.16 MICROGRAM(S)/KG/MIN: at 02:04

## 2019-07-07 RX ADMIN — Medication 650 MILLIGRAM(S): at 00:30

## 2019-07-07 RX ADMIN — SODIUM CHLORIDE 1000 MILLILITER(S): 9 INJECTION INTRAMUSCULAR; INTRAVENOUS; SUBCUTANEOUS at 01:26

## 2019-07-07 RX ADMIN — Medication 250 MILLIGRAM(S): at 01:33

## 2019-07-07 RX ADMIN — PIPERACILLIN AND TAZOBACTAM 200 GRAM(S): 4; .5 INJECTION, POWDER, LYOPHILIZED, FOR SOLUTION INTRAVENOUS at 00:30

## 2019-07-07 NOTE — ED PROCEDURE NOTE - PROCEDURE ADDITIONAL DETAILS
cardiac arrest; direct laryngoscopy with successful placement of ET tube  however, patient diet  no imaging performed

## 2019-07-07 NOTE — ED ADULT NURSE NOTE - OBJECTIVE STATEMENT
pt received to room tr-b, brought in by EMS for AMS. family at bedside states pt c/o abdominal pain and became acutely delirious. family states pt usually is A&Ox4 ambulatory and has not been sick the past few days. abdomen distended, family states normal size for pt, pt has PD catheter to RLQ. 20GIV noted to left hand by EMS, 18G IV placed R arm. pt on plavix. IVF and meds administered per order. EKG completed. paced rhythm on tele.

## 2019-07-07 NOTE — ED PROVIDER NOTE - PROGRESS NOTE DETAILS
Tolu ARCE: Pt becoming hypotensive despite 1.5L NS.  Given h/o CHF and cardiac hx, pt unable to receive full 30cc/kg fluid bolus for sepsis.  pt started on levophed for hypotension.  After levophed, BP noted to be improved, but pt then became unresponsive with agonal respirations.  I was called to bedside, pt without pulse - CPR started, pt intubated emergently.  Initial rhythm PEA, pt cont on ACLS protocol, converted to VF.  Pt received mult shocks, several rounds of epi, bicarb and calcium, amidarone.  Given persistent vfib refractory to defib and amio, also attempted dual sequential defibrillation, esmolol.  Pt remained in VF arrest, no pulse returned.  Pt's family is aware of situation and after discussion with family members, decision made to stop resuscitation.  Time of death 3:07am.  Family is aware, currently at bedside, offered SW services. Lucila ARCE: Discussed case with ME office (Pineda Bell); not a ME case.

## 2019-07-07 NOTE — ED PROVIDER NOTE - CLINICAL SUMMARY MEDICAL DECISION MAKING FREE TEXT BOX
75 y/o F with h/o DM, HTN, CAD, ESRD on PD here with 1 day abd pain, n/v, now with hyperglycemia and lethargy.  Pt febrile in ED - likely sepsis consider peritonitis vs other intraabd infection.  Will cover broad spectrum abx, labs, cx, xr, ct abd/pel, admit.

## 2019-07-07 NOTE — ED PROVIDER NOTE - OBJECTIVE STATEMENT
73 y/o F with h/o HTN, DM on oral meds, CAD s/p CABGv2, ESRD on PD BIB EMS for hyperglycemia.  Pt reporting epigastric pain, nausea and multiple episodes of nbnb vomiting since 1pm.  Tonight about 1 hour pta pt began acting confused.  Fingerstick on EMS arrival >400.  No recent illness, fever, diarrhea, travel, hospitalizations.  Last PD was Friday evening.

## 2019-07-07 NOTE — ED PROVIDER NOTE - CARE PLAN
Principal Discharge DX:	Cardiac arrest  Secondary Diagnosis:	Sepsis  Secondary Diagnosis:	Transaminitis  Secondary Diagnosis:	Ventricular fibrillation

## 2019-07-07 NOTE — ED PROVIDER NOTE - CRITICAL CARE PROVIDED
conducted a detailed discussion of DNR status/documentation/interpretation of diagnostic studies/consult w/ pt's family directly relating to pts condition

## 2019-07-07 NOTE — ED ADULT TRIAGE NOTE - CHIEF COMPLAINT QUOTE
Pt ARCHIE WHITAKER from Home, called as Notification for Altered Mental Status.  at scene. Noted Labored breathing. PMHx ESRD on Pertitoneal Dialysis, last treatment around 7pm last night. CHF HTN.

## 2019-07-07 NOTE — ED ADULT NURSE NOTE - INTERVENTIONS DEFINITIONS
[place] to call system/Stretcher in lowest position, wheels locked, appropriate side rails in place/Reinforce activity limits and safety measures with patient and family/Instruct patient to call for assistance/Monitor gait and stability/Physically safe environment: no spills, clutter or unnecessary equipment/Provide visual clues: red socks/Non-slip footwear when patient is off stretcher

## 2019-07-07 NOTE — ED PROCEDURE NOTE - ATTENDING CONTRIBUTION TO CARE
Tolu ARCE: I have personally seen and examined this patient. I have fully participated in the care of this patient. I was present at all key portions of the procedure.

## 2019-07-08 LAB
ORGANISM # SPEC MICROSCOPIC CNT: SIGNIFICANT CHANGE UP

## 2019-07-10 LAB
-  AMIKACIN: SIGNIFICANT CHANGE UP
-  AMPICILLIN/SULBACTAM: SIGNIFICANT CHANGE UP
-  AMPICILLIN: SIGNIFICANT CHANGE UP
-  AZTREONAM: SIGNIFICANT CHANGE UP
-  CEFAZOLIN: SIGNIFICANT CHANGE UP
-  CEFEPIME: SIGNIFICANT CHANGE UP
-  CEFOXITIN: SIGNIFICANT CHANGE UP
-  CEFTAZIDIME: SIGNIFICANT CHANGE UP
-  CEFTRIAXONE: SIGNIFICANT CHANGE UP
-  CIPROFLOXACIN: SIGNIFICANT CHANGE UP
-  ERTAPENEM: SIGNIFICANT CHANGE UP
-  GENTAMICIN: SIGNIFICANT CHANGE UP
-  IMIPENEM: SIGNIFICANT CHANGE UP
-  LEVOFLOXACIN: SIGNIFICANT CHANGE UP
-  MEROPENEM: SIGNIFICANT CHANGE UP
-  PIPERACILLIN/TAZOBACTAM: SIGNIFICANT CHANGE UP
-  TIGECYCLINE: SIGNIFICANT CHANGE UP
-  TOBRAMYCIN: SIGNIFICANT CHANGE UP
-  TRIMETHOPRIM/SULFAMETHOXAZOLE: SIGNIFICANT CHANGE UP
METHOD TYPE: SIGNIFICANT CHANGE UP
ORGANISM # SPEC MICROSCOPIC CNT: SIGNIFICANT CHANGE UP

## 2019-07-15 LAB
BACTERIA BLD CULT: SIGNIFICANT CHANGE UP
K OXYTOCA DNA BLD POS QL NAA+NON-PROBE: SIGNIFICANT CHANGE UP

## 2019-08-20 ENCOUNTER — APPOINTMENT (OUTPATIENT)
Dept: ELECTROPHYSIOLOGY | Facility: CLINIC | Age: 74
End: 2019-08-20

## 2019-11-20 ENCOUNTER — APPOINTMENT (OUTPATIENT)
Dept: ELECTROPHYSIOLOGY | Facility: CLINIC | Age: 74
End: 2019-11-20

## 2020-01-13 NOTE — PATIENT PROFILE ADULT - HOW PATIENT ADDRESSED, PROFILE
Ms Bell Xelcarlos manuelz Pregnancy And Lactation Text: This medication is Pregnancy Category D and is not considered safe during pregnancy.  The risk during breast feeding is also uncertain.

## 2020-04-07 NOTE — DISCHARGE NOTE ADULT - NS AS DC FU INST LIST INST
Call placed to IR to see if they could offer the patient an appointment even due to COVID-19.  Patient is suffering from a lot of pain in the L leg that is keeping him up at night.  Office is going to sent a message to the NP on call and let our office know or contact the patient directly   no

## 2020-07-21 NOTE — ED PROVIDER NOTE - CADM POA CENTRAL LINE
Post-Care Instructions: I reviewed with the patient in detail post-care instructions. Patient is to wear sunprotection, and avoid picking at any of the treated lesions. Pt may apply Vaseline to crusted or scabbing areas.
Medical Necessity Information: It is in your best interest to select a reason for this procedure from the list below. All of these items fulfill various CMS LCD requirements except the new and changing color options.
Consent: The patient's consent was obtained including but not limited to risks of crusting, scabbing, blistering, scarring, darker or lighter pigmentary change, recurrence, incomplete removal and infection.
Number Of Freeze-Thaw Cycles: 2 freeze-thaw cycles
Medical Necessity Clause: This procedure was medically necessary because the lesions that were treated were:
Duration Of Freeze Thaw-Cycle (Seconds): 5-10
Detail Level: Detailed
Render Post-Care Instructions In Note?: no
No

## 2020-12-31 NOTE — ED PROVIDER NOTE - CONSTITUTIONAL [+], MLM
Additional Notes: Patient consent was obtained to proceed with the visit and recommended plan of care after discussion of all risks and benefits, including the risks of COVID-19 exposure. Detail Level: Simple FEVER/CHILLS

## 2021-01-01 NOTE — PROGRESS NOTE ADULT - SUBJECTIVE AND OBJECTIVE BOX
This Patient has an extensive cardiac history. The Patient with a diagnosis of coronary artery disease, coronary artery bypass graft surgery and severe ischemic cardiomyopathy. As a result of the diagnoses the patient demonstrates a decrease in mobility, generalized weakness and deconditioning. This patient requires a semi electrical hospital bed to allow for head of bed elevation to prevent aspiration, allow assistance  for bed mobility and out of bed activities. The patient would need the hospital bed with gel overlay to prevent decubitus ulcers as well. Feeding and  care were discussed today and parent questions were answered

## 2021-03-31 NOTE — ED ADULT NURSE NOTE - ED STAT RN HANDOFF DETAILS
REFILL REQUEST:    MEDICATION:zolpidem (AMBIEN) 10 MG tablet    Last refill:  90 tablet 1 11/2/2020     Last office visit: 3/22/21  Return in 2-3 months   Please advise on refills.   
REFILL REQUEST:    MEDICATION:zolpidem (AMBIEN) 10 MG tablet    PHARMACY:[facility] [address]    CALL BACK #[phone]   
report given to Trudy GIPSON

## 2021-08-30 NOTE — CONSULT NOTE ADULT - SUBJECTIVE AND OBJECTIVE BOX
CHIEF COMPLAINT:  sob orthopnea  HISTORY OF PRESENT ILLNESS:  HPI:  Carlee Bell is a 73 year old lady with extensive medical history which includes. ESRD on peritoneal dialysis for the last 2 years, CHF (EF 46%, w/PAH), CAD (2 stents?), PPM, HTN, HLD, T2DM (not on insulin), and hypothyroidism. The patient was brought in by ambulance w/c of shortness of breath.   The patient states that she was overall doing ok but over the past day or so she has been having progressing shortness of breath , not being able to lay flat.. decided to come to ED   in ED pt was noted to be fluid overloaded, nephrology consulted and pt now getting another session of peritoneal dialysis   recurrent admuissions for sob orthopnea though xray  normal and no edema or evidence of fluid overload    S/P PTCI of left main and proximal cx patent LIMA diffuse disease of small RCA  Allergies    Cipro (Unknown)  Diovan (Unknown)    Intolerances        Social History:   Denies smoking, drinking or drug use. Lives at home with her  who has parkinson's disease and dementia and her son who has polio    FAMILY HISTORY:  Family history of early CAD: mother had cad  Family history of diabetes mellitus: mother had dm      MEDICATIONS  (STANDING):  reviewed and reconciled home meds     PAST MEDICAL & SURGICAL HISTORY:  ESRD on peritoneal dialysis  Acid reflux  Hypertension  Dyslipidemia  Diabetes mellitus  CAD (coronary artery disease)  S/P CABG (coronary artery bypass graft): in 2012  H/O: hysterectomy  History of total knee replacement b/l  After cataract, bilateral          MEDICATIONS:  aspirin enteric coated 81 milliGRAM(s) Oral daily  clopidogrel Tablet 75 milliGRAM(s) Oral daily  labetalol 300 milliGRAM(s) Oral two times a day        acetaminophen   Tablet .. 650 milliGRAM(s) Oral every 6 hours PRN    docusate sodium 100 milliGRAM(s) Oral three times a day    allopurinol 300 milliGRAM(s) Oral daily  atorvastatin 40 milliGRAM(s) Oral at bedtime  dextrose 40% Gel 15 Gram(s) Oral once PRN  dextrose 50% Injectable 12.5 Gram(s) IV Push once  dextrose 50% Injectable 25 Gram(s) IV Push once  dextrose 50% Injectable 25 Gram(s) IV Push once  glucagon  Injectable 1 milliGRAM(s) IntraMuscular once PRN  insulin lispro (HumaLOG) corrective regimen sliding scale   SubCutaneous three times a day before meals  levothyroxine 50 MICROGram(s) Oral daily    calcitriol   Capsule 0.5 MICROGram(s) Oral daily  calcium acetate 1334 milliGRAM(s) Oral three times a day with meals  dextrose 5%. 1000 milliLiter(s) IV Continuous <Continuous>  gentamicin 0.1% Cream 1 Application(s) Topical <User Schedule> PRN  multivitamin 1 Tablet(s) Oral daily      FAMILY HISTORY:  Family history of early CAD: mother had cad  Family history of diabetes mellitus: mother had dm      Allergies    Cipro (Unknown)  Diovan (Unknown)    Intolerances    	    PHYSICAL EXAM:  T(C): 36.4 (04-24-19 @ 09:48), Max: 36.9 (04-23-19 @ 12:16)  HR: 63 (04-24-19 @ 09:48) (54 - 88)  BP: 93/42 (04-24-19 @ 09:48) (88/61 - 129/86)  RR: 18 (04-24-19 @ 09:48) (18 - 18)  SpO2: 100% (04-24-19 @ 09:48) (98% - 100%)  Wt(kg): --  I&O's Summary    23 Apr 2019 07:01  -  24 Apr 2019 07:00  --------------------------------------------------------  IN: 2000 mL / OUT: 4100 mL / NET: -2100 mL    24 Apr 2019 07:01  -  24 Apr 2019 10:43  --------------------------------------------------------  IN: 2000 mL / OUT: 2300 mL / NET: -300 mL        Appearance: Normal depressed NAD orthopneic	  HEENT:   Normal oral mucosa, PERRL, EOMI	  Cardiovascular: Normal S1 S2, 2/6 murmur at apex No JVD, Nno s3  Respiratory: Lungs clear to auscultation	  Psychiatry: A & O x 3, Mood & affect appropriate  Gastrointestinal:  Soft, Non-tender, + BS	  Skin: No rashes, No ecchymoses, No cyanosis	  Neurologic: Non-focal  Extremities: No clubbing, cyanosis or edema  Vascular: Peripheral pulses palpable 2+ bilaterally    TELEMETRY: 	    ECG:  < from: 12 Lead ECG (02.11.19 @ 10:00) >  Diagnosis Line Atrial-sensed ventricular-paced rhythm  Abnormal ECG    < from: TTE with Doppler (w/Cont) (12.14.18 @ 11:31) >  OBSERVATIONS:  Mitral Valve: Mitral annular calcification, otherwise  normal mitral valve. Moderate mitral regurgitation.  Aortic Root: Normal aortic root.  Aortic Valve: Calcified trileaflet aortic valve with normal  opening.  Left Atrium: Normal left atrium.  LA volume index = 20  cc/m2.  Left Ventricle: Endocardium not well visualized; grossly  mild to moderate segmental left ventricular systolic  dysfunction.  Hypokinesis of the basal to mid inferior wall  and basal to mid inferoseptum.  Endocardial visualization  enhanced with intravenous injection of echo contrast  (Definity). Normal left ventricular internal dimensions and  wall thicknesses.  Right Heart: Normal right atrium. The right ventricle is  not well visualized; grossly normal right ventricular  systolic function. Normal tricuspid valve.  Mildtricuspid  regurgitation. Normal pulmonic valve. Mild-moderate  pulmonic regurgitation.  Pericardium/PleuraNormal pericardium with no pericardial  effusion.  Hemodynamic: Estimated right ventricular systolic pressure  equals 49 mm Hg, assuming right atrial pressure equals 10  mm Hg, consistent with mild pulmonary hypertension.  ------------------------------------------------------------------------  CONCLUSIONS:  1. Mitral annular calcification, otherwise normal mitral  valve. Moderate mitral regurgitation.  2. Normal left ventricular internal dimensions and wall  thicknesses.  3. Endocardium not well visualized; grossly mild to  moderate segmental left ventricular systolic dysfunction.  Hypokinesis of the basal to mid inferior wall and basal to  mid inferoseptum.  Endocardial visualization enhanced with  intravenous injection of echo contrast (Definity).  4. The right ventricle is not well visualized; grossly  normal right ventricular systolic function.  5. Estimated right ventricular systolic pressure equals 49  mm Hg, assuming right atrial pressure equals 10 mm Hg,  consistent with mild pulmonary hypertension.  ------------------------------------------------------------------------  Confirmed on  12/14/2018 - 16:01:48 by Hunter Crow M.D.  ------------------------------------------------------------------------    < from: Xray Chest 2 Views PA/Lat (04.23.19 @ 12:24) >  IMPRESSION:  Clear lungs. No pleural effusions or pneumothorax.    Stable sternal wires mediastinal surgical clips left chest wall   biventricular pacemaker cardiomegaly and aortic calcifications.    Trachea midline.    Generalized osteopenia and spinal degenerative changes again noted.      < end of copied text >    	    	  LABS:	 	    CARDIAC MARKERS:                                  11.8   8.42  )-----------( 238      ( 24 Apr 2019 05:17 )             38.7     04-24    141  |  90<L>  |  67<H>  ----------------------------<  187<H>  4.7   |  24  |  11.48<H>    Ca    11.1<H>      24 Apr 2019 05:17  Phos  7.5     04-24  Mg     2.1     04-24    TPro  6.2  /  Alb  3.9  /  TBili  0.2  /  DBili  x   /  AST  21  /  ALT  31  /  AlkPhos  68  04-24    proBNP: Serum Pro-Brain Natriuretic Peptide: > 98060 pg/mL (04-23 @ 12:12)    Lipid Profile:   HgA1c: Hemoglobin A1C, Whole Blood: 9.8 % (04-24 @ 05:17)    TSH: Thyroid Stimulating Hormone, Serum: 8.74 uIU/mL (04-24 @ 05:17) Detail Level: Detailed Depth Of Biopsy: dermis Was A Bandage Applied: Yes Size Of Lesion In Cm: 0 Biopsy Type: H and E Biopsy Method: Personna blade Anesthesia Type: 1% lidocaine without epinephrine Anesthesia Volume In Cc (Will Not Render If 0): 0.5 Hemostasis: Drysol Wound Care: Petrolatum Dressing: bandage Destruction After The Procedure: No Type Of Destruction Used: Curettage Curettage Text: The wound bed was treated with curettage after the biopsy was performed. Cryotherapy Text: The wound bed was treated with cryotherapy after the biopsy was performed. Electrodesiccation Text: The wound bed was treated with electrodesiccation after the biopsy was performed. Electrodesiccation And Curettage Text: The wound bed was treated with electrodesiccation and curettage after the biopsy was performed. Silver Nitrate Text: The wound bed was treated with silver nitrate after the biopsy was performed. Lab: 6 Lab Facility: 3 Consent: Written consent was obtained and risks were reviewed including but not limited to scarring, infection, bleeding, scabbing, incomplete removal, nerve damage and allergy to anesthesia. Post-Care Instructions: I reviewed with the patient in detail post-care instructions. Patient is to keep the biopsy site dry overnight, and then apply bacitracin twice daily until healed. Patient may apply hydrogen peroxide soaks to remove any crusting. Notification Instructions: Patient will be notified of biopsy results. However, patient instructed to call the office if not contacted within 2 weeks. Billing Type: Third-Party Bill Information: Selecting Yes will display possible errors in your note based on the variables you have selected. This validation is only offered as a suggestion for you. PLEASE NOTE THAT THE VALIDATION TEXT WILL BE REMOVED WHEN YOU FINALIZE YOUR NOTE. IF YOU WANT TO FAX A PRELIMINARY NOTE YOU WILL NEED TO TOGGLE THIS TO 'NO' IF YOU DO NOT WANT IT IN YOUR FAXED NOTE.

## 2022-08-08 NOTE — H&P ADULT - NEGATIVE FEMALE-SPECIFIC SYMPTOMS
Counseling and Referral of Other Preventative  (Italic type indicates deductible and co-insurance are waived)    Patient Name: Nazanin Saeed  Today's Date: 8/8/2022    Health Maintenance       Date Due Completion Date    DEXA Scan 11/04/2018 11/4/2015 (Done)    Override on 11/4/2015: Done    COVID-19 Vaccine (5 - Booster for Pfizer series) 08/12/2022 4/12/2022    Influenza Vaccine (1) 09/01/2022 10/5/2021    TETANUS VACCINE 12/10/2024 12/10/2014    Lipid Panel 08/02/2027 8/2/2022        No orders of the defined types were placed in this encounter.    The following information is provided to all patients.  This information is to help you find resources for any of the problems found today that may be affecting your health:                Living healthy guide: www.[facility].[place].gov      Understanding Diabetes: www.diabetes.org      Eating healthy: www.cdc.gov/healthyweight      [facility] home safety checklist: www.cdc.gov/steadi/patient.html      Agency on Aging: www.goea.[place].[facility]      Alcoholics anonymous (AA): www.aa.org      Physical Activity: www.jolene.nih.gov/mt3jbhb      Tobacco use: www.quitwithusla.org      no vaginal discharge/no irregular menses

## 2022-10-07 NOTE — ED PROVIDER NOTE - CPE EDP ENMT NORM
normal... You can access the FollowMyHealth Patient Portal offered by [facility] by registering at the following website: [email or web address]. By joining [facility]’s FollowMyHealth portal, you will also be able to view your health information using other applications (apps) compatible with our system.

## 2023-05-30 NOTE — ED PROCEDURE NOTE - PROCEDURE ADDITIONAL DETAILS
Focused ED Transthoracic echo 57414 - indication (      )    Grey scale imaging obtained in four views ( parasternal long, parasternal short, apical and subxiphoid)    No pericardial effusion noted.  No evidence of cardiac tamponade  LV contractility - severely decreased.  RV normal size.  Decreased RV function.    Impression: no pericardial effusion, severely decreased contractility, RV normal size, decreased RV function.  Dexeus. Focused ED Transthoracic echo 87569 - indication (SOB   )    Grey scale imaging obtained in four views ( parasternal long, parasternal short, apical and subxiphoid)    No pericardial effusion noted.  No evidence of cardiac tamponade  LV contractility - severely decreased.  RV normal size.  Decreased RV function.  IVC dilated with minimal respiratory change (2.0cm-1.74cm)    Impression: no pericardial effusion, severely decreased contractility, RV normal size, decreased RV function.  Mod-sev mitral regurgitation, no aortic insufficiency seen.  Dexeus. Clothing

## 2024-03-18 NOTE — ED ADULT TRIAGE NOTE - TEMPERATURE IN CELSIUS (DEGREES C)
Jane Staley is a 80 year old female presenting for Right shoulder pain, been going on for a little over a month. Patient says the pain is in the shoulder and radiates into the bicep and around the back of the neck. Patient says the pain is sharp and comes and goes. Patient is taking ibuprofen for pain. Patient has not gone to physical therapy. Patient denies numbness and tingling.     Medications reviewed and updated.      PCP verified,     Local pharmacy verified.    Social History     Tobacco Use   Smoking Status Former    Current packs/day: 0.00    Types: Cigarettes    Quit date: 1980    Years since quittin.2   Smokeless Tobacco Never   Tobacco Comments    19     ALLERGIES:  No Known Allergies       38.4

## 2024-07-31 NOTE — PATIENT PROFILE ADULT. - HEALTHCARE INFORMATION NEEDED, PROFILE
[facility]  [facility] Note    Date: 2024  Name: Desire Ulrich  MRN: [id number]       : 1966  Diagnosis: Lupus (M32.9)   Treatment: Benlysta 960mg  Referring Provider: Dr. Moses  Supervising Provider: Dr. Moses    Patient arrived to [facility] at 0830.  Ms. Ulrich allergies reviewed and she agreed to receiving today's treatment. A physical assessment was performed initially and post-treatment.      Ms. Boyle vitals were monitored before and after medication administration.   Vitals:    24 0829 24 0936   BP: (!) 179/96 (!) 175/99   Pulse: 71 66   Resp: 16 16   Temp: 97.9 °F (36.6 °C) 97.9 °F (36.6 °C)   TempSrc: Oral Oral   SpO2: 98% 97%     Recent labs results:  Lab Results   Component Value Date/Time     2024 10:49 AM    K 3.9 2024 10:49 AM     2024 10:49 AM    CO2 26 2024 10:49 AM    BUN 13 2024 10:49 AM    CREATININE 0.96 2024 10:49 AM    GLUCOSE 108 2024 10:49 AM    CALCIUM 8.3 2024 10:49 AM    LABGLOM 68 2024 10:49 AM    LABGLOM >60 2023 01:05 PM    LABGLOM 64 2022 12:00 AM      Lab Results   Component Value Date    WBC 4.9 2024    HGB 12.3 2024    HCT 39.2 2024    MCV 82.2 2024     2024   Medications given per providers orders:  Administrations This Visit       0.9 % sodium chloride infusion       Admin Date  2024 Action  New Bag Dose  20 mL/hr Rate  20 mL/hr Route  IntraVENous Administered By  Neha Momin, RN              belimumab (BENLYSTA) 960 mg in sodium chloride 0.9 % 287 mL infusion       Admin Date  2024 Action  New Bag Dose  960 mg Rate  287 mL/hr Route  IntraVENous Administered By  Neha Momin RN              sodium chloride flush 0.9 % injection 5-40 mL       Admin Date  2024 Action  Given Dose  10 mL Route  IntraVENous Administered By  Neha Momin, ARMAND                Benlysta 120mg x8 vial (Total dose 960mg, 0mg wasted)  NDC  none at this time

## 2025-01-15 NOTE — PHYSICAL THERAPY INITIAL EVALUATION ADULT - IMPAIRMENTS CONTRIBUTING TO GAIT DEVIATIONS, PT EVAL
Case Management Discharge Note                Selected Continued Care - Admitted Since 1/1/2025       Destination Coordination complete.      Service Provider Services Address Phone Fax Patient Preferred    [facility] [address] [address] [phone] --              Durable Medical Equipment    No services have been selected for the patient.                Dialysis/Infusion    No services have been selected for the patient.                Home Medical Care    No services have been selected for the patient.                Therapy    No services have been selected for the patient.                Community Resources    No services have been selected for the patient.                Community & DME    No services have been selected for the patient.                    Transportation Services  Ambulance: [facility]    Final Discharge Disposition Code: 03 - skilled nursing facility (SNF)   decreased strength

## 2025-03-10 NOTE — DISCHARGE NOTE ADULT - LAUNCH MEDICATION RECONCILIATION
Requested Prescriptions     Pending Prescriptions Disp Refills    LOSARTAN 100 MG Oral Tab [Pharmacy Med Name: Losartan Potassium 100 MG Oral Tablet] 87 tablet 0     Sig: Take 1 tablet by mouth once daily       Last Refill: 10/7/24    Last OV: 11/4    Please review and advise.     <<-----Click here for Discharge Medication Review

## 2025-03-29 NOTE — PROGRESS NOTE ADULT - PROBLEM SELECTOR PLAN 6
She has a low risk for recurrence. Thyroglobulin levels have remained stable, with the last recorded level being 0.3, most recent neck ultrasound with lymph node mapping showed a stable thyroid bed nodule.  The most recent TSH level was within the target range at 1.5. The current dose of levothyroxine 125 mcg daily will be maintained. A prescription for Synthroid 125 mcg has been provided to see if it improves symptoms compared to the generic levothyroxine. An ultrasound will be scheduled in 6 months. Thyroglobulin and thyroglobulin antibody tests will be ordered with the next set of labs, along with TSH. If there are any issues with the thyroglobulin levels, further action will be taken sooner.      Follow-up  The patient will follow up in 6 months.        Orders:  •  TGAB+THYROGLOBULIN,MACHO OR LCMS  
Recent calcium was normal and PTH remains normal. Hypoparathyroidism after procedure is likely,   We will continue calcium citrate at 600 mg twice a day, and calcitriol 0.25 mcg daily.    Orders:  •  Calcium, ionized; Future  
See plan for papillary thyroid cancer.    Orders:  •  Synthroid 125 MCG tablet; Take 1 tablet (125 mcg total) by mouth daily  •  TSH, 3rd generation with Free T4 reflex; Future  
The patient reported two episodes of sudden arm weakness and shaking, lasting about 20 minutes each. These episodes are unlikely to be related to low calcium or magnesium levels. The patient will monitor for any recurrence of these symptoms and inform the provider if they occur again. The patient will also discuss these symptoms with her neurologist to rule out any neurological causes.     
-Lipid panel with elevated TG  -low fat, DASH diet  -Risk factor modification  -continue asa, plavix and atorvastatin
cont aspirin, plavix

## 2025-04-07 NOTE — ED PROVIDER NOTE - NS ED MD AUTOPSYOFFER
Patient ambulatory to ED with c/o of upper and lower back pain. Says her home oxycodone is not helping  with the pain.  Reporting generalized weakness, unable to complete task due to SOB. History of stage metastatic breat cancer.     Triage Assessment (Adult)       Row Name 04/06/25 1691          Triage Assessment    Airway WDL WDL        Respiratory WDL    Respiratory WDL WDL        Skin Circulation/Temperature WDL    Skin Circulation/Temperature WDL WDL        Cardiac WDL    Cardiac WDL WDL;X   feels tired        Peripheral/Neurovascular WDL    Peripheral Neurovascular WDL WDL        Cognitive/Neuro/Behavioral WDL    Cognitive/Neuro/Behavioral WDL WDL                      was offered

## 2025-06-19 NOTE — OCCUPATIONAL THERAPY INITIAL EVALUATION ADULT - SIT-TO-STAND BALANCE
What Type Of Note Output Would You Prefer (Optional)?: Standard Output
Hpi Title: Evaluation of Skin Lesions
fair balance